# Patient Record
Sex: MALE | Race: WHITE | NOT HISPANIC OR LATINO | Employment: OTHER | URBAN - METROPOLITAN AREA
[De-identification: names, ages, dates, MRNs, and addresses within clinical notes are randomized per-mention and may not be internally consistent; named-entity substitution may affect disease eponyms.]

---

## 2017-01-05 ENCOUNTER — COMMUNICATION - HEALTHEAST (OUTPATIENT)
Dept: FAMILY MEDICINE | Facility: CLINIC | Age: 72
End: 2017-01-05

## 2017-01-05 DIAGNOSIS — E87.6 HYPOKALEMIA: ICD-10-CM

## 2017-01-09 ENCOUNTER — COMMUNICATION - HEALTHEAST (OUTPATIENT)
Dept: FAMILY MEDICINE | Facility: CLINIC | Age: 72
End: 2017-01-09

## 2017-01-12 ENCOUNTER — AMBULATORY - HEALTHEAST (OUTPATIENT)
Dept: LAB | Facility: CLINIC | Age: 72
End: 2017-01-12

## 2017-01-12 ENCOUNTER — COMMUNICATION - HEALTHEAST (OUTPATIENT)
Dept: NURSING | Facility: CLINIC | Age: 72
End: 2017-01-12

## 2017-01-12 DIAGNOSIS — E87.6 HYPOKALEMIA: ICD-10-CM

## 2017-01-12 DIAGNOSIS — I48.91 ATRIAL FIBRILLATION, UNSPECIFIED TYPE (H): ICD-10-CM

## 2017-01-26 ENCOUNTER — COMMUNICATION - HEALTHEAST (OUTPATIENT)
Dept: NURSING | Facility: CLINIC | Age: 72
End: 2017-01-26

## 2017-01-26 ENCOUNTER — AMBULATORY - HEALTHEAST (OUTPATIENT)
Dept: LAB | Facility: CLINIC | Age: 72
End: 2017-01-26

## 2017-01-26 DIAGNOSIS — I48.91 ATRIAL FIBRILLATION, UNSPECIFIED TYPE (H): ICD-10-CM

## 2017-02-17 ENCOUNTER — COMMUNICATION - HEALTHEAST (OUTPATIENT)
Dept: NURSING | Facility: CLINIC | Age: 72
End: 2017-02-17

## 2017-02-17 ENCOUNTER — AMBULATORY - HEALTHEAST (OUTPATIENT)
Dept: LAB | Facility: CLINIC | Age: 72
End: 2017-02-17

## 2017-02-17 DIAGNOSIS — I48.91 ATRIAL FIBRILLATION, UNSPECIFIED TYPE (H): ICD-10-CM

## 2017-02-19 ENCOUNTER — COMMUNICATION - HEALTHEAST (OUTPATIENT)
Dept: FAMILY MEDICINE | Facility: CLINIC | Age: 72
End: 2017-02-19

## 2017-02-19 DIAGNOSIS — E87.6 HYPOKALEMIA: ICD-10-CM

## 2017-03-02 ENCOUNTER — COMMUNICATION - HEALTHEAST (OUTPATIENT)
Dept: FAMILY MEDICINE | Facility: CLINIC | Age: 72
End: 2017-03-02

## 2017-03-02 DIAGNOSIS — E87.6 HYPOKALEMIA: ICD-10-CM

## 2017-03-07 ENCOUNTER — AMBULATORY - HEALTHEAST (OUTPATIENT)
Dept: LAB | Facility: CLINIC | Age: 72
End: 2017-03-07

## 2017-03-07 ENCOUNTER — COMMUNICATION - HEALTHEAST (OUTPATIENT)
Dept: NURSING | Facility: CLINIC | Age: 72
End: 2017-03-07

## 2017-03-07 DIAGNOSIS — I48.91 ATRIAL FIBRILLATION, UNSPECIFIED TYPE (H): ICD-10-CM

## 2017-03-21 ENCOUNTER — AMBULATORY - HEALTHEAST (OUTPATIENT)
Dept: LAB | Facility: CLINIC | Age: 72
End: 2017-03-21

## 2017-03-21 ENCOUNTER — COMMUNICATION - HEALTHEAST (OUTPATIENT)
Dept: FAMILY MEDICINE | Facility: CLINIC | Age: 72
End: 2017-03-21

## 2017-03-21 DIAGNOSIS — I48.91 ATRIAL FIBRILLATION, UNSPECIFIED TYPE (H): ICD-10-CM

## 2017-03-31 ENCOUNTER — COMMUNICATION - HEALTHEAST (OUTPATIENT)
Dept: FAMILY MEDICINE | Facility: CLINIC | Age: 72
End: 2017-03-31

## 2017-03-31 DIAGNOSIS — I48.91 ATRIAL FIBRILLATION (H): ICD-10-CM

## 2017-04-04 ENCOUNTER — COMMUNICATION - HEALTHEAST (OUTPATIENT)
Dept: FAMILY MEDICINE | Facility: CLINIC | Age: 72
End: 2017-04-04

## 2017-04-04 DIAGNOSIS — I48.91 ATRIAL FIBRILLATION (H): ICD-10-CM

## 2017-04-06 ENCOUNTER — COMMUNICATION - HEALTHEAST (OUTPATIENT)
Dept: SCHEDULING | Facility: CLINIC | Age: 72
End: 2017-04-06

## 2017-04-11 ENCOUNTER — HOME CARE/HOSPICE - HEALTHEAST (OUTPATIENT)
Dept: HOME HEALTH SERVICES | Facility: HOME HEALTH | Age: 72
End: 2017-04-11

## 2017-04-12 ENCOUNTER — COMMUNICATION - HEALTHEAST (OUTPATIENT)
Dept: FAMILY MEDICINE | Facility: CLINIC | Age: 72
End: 2017-04-12

## 2017-04-13 ENCOUNTER — COMMUNICATION - HEALTHEAST (OUTPATIENT)
Dept: LAB | Facility: CLINIC | Age: 72
End: 2017-04-13

## 2017-04-13 ENCOUNTER — OFFICE VISIT - HEALTHEAST (OUTPATIENT)
Dept: FAMILY MEDICINE | Facility: CLINIC | Age: 72
End: 2017-04-13

## 2017-04-13 DIAGNOSIS — I48.91 ATRIAL FIBRILLATION, UNSPECIFIED TYPE (H): ICD-10-CM

## 2017-04-13 DIAGNOSIS — Z87.440 HISTORY OF UTI: ICD-10-CM

## 2017-04-17 ENCOUNTER — OFFICE VISIT - HEALTHEAST (OUTPATIENT)
Dept: FAMILY MEDICINE | Facility: CLINIC | Age: 72
End: 2017-04-17

## 2017-04-17 ENCOUNTER — COMMUNICATION - HEALTHEAST (OUTPATIENT)
Dept: FAMILY MEDICINE | Facility: CLINIC | Age: 72
End: 2017-04-17

## 2017-04-17 ENCOUNTER — COMMUNICATION - HEALTHEAST (OUTPATIENT)
Dept: NURSING | Facility: CLINIC | Age: 72
End: 2017-04-17

## 2017-04-17 DIAGNOSIS — F03.90 DEMENTIA (H): ICD-10-CM

## 2017-04-17 DIAGNOSIS — I48.91 ATRIAL FIBRILLATION, UNSPECIFIED TYPE (H): ICD-10-CM

## 2017-04-17 DIAGNOSIS — R31.9 HEMATURIA: ICD-10-CM

## 2017-04-17 ASSESSMENT — MIFFLIN-ST. JEOR: SCORE: 1594.93

## 2017-04-26 ENCOUNTER — COMMUNICATION - HEALTHEAST (OUTPATIENT)
Dept: NURSING | Facility: CLINIC | Age: 72
End: 2017-04-26

## 2017-04-26 ENCOUNTER — AMBULATORY - HEALTHEAST (OUTPATIENT)
Dept: LAB | Facility: CLINIC | Age: 72
End: 2017-04-26

## 2017-04-26 DIAGNOSIS — I48.91 ATRIAL FIBRILLATION, UNSPECIFIED TYPE (H): ICD-10-CM

## 2017-05-05 ENCOUNTER — RECORDS - HEALTHEAST (OUTPATIENT)
Dept: ADMINISTRATIVE | Facility: OTHER | Age: 72
End: 2017-05-05

## 2017-05-05 ENCOUNTER — COMMUNICATION - HEALTHEAST (OUTPATIENT)
Dept: NURSING | Facility: CLINIC | Age: 72
End: 2017-05-05

## 2017-05-05 ENCOUNTER — AMBULATORY - HEALTHEAST (OUTPATIENT)
Dept: LAB | Facility: CLINIC | Age: 72
End: 2017-05-05

## 2017-05-05 DIAGNOSIS — I48.91 ATRIAL FIBRILLATION, UNSPECIFIED TYPE (H): ICD-10-CM

## 2017-05-09 ENCOUNTER — COMMUNICATION - HEALTHEAST (OUTPATIENT)
Dept: NURSING | Facility: CLINIC | Age: 72
End: 2017-05-09

## 2017-05-11 ENCOUNTER — HOSPITAL ENCOUNTER (OUTPATIENT)
Dept: CT IMAGING | Facility: CLINIC | Age: 72
Discharge: HOME | End: 2017-05-11
Attending: FAMILY MEDICINE

## 2017-05-11 DIAGNOSIS — Z87.440 HISTORY OF UTI: ICD-10-CM

## 2017-05-19 ENCOUNTER — COMMUNICATION - HEALTHEAST (OUTPATIENT)
Dept: NURSING | Facility: CLINIC | Age: 72
End: 2017-05-19

## 2017-05-19 ENCOUNTER — AMBULATORY - HEALTHEAST (OUTPATIENT)
Dept: LAB | Facility: CLINIC | Age: 72
End: 2017-05-19

## 2017-05-19 DIAGNOSIS — I48.91 ATRIAL FIBRILLATION, UNSPECIFIED TYPE (H): ICD-10-CM

## 2017-05-30 ENCOUNTER — COMMUNICATION - HEALTHEAST (OUTPATIENT)
Dept: FAMILY MEDICINE | Facility: CLINIC | Age: 72
End: 2017-05-30

## 2017-05-30 DIAGNOSIS — I10 ESSENTIAL HYPERTENSION: ICD-10-CM

## 2017-06-06 ENCOUNTER — OFFICE VISIT - HEALTHEAST (OUTPATIENT)
Dept: FAMILY MEDICINE | Facility: CLINIC | Age: 72
End: 2017-06-06

## 2017-06-06 ENCOUNTER — COMMUNICATION - HEALTHEAST (OUTPATIENT)
Dept: NURSING | Facility: CLINIC | Age: 72
End: 2017-06-06

## 2017-06-06 DIAGNOSIS — I48.91 ATRIAL FIBRILLATION, UNSPECIFIED TYPE (H): ICD-10-CM

## 2017-06-06 DIAGNOSIS — F03.90 DEMENTIA (H): ICD-10-CM

## 2017-06-06 DIAGNOSIS — H91.90 HEARING LOSS: ICD-10-CM

## 2017-06-06 DIAGNOSIS — I89.0 LYMPHEDEMA: ICD-10-CM

## 2017-06-06 DIAGNOSIS — N40.0 BPH (BENIGN PROSTATIC HYPERPLASIA): ICD-10-CM

## 2017-06-06 DIAGNOSIS — I48.91 ATRIAL FIBRILLATION (H): ICD-10-CM

## 2017-06-08 ENCOUNTER — AMBULATORY - HEALTHEAST (OUTPATIENT)
Dept: FAMILY MEDICINE | Facility: CLINIC | Age: 72
End: 2017-06-08

## 2017-06-08 ENCOUNTER — COMMUNICATION - HEALTHEAST (OUTPATIENT)
Dept: FAMILY MEDICINE | Facility: CLINIC | Age: 72
End: 2017-06-08

## 2017-06-08 DIAGNOSIS — E87.6 HYPOKALEMIA: ICD-10-CM

## 2017-06-15 ENCOUNTER — HOME CARE/HOSPICE - HEALTHEAST (OUTPATIENT)
Dept: HOME HEALTH SERVICES | Facility: HOME HEALTH | Age: 72
End: 2017-06-15

## 2017-06-16 ENCOUNTER — COMMUNICATION - HEALTHEAST (OUTPATIENT)
Dept: FAMILY MEDICINE | Facility: CLINIC | Age: 72
End: 2017-06-16

## 2017-06-16 DIAGNOSIS — R53.1 WEAKNESS: ICD-10-CM

## 2017-06-21 ENCOUNTER — COMMUNICATION - HEALTHEAST (OUTPATIENT)
Dept: FAMILY MEDICINE | Facility: CLINIC | Age: 72
End: 2017-06-21

## 2017-06-21 ENCOUNTER — COMMUNICATION - HEALTHEAST (OUTPATIENT)
Dept: NURSING | Facility: CLINIC | Age: 72
End: 2017-06-21

## 2017-06-21 ENCOUNTER — COMMUNICATION - HEALTHEAST (OUTPATIENT)
Dept: SCHEDULING | Facility: CLINIC | Age: 72
End: 2017-06-21

## 2017-06-26 ENCOUNTER — COMMUNICATION - HEALTHEAST (OUTPATIENT)
Dept: NURSING | Facility: CLINIC | Age: 72
End: 2017-06-26

## 2017-06-26 ENCOUNTER — OFFICE VISIT - HEALTHEAST (OUTPATIENT)
Dept: FAMILY MEDICINE | Facility: CLINIC | Age: 72
End: 2017-06-26

## 2017-06-26 DIAGNOSIS — I48.91 ATRIAL FIBRILLATION (H): ICD-10-CM

## 2017-06-26 DIAGNOSIS — E83.42 HYPOMAGNESEMIA: ICD-10-CM

## 2017-06-26 DIAGNOSIS — F03.90 DEMENTIA (H): ICD-10-CM

## 2017-06-26 DIAGNOSIS — J18.9 PNEUMONIA: ICD-10-CM

## 2017-06-26 DIAGNOSIS — I48.91 ATRIAL FIBRILLATION, UNSPECIFIED TYPE (H): ICD-10-CM

## 2017-06-26 DIAGNOSIS — E87.6 HYPOKALEMIA: ICD-10-CM

## 2017-06-27 ENCOUNTER — RECORDS - HEALTHEAST (OUTPATIENT)
Dept: ADMINISTRATIVE | Facility: OTHER | Age: 72
End: 2017-06-27

## 2017-07-10 ENCOUNTER — COMMUNICATION - HEALTHEAST (OUTPATIENT)
Dept: FAMILY MEDICINE | Facility: CLINIC | Age: 72
End: 2017-07-10

## 2017-07-10 DIAGNOSIS — H91.90 HEARING LOSS: ICD-10-CM

## 2017-08-06 ENCOUNTER — COMMUNICATION - HEALTHEAST (OUTPATIENT)
Dept: FAMILY MEDICINE | Facility: CLINIC | Age: 72
End: 2017-08-06

## 2017-08-06 DIAGNOSIS — F41.9 ANXIETY: ICD-10-CM

## 2017-09-12 ENCOUNTER — OFFICE VISIT - HEALTHEAST (OUTPATIENT)
Dept: CARDIOLOGY | Facility: CLINIC | Age: 72
End: 2017-09-12

## 2017-09-12 DIAGNOSIS — I10 ESSENTIAL HYPERTENSION WITH GOAL BLOOD PRESSURE LESS THAN 140/90: ICD-10-CM

## 2017-09-12 DIAGNOSIS — Z53.09 CONTRAINDICATION TO ANTICOAGULATION THERAPY: ICD-10-CM

## 2017-09-12 DIAGNOSIS — I48.20 CHRONIC ATRIAL FIBRILLATION (H): ICD-10-CM

## 2017-09-12 ASSESSMENT — MIFFLIN-ST. JEOR: SCORE: 1589.94

## 2017-09-13 ENCOUNTER — COMMUNICATION - HEALTHEAST (OUTPATIENT)
Dept: NURSING | Facility: CLINIC | Age: 72
End: 2017-09-13

## 2017-09-14 ENCOUNTER — COMMUNICATION - HEALTHEAST (OUTPATIENT)
Dept: FAMILY MEDICINE | Facility: CLINIC | Age: 72
End: 2017-09-14

## 2017-09-14 DIAGNOSIS — I10 HTN (HYPERTENSION): ICD-10-CM

## 2017-09-18 ENCOUNTER — COMMUNICATION - HEALTHEAST (OUTPATIENT)
Dept: CARDIOLOGY | Facility: CLINIC | Age: 72
End: 2017-09-18

## 2017-09-23 ENCOUNTER — COMMUNICATION - HEALTHEAST (OUTPATIENT)
Dept: FAMILY MEDICINE | Facility: CLINIC | Age: 72
End: 2017-09-23

## 2017-09-23 DIAGNOSIS — I48.91 ATRIAL FIBRILLATION (H): ICD-10-CM

## 2017-11-01 ENCOUNTER — COMMUNICATION - HEALTHEAST (OUTPATIENT)
Dept: CARDIOLOGY | Facility: CLINIC | Age: 72
End: 2017-11-01

## 2017-11-07 ENCOUNTER — COMMUNICATION - HEALTHEAST (OUTPATIENT)
Dept: FAMILY MEDICINE | Facility: CLINIC | Age: 72
End: 2017-11-07

## 2017-11-07 DIAGNOSIS — I10 ESSENTIAL HYPERTENSION: ICD-10-CM

## 2017-11-30 ENCOUNTER — OFFICE VISIT - HEALTHEAST (OUTPATIENT)
Dept: FAMILY MEDICINE | Facility: CLINIC | Age: 72
End: 2017-11-30

## 2017-11-30 DIAGNOSIS — N40.0 BPH (BENIGN PROSTATIC HYPERPLASIA): ICD-10-CM

## 2017-11-30 DIAGNOSIS — F03.90 DEMENTIA (H): ICD-10-CM

## 2017-11-30 DIAGNOSIS — I10 ESSENTIAL HYPERTENSION: ICD-10-CM

## 2017-11-30 DIAGNOSIS — I48.20 CHRONIC ATRIAL FIBRILLATION (H): ICD-10-CM

## 2017-11-30 LAB
CHOLEST SERPL-MCNC: 140 MG/DL
FASTING STATUS PATIENT QL REPORTED: NO
HDLC SERPL-MCNC: 41 MG/DL
LDLC SERPL CALC-MCNC: 86 MG/DL
TRIGL SERPL-MCNC: 67 MG/DL

## 2017-12-07 ENCOUNTER — COMMUNICATION - HEALTHEAST (OUTPATIENT)
Dept: FAMILY MEDICINE | Facility: CLINIC | Age: 72
End: 2017-12-07

## 2017-12-07 DIAGNOSIS — I48.91 ATRIAL FIBRILLATION (H): ICD-10-CM

## 2018-01-08 ENCOUNTER — COMMUNICATION - HEALTHEAST (OUTPATIENT)
Dept: FAMILY MEDICINE | Facility: CLINIC | Age: 73
End: 2018-01-08

## 2018-01-28 ENCOUNTER — COMMUNICATION - HEALTHEAST (OUTPATIENT)
Dept: FAMILY MEDICINE | Facility: CLINIC | Age: 73
End: 2018-01-28

## 2018-01-28 DIAGNOSIS — F41.9 ANXIETY: ICD-10-CM

## 2018-01-30 ENCOUNTER — OFFICE VISIT - HEALTHEAST (OUTPATIENT)
Dept: FAMILY MEDICINE | Facility: CLINIC | Age: 73
End: 2018-01-30

## 2018-01-30 DIAGNOSIS — R29.6 FREQUENT FALLS: ICD-10-CM

## 2018-01-30 DIAGNOSIS — H91.90 HEARING LOSS: ICD-10-CM

## 2018-01-30 DIAGNOSIS — F32.89 DEPRESSIVE DISORDER, ATYPICAL: ICD-10-CM

## 2018-01-30 DIAGNOSIS — G30.9 ALZHEIMER'S DEMENTIA WITHOUT BEHAVIORAL DISTURBANCE, UNSPECIFIED TIMING OF DEMENTIA ONSET: ICD-10-CM

## 2018-01-30 DIAGNOSIS — I48.20 CHRONIC ATRIAL FIBRILLATION (H): ICD-10-CM

## 2018-01-30 DIAGNOSIS — F41.1 ANXIETY STATE: ICD-10-CM

## 2018-01-30 DIAGNOSIS — R06.00 PAROXYSMAL DYSPNEA: ICD-10-CM

## 2018-01-30 DIAGNOSIS — F02.80 ALZHEIMER'S DEMENTIA WITHOUT BEHAVIORAL DISTURBANCE, UNSPECIFIED TIMING OF DEMENTIA ONSET: ICD-10-CM

## 2018-01-30 LAB
ATRIAL RATE - MUSE: 60 BPM
DIASTOLIC BLOOD PRESSURE - MUSE: NORMAL MMHG
INTERPRETATION ECG - MUSE: NORMAL
P AXIS - MUSE: NORMAL DEGREES
PR INTERVAL - MUSE: NORMAL MS
QRS DURATION - MUSE: 122 MS
QT - MUSE: 456 MS
QTC - MUSE: 451 MS
R AXIS - MUSE: -51 DEGREES
SYSTOLIC BLOOD PRESSURE - MUSE: NORMAL MMHG
T AXIS - MUSE: 68 DEGREES
VENTRICULAR RATE- MUSE: 59 BPM

## 2018-01-30 ASSESSMENT — MIFFLIN-ST. JEOR: SCORE: 1568.17

## 2018-02-06 ENCOUNTER — COMMUNICATION - HEALTHEAST (OUTPATIENT)
Dept: FAMILY MEDICINE | Facility: CLINIC | Age: 73
End: 2018-02-06

## 2018-02-06 DIAGNOSIS — F41.1 ANXIETY STATE: ICD-10-CM

## 2018-02-13 ENCOUNTER — HOSPITAL ENCOUNTER (OUTPATIENT)
Dept: CARDIOLOGY | Facility: CLINIC | Age: 73
Discharge: HOME | End: 2018-02-13
Attending: FAMILY MEDICINE

## 2018-02-13 DIAGNOSIS — I48.20 CHRONIC ATRIAL FIBRILLATION (H): ICD-10-CM

## 2018-02-13 DIAGNOSIS — R06.00 PAROXYSMAL DYSPNEA: ICD-10-CM

## 2018-04-10 ENCOUNTER — OFFICE VISIT - HEALTHEAST (OUTPATIENT)
Dept: PHYSICAL THERAPY | Facility: REHABILITATION | Age: 73
End: 2018-04-10

## 2018-04-10 DIAGNOSIS — M62.81 GENERALIZED MUSCLE WEAKNESS: ICD-10-CM

## 2018-04-10 DIAGNOSIS — R26.81 UNSTEADINESS ON FEET: ICD-10-CM

## 2018-04-24 ENCOUNTER — OFFICE VISIT - HEALTHEAST (OUTPATIENT)
Dept: PHYSICAL THERAPY | Facility: REHABILITATION | Age: 73
End: 2018-04-24

## 2018-04-24 DIAGNOSIS — R26.81 UNSTEADINESS ON FEET: ICD-10-CM

## 2018-04-24 DIAGNOSIS — M62.81 GENERALIZED MUSCLE WEAKNESS: ICD-10-CM

## 2018-04-25 ENCOUNTER — AMBULATORY - HEALTHEAST (OUTPATIENT)
Dept: LAB | Facility: CLINIC | Age: 73
End: 2018-04-25

## 2018-04-25 DIAGNOSIS — I48.20 CHRONIC ATRIAL FIBRILLATION (H): ICD-10-CM

## 2018-04-25 DIAGNOSIS — F02.80 ALZHEIMER'S DEMENTIA WITHOUT BEHAVIORAL DISTURBANCE, UNSPECIFIED TIMING OF DEMENTIA ONSET: ICD-10-CM

## 2018-04-25 DIAGNOSIS — G30.9 ALZHEIMER'S DEMENTIA WITHOUT BEHAVIORAL DISTURBANCE, UNSPECIFIED TIMING OF DEMENTIA ONSET: ICD-10-CM

## 2018-04-25 DIAGNOSIS — F41.1 ANXIETY STATE: ICD-10-CM

## 2018-04-25 DIAGNOSIS — F32.89 DEPRESSIVE DISORDER, ATYPICAL: ICD-10-CM

## 2018-04-25 DIAGNOSIS — R29.6 FREQUENT FALLS: ICD-10-CM

## 2018-04-25 DIAGNOSIS — R06.00 PAROXYSMAL DYSPNEA: ICD-10-CM

## 2018-04-25 LAB
ERYTHROCYTE [DISTWIDTH] IN BLOOD BY AUTOMATED COUNT: 12.6 % (ref 11–14.5)
HCT VFR BLD AUTO: 35.2 % (ref 40–54)
HGB BLD-MCNC: 12.1 G/DL (ref 14–18)
MCH RBC QN AUTO: 32.4 PG (ref 27–34)
MCHC RBC AUTO-ENTMCNC: 34.3 G/DL (ref 32–36)
MCV RBC AUTO: 94 FL (ref 80–100)
PLATELET # BLD AUTO: 264 THOU/UL (ref 140–440)
PMV BLD AUTO: 6.9 FL (ref 7–10)
RBC # BLD AUTO: 3.72 MILL/UL (ref 4.4–6.2)
WBC: 7 THOU/UL (ref 4–11)

## 2018-04-26 LAB
ANION GAP SERPL CALCULATED.3IONS-SCNC: 9 MMOL/L (ref 5–18)
BUN SERPL-MCNC: 17 MG/DL (ref 8–28)
CALCIUM SERPL-MCNC: 9.4 MG/DL (ref 8.5–10.5)
CHLORIDE BLD-SCNC: 102 MMOL/L (ref 98–107)
CO2 SERPL-SCNC: 28 MMOL/L (ref 22–31)
CREAT SERPL-MCNC: 0.9 MG/DL (ref 0.7–1.3)
GFR SERPL CREATININE-BSD FRML MDRD: >60 ML/MIN/1.73M2
GLUCOSE BLD-MCNC: 126 MG/DL (ref 70–125)
MAGNESIUM SERPL-MCNC: 1.4 MG/DL (ref 1.8–2.6)
POTASSIUM BLD-SCNC: 3.5 MMOL/L (ref 3.5–5)
SODIUM SERPL-SCNC: 139 MMOL/L (ref 136–145)
TSH SERPL DL<=0.005 MIU/L-ACNC: 1.78 UIU/ML (ref 0.3–5)

## 2018-04-27 ENCOUNTER — COMMUNICATION - HEALTHEAST (OUTPATIENT)
Dept: PHYSICAL THERAPY | Facility: REHABILITATION | Age: 73
End: 2018-04-27

## 2018-04-30 ENCOUNTER — COMMUNICATION - HEALTHEAST (OUTPATIENT)
Dept: FAMILY MEDICINE | Facility: CLINIC | Age: 73
End: 2018-04-30

## 2018-04-30 DIAGNOSIS — I10 ESSENTIAL HYPERTENSION: ICD-10-CM

## 2018-04-30 DIAGNOSIS — E87.6 HYPOKALEMIA: ICD-10-CM

## 2018-05-06 ENCOUNTER — AMBULATORY - HEALTHEAST (OUTPATIENT)
Dept: FAMILY MEDICINE | Facility: CLINIC | Age: 73
End: 2018-05-06

## 2018-05-06 DIAGNOSIS — E83.42 HYPOMAGNESEMIA: ICD-10-CM

## 2018-05-07 ENCOUNTER — COMMUNICATION - HEALTHEAST (OUTPATIENT)
Dept: FAMILY MEDICINE | Facility: CLINIC | Age: 73
End: 2018-05-07

## 2018-05-09 ENCOUNTER — COMMUNICATION - HEALTHEAST (OUTPATIENT)
Dept: SCHEDULING | Facility: CLINIC | Age: 73
End: 2018-05-09

## 2018-05-09 ENCOUNTER — COMMUNICATION - HEALTHEAST (OUTPATIENT)
Dept: FAMILY MEDICINE | Facility: CLINIC | Age: 73
End: 2018-05-09

## 2018-05-29 ENCOUNTER — AMBULATORY - HEALTHEAST (OUTPATIENT)
Dept: LAB | Facility: CLINIC | Age: 73
End: 2018-05-29

## 2018-05-29 DIAGNOSIS — E83.42 HYPOMAGNESEMIA: ICD-10-CM

## 2018-05-29 LAB — MAGNESIUM SERPL-MCNC: 1.2 MG/DL (ref 1.8–2.6)

## 2018-05-30 ENCOUNTER — COMMUNICATION - HEALTHEAST (OUTPATIENT)
Dept: FAMILY MEDICINE | Facility: CLINIC | Age: 73
End: 2018-05-30

## 2018-06-17 ENCOUNTER — COMMUNICATION - HEALTHEAST (OUTPATIENT)
Dept: FAMILY MEDICINE | Facility: CLINIC | Age: 73
End: 2018-06-17

## 2018-06-17 DIAGNOSIS — I48.91 ATRIAL FIBRILLATION (H): ICD-10-CM

## 2018-06-23 ENCOUNTER — COMMUNICATION - HEALTHEAST (OUTPATIENT)
Dept: FAMILY MEDICINE | Facility: CLINIC | Age: 73
End: 2018-06-23

## 2018-06-23 DIAGNOSIS — I48.91 A-FIB (H): ICD-10-CM

## 2018-07-03 ENCOUNTER — RECORDS - HEALTHEAST (OUTPATIENT)
Dept: ADMINISTRATIVE | Facility: OTHER | Age: 73
End: 2018-07-03

## 2018-07-31 ENCOUNTER — OFFICE VISIT - HEALTHEAST (OUTPATIENT)
Dept: FAMILY MEDICINE | Facility: CLINIC | Age: 73
End: 2018-07-31

## 2018-07-31 DIAGNOSIS — N40.0 BPH (BENIGN PROSTATIC HYPERPLASIA): ICD-10-CM

## 2018-07-31 DIAGNOSIS — I48.20 CHRONIC ATRIAL FIBRILLATION (H): ICD-10-CM

## 2018-07-31 DIAGNOSIS — R06.00 DYSPNEA: ICD-10-CM

## 2018-07-31 DIAGNOSIS — H91.90 HEARING LOSS: ICD-10-CM

## 2018-07-31 DIAGNOSIS — I10 ESSENTIAL HYPERTENSION: ICD-10-CM

## 2018-07-31 DIAGNOSIS — B37.2 YEAST DERMATITIS: ICD-10-CM

## 2018-07-31 DIAGNOSIS — F03.90 DEMENTIA (H): ICD-10-CM

## 2018-07-31 LAB
ALBUMIN SERPL-MCNC: 3.6 G/DL (ref 3.5–5)
ALP SERPL-CCNC: 91 U/L (ref 45–120)
ALT SERPL W P-5'-P-CCNC: 24 U/L (ref 0–45)
ANION GAP SERPL CALCULATED.3IONS-SCNC: 9 MMOL/L (ref 5–18)
AST SERPL W P-5'-P-CCNC: 67 U/L (ref 0–40)
BILIRUB SERPL-MCNC: 0.8 MG/DL (ref 0–1)
BNP SERPL-MCNC: 297 PG/ML (ref 0–69)
BUN SERPL-MCNC: 25 MG/DL (ref 8–28)
CALCIUM SERPL-MCNC: 10 MG/DL (ref 8.5–10.5)
CHLORIDE BLD-SCNC: 107 MMOL/L (ref 98–107)
CHOLEST SERPL-MCNC: 127 MG/DL
CO2 SERPL-SCNC: 27 MMOL/L (ref 22–31)
CREAT SERPL-MCNC: 1.08 MG/DL (ref 0.7–1.3)
ERYTHROCYTE [DISTWIDTH] IN BLOOD BY AUTOMATED COUNT: 12.8 % (ref 11–14.5)
FASTING STATUS PATIENT QL REPORTED: YES
GFR SERPL CREATININE-BSD FRML MDRD: >60 ML/MIN/1.73M2
GLUCOSE BLD-MCNC: 126 MG/DL (ref 70–125)
HCT VFR BLD AUTO: 35 % (ref 40–54)
HDLC SERPL-MCNC: 41 MG/DL
HGB BLD-MCNC: 12.3 G/DL (ref 14–18)
LDLC SERPL CALC-MCNC: 74 MG/DL
MAGNESIUM SERPL-MCNC: 1.9 MG/DL (ref 1.8–2.6)
MCH RBC QN AUTO: 32.4 PG (ref 27–34)
MCHC RBC AUTO-ENTMCNC: 35.2 G/DL (ref 32–36)
MCV RBC AUTO: 92 FL (ref 80–100)
PLATELET # BLD AUTO: 242 THOU/UL (ref 140–440)
PMV BLD AUTO: 7.4 FL (ref 7–10)
POTASSIUM BLD-SCNC: 3.6 MMOL/L (ref 3.5–5)
PROT SERPL-MCNC: 6.9 G/DL (ref 6–8)
RBC # BLD AUTO: 3.8 MILL/UL (ref 4.4–6.2)
SODIUM SERPL-SCNC: 143 MMOL/L (ref 136–145)
TRIGL SERPL-MCNC: 62 MG/DL
WBC: 9 THOU/UL (ref 4–11)

## 2018-07-31 ASSESSMENT — MIFFLIN-ST. JEOR: SCORE: 1592.21

## 2018-08-02 ENCOUNTER — AMBULATORY - HEALTHEAST (OUTPATIENT)
Dept: FAMILY MEDICINE | Facility: CLINIC | Age: 73
End: 2018-08-02

## 2018-08-02 DIAGNOSIS — R06.00 DYSPNEA: ICD-10-CM

## 2018-08-03 ENCOUNTER — COMMUNICATION - HEALTHEAST (OUTPATIENT)
Dept: FAMILY MEDICINE | Facility: CLINIC | Age: 73
End: 2018-08-03

## 2018-08-16 ENCOUNTER — HOSPITAL ENCOUNTER (OUTPATIENT)
Dept: CARDIOLOGY | Facility: CLINIC | Age: 73
Discharge: HOME | End: 2018-08-16
Attending: FAMILY MEDICINE

## 2018-08-16 DIAGNOSIS — R06.00 DYSPNEA: ICD-10-CM

## 2018-08-16 ASSESSMENT — MIFFLIN-ST. JEOR: SCORE: 1592.21

## 2018-08-17 LAB
AORTIC ROOT: 3.5 CM
AORTIC VALVE MEAN VELOCITY: 155 CM/S
AV DIMENSIONLESS INDEX VTI: 0.5
AV MEAN GRADIENT: 11 MMHG
AV PEAK GRADIENT: 16.3 MMHG
AV VALVE AREA: 1.6 CM2
AV VELOCITY RATIO: 0.5
BSA FOR ECHO PROCEDURE: 2.05 M2
CV BLOOD PRESSURE: NORMAL MMHG
CV ECHO HEIGHT: 69 IN
CV ECHO WEIGHT: 190 LBS
DOP CALC AO PEAK VEL: 202 CM/S
DOP CALC AO VTI: 43.3 CM
DOP CALC LVOT AREA: 3.46 CM2
DOP CALC LVOT DIAMETER: 2.1 CM
DOP CALC LVOT PEAK VEL: 92.9 CM/S
DOP CALC LVOT STROKE VOLUME: 69.9 CM3
DOP CALC MV VTI: 21.8 CM
DOP CALCLVOT PEAK VEL VTI: 20.2 CM
EJECTION FRACTION: 59 % (ref 55–75)
FRACTIONAL SHORTENING: 30.7 % (ref 28–44)
INTERVENTRICULAR SEPTUM IN END DIASTOLE: 1.4 CM (ref 0.6–1)
IVS/PW RATIO: 1.2
LA AREA 1: 23.4 CM2
LA AREA 2: 23.7 CM2
LEFT ATRIUM LENGTH: 5.62 CM
LEFT ATRIUM SIZE: 4 CM
LEFT ATRIUM VOLUME INDEX: 40.9 ML/M2
LEFT ATRIUM VOLUME: 83.9 ML
LEFT VENTRICLE CARDIAC INDEX: 0 L/MIN/M2
LEFT VENTRICLE CARDIAC OUTPUT: 0 L/MIN
LEFT VENTRICLE DIASTOLIC VOLUME INDEX: 63.9 CM3/M2 (ref 34–74)
LEFT VENTRICLE DIASTOLIC VOLUME: 131 CM3 (ref 62–150)
LEFT VENTRICLE HEART RATE: 0 BPM
LEFT VENTRICLE MASS INDEX: 128.1 G/M2
LEFT VENTRICLE SYSTOLIC VOLUME INDEX: 26.3 CM3/M2 (ref 11–31)
LEFT VENTRICLE SYSTOLIC VOLUME: 54 CM3 (ref 21–61)
LEFT VENTRICULAR INTERNAL DIMENSION IN DIASTOLE: 5.01 CM (ref 4.2–5.8)
LEFT VENTRICULAR INTERNAL DIMENSION IN SYSTOLE: 3.47 CM (ref 2.5–4)
LEFT VENTRICULAR MASS: 262.6 G
LEFT VENTRICULAR OUTFLOW TRACT MEAN GRADIENT: 2 MMHG
LEFT VENTRICULAR OUTFLOW TRACT MEAN VELOCITY: 69.4 CM/S
LEFT VENTRICULAR OUTFLOW TRACT PEAK GRADIENT: 3 MMHG
LEFT VENTRICULAR POSTERIOR WALL IN END DIASTOLE: 1.2 CM (ref 0.6–1)
LV STROKE VOLUME INDEX: 34.1 ML/M2
MITRAL VALVE DECELERATION SLOPE: 5230 MM/S2
MITRAL VALVE MEAN INFLOW VELOCITY: 73.1 CM/S
MITRAL VALVE PEAK VELOCITY: 104 CM/S
MITRAL VALVE PRESSURE HALF-TIME: 57 MS
MV AREA VTI: 3.21 CM2
MV AVERAGE E/E' RATIO: 10.2 CM/S
MV DECELERATION TIME: 201 MS
MV E'TISSUE VEL-LAT: 10.9 CM/S
MV E'TISSUE VEL-MED: 7.67 CM/S
MV LATERAL E/E' RATIO: 8.7
MV MEAN GRADIENT: 2 MMHG
MV MEDIAL E/E' RATIO: 12.4
MV PEAK E VELOCITY: 94.8 CM/S
MV PEAK GRADIENT: 4.3 MMHG
MV VALVE AREA BY CONTINUITY EQUATION: 3.2 CM2
MV VALVE AREA PRESSURE 1/2 METHOD: 3.9 CM2
NUC REST DIASTOLIC VOLUME INDEX: 3040 LBS
NUC REST SYSTOLIC VOLUME INDEX: 69 IN
TRICUSPID REGURGITATION PEAK PRESSURE GRADIENT: 18 MMHG
TRICUSPID VALVE ANULAR PLANE SYSTOLIC EXCURSION: 1.3 CM
TRICUSPID VALVE PEAK REGURGITANT VELOCITY: 212 CM/S

## 2018-09-13 ENCOUNTER — COMMUNICATION - HEALTHEAST (OUTPATIENT)
Dept: FAMILY MEDICINE | Facility: CLINIC | Age: 73
End: 2018-09-13

## 2018-09-13 DIAGNOSIS — I10 HTN (HYPERTENSION): ICD-10-CM

## 2018-09-23 ENCOUNTER — COMMUNICATION - HEALTHEAST (OUTPATIENT)
Dept: FAMILY MEDICINE | Facility: CLINIC | Age: 73
End: 2018-09-23

## 2018-09-23 DIAGNOSIS — I48.91 ATRIAL FIBRILLATION (H): ICD-10-CM

## 2018-10-02 ENCOUNTER — OFFICE VISIT - HEALTHEAST (OUTPATIENT)
Dept: FAMILY MEDICINE | Facility: CLINIC | Age: 73
End: 2018-10-02

## 2018-10-02 DIAGNOSIS — F32.89 DEPRESSIVE DISORDER, ATYPICAL: ICD-10-CM

## 2018-10-02 DIAGNOSIS — R06.00 DYSPNEA, UNSPECIFIED TYPE: ICD-10-CM

## 2018-10-03 ENCOUNTER — RECORDS - HEALTHEAST (OUTPATIENT)
Dept: LAB | Facility: HOSPITAL | Age: 73
End: 2018-10-03

## 2018-10-03 LAB
ALBUMIN SERPL-MCNC: 3.6 G/DL (ref 3.5–5)
ALP SERPL-CCNC: 76 U/L (ref 45–120)
ALT SERPL W P-5'-P-CCNC: 22 U/L (ref 0–45)
AST SERPL W P-5'-P-CCNC: 25 U/L (ref 0–40)
BILIRUB DIRECT SERPL-MCNC: 0.3 MG/DL
BILIRUB SERPL-MCNC: 1 MG/DL (ref 0–1)
PROT SERPL-MCNC: 7 G/DL (ref 6–8)

## 2018-10-18 ENCOUNTER — OFFICE VISIT - HEALTHEAST (OUTPATIENT)
Dept: FAMILY MEDICINE | Facility: CLINIC | Age: 73
End: 2018-10-18

## 2018-10-18 ENCOUNTER — HOSPITAL ENCOUNTER (OUTPATIENT)
Dept: RESPIRATORY THERAPY | Facility: CLINIC | Age: 73
Discharge: HOME | End: 2018-10-18
Attending: FAMILY MEDICINE

## 2018-10-18 DIAGNOSIS — I87.2 VENOUS (PERIPHERAL) INSUFFICIENCY: ICD-10-CM

## 2018-10-18 DIAGNOSIS — R06.00 DYSPNEA, UNSPECIFIED TYPE: ICD-10-CM

## 2018-10-18 LAB — HGB BLD-MCNC: 12.5 G/DL (ref 14–18)

## 2018-10-26 ENCOUNTER — COMMUNICATION - HEALTHEAST (OUTPATIENT)
Dept: FAMILY MEDICINE | Facility: CLINIC | Age: 73
End: 2018-10-26

## 2018-10-26 DIAGNOSIS — I87.2 VENOUS (PERIPHERAL) INSUFFICIENCY: ICD-10-CM

## 2018-11-05 ENCOUNTER — COMMUNICATION - HEALTHEAST (OUTPATIENT)
Dept: FAMILY MEDICINE | Facility: CLINIC | Age: 73
End: 2018-11-05

## 2018-11-05 DIAGNOSIS — I10 ESSENTIAL HYPERTENSION: ICD-10-CM

## 2018-12-10 ENCOUNTER — OFFICE VISIT - HEALTHEAST (OUTPATIENT)
Dept: FAMILY MEDICINE | Facility: CLINIC | Age: 73
End: 2018-12-10

## 2018-12-10 DIAGNOSIS — I48.20 CHRONIC ATRIAL FIBRILLATION (H): ICD-10-CM

## 2018-12-10 DIAGNOSIS — F03.90 DEMENTIA WITHOUT BEHAVIORAL DISTURBANCE, UNSPECIFIED DEMENTIA TYPE: ICD-10-CM

## 2018-12-10 DIAGNOSIS — I10 ESSENTIAL HYPERTENSION: ICD-10-CM

## 2018-12-23 ENCOUNTER — COMMUNICATION - HEALTHEAST (OUTPATIENT)
Dept: FAMILY MEDICINE | Facility: CLINIC | Age: 73
End: 2018-12-23

## 2018-12-23 DIAGNOSIS — F32.89 DEPRESSIVE DISORDER, ATYPICAL: ICD-10-CM

## 2018-12-28 ENCOUNTER — COMMUNICATION - HEALTHEAST (OUTPATIENT)
Dept: FAMILY MEDICINE | Facility: CLINIC | Age: 73
End: 2018-12-28

## 2018-12-28 DIAGNOSIS — I48.91 ATRIAL FIBRILLATION (H): ICD-10-CM

## 2019-01-13 ENCOUNTER — APPOINTMENT (OUTPATIENT)
Dept: CT IMAGING | Facility: CLINIC | Age: 74
End: 2019-01-13
Payer: COMMERCIAL

## 2019-01-13 ENCOUNTER — HOSPITAL ENCOUNTER (EMERGENCY)
Facility: CLINIC | Age: 74
Discharge: HOME | End: 2019-01-13
Attending: PHYSICIAN ASSISTANT | Admitting: PHYSICIAN ASSISTANT
Payer: COMMERCIAL

## 2019-01-13 VITALS
TEMPERATURE: 97.9 F | WEIGHT: 178 LBS | OXYGEN SATURATION: 99 % | HEART RATE: 83 BPM | RESPIRATION RATE: 20 BRPM | BODY MASS INDEX: 24.11 KG/M2 | SYSTOLIC BLOOD PRESSURE: 135 MMHG | HEIGHT: 72 IN | DIASTOLIC BLOOD PRESSURE: 76 MMHG

## 2019-01-13 DIAGNOSIS — S00.219A: ICD-10-CM
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
## 2019-01-13 PROCEDURE — 70450 CT HEAD/BRAIN W/O DYE: CPT

## 2019-01-13 PROCEDURE — G0463 HOSPITAL OUTPT CLINIC VISIT: HCPCS | Mod: 25

## 2019-01-13 PROCEDURE — 99214 OFFICE O/P EST MOD 30 MIN: CPT | Performed by: PHYSICIAN ASSISTANT

## 2019-01-13 ASSESSMENT — MIFFLIN-ST. JEOR: SCORE: 1600.4

## 2019-01-13 NOTE — ED AVS SNAPSHOT
[facility] Emergency Department  [address] [address]  Phone:  [phone]  Fax:  [phone]                                    Navdeep Spann   MRN: [id number]    Department:  [facility] Emergency Department   Date of Visit:  1/13/2019           After Visit Summary Signature Page    I have received my discharge instructions, and my questions have been answered. I have discussed any challenges I see with this plan with the nurse or doctor.    ..........................................................................................................................................  Patient/Patient Representative Signature      ..........................................................................................................................................  Patient Representative Print Name and Relationship to Patient    ..................................................               ................................................  Date                                   Time    ..........................................................................................................................................  Reviewed by Signature/Title    ...................................................              ..............................................  Date                                               Time          22EPIC Rev 08/18

## 2019-01-15 ASSESSMENT — ENCOUNTER SYMPTOMS
FEVER: 0
COLOR CHANGE: 0
PALPITATIONS: 0
COUGH: 0
LIGHT-HEADEDNESS: 0
NAUSEA: 0
NECK PAIN: 0
WOUND: 1
DIARRHEA: 0
NUMBNESS: 0
VOMITING: 0
DIZZINESS: 0
SHORTNESS OF BREATH: 0
BACK PAIN: 0
ABDOMINAL PAIN: 0
WHEEZING: 0
CHILLS: 0
WEAKNESS: 0
HEADACHES: 0

## 2019-01-15 NOTE — ED PROVIDER NOTES
History     Chief Complaint   Patient presents with     Laceration     HPI  Navdeep Spann is a 73 year old male who presents to the urgent care accompanied by friend with concern over laceration to his left eyebrow which occurred several hours prior to arrival.  Patient states that he was walking outside when he slipped on ice and fell forward.  He did not have any loss of consciousness.  He did however complain of feeling dazed/seeing stars  for several minutes.  Since then he denies any current headache, dizziness, lightheadedness, nausea, vomiting, photo or phonophobia, vision changes, eye pain, cough, dyspnea, wheezing.  He did not have any preceding chest pain, dizziness prior to fall.  He has not attempted any OTC treatments.  His tetanus vaccine is up-to-date per his report.  He is on Coumadin which he believes is for atrial fibrillation when asked directly.      Problem List:    There are no active problems to display for this patient.       Past Medical History:    No past medical history on file.    Past Surgical History:    No past surgical history on file.    Family History:    No family history on file.    Social History:  Marital Status:   [2]  Social History     Tobacco Use     Smoking status: Not on file   Substance Use Topics     Alcohol use: Not on file     Drug use: Not on file        Medications:      No current outpatient medications on file.      Review of Systems   Constitutional: Negative for chills and fever.   Respiratory: Negative for cough, shortness of breath and wheezing.    Cardiovascular: Negative for chest pain and palpitations.   Gastrointestinal: Negative for abdominal pain, diarrhea, nausea and vomiting.   Musculoskeletal: Negative for back pain and neck pain.   Skin: Positive for wound. Negative for color change and rash.   Neurological: Negative for dizziness, syncope, weakness, light-headedness, numbness and headaches.     Physical Exam   BP: 135/76  Pulse:  83  Temp: 97.9  F (36.6  C)  Resp: 20  Height: 182.9 cm (6')  Weight: 80.7 kg (178 lb)  SpO2: 99 %    Physical Exam   Constitutional: He is oriented to person, place, and time. He appears well-developed. No distress.   HENT:   Head: Normocephalic. Head is with abrasion. Head is without raccoon's eyes, without Lees's sign and without laceration. Hair is normal.       Right Ear: Tympanic membrane normal. No hemotympanum.   Left Ear: Tympanic membrane normal. No hemotympanum.   Nose: Nose normal.   Mouth/Throat: Uvula is midline, oropharynx is clear and moist and mucous membranes are normal.   Eyes: Conjunctivae and EOM are normal. Pupils are equal, round, and reactive to light. Right eye exhibits no discharge. Left eye exhibits no discharge.   Neck: Normal range of motion.   Neurological: He is alert and oriented to person, place, and time. He has normal strength and normal reflexes. He is not disoriented. He displays normal reflexes. No cranial nerve deficit or sensory deficit. GCS eye subscore is 4. GCS verbal subscore is 5. GCS motor subscore is 6.   Normal finger nose finger and rapid alternating movement testing    Skin: Skin is warm and dry. Abrasion and bruising noted. No laceration and no rash noted. No erythema.     ED Course        Procedures        Critical Care time:  none        Results for orders placed or performed during the hospital encounter of 01/13/19   Head CT w/o contrast    Narrative    CT SCAN OF THE HEAD WITHOUT CONTRAST   1/13/2019 7:36 PM     HISTORY: Patient fell, is on coumadin, denies current complaints.    TECHNIQUE:  Axial images of the head and coronal reformations without  IV contrast material. Radiation dose for this scan was reduced using  automated exposure control, adjustment of the mA and/or kV according  to patient size, or iterative reconstruction technique.    COMPARISON: None.    FINDINGS: No evidence of acute intracranial hemorrhage. No mass effect  or midline shift.  Extensive periventricular white matter hypodensities  are nonspecific, but most likely related to chronic microvascular  ischemic disease. Ventricular size is within normal limits without  evidence of hydrocephalus. No abnormal extra-axial fluid collection.  Chronic appearing lacunar infarct in the ventral right thalamus.    Small air-fluid layers within both maxillary sinuses.    Mild soft tissue swelling overlying the left preorbital region. No  underlying orbital fracture.      Impression    IMPRESSION:     1. No evidence of acute intracranial hemorrhage, mass, or herniation.  2. Marked periventricular white matter changes likely due to chronic  microvascular ischemic disease.  3. Chronic appearing lacunar infarct in the ventral right thalamus.  4. Nonspecific small air-fluid layers in the maxillary sinuses  bilaterally. This can be seen in acute sinusitis in the appropriate  clinical setting.  5. Mild soft tissue swelling overlying the left preorbital region. No  underlying orbital fracture appreciated.    CHAITANYA ZAMAN MD     Due to patient's anticoagulant use and persistent bleeding since timing of injury I did discuss her/benefits of closing superficial abrasion with surgical adhesive and patient elected to proceed.  Wound was cleaned with Hibiclens, normal saline.  He tolerated placement of glue without immediate complications.    Medications - No data to display    Assessments & Plan (with Medical Decision Making)     I have reviewed the nursing notes.    I have reviewed the findings, diagnosis, plan and need for follow up with the patient.          Medication List      There are no discharge medications for this visit.       Final diagnoses:   Abrasion of eyebrow   Fall, initial encounter     73-year-old male on Coumadin presents to the urgent care with concerns over laceration to his left eyebrow after he sustained a fall earlier today.  He had stable vital signs upon arrival.  Physical exam findings as  described above were significant for superficial abrasion to the left eyebrow with associated soft tissue swelling, minimal tenderness to palpation.  He had associated soft tissue swelling in the area.  He denies any significant headache or other concerning symptoms for concussion/clinically significant intracranial trauma however given his history of Coumadin use after reviewing the literature I did elect to obtain CT scan of his head which did not demonstrate any evidence of intracranial bleed, herniation.  He was discharged home stable with instructions for symptomatic treatment of cut.  Wound care instructions, signs of infection, worrisome reasons to return to ER/UC discussed.  Follow up as needed.      1/13/2019   [facility] EMERGENCY DEPARTMENT     Juliann Reed PA-C  01/15/19 [id number]

## 2019-01-26 ENCOUNTER — COMMUNICATION - HEALTHEAST (OUTPATIENT)
Dept: FAMILY MEDICINE | Facility: CLINIC | Age: 74
End: 2019-01-26

## 2019-01-26 DIAGNOSIS — I48.91 ATRIAL FIBRILLATION (H): ICD-10-CM

## 2019-02-14 ENCOUNTER — OFFICE VISIT - HEALTHEAST (OUTPATIENT)
Dept: FAMILY MEDICINE | Facility: CLINIC | Age: 74
End: 2019-02-14

## 2019-02-14 DIAGNOSIS — I10 ESSENTIAL HYPERTENSION: ICD-10-CM

## 2019-02-14 DIAGNOSIS — F32.89 DEPRESSIVE DISORDER, ATYPICAL: ICD-10-CM

## 2019-02-14 DIAGNOSIS — I48.20 CHRONIC ATRIAL FIBRILLATION (H): ICD-10-CM

## 2019-02-14 ASSESSMENT — MIFFLIN-ST. JEOR: SCORE: 1578.6

## 2019-02-18 ENCOUNTER — RECORDS - HEALTHEAST (OUTPATIENT)
Dept: LAB | Facility: HOSPITAL | Age: 74
End: 2019-02-18

## 2019-02-18 ENCOUNTER — RECORDS - HEALTHEAST (OUTPATIENT)
Dept: ADMINISTRATIVE | Facility: OTHER | Age: 74
End: 2019-02-18

## 2019-02-18 LAB
ALBUMIN SERPL-MCNC: 3.8 G/DL (ref 3.5–5)
ALP SERPL-CCNC: 106 U/L (ref 45–120)
ALT SERPL W P-5'-P-CCNC: 11 U/L (ref 0–45)
AST SERPL W P-5'-P-CCNC: 16 U/L (ref 0–40)
BILIRUB DIRECT SERPL-MCNC: 0.2 MG/DL
BILIRUB SERPL-MCNC: 0.7 MG/DL (ref 0–1)
PROT SERPL-MCNC: 7.4 G/DL (ref 6–8)

## 2019-02-27 ENCOUNTER — COMMUNICATION - HEALTHEAST (OUTPATIENT)
Dept: FAMILY MEDICINE | Facility: CLINIC | Age: 74
End: 2019-02-27

## 2019-02-27 DIAGNOSIS — I48.91 ATRIAL FIBRILLATION (H): ICD-10-CM

## 2019-03-13 ENCOUNTER — COMMUNICATION - HEALTHEAST (OUTPATIENT)
Dept: FAMILY MEDICINE | Facility: CLINIC | Age: 74
End: 2019-03-13

## 2019-03-13 DIAGNOSIS — I48.91 A-FIB (H): ICD-10-CM

## 2019-03-25 ENCOUNTER — COMMUNICATION - HEALTHEAST (OUTPATIENT)
Dept: FAMILY MEDICINE | Facility: CLINIC | Age: 74
End: 2019-03-25

## 2019-03-25 DIAGNOSIS — I48.91 ATRIAL FIBRILLATION (H): ICD-10-CM

## 2019-04-02 ENCOUNTER — OFFICE VISIT - HEALTHEAST (OUTPATIENT)
Dept: FAMILY MEDICINE | Facility: CLINIC | Age: 74
End: 2019-04-02

## 2019-04-02 DIAGNOSIS — R73.9 ELEVATED BLOOD SUGAR: ICD-10-CM

## 2019-04-02 DIAGNOSIS — F41.1 ANXIETY STATE: ICD-10-CM

## 2019-04-02 LAB — HBA1C MFR BLD: 5.8 % (ref 3.5–6)

## 2019-04-02 ASSESSMENT — MIFFLIN-ST. JEOR: SCORE: 1642.11

## 2019-04-18 ENCOUNTER — COMMUNICATION - HEALTHEAST (OUTPATIENT)
Dept: FAMILY MEDICINE | Facility: CLINIC | Age: 74
End: 2019-04-18

## 2019-04-18 DIAGNOSIS — E87.6 HYPOKALEMIA: ICD-10-CM

## 2019-04-23 ENCOUNTER — COMMUNICATION - HEALTHEAST (OUTPATIENT)
Dept: FAMILY MEDICINE | Facility: CLINIC | Age: 74
End: 2019-04-23

## 2019-04-23 DIAGNOSIS — I48.91 ATRIAL FIBRILLATION (H): ICD-10-CM

## 2019-05-04 ENCOUNTER — COMMUNICATION - HEALTHEAST (OUTPATIENT)
Dept: FAMILY MEDICINE | Facility: CLINIC | Age: 74
End: 2019-05-04

## 2019-05-04 DIAGNOSIS — F32.89 DEPRESSIVE DISORDER, ATYPICAL: ICD-10-CM

## 2019-05-05 ENCOUNTER — APPOINTMENT (OUTPATIENT)
Dept: CT IMAGING | Facility: CLINIC | Age: 74
End: 2019-05-05
Attending: EMERGENCY MEDICINE
Payer: COMMERCIAL

## 2019-05-05 ENCOUNTER — APPOINTMENT (OUTPATIENT)
Dept: GENERAL RADIOLOGY | Facility: CLINIC | Age: 74
DRG: 070 | End: 2019-05-05
Attending: PHYSICIAN ASSISTANT
Payer: COMMERCIAL

## 2019-05-05 ENCOUNTER — APPOINTMENT (OUTPATIENT)
Dept: MRI IMAGING | Facility: CLINIC | Age: 74
End: 2019-05-05
Attending: EMERGENCY MEDICINE
Payer: COMMERCIAL

## 2019-05-05 ENCOUNTER — HOSPITAL ENCOUNTER (EMERGENCY)
Facility: CLINIC | Age: 74
Discharge: ANOTHER HOSPITAL | End: 2019-05-05
Attending: EMERGENCY MEDICINE | Admitting: EMERGENCY MEDICINE
Payer: COMMERCIAL

## 2019-05-05 ENCOUNTER — HOSPITAL ENCOUNTER (INPATIENT)
Facility: CLINIC | Age: 74
LOS: 11 days | Discharge: NURSING, REHAB OR LONG-TERM CARE | DRG: 070 | End: 2019-05-16
Attending: INTERNAL MEDICINE | Admitting: INTERNAL MEDICINE
Payer: COMMERCIAL

## 2019-05-05 VITALS
OXYGEN SATURATION: 99 % | HEART RATE: 107 BPM | WEIGHT: 170 LBS | TEMPERATURE: 97.6 F | DIASTOLIC BLOOD PRESSURE: 52 MMHG | BODY MASS INDEX: 23.06 KG/M2 | RESPIRATION RATE: 20 BRPM | SYSTOLIC BLOOD PRESSURE: 148 MMHG

## 2019-05-05 DIAGNOSIS — G30.9 ALZHEIMER'S DEMENTIA WITHOUT BEHAVIORAL DISTURBANCE, UNSPECIFIED TIMING OF DEMENTIA ONSET: ICD-10-CM

## 2019-05-05 DIAGNOSIS — R41.82 ALTERED MENTAL STATUS, UNSPECIFIED ALTERED MENTAL STATUS TYPE: Primary | ICD-10-CM

## 2019-05-05 DIAGNOSIS — F02.80 ALZHEIMER'S DEMENTIA WITHOUT BEHAVIORAL DISTURBANCE, UNSPECIFIED TIMING OF DEMENTIA ONSET: ICD-10-CM

## 2019-05-05 DIAGNOSIS — R40.4 ALTERED LEVEL OF CONSCIOUSNESS: ICD-10-CM

## 2019-05-05 DIAGNOSIS — Z86.79 HISTORY OF ATRIAL FIBRILLATION: ICD-10-CM

## 2019-05-05 LAB
ABO + RH BLD: NORMAL
ABO + RH BLD: NORMAL
ALBUMIN SERPL-MCNC: 3.7 G/DL (ref 3.4–5)
ALBUMIN UR-MCNC: NEGATIVE MG/DL
ALP SERPL-CCNC: 109 U/L (ref 40–150)
ALT SERPL W P-5'-P-CCNC: 18 U/L (ref 0–70)
ANION GAP SERPL CALCULATED.3IONS-SCNC: 6 MMOL/L (ref 3–14)
APPEARANCE UR: CLEAR
AST SERPL W P-5'-P-CCNC: 18 U/L (ref 0–45)
BASE EXCESS BLDV CALC-SCNC: 3.2 MMOL/L
BASE EXCESS BLDV CALC-SCNC: 4.1 MMOL/L
BASE EXCESS BLDV CALC-SCNC: 4.6 MMOL/L
BASOPHILS # BLD AUTO: 0.1 10E9/L (ref 0–0.2)
BASOPHILS NFR BLD AUTO: 0.6 %
BILIRUB SERPL-MCNC: 0.6 MG/DL (ref 0.2–1.3)
BILIRUB UR QL STRIP: NEGATIVE
BLD GP AB SCN SERPL QL: NORMAL
BLOOD BANK CMNT PATIENT-IMP: NORMAL
BUN SERPL-MCNC: 14 MG/DL (ref 7–30)
CALCIUM SERPL-MCNC: 9.1 MG/DL (ref 8.5–10.1)
CHLORIDE SERPL-SCNC: 104 MMOL/L (ref 94–109)
CO2 SERPL-SCNC: 31 MMOL/L (ref 20–32)
COLOR UR AUTO: NORMAL
CREAT SERPL-MCNC: 1.2 MG/DL (ref 0.66–1.25)
DIFFERENTIAL METHOD BLD: ABNORMAL
DIGOXIN SERPL-MCNC: 0.7 UG/L (ref 0.5–2)
EOSINOPHIL # BLD AUTO: 0 10E9/L (ref 0–0.7)
EOSINOPHIL NFR BLD AUTO: 0.3 %
ERYTHROCYTE [DISTWIDTH] IN BLOOD BY AUTOMATED COUNT: 12.9 % (ref 10–15)
GFR SERPL CREATININE-BSD FRML MDRD: 60 ML/MIN/{1.73_M2}
GLUCOSE BLDC GLUCOMTR-MCNC: 132 MG/DL (ref 70–99)
GLUCOSE BLDC GLUCOMTR-MCNC: 146 MG/DL (ref 70–99)
GLUCOSE BLDC GLUCOMTR-MCNC: 184 MG/DL (ref 70–99)
GLUCOSE SERPL-MCNC: 204 MG/DL (ref 70–99)
GLUCOSE UR STRIP-MCNC: NEGATIVE MG/DL
HBA1C MFR BLD: 5.8 % (ref 0–5.6)
HCO3 BLDV-SCNC: 30 MMOL/L (ref 21–28)
HCT VFR BLD AUTO: 41.3 % (ref 40–53)
HGB BLD-MCNC: 13.7 G/DL (ref 13.3–17.7)
HGB UR QL STRIP: NEGATIVE
IMM GRANULOCYTES # BLD: 0 10E9/L (ref 0–0.4)
IMM GRANULOCYTES NFR BLD: 0.4 %
INR PPP: 1.05 (ref 0.86–1.14)
KETONES UR STRIP-MCNC: NEGATIVE MG/DL
LACTATE BLD-SCNC: 2 MMOL/L (ref 0.7–2)
LACTATE BLD-SCNC: 2.1 MMOL/L (ref 0.7–2)
LACTATE BLD-SCNC: 2.2 MMOL/L (ref 0.7–2)
LEUKOCYTE ESTERASE UR QL STRIP: NEGATIVE
LYMPHOCYTES # BLD AUTO: 1 10E9/L (ref 0.8–5.3)
LYMPHOCYTES NFR BLD AUTO: 9.6 %
MAGNESIUM SERPL-MCNC: 1.9 MG/DL (ref 1.6–2.3)
MCH RBC QN AUTO: 31.8 PG (ref 26.5–33)
MCHC RBC AUTO-ENTMCNC: 33.2 G/DL (ref 31.5–36.5)
MCV RBC AUTO: 96 FL (ref 78–100)
MONOCYTES # BLD AUTO: 0.7 10E9/L (ref 0–1.3)
MONOCYTES NFR BLD AUTO: 6 %
NEUTROPHILS # BLD AUTO: 9 10E9/L (ref 1.6–8.3)
NEUTROPHILS NFR BLD AUTO: 83.1 %
NITRATE UR QL: NEGATIVE
NRBC # BLD AUTO: 0 10*3/UL
NRBC BLD AUTO-RTO: 0 /100
O2/TOTAL GAS SETTING VFR VENT: 2 %
O2/TOTAL GAS SETTING VFR VENT: 2 %
O2/TOTAL GAS SETTING VFR VENT: ABNORMAL %
PCO2 BLDV: 49 MM HG (ref 40–50)
PCO2 BLDV: 52 MM HG (ref 40–50)
PCO2 BLDV: 52 MM HG (ref 40–50)
PH BLDV: 7.36 PH (ref 7.32–7.43)
PH BLDV: 7.38 PH (ref 7.32–7.43)
PH BLDV: 7.4 PH (ref 7.32–7.43)
PH UR STRIP: 6 PH (ref 5–7)
PLATELET # BLD AUTO: 192 10E9/L (ref 150–450)
PO2 BLDV: 28 MM HG (ref 25–47)
PO2 BLDV: 32 MM HG (ref 25–47)
PO2 BLDV: 37 MM HG (ref 25–47)
POTASSIUM SERPL-SCNC: 3.1 MMOL/L (ref 3.4–5.3)
POTASSIUM SERPL-SCNC: 3.5 MMOL/L (ref 3.4–5.3)
PROT SERPL-MCNC: 7.6 G/DL (ref 6.8–8.8)
RBC # BLD AUTO: 4.31 10E12/L (ref 4.4–5.9)
RBC #/AREA URNS AUTO: <1 /HPF (ref 0–2)
SODIUM SERPL-SCNC: 141 MMOL/L (ref 133–144)
SOURCE: NORMAL
SP GR UR STRIP: 1.02 (ref 1–1.03)
SPECIMEN EXP DATE BLD: NORMAL
UROBILINOGEN UR STRIP-MCNC: NORMAL MG/DL (ref 0–2)
WBC # BLD AUTO: 10.8 10E9/L (ref 4–11)
WBC #/AREA URNS AUTO: <1 /HPF (ref 0–5)

## 2019-05-05 PROCEDURE — 99292 CRITICAL CARE ADDL 30 MIN: CPT | Mod: Z6 | Performed by: EMERGENCY MEDICINE

## 2019-05-05 PROCEDURE — 12000000 ZZH R&B MED SURG/OB

## 2019-05-05 PROCEDURE — 82803 BLOOD GASES ANY COMBINATION: CPT | Performed by: EMERGENCY MEDICINE

## 2019-05-05 PROCEDURE — 99291 CRITICAL CARE FIRST HOUR: CPT | Mod: 25 | Performed by: EMERGENCY MEDICINE

## 2019-05-05 PROCEDURE — 80162 ASSAY OF DIGOXIN TOTAL: CPT | Performed by: EMERGENCY MEDICINE

## 2019-05-05 PROCEDURE — 96374 THER/PROPH/DIAG INJ IV PUSH: CPT | Mod: 59 | Performed by: EMERGENCY MEDICINE

## 2019-05-05 PROCEDURE — 93005 ELECTROCARDIOGRAM TRACING: CPT | Performed by: EMERGENCY MEDICINE

## 2019-05-05 PROCEDURE — 84132 ASSAY OF SERUM POTASSIUM: CPT | Performed by: PHYSICIAN ASSISTANT

## 2019-05-05 PROCEDURE — 81001 URINALYSIS AUTO W/SCOPE: CPT | Performed by: PHYSICIAN ASSISTANT

## 2019-05-05 PROCEDURE — 86850 RBC ANTIBODY SCREEN: CPT | Performed by: EMERGENCY MEDICINE

## 2019-05-05 PROCEDURE — 25000131 ZZH RX MED GY IP 250 OP 636 PS 637: Performed by: PHYSICIAN ASSISTANT

## 2019-05-05 PROCEDURE — 83036 HEMOGLOBIN GLYCOSYLATED A1C: CPT | Performed by: PHYSICIAN ASSISTANT

## 2019-05-05 PROCEDURE — 72125 CT NECK SPINE W/O DYE: CPT

## 2019-05-05 PROCEDURE — 25000128 H RX IP 250 OP 636: Performed by: EMERGENCY MEDICINE

## 2019-05-05 PROCEDURE — 00000146 ZZHCL STATISTIC GLUCOSE BY METER IP

## 2019-05-05 PROCEDURE — 36415 COLL VENOUS BLD VENIPUNCTURE: CPT | Performed by: PHYSICIAN ASSISTANT

## 2019-05-05 PROCEDURE — 25800030 ZZH RX IP 258 OP 636: Performed by: PHYSICIAN ASSISTANT

## 2019-05-05 PROCEDURE — 71045 X-RAY EXAM CHEST 1 VIEW: CPT

## 2019-05-05 PROCEDURE — 87040 BLOOD CULTURE FOR BACTERIA: CPT | Performed by: PHYSICIAN ASSISTANT

## 2019-05-05 PROCEDURE — 70450 CT HEAD/BRAIN W/O DYE: CPT | Mod: 59

## 2019-05-05 PROCEDURE — 70551 MRI BRAIN STEM W/O DYE: CPT

## 2019-05-05 PROCEDURE — 70498 CT ANGIOGRAPHY NECK: CPT

## 2019-05-05 PROCEDURE — 85610 PROTHROMBIN TIME: CPT | Performed by: EMERGENCY MEDICINE

## 2019-05-05 PROCEDURE — 86901 BLOOD TYPING SEROLOGIC RH(D): CPT | Performed by: EMERGENCY MEDICINE

## 2019-05-05 PROCEDURE — 93010 ELECTROCARDIOGRAM REPORT: CPT | Mod: Z6 | Performed by: EMERGENCY MEDICINE

## 2019-05-05 PROCEDURE — 85025 COMPLETE CBC W/AUTO DIFF WBC: CPT | Performed by: EMERGENCY MEDICINE

## 2019-05-05 PROCEDURE — 25000128 H RX IP 250 OP 636: Performed by: PHYSICIAN ASSISTANT

## 2019-05-05 PROCEDURE — 25000125 ZZHC RX 250: Performed by: PHYSICIAN ASSISTANT

## 2019-05-05 PROCEDURE — 83735 ASSAY OF MAGNESIUM: CPT | Performed by: PHYSICIAN ASSISTANT

## 2019-05-05 PROCEDURE — 25000125 ZZHC RX 250: Performed by: EMERGENCY MEDICINE

## 2019-05-05 PROCEDURE — 74176 CT ABD & PELVIS W/O CONTRAST: CPT

## 2019-05-05 PROCEDURE — 83605 ASSAY OF LACTIC ACID: CPT | Performed by: EMERGENCY MEDICINE

## 2019-05-05 PROCEDURE — 96361 HYDRATE IV INFUSION ADD-ON: CPT | Performed by: EMERGENCY MEDICINE

## 2019-05-05 PROCEDURE — 86900 BLOOD TYPING SEROLOGIC ABO: CPT | Performed by: EMERGENCY MEDICINE

## 2019-05-05 PROCEDURE — 99292 CRITICAL CARE ADDL 30 MIN: CPT | Performed by: EMERGENCY MEDICINE

## 2019-05-05 PROCEDURE — 99223 1ST HOSP IP/OBS HIGH 75: CPT | Mod: AI | Performed by: PHYSICIAN ASSISTANT

## 2019-05-05 PROCEDURE — 80053 COMPREHEN METABOLIC PANEL: CPT | Performed by: EMERGENCY MEDICINE

## 2019-05-05 PROCEDURE — 25800030 ZZH RX IP 258 OP 636: Performed by: EMERGENCY MEDICINE

## 2019-05-05 RX ORDER — LISINOPRIL 40 MG/1
40 TABLET ORAL DAILY
COMMUNITY

## 2019-05-05 RX ORDER — AMOXICILLIN 250 MG
2 CAPSULE ORAL 2 TIMES DAILY PRN
Status: DISCONTINUED | OUTPATIENT
Start: 2019-05-05 | End: 2019-05-16 | Stop reason: HOSPADM

## 2019-05-05 RX ORDER — POTASSIUM CHLORIDE 1.5 G/1.58G
20-40 POWDER, FOR SOLUTION ORAL
Status: DISCONTINUED | OUTPATIENT
Start: 2019-05-05 | End: 2019-05-16 | Stop reason: HOSPADM

## 2019-05-05 RX ORDER — NALOXONE HYDROCHLORIDE 0.4 MG/ML
.1-.4 INJECTION, SOLUTION INTRAMUSCULAR; INTRAVENOUS; SUBCUTANEOUS
Status: DISCONTINUED | OUTPATIENT
Start: 2019-05-05 | End: 2019-05-16 | Stop reason: HOSPADM

## 2019-05-05 RX ORDER — ACETAMINOPHEN 650 MG/1
650 SUPPOSITORY RECTAL EVERY 4 HOURS PRN
Status: DISCONTINUED | OUTPATIENT
Start: 2019-05-05 | End: 2019-05-16 | Stop reason: HOSPADM

## 2019-05-05 RX ORDER — POLYETHYLENE GLYCOL 3350 17 G/17G
17 POWDER, FOR SOLUTION ORAL DAILY PRN
Status: DISCONTINUED | OUTPATIENT
Start: 2019-05-05 | End: 2019-05-16 | Stop reason: HOSPADM

## 2019-05-05 RX ORDER — KETAMINE HCL IN 0.9 % NACL 50 MG/5 ML
100 SYRINGE (ML) INTRAVENOUS
Status: DISCONTINUED | OUTPATIENT
Start: 2019-05-05 | End: 2019-05-05

## 2019-05-05 RX ORDER — CITALOPRAM HYDROBROMIDE 20 MG/1
20 TABLET ORAL DAILY
COMMUNITY
Start: 2019-05-04 | End: 2021-07-29

## 2019-05-05 RX ORDER — NICOTINE POLACRILEX 4 MG
15-30 LOZENGE BUCCAL
Status: DISCONTINUED | OUTPATIENT
Start: 2019-05-05 | End: 2019-05-12

## 2019-05-05 RX ORDER — DIGOXIN 0.25 MG/ML
100 INJECTION INTRAMUSCULAR; INTRAVENOUS DAILY
Status: DISCONTINUED | OUTPATIENT
Start: 2019-05-06 | End: 2019-05-10

## 2019-05-05 RX ORDER — POTASSIUM CHLORIDE 1500 MG/1
20-40 TABLET, EXTENDED RELEASE ORAL
Status: DISCONTINUED | OUTPATIENT
Start: 2019-05-05 | End: 2019-05-16 | Stop reason: HOSPADM

## 2019-05-05 RX ORDER — POTASSIUM CHLORIDE 7.45 MG/ML
10 INJECTION INTRAVENOUS
Status: DISCONTINUED | OUTPATIENT
Start: 2019-05-05 | End: 2019-05-16 | Stop reason: HOSPADM

## 2019-05-05 RX ORDER — ONDANSETRON 4 MG/1
4 TABLET, ORALLY DISINTEGRATING ORAL EVERY 6 HOURS PRN
Status: DISCONTINUED | OUTPATIENT
Start: 2019-05-05 | End: 2019-05-16 | Stop reason: HOSPADM

## 2019-05-05 RX ORDER — AMOXICILLIN 250 MG
1 CAPSULE ORAL 2 TIMES DAILY PRN
Status: DISCONTINUED | OUTPATIENT
Start: 2019-05-05 | End: 2019-05-16 | Stop reason: HOSPADM

## 2019-05-05 RX ORDER — IOPAMIDOL 755 MG/ML
70 INJECTION, SOLUTION INTRAVASCULAR ONCE
Status: COMPLETED | OUTPATIENT
Start: 2019-05-05 | End: 2019-05-05

## 2019-05-05 RX ORDER — ALPRAZOLAM 0.25 MG
0.25 TABLET ORAL DAILY PRN
Status: ON HOLD | COMMUNITY
Start: 2019-04-02 | End: 2019-05-16

## 2019-05-05 RX ORDER — CHLORTHALIDONE 25 MG/1
25 TABLET ORAL DAILY
Status: ON HOLD | COMMUNITY
Start: 2018-09-13 | End: 2019-05-16

## 2019-05-05 RX ORDER — DEXTROSE MONOHYDRATE 25 G/50ML
25-50 INJECTION, SOLUTION INTRAVENOUS
Status: DISCONTINUED | OUTPATIENT
Start: 2019-05-05 | End: 2019-05-12

## 2019-05-05 RX ORDER — MAGNESIUM SULFATE HEPTAHYDRATE 40 MG/ML
4 INJECTION, SOLUTION INTRAVENOUS EVERY 4 HOURS PRN
Status: DISCONTINUED | OUTPATIENT
Start: 2019-05-05 | End: 2019-05-16 | Stop reason: HOSPADM

## 2019-05-05 RX ORDER — ACETAMINOPHEN 325 MG/1
650 TABLET ORAL EVERY 4 HOURS PRN
Status: DISCONTINUED | OUTPATIENT
Start: 2019-05-05 | End: 2019-05-16 | Stop reason: HOSPADM

## 2019-05-05 RX ORDER — PROCHLORPERAZINE 25 MG
12.5 SUPPOSITORY, RECTAL RECTAL EVERY 12 HOURS PRN
Status: DISCONTINUED | OUTPATIENT
Start: 2019-05-05 | End: 2019-05-16 | Stop reason: HOSPADM

## 2019-05-05 RX ORDER — SODIUM CHLORIDE 9 MG/ML
INJECTION, SOLUTION INTRAVENOUS CONTINUOUS
Status: DISCONTINUED | OUTPATIENT
Start: 2019-05-05 | End: 2019-05-07

## 2019-05-05 RX ORDER — DONEPEZIL HYDROCHLORIDE 10 MG/1
10 TABLET, FILM COATED ORAL AT BEDTIME
COMMUNITY
End: 2020-11-02

## 2019-05-05 RX ORDER — ASPIRIN 81 MG/1
81 TABLET ORAL DAILY
COMMUNITY
Start: 2019-03-17

## 2019-05-05 RX ORDER — POTASSIUM CHLORIDE 1500 MG/1
20 TABLET, EXTENDED RELEASE ORAL 3 TIMES DAILY
Status: ON HOLD | COMMUNITY
Start: 2019-04-19 | End: 2019-05-16

## 2019-05-05 RX ORDER — POTASSIUM CL/LIDO/0.9 % NACL 10MEQ/0.1L
10 INTRAVENOUS SOLUTION, PIGGYBACK (ML) INTRAVENOUS
Status: DISCONTINUED | OUTPATIENT
Start: 2019-05-05 | End: 2019-05-16 | Stop reason: HOSPADM

## 2019-05-05 RX ORDER — BISACODYL 10 MG
10 SUPPOSITORY, RECTAL RECTAL DAILY PRN
Status: DISCONTINUED | OUTPATIENT
Start: 2019-05-05 | End: 2019-05-16 | Stop reason: HOSPADM

## 2019-05-05 RX ORDER — FERROUS SULFATE 325(65) MG
1 TABLET ORAL
COMMUNITY
Start: 2015-05-26

## 2019-05-05 RX ORDER — METOPROLOL TARTRATE 1 MG/ML
2.5 INJECTION, SOLUTION INTRAVENOUS EVERY 4 HOURS PRN
Status: DISCONTINUED | OUTPATIENT
Start: 2019-05-05 | End: 2019-05-16 | Stop reason: HOSPADM

## 2019-05-05 RX ORDER — DIGOXIN 125 MCG
125 TABLET ORAL DAILY
COMMUNITY
Start: 2019-04-23

## 2019-05-05 RX ORDER — METOPROLOL TARTRATE 100 MG
150 TABLET ORAL 2 TIMES DAILY
Status: ON HOLD | COMMUNITY
Start: 2014-02-26 | End: 2019-05-16

## 2019-05-05 RX ORDER — PROCHLORPERAZINE MALEATE 5 MG
5 TABLET ORAL EVERY 6 HOURS PRN
Status: DISCONTINUED | OUTPATIENT
Start: 2019-05-05 | End: 2019-05-16 | Stop reason: HOSPADM

## 2019-05-05 RX ORDER — ONDANSETRON 2 MG/ML
4 INJECTION INTRAMUSCULAR; INTRAVENOUS EVERY 6 HOURS PRN
Status: DISCONTINUED | OUTPATIENT
Start: 2019-05-05 | End: 2019-05-16 | Stop reason: HOSPADM

## 2019-05-05 RX ORDER — MAGNESIUM OXIDE 400 MG/1
400 TABLET ORAL DAILY
COMMUNITY
Start: 2018-11-05

## 2019-05-05 RX ORDER — TRIMETHOPRIM 100 MG/1
100 TABLET ORAL DAILY
COMMUNITY

## 2019-05-05 RX ORDER — POTASSIUM CHLORIDE 29.8 MG/ML
20 INJECTION INTRAVENOUS
Status: DISCONTINUED | OUTPATIENT
Start: 2019-05-05 | End: 2019-05-16 | Stop reason: HOSPADM

## 2019-05-05 RX ORDER — LORAZEPAM 2 MG/ML
0.5 INJECTION INTRAMUSCULAR
Status: COMPLETED | OUTPATIENT
Start: 2019-05-05 | End: 2019-05-05

## 2019-05-05 RX ORDER — METOPROLOL TARTRATE 1 MG/ML
2.5 INJECTION, SOLUTION INTRAVENOUS EVERY 6 HOURS
Status: DISCONTINUED | OUTPATIENT
Start: 2019-05-05 | End: 2019-05-06 | Stop reason: ALTCHOICE

## 2019-05-05 RX ADMIN — Medication 10 MEQ: at 19:41

## 2019-05-05 RX ADMIN — Medication 10 MEQ: at 17:30

## 2019-05-05 RX ADMIN — SODIUM CHLORIDE, POTASSIUM CHLORIDE, SODIUM LACTATE AND CALCIUM CHLORIDE 500 ML: 600; 310; 30; 20 INJECTION, SOLUTION INTRAVENOUS at 09:10

## 2019-05-05 RX ADMIN — LORAZEPAM 0.5 MG: 2 INJECTION INTRAMUSCULAR; INTRAVENOUS at 11:07

## 2019-05-05 RX ADMIN — SODIUM CHLORIDE: 9 INJECTION, SOLUTION INTRAVENOUS at 17:26

## 2019-05-05 RX ADMIN — Medication 10 MEQ: at 20:40

## 2019-05-05 RX ADMIN — Medication 10 MEQ: at 18:35

## 2019-05-05 RX ADMIN — SODIUM CHLORIDE, POTASSIUM CHLORIDE, SODIUM LACTATE AND CALCIUM CHLORIDE 500 ML: 600; 310; 30; 20 INJECTION, SOLUTION INTRAVENOUS at 08:27

## 2019-05-05 RX ADMIN — INSULIN ASPART 1 UNITS: 100 INJECTION, SOLUTION INTRAVENOUS; SUBCUTANEOUS at 18:37

## 2019-05-05 RX ADMIN — SODIUM CHLORIDE 100 ML: 9 INJECTION, SOLUTION INTRAVENOUS at 07:25

## 2019-05-05 RX ADMIN — LORAZEPAM 0.5 MG: 2 INJECTION INTRAMUSCULAR; INTRAVENOUS at 08:08

## 2019-05-05 RX ADMIN — METOPROLOL TARTRATE 2.5 MG: 5 INJECTION, SOLUTION INTRAVENOUS at 20:46

## 2019-05-05 RX ADMIN — IOPAMIDOL 70 ML: 755 INJECTION, SOLUTION INTRAVENOUS at 07:25

## 2019-05-05 ASSESSMENT — ENCOUNTER SYMPTOMS
ALLERGIC/IMMUNOLOGIC NEGATIVE: 1
NEUROLOGICAL NEGATIVE: 1
PSYCHIATRIC NEGATIVE: 1
CARDIOVASCULAR NEGATIVE: 1
MUSCULOSKELETAL NEGATIVE: 1
RESPIRATORY NEGATIVE: 1
HEMATOLOGIC/LYMPHATIC NEGATIVE: 1
EYES NEGATIVE: 1
ENDOCRINE NEGATIVE: 1
GASTROINTESTINAL NEGATIVE: 1
CONSTITUTIONAL NEGATIVE: 1
WOUND: 1

## 2019-05-05 ASSESSMENT — ACTIVITIES OF DAILY LIVING (ADL): ADLS_ACUITY_SCORE: 15

## 2019-05-05 ASSESSMENT — MIFFLIN-ST. JEOR: SCORE: 1627.15

## 2019-05-05 NOTE — H&P
Admitted:     05/05/2019      HOSPITALIST ADMISSION      PRIMARY CARE PROVIDER:  Through the [facility].      CHIEF COMPLAINT:  Altered mental status.      History is provided by the patient's spouse over the phone due to the patient's altered mental status.      HISTORY OF PRESENT ILLNESS:  Navdeep Spann is a 74-year-old male with remote history of alcohol abuse, now in remission, cannabis use disorder, frequent UTIs on prophylactic therapy, BPH, depression/anxiety, dementia, DLD, HTN, and chronic atrial fibrillation who is a direct admission from [facility] Emergency Department where he was brought in by EMS after found on the ground around 6:30 a.m. on the day of admission.  His wife reports that they were up watching television until about 3:00 a.m. and this is when he was last seen normal.  She states she got up around 6:30 a.m. and found him lying on his back beside the futon that he was sleeping on.  She tried to wake him and he would open his eyes, but would not respond to questions, though he did babble incoherently.  Per the EMS report, he would follow commands like opening his mouth and sticking out his tongue.  EMS had appreciated left-sided hemiplegia as he would not move his left arm or leg and states that his pupils were pinpoint.      In the Emergency Department, a code stroke was called and subsequent imaging did not reveal any acute pathology, but did show chronic disease and prior infarction.  His overall lab work was unremarkable with the exception of mild hypokalemia, hyperglycemia and a mildly elevated lactate of 2.2.  He was treated with two 500 mL LR boluses as well as a total of 1 mg of Ativan.  He was discussed with Neurology, Dr. Genao, who determined that he was not a TPA candidate based off of last seen normal around 3:00 a.m.  Additionally, his EKG noted nonspecific T-wave changes with none available for comparison as well as rate controlled atrial fibrillation.  He had  "a normal digoxin level.  The patient was signed out to the accepting Hospitalist at [facility] with a GCS of 14.      The patient was evaluated at bedside and is minimally responsive to deep sternal rub.  He does withdraw to pain.  His breathing is unlabored and symmetric.  His pupils are approximately 3 mm and minimally responsive to light.  He moves all 4 extremities with withdrawing to pain.  There are no focal neurologic deficits appreciated, but unable to completely assess.  He does not follow commands to open his eyes, stick out his tongue or any other.  His heart rate is irregularly irregular but less than 100.      In conversation with his wife she states that he has a history of falling and notes that the patient is supposed to walk with a cane.  She states he has Alzheimer's dementia that was diagnosed approximately 3-4 years ago, but he is generally alert, interactive, and oriented to the situation.  He would usually be able to explain the events that led to such presentation today.  She states no recent medication changes other than discontinuing Prozac in February starting on citalopram for increased anxiety.  He was in chart review prescribed alprazolam as well and she states she has not used any of this p.r.n. medication.  Has history of alcohol abuse but has been abstinent since 1990 and previously smoked cannabis fairly regularly but has not used for approximately 1 year.  She states it is unlikely that he mixed up his medications or accidentally took something he was not supposed to, as she sets up his pills and directly gives them to him.  He has a history of accidentally taking his son's Clozaril 6-7 years ago and was reportedly a \"coma.\"  He has otherwise been in his usual state of health with no overt fevers or URI symptoms other than a cough that she is thought little of over the past month.  Notes he has a history of frequent UTIs and is on prophylactic trimethoprim for this.      PAST MEDICAL " HISTORY:   1.  Chronic atrial fibrillation.  Anticoagulation discontinued summer 2018, it appears due to frequent falls.   2.  Hypertension.   3.  Dyslipidemia.   4.  BPH.   5.  Depression/anxiety.   6.  Alzheimer's dementia.   7.  History cannabis use disorder.  Reportedly no use for approximately 1 year.   8.  History of alcohol abuse.  Abstinent since 1990.   9.  History frequent UTI, now on prophylactic trimethoprim.   10.  History of perforated diverticulitis.      PAST SURGICAL HISTORY:   1.  Tibial plateau arthroplasty 2008, left.  It looks like he has had a total knee arthroplasty in 1999 of the right knee and 2002 of the left arthroscopy.   2.  Appendectomy at age 16.   3.  TURP 2008.xt   4.  Laparoscopic colectomy with colostomy in 2014.   5.  PICC line in 2014.   6.  Laparoscopic-assisted colostomy takedown in 2015.      FAMILY HISTORY:  Mother:  Cancer.  Father:  Drowned.      SOCIAL HISTORY:  Reportedly, never smoker.  Quit drinking alcohol in 1990 after struggling with abuse.  No illicit drug use other than cannabis and last use was estimated around a year ago.  Reportedly walks with a cane at baseline.  The patient has longtime partner, significant other, Melva.  They are currently living with Melva's ex- in [place], [place].      PRIOR TO ADMISSION MEDICATIONS:  *      ALLERGIES:  No known drug allergies.      REVIEW OF SYSTEMS:  A complete review of systems was not obtained due to patient's altered mental status.      PHYSICAL EXAMINATION:   VITAL SIGNS:  Blood pressure 146/71, heart rate 78, temperature 97.6, respirations are 16, oxygen saturation 98%.   GENERAL:  Male who appears much older than stated age.  He looks comfortable lying on his back and breathing comfortably and snoring loudly.  He withdraws to sternal rub and pushing on a contusion on his right flank but otherwise does not open his eyes spontaneously or to command and does not follow any commands or respond to questions.    SKIN:  Warm, dry.  No rash or lesions on exposed skin.   HEENT:  Normocephalic, atraumatic.  EOMs not assessed as unable to follow commands.  Pupils minimally responsive but symmetric normal in size.  Dry mucous membranes and crowded oropharynx.   NECK:  Supple.  No cervical lymphadenopathy or JVD.   CHEST:  Breath sounds clear to auscultation with no increased work of breathing.   CARDIOVASCULAR:  Irregularly irregular.  No rub or murmur is appreciated.  Trace peripheral edema.   ABDOMEN:  Soft, nontender, nondistended.  Contusion with associated ecchymosis over right flank that is tender to palpation.   MUSCULOSKELETAL:  Moves all 4 extremities.   NEUROLOGIC:  Sleeping as above.  Unable to fully assess cranial nerves and strength.      LABORATORY DATA:  Sodium 141, potassium 3.1, creatinine 1.20, GFR 60, glucose 204.   WBC 10.8, hemoglobin 13.7, platelets 192.  INR 1.05.  Digoxin level is 0.7.       IMAGING:  MRI brain without contrast.   1.  No acute stroke. No mass or hemorrhage.  2.  The area of hypodensity in the left thalamus correlates with mild presumed chronic small vessel ischemic change. No associated well-defined lacunar infarct.  3.  Chronic lacunar infarct in the right thalamus.  4.  Advanced supratentorial and mild-to-moderate pontine presumed chronic small vessel ischemic change.    CTA head and neck:  HEAD CTA:  1.  Normal head CTA.  2.  No significant stenosis.  No aneurysm or vascular malformation.     NECK CTA:  1.  Normal neck CTA for age.  2.  No significant stenosis as per NASCET criteria.    CT cervical spine.  1.  No fracture or posttraumatic subluxation.  2.  No high-grade spinal canal stenosis.  3.  Degenerative change results in severe right C3-4, severe left C4-5 and severe right C5-6 neural foraminal stenoses.    CT head without contrast.  1.  Vague 13.1 x 8.4 mm left thalamic hypodensity more prominent than on 1/13/2019. This may be an acute infarct superimposed on a  chronic  infarct.  2.  No mass effect or hemorrhagic transformation.  3.  Small chronic right thalamic infarct.  4.  Advanced presumed chronic small vessel ischemic change.    CT chest, abdomen and pelvis.  1. No acute abnormality is identified. Specifically, no solid organ  injury is seen.  2. Mild mediastinal lymphadenopathy.  3. There is a 0.3 cm indeterminate nodule in the left lung base.    EKG. Atrial fibrillation with intraventricular conduction defect and nonspecific ST/T wave changes.     ASSESSMENT AND PLAN:  Navdeep Spann is a 74-year-old male with history of alcohol and cannabis use disorders now in remission, frequent UTIs on prophylactic medication, depression/anxiety, BPH, dementia, HTN, and chronic atrial fibrillation who is a direct admission from [facility] where he was brought in via EMS after found down with altered mental status around 6:30 a.m. on the day of admission.      1. Altered mental status.  2. Right flank contusion.  3. Unwitnessed fall.  Initially felt possibly CVA related as EMS reported left-sided hemiplegia and wife reported babbled speech.  CTH showed a possible stroke, but MRI showed no acute pathology; chronic infarction as well as chronic small vessel disease.  Prozac discontinued February in favor of starting citalopram; no other recent med changes.  No alcohol or drug use.  The patient's spouse sets up and gives the patient his medications.  No overt symptoms for infection other than a nonspecific cough for the last month that the spouse did not think much of.  Basic workup with a metabolic panel and CBC does not suggest overt pathology.  CT chest, abdomen and pelvis obtained without acute pathology.  CT cervical spine also obtained and did not show any acute pathology.  -- The patient reportedly left [facility] with GCS of 14 and currently his GCS is 4-5 as he only withdraws to pain.  He is breathing comfortably with no hypoxia or tachycardia and appears overall  "comfortable sleeping -- presumably from total 1mg IV ativan in ED.   -- Monitor on telemetry.   -- Neuro checks q.4 hours.  -- Obtain orthostatic vitals when able.  -- NPO until able to complete bedside swallow evaluation.  -- NS IVF 75mL/hour.  -- Obtain blood culture, CXR, urinalysis.  -- Overall low suspicion this was syncopal episode based on hx frequent falls, but will obtain echo with bubble study given initial concern for CVA and to eval structural/valve changes.  -- Appreciate Neurology input and will defer further stroke evaluation to their discretion.  -- Tylenol is available for pain control.    Hypokalemia.  Mild at 3.1.  Wife states appetite has been excellent lately.   -- Check magnesium.  -- Potassium and magnesium replacement protocols ordered.    Hyperglycemia. BG >200 with EMS and in ED.  -- QID AC HS glucose monitoring and low SSI available.  -- Check A1C.    Chronic atrial fibrillation.  Rate controlled with beta blocker.  Not on anticoagulation, appears due to history of frequent falls, Coumadin was discontinued in summer of 2018.   -- Schedule IV metoprolol 2.5mg Q6H and IV digoxin at reduced dose 100mcg daily until clear no swallowing risk.  -- PRN metoprolol for HR >120.    Hypertension, dyslipidemia.   -- PTA ASA, lisinopril 40mg, chlorthalidone 25mg, and Lopressor 150mg BID on hold while NPO.  -- Scheduled Lopressor IV as above and PRN hydralazine for SBP >180.  -- Monitor I&Os and daily weights.    Alzheimer's dementia.  Reportedly diagnosed 3-4 years ago following an episode where patient reportedly accidentally took his son's Clozaril prescription and went into a \"coma.\"  Then had neuropsychiatric testing at this was diagnosed.  Wife states this has been progressive.  Also states he is generally alert and interactive and oriented to situation.  Walks with a cane at baseline.  -- Resume PTA Aricept when tolerating PO.    Depression/anxiety.  Seen in chart review with increased anxiety of " late and Prozac was discontinued in February in favor of starting citalopram.  In April it appears he was given a limited prescription of alprazolam which the spouse states he has not yet used.   -- Resume PTA citalopram when tolerating PO.    History of alcohol abuse and cannabis use disorder.  Abstinent from alcohol since  and has not used cannabis for estimated 1 year.  Wife does not believe he has any access to alcohol or drugs that would be contributing to above presentation.    BPH and history of frequent UTI on prophylactic trimethoprim.   -- Hold PTA trimethoprim 100mg daily.  -- Check urinalysis as above.     Severe right C3-C4 C5-C6 and left C4-C5 foraminal stenosis. Incidental findings on CT c-spine.   -- Noted.    Deep venous thrombosis prophylaxis:  PCDs.      CODE STATUS:  Full code confirmed with patient's spouse at time of admission, she states she has POA paperwork, but has not yet filled these out.      DISPOSITION:  Anticipate discharge in greater than 2 days pending clinical course.      ATTESTATION:  This patient was discussed with Dr. Nohemy Broderick of the Hospitalist Service who is in agreement with my assessment and plan of care as outlined above.         NOHEMY BRODERICK DO       As dictated by JOANNA ULMEN BARTHELL, PA-C            D: 2019   T: 2019   MT: JORI      Name:     HELIO SEGUNDO   MRN:      [id number]        Account:      [id number]   :      1945        Admitted:     2019                   Document: [id number]

## 2019-05-05 NOTE — ED NOTES
[facility] here, report given. Emtala not signed as patient not able, RN signature X2. Monitoring transferred to [facility].

## 2019-05-05 NOTE — ED PROVIDER NOTES
History     Chief Complaint   Patient presents with     Cerebrovascular Accident     last known well 0300, found on floor, decreased responsiveness, unknown if fell     HPI  Navdeep Spann is a 74 year old male who arrived from home by EMS for evaluation for altered level of consciousness after an unwitnessed fall.  History is limited from the patient due to his presentation.  History obtained from EMS who report patient was found on the ground at his home.  He was last seen/ normal around 3 AM when his girlfriend found him on a carpeted floor.  EMS reports blood sugar was in the 240s.  He was hypertensive on transport.  He arrived for stroke evaluation without peripheral IV access.  Patient was seen upon arrival with 10 minutes of pre-notification.  Upon arrival patient responded to verbal commands opening his mouth, and sticking out his tongue.  He had a left-hemiplegia and would not move left arm or leg.  His pupils were pinpoint.  symmetric chest rise with equal breath sounds.  He had an abrasion about his right flank.  Additional history was obtained from his long-term Partner- ( Melva) who later arrived in the emergency department during course of care..  She reported they are currently staying with her ex- in [facility].  They have been living there since December 2018.  Originally from [place].  Patient is reported to be off warfarin as of June 2017.  Patient is normally ambulatory with a cane. Prior history of bilateral knee replacement and lymphedema.History of  Alzheimer's but no known history of strokes.  Patient is on benazepril, chlorthalidone, digoxin, ferrous sulfate, metoprolol and trimethoprim for history of urinary tract infections.  He is a retiree who used to work for [employer]  and retired at the age of 56 after working there for 40 years. Girl friend reports she found him on the floor this morning around 3AM. He was sleeping in a futon next to her. She did not  hear him fall. He was supine on a carpeted floor.    Allergies:  No Known Allergies    Problem List:    There are no active problems to display for this patient.       Past Medical History:    History reviewed. No pertinent past medical history.    Past Surgical History:    No past surgical history on file.    Family History:    No family history on file.    Social History:  Marital Status:   [2]  Social History     Tobacco Use     Smoking status: None   Substance Use Topics     Alcohol use: None     Drug use: None        Medications:      No current outpatient medications on file.      Review of Systems   Constitutional: Negative.         Unwitnessed fall at home.  Last known normal at 3 AM.  Found in the floor by his girlfriend   HENT: Negative.    Eyes: Negative.    Respiratory: Negative.    Cardiovascular: Negative.    Gastrointestinal: Negative.    Endocrine: Negative.    Genitourinary: Negative.    Musculoskeletal: Negative.    Skin: Positive for wound (abrasion overlying right flank).   Allergic/Immunologic: Negative.    Neurological: Negative.    Hematological: Negative.    Psychiatric/Behavioral: Negative.        Physical Exam   BP: (!) 160/117  Pulse: 79  Heart Rate: 76  Temp: 97.6  F (36.4  C)  Resp: 14  Weight: 77.1 kg (170 lb)  SpO2: 94 %      Physical Exam   Constitutional: He appears listless.   HENT:   Head: Normocephalic and atraumatic.   Neck: Normal range of motion. Neck supple. No thyromegaly present.   Pulmonary/Chest: Effort normal.   Abdominal: Soft. He exhibits no distension and no mass. There is no tenderness. There is no rebound and no guarding. No hernia.   Musculoskeletal: He exhibits no tenderness or deformity.   Lymphadenopathy:     He has no cervical adenopathy.   Neurological: He appears listless. GCS eye subscore is 3. GCS verbal subscore is 4. GCS motor subscore is 5.   Skin: There is pallor.       ED Course        Procedures               EKG Interpretation:      Interpreted  by Nadeem Almazan  Time reviewed: 08:35  Symptoms at time of EKG: altered level of consciousness   Rhythm: atrial fibrillation - controlled and atrial fibrillation - rapid  Rate: Normal  Axis: Normal  Ectopy: premature ventricular contractions (unifocal)  Conduction: nonspecific interventricular conduction block  ST Segments/ T Waves: Non-specific ST-T wave changes  Q Waves: nonspecific  Comparison to prior: No old EKG available    Clinical Impression: Rate controlled atrial fibrillation with ectopic beats unifocal PVCs with nonspecific T wave changes without old EKG for comparison.        Critical Care time:  was 90 minutes for this patient excluding procedures.               ED medications:  Medications   lactated ringers BOLUS 500 mL (0 mLs Intravenous Stopped 5/5/19 0910)   iopamidol (ISOVUE-370) solution 70 mL (70 mLs Intravenous Given 5/5/19 0725)   sodium chloride 0.9 % bag 500mL for CT scan flush use (100 mLs Intravenous Given 5/5/19 0725)   LORazepam (ATIVAN) injection 0.5 mg (0.5 mg Intravenous Given 5/5/19 1107)   lactated ringers BOLUS 500 mL (0 mLs Intravenous Stopped 5/5/19 1018)       ED labs and imaging:  Results for orders placed or performed during the hospital encounter of 05/05/19   CT Head w/o Contrast    Narrative    HEAD CT AND CT ANGIOGRAM OF THE HEAD AND NECK WITHOUT AND WITH  CONTRAST  5/5/2019 7:41 AM     COMPARISON: Head CT 1/13/2019    HISTORY: Last known normal at 3 AM.  Found on the ground. Right  hemiparesis.    TECHNIQUE:    HEAD CT: Axial CT images of the head from the skull base to the vertex  were acquired without IV contrast.  HEAD AND NECK CTA: Precontrast localizing scans were followed by CT  angiography with an injection of 70 mL Isovue-370 nonionic intravenous  contrast material with scans through the head and neck.  Images were  transferred to a separate 3-D workstation where multiplanar  reformations and 3-D images were created.  Estimates of carotid  stenoses are  made relative to the distal internal carotid artery  diameters except as noted.      FINDINGS:   HEAD CT:  INTRACRANIAL CONTENTS: No mass or hemorrhage. Vague 13.1 x 8.4 mm left  thalamic hypodensity, slightly more prominent than on the prior exam.  This may be an acute infarct superimposed on a chronic infarct.  Chronic lacunar infarct in the right thalamus. Advanced presumed  chronic small vessel ischemic change throughout the supratentorial  parenchyma.    VISUALIZED ORBITS/SINUSES/MASTOIDS: No significant orbital  abnormality. No significant paranasal sinus mucosal disease. No  significant middle ear or mastoid effusion.    OSSEOUS STRUCTURES/SOFT TISSUES: No significant abnormality.    HEAD CTA:  ANTERIOR CIRCULATION: No significant stenosis or occlusion. Standard  [place] anatomy.    POSTERIOR CIRCULATION: No significant stenosis or occlusion. Balanced  vertebral arteries supply a normal basilar artery.    ANEURYSM/VASCULAR MALFORMATION: None.    NECK CTA:  RIGHT CAROTID: Less than 40% stenosis in the right ICA based on NASCET  criteria.    LEFT CAROTID: Less than 40% stenosis in the left ICA based on NASCET  criteria.     VERTEBRAL ARTERIES: Balanced vertebral arteries are patent in the neck  and into the head.     AORTIC ARCH: Classic aortic arch anatomy with no significant stenosis  at the origin of the great vessels.    OTHER: Normal neck soft tissues. Normal visualized lung apices.      Impression    IMPRESSION:  HEAD CT:  1.  Vague 13.1 x 8.4 mm left thalamic hypodensity more prominent than  on 1/13/2019. This may be an acute infarct superimposed on a chronic  infarct.  2.  No mass effect or hemorrhagic transformation.  3.  Small chronic right thalamic infarct.  4.  Advanced presumed chronic small vessel ischemic change.    HEAD CTA:  1.  Normal head CTA.  2.  No significant stenosis.  No aneurysm or vascular malformation.    NECK CTA:  1.  Normal neck CTA  for age.  2.  No significant stenosis  as per NASCET criteria.    Nonenhanced head CT findings were discussed with Dr. Almazan at 7:50  AM hours and head and neck CTA findings were discussed with Dr. Almazan at 8:30 AM hours on 5/5/2019.    Radiation dose for this scan was reduced using automated exposure  control, adjustment of the mA and/or kV according to patient size, or  iterative reconstruction technique.    ANDRES SABA MD   CT Head Neck Angio w/o & w Contrast    Narrative    HEAD CT AND CT ANGIOGRAM OF THE HEAD AND NECK WITHOUT AND WITH  CONTRAST  5/5/2019 7:41 AM     COMPARISON: Head CT 1/13/2019    HISTORY: Last known normal at 3 AM.  Found on the ground. Right  hemiparesis.    TECHNIQUE:    HEAD CT: Axial CT images of the head from the skull base to the vertex  were acquired without IV contrast.  HEAD AND NECK CTA: Precontrast localizing scans were followed by CT  angiography with an injection of 70 mL Isovue-370 nonionic intravenous  contrast material with scans through the head and neck.  Images were  transferred to a separate 3-D workstation where multiplanar  reformations and 3-D images were created.  Estimates of carotid  stenoses are made relative to the distal internal carotid artery  diameters except as noted.      FINDINGS:   HEAD CT:  INTRACRANIAL CONTENTS: No mass or hemorrhage. Vague 13.1 x 8.4 mm left  thalamic hypodensity, slightly more prominent than on the prior exam.  This may be an acute infarct superimposed on a chronic infarct.  Chronic lacunar infarct in the right thalamus. Advanced presumed  chronic small vessel ischemic change throughout the supratentorial  parenchyma.    VISUALIZED ORBITS/SINUSES/MASTOIDS: No significant orbital  abnormality. No significant paranasal sinus mucosal disease. No  significant middle ear or mastoid effusion.    OSSEOUS STRUCTURES/SOFT TISSUES: No significant abnormality.    HEAD CTA:  ANTERIOR CIRCULATION: No significant stenosis or occlusion. Standard  [place]  anatomy.    POSTERIOR CIRCULATION: No significant stenosis or occlusion. Balanced  vertebral arteries supply a normal basilar artery.    ANEURYSM/VASCULAR MALFORMATION: None.    NECK CTA:  RIGHT CAROTID: Less than 40% stenosis in the right ICA based on NASCET  criteria.    LEFT CAROTID: Less than 40% stenosis in the left ICA based on NASCET  criteria.     VERTEBRAL ARTERIES: Balanced vertebral arteries are patent in the neck  and into the head.     AORTIC ARCH: Classic aortic arch anatomy with no significant stenosis  at the origin of the great vessels.    OTHER: Normal neck soft tissues. Normal visualized lung apices.      Impression    IMPRESSION:  HEAD CT:  1.  Vague 13.1 x 8.4 mm left thalamic hypodensity more prominent than  on 1/13/2019. This may be an acute infarct superimposed on a chronic  infarct.  2.  No mass effect or hemorrhagic transformation.  3.  Small chronic right thalamic infarct.  4.  Advanced presumed chronic small vessel ischemic change.    HEAD CTA:  1.  Normal head CTA.  2.  No significant stenosis.  No aneurysm or vascular malformation.    NECK CTA:  1.  Normal neck CTA  for age.  2.  No significant stenosis as per NASCET criteria.    Nonenhanced head CT findings were discussed with Dr. Almazan at 7:50  AM hours and head and neck CTA findings were discussed with Dr. Almazan at 8:30 AM hours on 5/5/2019.    Radiation dose for this scan was reduced using automated exposure  control, adjustment of the mA and/or kV according to patient size, or  iterative reconstruction technique.    ANDRES SABA MD   CT Chest Abdomen Pelvis w/o Contrast    Narrative    CT CHEST, ABDOMEN, AND PELVIS WITHOUT CONTRAST  5/5/2019 8:33 AM    HISTORY: Altered level of consciousness.  Found on the ground last  known normal at 3 AM.  Evaluate for acute process related to blunt  trauma with fall. Specifically solid organ injury    COMPARISON: None.    TECHNIQUE: Routine transverse CT imaging of the chest, abdomen,  and  pelvis was performed without intravenous contrast. Radiation dose for  this scan was reduced using automated exposure control, adjustment of  the mA and/or kV according to patient size, or iterative  reconstruction technique.    FINDINGS:     Chest: The heart size is normal. There are a few mildly enlarged  mediastinal lymph nodes, largest measuring 1.4 cm in short axis  dimension in the precarinal location. No other abnormal mediastinal  mass is seen. There is calcification within the thoracic aorta and  coronary arteries. The vascular structures are otherwise unremarkable,  allowing for the absence of contrast. There is mild atelectasis in  both posterior lung bases. There is a 0.3 cm nodule in the left lung  base demonstrated on series 5 image 28. There is also a 0.3 cm  calcified granuloma of the right posterior lower lobe on series 2  image 25. The lungs are otherwise clear. No pneumothorax is  demonstrated. No pleural effusion is identified. There are  degenerative changes in the spine. No other osseous abnormality is  seen. No chest wall pathology is seen.     Abdomen and pelvis: The liver, spleen, pancreas, gallbladder, and  adrenal glands are normal. There is a 0.2 cm nonobstructing calculus  in the anterior aspect of the right kidney. There appear to be  bilateral bladder diverticula. No other urinary tract abnormality is  seen. No enlarged lymph node or other abnormal mass is demonstrated.  No free fluid is seen. No free intraperitoneal gas is identified. The  gastrointestinal tract is unremarkable. The appendix is not  identified. There is no additional evidence of appendicitis. There is  calcification in the vascular structures. There are prominent  degenerative changes in the spine. No other osseous abnormality is  seen. No abdominal or pelvic wall pathology is demonstrated.       Impression    IMPRESSION:   1. No acute abnormality is identified. Specifically, no solid organ  injury is seen.  2.  Mild mediastinal lymphadenopathy.  3. There is a 0.3 cm indeterminate nodule in the left lung base.  Recommendations for one or multiple incidental lung nodules < 6mm :    Low risk patients: No routine follow-up.    High risk patients: Optional follow-up CT at 12 months; if  unchanged, no further follow-up.    *Low Risk: Minimal or absent history of smoking or other known risk  factors.  *Nonsolid (ground glass) or partly solid nodules may require longer  follow-up to exclude indolent adenocarcinoma.  *Recommendations based on Guidelines for the Management of Incidental  Pulmonary Nodules Detected at CT: From the Fleischner Society 2017,  Radiology 2017.    HERIBERTO MIRANDA MD   Cervical spine CT w/o contrast    Narrative    CT OF THE CERVICAL SPINE WITHOUT CONTRAST   5/5/2019 7:59 AM     COMPARISON: None    HISTORY: Traumatic neck injury. Status post fall. Right hemiparesis.    TECHNIQUE:  Axial images of the cervical spine were acquired without  intravenous contrast. Multiplanar reformations were created.      FINDINGS:   VERTEBRA: Normal vertebral body heights and alignment. No fracture or  posttraumatic subluxation.    CANAL/FORAMINA: No high-grade spinal canal stenosis. Degenerative  change results in severe right C3-4, severe left C4-5 and severe right  C5-6 neural foraminal stenoses.    PARASPINAL: No extraspinal abnormality. Visualized lung fields are  clear.      Impression    IMPRESSION:  CERVICAL SPINE CT:  1.  No fracture or posttraumatic subluxation.  2.  No high-grade spinal canal stenosis.  3.  Degenerative change results in severe right C3-4, severe left C4-5  and severe right C5-6 neural foraminal stenoses.    Radiation dose for this scan was reduced using automated exposure  control, adjustment of the mA and/or kV according to patient size, or  iterative reconstruction technique    ANDRES SABA MD   MR Brain w/o Contrast    Narrative    MRI OF THE BRAIN WITHOUT CONTRAST   5/5/2019 11:32 AM      HISTORY: Abnormal head CT. Possible acute on chronic left thalamic  infarct.    COMPARISON: Head CT 5/5/2019.    TECHNIQUE: Sagittal T1-weighted, axial T2-weighted, axial FLAIR, and  axial diffusion-weighted MR images of the brain were acquired without  intravenous contrast.    FINDINGS:   INTRACRANIAL CONTENTS: No acute or subacute infarct. No mass, acute  hemorrhage, or extra-axial fluid collections. Extensive supratentorial  white matter T2 prolongation associated with moderate generalized  volume loss, presumably related to advanced chronic small vessel  ischemic change. Mild-to-moderate presumed chronic small vessel  ischemic change in the zul[place]. Chronic lacunar infarct in the right  thalamus. Mild presumed chronic small vessel ischemic change in the  left thalamus correlating with the abnormality on CT. No acute or  chronic lacunar infarct in this area. Normal position of the  cerebellar tonsils.    SELLA: No significant abnormality accounting for technique.    OSSEOUS STRUCTURES/SOFT TISSUES: No aggressive osseous lesion  involving the calvarium, skull base, or visualized upper cervical  spine. The major intracranial vascular flow voids are maintained.    ORBITS: No significant abnormality accounting for technique.    SINUSES/MASTOIDS: No significant paranasal sinus mucosal disease. No  significant middle ear or mastoid effusion.       Impression    IMPRESSION:  HEAD MRI:  1.  No acute stroke. No mass or hemorrhage.  2.  The area of hypodensity in the left thalamus correlates with mild  presumed chronic small vessel ischemic change. No associated  well-defined lacunar infarct.  3.  Chronic lacunar infarct in the right thalamus.  4.  Advanced supratentorial and mild-to-moderate pontine presumed  chronic small vessel ischemic change.    ANDRES SABA MD   CBC with platelets differential   Result Value Ref Range    WBC 10.8 4.0 - 11.0 10e9/L    RBC Count 4.31 (L) 4.4 - 5.9 10e12/L    Hemoglobin 13.7 13.3 -  17.7 g/dL    Hematocrit 41.3 40.0 - 53.0 %    MCV 96 78 - 100 fl    MCH 31.8 26.5 - 33.0 pg    MCHC 33.2 31.5 - 36.5 g/dL    RDW 12.9 10.0 - 15.0 %    Platelet Count 192 150 - 450 10e9/L    Diff Method Automated Method     % Neutrophils 83.1 %    % Lymphocytes 9.6 %    % Monocytes 6.0 %    % Eosinophils 0.3 %    % Basophils 0.6 %    % Immature Granulocytes 0.4 %    Nucleated RBCs 0 0 /100    Absolute Neutrophil 9.0 (H) 1.6 - 8.3 10e9/L    Absolute Lymphocytes 1.0 0.8 - 5.3 10e9/L    Absolute Monocytes 0.7 0.0 - 1.3 10e9/L    Absolute Eosinophils 0.0 0.0 - 0.7 10e9/L    Absolute Basophils 0.1 0.0 - 0.2 10e9/L    Abs Immature Granulocytes 0.0 0 - 0.4 10e9/L    Absolute Nucleated RBC 0.0    INR   Result Value Ref Range    INR 1.05 0.86 - 1.14   Comprehensive metabolic panel   Result Value Ref Range    Sodium 141 133 - 144 mmol/L    Potassium 3.1 (L) 3.4 - 5.3 mmol/L    Chloride 104 94 - 109 mmol/L    Carbon Dioxide 31 20 - 32 mmol/L    Anion Gap 6 3 - 14 mmol/L    Glucose 204 (H) 70 - 99 mg/dL    Urea Nitrogen 14 7 - 30 mg/dL    Creatinine 1.20 0.66 - 1.25 mg/dL    GFR Estimate 60 (L) >60 mL/min/[1.73_m2]    GFR Estimate If Black 69 >60 mL/min/[1.73_m2]    Calcium 9.1 8.5 - 10.1 mg/dL    Bilirubin Total 0.6 0.2 - 1.3 mg/dL    Albumin 3.7 3.4 - 5.0 g/dL    Protein Total 7.6 6.8 - 8.8 g/dL    Alkaline Phosphatase 109 40 - 150 U/L    ALT 18 0 - 70 U/L    AST 18 0 - 45 U/L   Lactic acid whole blood   Result Value Ref Range    Lactic Acid 2.1 (H) 0.7 - 2.0 mmol/L   Blood gas venous   Result Value Ref Range    Ph Venous 7.36 7.32 - 7.43 pH    PCO2 Venous 52 (H) 40 - 50 mm Hg    PO2 Venous 32 25 - 47 mm Hg    Bicarbonate Venous 30 (H) 21 - 28 mmol/L    Base Excess Venous 3.2 mmol/L    FIO2 2    Glucose by meter   Result Value Ref Range    Glucose 184 (H) 70 - 99 mg/dL   Digoxin level   Result Value Ref Range    Digoxin Level 0.7 0.5 - 2.0 ug/L   Lactic acid whole blood   Result Value Ref Range    Lactic Acid 2.2 (H) 0.7 - 2.0  mmol/L   Blood gas venous   Result Value Ref Range    Ph Venous 7.38 7.32 - 7.43 pH    PCO2 Venous 52 (H) 40 - 50 mm Hg    PO2 Venous 28 25 - 47 mm Hg    Bicarbonate Venous 30 (H) 21 - 28 mmol/L    Base Excess Venous 4.1 mmol/L    FIO2 2    Blood gas venous   Result Value Ref Range    Ph Venous 7.40 7.32 - 7.43 pH    PCO2 Venous 49 40 - 50 mm Hg    PO2 Venous 37 25 - 47 mm Hg    Bicarbonate Venous 30 (H) 21 - 28 mmol/L    Base Excess Venous 4.6 mmol/L    FIO2 2l/02/nc    Lactic acid whole blood   Result Value Ref Range    Lactic Acid 2.0 0.7 - 2.0 mmol/L   ABO/Rh type and screen   Result Value Ref Range    ABO O     RH(D) Pos     Antibody Screen Neg     Test Valid Only At [facility]        Specimen Expires 05/08/2019            ED Vitals:  Vitals:    05/05/19 0950 05/05/19 1000 05/05/19 1010 05/05/19 1200   BP: 157/86 160/88 148/52    Pulse: 73 76 107    Resp: 11 23 21 22   Temp:       TempSrc:       SpO2: 99% 99% 99% 99%   Weight:         National Institutes of Health Stroke Scale  Exam Interval: Baseline   Score    Level of consciousness: (2)   Not alert; repeat stim to attend strong stim/pain to move    LOC questions: (2)   Answers neither question correctly    LOC commands: (2)   Performs neither task correctly    Best gaze: (1)   Partial gaze palsy    Visual: 3mm pupils, not reactive to light    Facial palsy: (3)   Complete paralysis of one or both sides    Motor arm (left): (4)   No movement    Motor arm (right): (2)   Some effort against gravity and withdraws to painful stimuli    Motor leg (left): (4)   No movement    Motor leg (right): (2)   Some effort against gravity    Limb ataxia: (2)   Present in two limbs    Sensory: (2)   Severe to total sensory loss    Best language: (3)   Mute- global aphasia    Dysarthria: (2)   No spontaneous verbal response    Extinction and inattention: (2)   Profound nicholas-inattention / extinction > one modality        Total Score:  33     Assessments & Plan (with  Medical Decision Making)   Clinical impression: 74-year-old male with history of atrial fibrillation who arrived  by EMS for evaluation for concern for altered level of consciousness after an unwitnessed fall.  Exact cause of his episode when he was found down is not clear.   History was limited on arrival as there was no family at the bedside additional history obtained during course of care and from EMS on arrival..    History obtained from EMS was  patient was found on the ground.  He was last known normal at 3 AM when he was found by his girlfriend-( Melva) on the ground.  EMS subsequently transported the patient who arrived as a stroke evaluation.  Patient was seen upon arrival.  Code stroke was initiated as patient had a GCS of 12 responded to painful stimuli, followed verbal commands by opening his mouth.  He had pinpoint pupils, minimally reactive.  He had no scalp hematoma or contusion. NIH on ED arrival was 33. GCS 12.   Symmetric chest rise on lung exam.  No rubs or gallops appreciated.  His abdomen was soft, non-distended, with bruising about his right lower flank and abdomen.  His pelvis was stable.  EMS reports patient withdrew to pain during  attempts to place a peripheral IV. Hypertensive in transport (SBP~ 170s).  A code stroke was initiated upon arrival with concern for symptoms presumed onset at 3 AM as patient was last known normal and seen at 3 AM.  Additional history obtained from long-term partner who later arrived in the emergency department stating patient was sleeping next to her in a futon and was found on carpeted floor at 3 AM.  Melva assumed patient got up to go to the bathroom.  She did not hear him fall.  Patient is normally ambulatory independently with a cane.  He does have a history of lymphedema ,r bilateral knee replacement. History of atrial fibrillation.  Patient is reported to have been off of Coumadin because of fall risk since June 2017        ED course and plan:  I  "reviewed patient's medical records.  He was last seen in urgent care on January 13, 2019.  With altered level of consciousness post unwitnessed fall- code stroke was initiated upon arrival.  CT imaging of the head, CTA (head and neck) and CTchest/ abdomen/ pelvis was obtained without contrast to limit contrast load to evaluate for solid organ injury, to rule out intracranial hemorrhage related to blunt trauma with fall.    Patient was monitored on end-tidal CO2.  Patient was accompanied by nursing to CT for code stroke imaging.  I reviewed records provided by EMS. His long-term partner Melva who later arrived in the emergency department with her son provided additional history.    Received a phone call from the on-duty interpreting radiologist- Dr Zambrano at 7:47 AM stating CT head showed a chronic right thalamic infarct and a subacute left thalamic infarct.  Additional phone calls at 8:35 AM stating no acute process on CTA. Dr Zambrano recommended an MRI of the brain without contrast because of concern for a left thalamic lesion. Patient required IV ketamine for sedation for MRI as he was \" fidgeting and squirming\".  To ensure good quality of MRI imaging patient was given IV ketamine and monitored by nursing while in diagnostic imaging.  CT cervical spine was negative for acute bony process. Cervical collar removed (after placement on ED arrival).  See report above. MRI was negative acute stroke. See additional findings in report above.    I reviewed arrival presentation with Dr Dontrell Genao- on-duty neurologist at the [facility] at 7.50AM who reviewed images. Patient is not IV TPA candidate with patient found on the ground at 3AM.  Dr. Genao also reviewed CT and CTA imaging.  Plan was to transfer to [facility] once a bed was available for further care and evaluation.   Dr Genao agreed to assume care on transfer at 8.35AM    INR on ED arrival-1.05.  EKG during course of care showed rate controlled atrial fibrillation " with ectopic beats unifocal PVCs.  There was no old EKG for comparison, subtle T wave changes noted- nonspecific with no old EKG for comparison. Lactate on arrival was 2.1, and trended. Normal digoxin level.  Plan of care including rationale for transfer was reviewed with his long-term partner- (Melva).    I spoke with Dr. Genao at 9.35AM again about his level of consciousness with degree of alertness, to ensure comfort with level of care for admission. No clear indication for emergent intubation. At 11.20AM-I was notified that patient may not be transferred for another 4 to 6 hours to the [facility].  I elected to transfer the patient to [facility].     I spoke with  Dr ASHOK Broderick- admitting hospitalist at  [facility]-who agreed to assume care upon transfer for further care with plan for Dr. Dontrell Genao- stroke neurologist to continue to manage patient in consultation.  No acute solid organ injury related to unwitnessed fall.        Critical care-90 minutes.          Disclaimer: This note consists of symbols derived from keyboarding, dictation and/or voice recognition software. As a result, there may be errors in the script that have gone undetected. Please consider this when interpreting information found in this chart.  I have reviewed the nursing notes.    I have reviewed the findings, diagnosis, plan and need for follow up with the patient.          Medication List      There are no discharge medications for this visit.         Final diagnoses:   Altered level of consciousness - uncertain cause. Last Known/ at baseline around 3AM   History of atrial fibrillation - Was anticoagulated until June 2017 5/5/2019   [facility] EMERGENCY DEPARTMENT     Nadeem Almazan MD  05/05/19 1667

## 2019-05-05 NOTE — PHARMACY-ADMISSION MEDICATION HISTORY
Admission medication history interview status for the 5/5/2019  admission is complete. See EPIC admission navigator for prior to admission medications     Medication history source reliability:Poor    Actions taken by pharmacist (provider contacted, etc):None     Additional medication history information not noted on PTA med list :source [facility] Medlist, last doses unknown    Medication reconciliation/reorder completed by provider prior to medication history? No    Time spent in this activity: 30min    Prior to Admission medications    Medication Sig Last Dose Taking? Auth Provider   ALPRAZolam (XANAX) 0.25 MG tablet Take 0.25 mg by mouth daily as needed for anxiety  Yes Unknown, Entered By History   aspirin 81 MG EC tablet Take 81 mg by mouth daily  Yes Unknown, Entered By History   chlorthalidone (HYGROTON) 25 MG tablet Take 25 mg by mouth daily  Yes Unknown, Entered By History   citalopram (CELEXA) 20 MG tablet Take 20 mg by mouth daily  Yes Unknown, Entered By History   digoxin (DIGOX) 125 MCG tablet Take 125 mcg by mouth daily  Yes Unknown, Entered By History   ferrous sulfate (FEROSUL) 325 (65 Fe) MG tablet Take 1 tablet by mouth 3 times daily (with meals)  Yes Unknown, Entered By History   magnesium oxide (MAG-OX) 400 MG tablet Take 400 mg by mouth daily  Yes Unknown, Entered By History   metoprolol tartrate (LOPRESSOR) 100 MG tablet Take 150 mg by mouth 2 times daily  Yes Unknown, Entered By History   potassium chloride ER (KLOR-CON) 20 MEQ CR tablet Take 20 mEq by mouth 3 times daily  Yes Unknown, Entered By History   cyanocobalamin (VITAMIN B-12) 2500 MCG SUBL sublingual tablet Place 2,500 mcg under the tongue daily   Unknown, Entered By History   donepezil (ARICEPT) 10 MG tablet Take 10 mg by mouth daily   Unknown, Entered By History   lisinopril (PRINIVIL/ZESTRIL) 40 MG tablet Take 40 mg by mouth daily   Unknown, Entered By History   trimethoprim (TRIMPEX) 100 MG tablet Take 100 mg by mouth daily    Unknown, Entered By History

## 2019-05-05 NOTE — PROGRESS NOTES
Hospitalist admission note dictated. Confirmation #: [id number].    JoAnna Barthell, PA-C  5/5/2019   4:45 PM

## 2019-05-06 ENCOUNTER — APPOINTMENT (OUTPATIENT)
Dept: GENERAL RADIOLOGY | Facility: CLINIC | Age: 74
DRG: 070 | End: 2019-05-06
Attending: INTERNAL MEDICINE
Payer: COMMERCIAL

## 2019-05-06 ENCOUNTER — APPOINTMENT (OUTPATIENT)
Dept: CARDIOLOGY | Facility: CLINIC | Age: 74
DRG: 070 | End: 2019-05-06
Attending: PHYSICIAN ASSISTANT
Payer: COMMERCIAL

## 2019-05-06 LAB
ANION GAP SERPL CALCULATED.3IONS-SCNC: 7 MMOL/L (ref 3–14)
APPEARANCE CSF: CLEAR
APPEARANCE CSF: CLEAR
BUN SERPL-MCNC: 10 MG/DL (ref 7–30)
CALCIUM SERPL-MCNC: 8.7 MG/DL (ref 8.5–10.1)
CHLORIDE SERPL-SCNC: 111 MMOL/L (ref 94–109)
CO2 SERPL-SCNC: 27 MMOL/L (ref 20–32)
COLOR CSF: ABNORMAL
COLOR CSF: COLORLESS
CREAT SERPL-MCNC: 1.04 MG/DL (ref 0.66–1.25)
GFR SERPL CREATININE-BSD FRML MDRD: 71 ML/MIN/{1.73_M2}
GLUCOSE BLDC GLUCOMTR-MCNC: 106 MG/DL (ref 70–99)
GLUCOSE BLDC GLUCOMTR-MCNC: 123 MG/DL (ref 70–99)
GLUCOSE BLDC GLUCOMTR-MCNC: 123 MG/DL (ref 70–99)
GLUCOSE BLDC GLUCOMTR-MCNC: 131 MG/DL (ref 70–99)
GLUCOSE BLDC GLUCOMTR-MCNC: 135 MG/DL (ref 70–99)
GLUCOSE BLDC GLUCOMTR-MCNC: 144 MG/DL (ref 70–99)
GLUCOSE CSF-MCNC: 67 MG/DL (ref 40–70)
GLUCOSE SERPL-MCNC: 141 MG/DL (ref 70–99)
GRAM STN SPEC: NORMAL
POTASSIUM SERPL-SCNC: 3.3 MMOL/L (ref 3.4–5.3)
PROCALCITONIN SERPL-MCNC: <0.05 NG/ML
PROT CSF-MCNC: 63 MG/DL (ref 15–60)
RBC # CSF MANUAL: 19 /UL (ref 0–2)
RBC # CSF MANUAL: 452 /UL (ref 0–2)
SODIUM SERPL-SCNC: 145 MMOL/L (ref 133–144)
SPECIMEN SOURCE: NORMAL
TUBE # CSF: 1 #
TUBE # CSF: 4 #
WBC # CSF MANUAL: 1 /UL (ref 0–5)
WBC # CSF MANUAL: 2 /UL (ref 0–5)

## 2019-05-06 PROCEDURE — 87015 SPECIMEN INFECT AGNT CONCNTJ: CPT | Performed by: NURSE PRACTITIONER

## 2019-05-06 PROCEDURE — 25000128 H RX IP 250 OP 636: Performed by: INTERNAL MEDICINE

## 2019-05-06 PROCEDURE — 80048 BASIC METABOLIC PNL TOTAL CA: CPT | Performed by: PHYSICIAN ASSISTANT

## 2019-05-06 PROCEDURE — 25000128 H RX IP 250 OP 636: Performed by: PHYSICIAN ASSISTANT

## 2019-05-06 PROCEDURE — 36415 COLL VENOUS BLD VENIPUNCTURE: CPT | Performed by: INTERNAL MEDICINE

## 2019-05-06 PROCEDURE — 89050 BODY FLUID CELL COUNT: CPT | Performed by: NURSE PRACTITIONER

## 2019-05-06 PROCEDURE — 86592 SYPHILIS TEST NON-TREP QUAL: CPT | Performed by: NURSE PRACTITIONER

## 2019-05-06 PROCEDURE — 87040 BLOOD CULTURE FOR BACTERIA: CPT | Performed by: INTERNAL MEDICINE

## 2019-05-06 PROCEDURE — 25000128 H RX IP 250 OP 636: Performed by: PSYCHIATRY & NEUROLOGY

## 2019-05-06 PROCEDURE — 25000125 ZZHC RX 250: Performed by: PHYSICIAN ASSISTANT

## 2019-05-06 PROCEDURE — 82945 GLUCOSE OTHER FLUID: CPT | Performed by: NURSE PRACTITIONER

## 2019-05-06 PROCEDURE — 87498 ENTEROVIRUS PROBE&REVRS TRNS: CPT | Performed by: NURSE PRACTITIONER

## 2019-05-06 PROCEDURE — 25000132 ZZH RX MED GY IP 250 OP 250 PS 637: Performed by: PHYSICIAN ASSISTANT

## 2019-05-06 PROCEDURE — 84157 ASSAY OF PROTEIN OTHER: CPT | Performed by: NURSE PRACTITIONER

## 2019-05-06 PROCEDURE — 25800030 ZZH RX IP 258 OP 636: Performed by: PHYSICIAN ASSISTANT

## 2019-05-06 PROCEDURE — 87040 BLOOD CULTURE FOR BACTERIA: CPT | Performed by: NURSE PRACTITIONER

## 2019-05-06 PROCEDURE — 87529 HSV DNA AMP PROBE: CPT | Performed by: NURSE PRACTITIONER

## 2019-05-06 PROCEDURE — 25000125 ZZHC RX 250: Performed by: INTERNAL MEDICINE

## 2019-05-06 PROCEDURE — 36415 COLL VENOUS BLD VENIPUNCTURE: CPT | Performed by: PHYSICIAN ASSISTANT

## 2019-05-06 PROCEDURE — 12000000 ZZH R&B MED SURG/OB

## 2019-05-06 PROCEDURE — 40000264 ECHOCARDIOGRAM COMPLETE

## 2019-05-06 PROCEDURE — 99233 SBSQ HOSP IP/OBS HIGH 50: CPT | Performed by: INTERNAL MEDICINE

## 2019-05-06 PROCEDURE — 71045 X-RAY EXAM CHEST 1 VIEW: CPT

## 2019-05-06 PROCEDURE — 93306 TTE W/DOPPLER COMPLETE: CPT | Mod: 26 | Performed by: INTERNAL MEDICINE

## 2019-05-06 PROCEDURE — 87075 CULTR BACTERIA EXCEPT BLOOD: CPT | Performed by: NURSE PRACTITIONER

## 2019-05-06 PROCEDURE — 87798 DETECT AGENT NOS DNA AMP: CPT | Performed by: NURSE PRACTITIONER

## 2019-05-06 PROCEDURE — 87070 CULTURE OTHR SPECIMN AEROBIC: CPT | Performed by: NURSE PRACTITIONER

## 2019-05-06 PROCEDURE — 84145 PROCALCITONIN (PCT): CPT | Performed by: INTERNAL MEDICINE

## 2019-05-06 PROCEDURE — 009U3ZX DRAINAGE OF SPINAL CANAL, PERCUTANEOUS APPROACH, DIAGNOSTIC: ICD-10-PCS | Performed by: PSYCHIATRY & NEUROLOGY

## 2019-05-06 PROCEDURE — 40000061 ZZH STATISTIC EEG TIME EA 10 MIN

## 2019-05-06 PROCEDURE — 84132 ASSAY OF SERUM POTASSIUM: CPT | Performed by: INTERNAL MEDICINE

## 2019-05-06 PROCEDURE — 00000146 ZZHCL STATISTIC GLUCOSE BY METER IP

## 2019-05-06 PROCEDURE — 36415 COLL VENOUS BLD VENIPUNCTURE: CPT | Performed by: NURSE PRACTITIONER

## 2019-05-06 PROCEDURE — 99222 1ST HOSP IP/OBS MODERATE 55: CPT | Mod: 25 | Performed by: NURSE PRACTITIONER

## 2019-05-06 PROCEDURE — 99207 ZZC CDG-MDM COMPONENT: MEETS MODERATE - UP CODED: CPT | Performed by: INTERNAL MEDICINE

## 2019-05-06 PROCEDURE — 95816 EEG AWAKE AND DROWSY: CPT

## 2019-05-06 PROCEDURE — 87205 SMEAR GRAM STAIN: CPT | Performed by: NURSE PRACTITIONER

## 2019-05-06 PROCEDURE — 62270 DX LMBR SPI PNXR: CPT | Mod: GC | Performed by: PSYCHIATRY & NEUROLOGY

## 2019-05-06 RX ORDER — METOPROLOL TARTRATE 50 MG
150 TABLET ORAL 2 TIMES DAILY
Status: DISCONTINUED | OUTPATIENT
Start: 2019-05-06 | End: 2019-05-06

## 2019-05-06 RX ORDER — LISINOPRIL 40 MG/1
40 TABLET ORAL DAILY
Status: DISCONTINUED | OUTPATIENT
Start: 2019-05-06 | End: 2019-05-10

## 2019-05-06 RX ORDER — FUROSEMIDE 10 MG/ML
40 INJECTION INTRAMUSCULAR; INTRAVENOUS ONCE
Status: COMPLETED | OUTPATIENT
Start: 2019-05-06 | End: 2019-05-06

## 2019-05-06 RX ORDER — METOPROLOL TARTRATE 1 MG/ML
5 INJECTION, SOLUTION INTRAVENOUS EVERY 6 HOURS
Status: DISCONTINUED | OUTPATIENT
Start: 2019-05-06 | End: 2019-05-10

## 2019-05-06 RX ORDER — LORAZEPAM 2 MG/ML
INJECTION INTRAMUSCULAR
Status: DISPENSED
Start: 2019-05-06 | End: 2019-05-07

## 2019-05-06 RX ORDER — LORAZEPAM 2 MG/ML
1 INJECTION INTRAMUSCULAR ONCE
Status: COMPLETED | OUTPATIENT
Start: 2019-05-06 | End: 2019-05-06

## 2019-05-06 RX ORDER — DONEPEZIL HYDROCHLORIDE 10 MG/1
10 TABLET, FILM COATED ORAL AT BEDTIME
Status: DISCONTINUED | OUTPATIENT
Start: 2019-05-06 | End: 2019-05-16 | Stop reason: HOSPADM

## 2019-05-06 RX ORDER — PIPERACILLIN SODIUM, TAZOBACTAM SODIUM 4; .5 G/20ML; G/20ML
4.5 INJECTION, POWDER, LYOPHILIZED, FOR SOLUTION INTRAVENOUS EVERY 6 HOURS
Status: DISCONTINUED | OUTPATIENT
Start: 2019-05-06 | End: 2019-05-11

## 2019-05-06 RX ADMIN — LORAZEPAM 1 MG: 2 INJECTION INTRAMUSCULAR; INTRAVENOUS at 17:30

## 2019-05-06 RX ADMIN — Medication 10 MEQ: at 20:34

## 2019-05-06 RX ADMIN — METOPROLOL TARTRATE 2.5 MG: 5 INJECTION, SOLUTION INTRAVENOUS at 06:28

## 2019-05-06 RX ADMIN — INSULIN ASPART 1 UNITS: 100 INJECTION, SOLUTION INTRAVENOUS; SUBCUTANEOUS at 10:06

## 2019-05-06 RX ADMIN — Medication 10 MEQ: at 13:44

## 2019-05-06 RX ADMIN — Medication 10 MEQ: at 15:11

## 2019-05-06 RX ADMIN — FUROSEMIDE 40 MG: 10 INJECTION, SOLUTION INTRAVENOUS at 20:24

## 2019-05-06 RX ADMIN — METOPROLOL TARTRATE 5 MG: 5 INJECTION, SOLUTION INTRAVENOUS at 20:20

## 2019-05-06 RX ADMIN — PIPERACILLIN SODIUM,TAZOBACTAM SODIUM 4.5 G: 4; .5 INJECTION, POWDER, FOR SOLUTION INTRAVENOUS at 20:25

## 2019-05-06 RX ADMIN — METOPROLOL TARTRATE 5 MG: 5 INJECTION, SOLUTION INTRAVENOUS at 13:13

## 2019-05-06 RX ADMIN — METOPROLOL TARTRATE 2.5 MG: 5 INJECTION, SOLUTION INTRAVENOUS at 01:53

## 2019-05-06 RX ADMIN — ACETAMINOPHEN 650 MG: 650 SUPPOSITORY RECTAL at 14:24

## 2019-05-06 RX ADMIN — Medication 10 MEQ: at 18:12

## 2019-05-06 RX ADMIN — PIPERACILLIN SODIUM,TAZOBACTAM SODIUM 4.5 G: 4; .5 INJECTION, POWDER, FOR SOLUTION INTRAVENOUS at 15:04

## 2019-05-06 RX ADMIN — SODIUM CHLORIDE: 9 INJECTION, SOLUTION INTRAVENOUS at 10:18

## 2019-05-06 RX ADMIN — DIGOXIN 100 MCG: 0.25 INJECTION INTRAMUSCULAR; INTRAVENOUS at 10:06

## 2019-05-06 ASSESSMENT — ACTIVITIES OF DAILY LIVING (ADL)
ADLS_ACUITY_SCORE: 20

## 2019-05-06 NOTE — PROVIDER NOTIFICATION
Spoke with Mary Kay Spring NP Neurology, Will hold on antibiotic until after LP.      Pt received 1 dose Zosyn at 1430. Will hold other doses until directed further.    LP done.Okay per Dia Lopez to continue Zoysn.

## 2019-05-06 NOTE — PLAN OF CARE
Unable to assess orientation and most of neuros. Pt is mostly non-verbal, at times did mumble a few short words which were garbled and non-understandable. Pt does not follow commands, LS clear, BS+, flatus+, voiding adequately. Pt is incontinent of bladder, is turn and repo q2h, on BG checks q4h. Pt is assist x2, is NPO ex meds. Abrasion to R lower side is JS, is CDI.

## 2019-05-06 NOTE — PROCEDURES
Procedure Date: 2019      PORTABLE ELECTROENCEPHALOGRAM       DATE OF RECORDIN2019      EEG # FSH-      CLINICAL SUMMARY:  The patient is a 74-year-old male, who was found down by his wife, who was confused, restless and obtunded.  EEG was performed to evaluate for seizures.     TECHNICAL SUMMARY:  This EEG monitoring was performed with 23 scalp electrodes in the 10-20 system of placement and additional scalp, precordial and other surface electrodes were used for electrical referencing and artifact detection.      INTERICTAL ACTIVITY:  During waking, there was nonsustained 9-10 Hz alpha activity over the posterior head regions, which was symmetric and reactive.  Diffuse low-amplitude theta-delta slowing was present throughout the recording.  No epileptiform discharges were present.      Activation maneuvers were not performed.      CLINICAL/ICTAL EVENTS:  The patient was reported to be agitated, restless and moaning and confused during the EEG.  EEG did not show any ictal activity; therefore, the patient's confusion is not due to seizures or status epilepticus.      IMPRESSION:  This is an abnormal portable EEG due to the presence of mild to moderate diffuse nonspecific encephalopathy.   No epileptiform discharges were present.  No electrographic or clinical seizures were recorded.  Clinical correlation advised.         SCAR NICHOLSON MD             D: 2019   T: 2019   MT: BRIANA      Name:     HELIO SEGUNDO   MRN:      [id number]        Account:        [id number]   :      1945           Procedure Date: 2019      Document: [id number]

## 2019-05-06 NOTE — PLAN OF CARE
Direct admit d/t a fall. Very lethargy, not able to follow command. VEENA most of his neuro exam. Vitals stable. Tele a-fib with CVR. Incontinent of urine. Strong assist of two with cares. BGM was 146/132. Potassium was 3.1, replacement was done per protocol and recheck was 3.5. He has right lateral/lower abdomen site bruise/abrasion from the fall and it is open to air. Needs assist and reposition every 2 hrs. Continue to monitor.

## 2019-05-06 NOTE — PLAN OF CARE
SLP - Attempted to see patient for a swallow evaluation; however, pt did not follow commands or maintain alertness despite max cues this am.  Will follow.

## 2019-05-06 NOTE — CONSULTS
[facility]    Neurology Consultation Note     Navdeep Spann MRN# [id number]   YOB: 1945 Age: 74 year old    Code Status:Full Code   Date of Admission: 5/5/2019  Date of Consult: 05/06/2019    _________________________________   Primary Care Physician   Physician No Ref-Primary      ______________________________________________         Assessment & Plan     Reason for consult: I was asked by Joanna Barthell, PA-C to evaluate this patient for altered mental status      ADDENDUM: 5/6/2019  2:38 PM  Patient spiking fever, no significant clinical change.  Ordered blood cultures  Will order LP with cultures for further evaluation  ----------------------------------------------------  Mary Kay Spring, MSN, FNP-BC, RN CNRN    ______________________________________________  #. (R41.82) Altered mental status, unspecified altered mental status type  (primary encounter diagnosis)  --found down at home  --CT/MRI with old right thalamic infarct, no new abnormalities  --not following commands, not verbalizing  -----will get EEG for further evaluation  #. (G30.9,  F02.80) Alzheimer's dementia without behavioral disturbance, unspecified timing of dementia onset  --at baseline is conversant and appropriate, per family  --on donepezil PTA   #. DVT Prophylaxis  --per primary service  #. PT/OT/Speech  --evaluations as able  #. Nutrition / GI Prophylaxis  --Per recommendations of speech therapy      #. Code Status: Full Code      Chief Complaint   ______________________________________________  Altered mental status   History is obtained from the electronic health record    History of Present Illness   ______________________________________________  Navdeep Spann is a 74 year old male who presented with altered mental status. Patient was found on the ground the day of admission and EMS was called. They were up until 3am the morning of admission watching tv, and wife found him on the floor next  to the futon he was sleeping on at 0630, would open his eyes but not respond to questions, and was babbling incoherently. Per EMS, he followed minimal commands and noted left sided weakness. Code stroke was called in the ED. Imaging negative for acute abnormality. Patient has Alzheimer's at baseline, but per wife is alert, interactive, and oriented. History of falls, walks with a cane. Wife sets up his medications, so it is unlikely he mixed up his medications, per wife. Previously on anticoagulation for Afib, which was discontinued due to frequent falls.    On exam, patient opens eyes occasionally spontaneously but does not track. Spontaneous movements in all extremities with good strength, but does not follow commands. No verbalizations. Will get EEG for further evaluation.     Past Medical History    ______________________________________________  No past medical history on file.   Unable to review with patient due to mental status  Past Surgical History   ______________________________________________  No past surgical history on file.   Unable to review with patient due to mental status  Prior to Admission Medications   ______________________________________________  Prior to Admission Medications   Prescriptions Last Dose Informant Patient Reported? Taking?   ALPRAZolam (XANAX) 0.25 MG tablet   Yes Yes   Sig: Take 0.25 mg by mouth daily as needed for anxiety   aspirin 81 MG EC tablet   Yes Yes   Sig: Take 81 mg by mouth daily   chlorthalidone (HYGROTON) 25 MG tablet   Yes Yes   Sig: Take 25 mg by mouth daily   citalopram (CELEXA) 20 MG tablet   Yes Yes   Sig: Take 20 mg by mouth daily   cyanocobalamin (VITAMIN B-12) 2500 MCG SUBL sublingual tablet   Yes Yes   Sig: Place 2,500 mcg under the tongue daily   digoxin (DIGOX) 125 MCG tablet   Yes Yes   Sig: Take 125 mcg by mouth daily   donepezil (ARICEPT) 10 MG tablet   Yes Yes   Sig: Take 10 mg by mouth At Bedtime    ferrous sulfate (FEROSUL) 325 (65 Fe) MG tablet    Yes Yes   Sig: Take 1 tablet by mouth 3 times daily (with meals)   lisinopril (PRINIVIL/ZESTRIL) 40 MG tablet   Yes Yes   Sig: Take 40 mg by mouth daily   magnesium oxide (MAG-OX) 400 MG tablet   Yes Yes   Sig: Take 400 mg by mouth daily   metoprolol tartrate (LOPRESSOR) 100 MG tablet   Yes Yes   Sig: Take 150 mg by mouth 2 times daily   potassium chloride ER (KLOR-CON) 20 MEQ CR tablet   Yes Yes   Sig: Take 20 mEq by mouth 3 times daily   trimethoprim (TRIMPEX) 100 MG tablet   Yes Yes   Sig: Take 100 mg by mouth daily       Facility-Administered Medications: None     Allergies   No Known Allergies    Social History   ______________________________________________  Social History     Socioeconomic History     Marital status:      Spouse name: Not on file     Number of children: Not on file     Years of education: Not on file     Highest education level: Not on file   Occupational History     Not on file   Social Needs     Financial resource strain: Not on file     Food insecurity:     Worry: Not on file     Inability: Not on file     Transportation needs:     Medical: Not on file     Non-medical: Not on file   Tobacco Use     Smoking status: Not on file   Substance and Sexual Activity     Alcohol use: Not on file     Drug use: Not on file     Sexual activity: Not on file   Lifestyle     Physical activity:     Days per week: Not on file     Minutes per session: Not on file     Stress: Not on file   Relationships     Social connections:     Talks on phone: Not on file     Gets together: Not on file     Attends Judaism service: Not on file     Active member of club or organization: Not on file     Attends meetings of clubs or organizations: Not on file     Relationship status: Not on file     Intimate partner violence:     Fear of current or ex partner: Not on file     Emotionally abused: Not on file     Physically abused: Not on file     Forced sexual activity: Not on file   Other Topics Concern     Not on  "file   Social History Narrative     Not on file     Unable to review with patient due to mental status    Family History   ______________________________________________  Reviewed in chart - unable to review with patient due to mental status.    Review of Systems   ______________________________________________  Review of systems is not obtainable due to patient factors - mental status      Physical Exam   ______________________________________________  Weight:185 lbs 0 oz; Height:6' 0\"  Temp: 101.3  F (38.5  C) Temp src: Oral BP: 144/77 Pulse: 88 Heart Rate: 109 Resp: 20 SpO2: 94 % O2 Device: None (Room air)    General Appearance:  No acute distress  Neuro:       Mental Status Exam:   Lethargic, opens eyes spontaneously, not to voice, unable to assess orientation. No verbalizations. Mental status is decreased/agitated       Cranial Nerves:  Pupils 3 mm, reactive. Opens eyes spontaneously, not tracking. Face is symmetric. Other CN are largely untestable due to agitation       Motor:  Moves all extremities, does not follow commands. Tone and bulk are normal           Reflexes:  Unable to assess DTR due to agitation. Toes equivocal.        Sensory:  Unable to assess           Coordination:   Unable to assess       Gait:  Unable to ambulate  Neck: no clear nuchal rigidity, normal thyroid. No carotid bruits.    Cardiovascular: Regular rate and rhythm, no m/r/g  Lungs: Clear to auscultation  Abdomen: Soft, not tender, not distended  Extremities: No clubbing, no cyanosis, no edema    Data   ______________________________________________  All Data personally reviewed:       Labs:   CBC RESULTS:     Recent Labs   Lab 05/05/19  0725   WBC 10.8   RBC 4.31*   HGB 13.7   HCT 41.3        Basic Metabolic Panel:   Recent Labs   Lab Test 05/06/19  0738 05/05/19  2253 05/05/19  0725   *  --  141   POTASSIUM 3.3* 3.5 3.1*   CHLORIDE 111*  --  104   CO2 27  --  31   BUN 10  --  14   CR 1.04  --  1.20   *  --  204* "   PANKAJ 8.7  --  9.1     Liver panel:  Recent Labs   Lab Test 05/05/19  0725   PROTTOTAL 7.6   ALBUMIN 3.7   BILITOTAL 0.6   ALKPHOS 109   AST 18   ALT 18     INR:  Recent Labs   Lab Test 05/05/19  0725   INR 1.05      A1C:   Recent Labs   Lab Test 05/05/19  1753   A1C 5.8*     UA Results:  Recent Labs   Lab Test 05/05/19  1900   COLOR Light Yellow   APPEARANCE Clear   URINEGLC Negative   URINEBILI Negative   URINEKETONE Negative   SG 1.016   UBLD Negative   URINEPH 6.0   PROTEIN Negative   NITRITE Negative   LEUKEST Negative   RBCU <1   WBCU <1     Most Recent 6 Bacteria Isolates From Any Culture (See EPIC Reports for Culture Details):  Recent Labs   Lab Test 05/05/19  1752   CULT No growth after 9 hours        Cardiac US:   --       Neurophysiology:   --       Imaging:   All imaging studies were reviewed personally  CT head 5/5/19:   1.  Vague 13.1 x 8.4 mm left thalamic hypodensity more prominent than on 1/13/2019. This may be an acute infarct superimposed on a chronic infarct.  2.  No mass effect or hemorrhagic transformation.  3.  Small chronic right thalamic infarct.  4.  Advanced presumed chronic small vessel ischemic change.    CTA neck/head 5/5/19:  HEAD CTA:  1.  Normal head CTA.  2.  No significant stenosis.  No aneurysm or vascular malformation.     NECK CTA:  1.  Normal neck CTA  for age.  2.  No significant stenosis as per NASCET criteria.    MRI brain 5/5/19:   1.  No acute stroke. No mass or hemorrhage.  2.  The area of hypodensity in the left thalamus correlates with mild presumed chronic small vessel ischemic change. No associated well-defined lacunar infarct.  3.  Chronic lacunar infarct in the right thalamus.  4.  Advanced supratentorial and mild-to-moderate pontine presumed chronic small vessel ischemic change.      Text Page    Mary Kay Spring, MSN, FNP-BC, RN CNRN SCRN

## 2019-05-06 NOTE — PROGRESS NOTES
[facility]    Medicine Progress Note - Hospitalist Service       Date of Admission:  5/5/2019  Assessment & Plan   Navdeep Spann is a 74-year-old male with history of alcohol and cannabis use disorders now in remission, frequent UTIs on prophylactic medication, depression/anxiety, BPH, dementia, HTN, and chronic atrial fibrillation who is a direct admission from [facility] where he was brought in via EMS after found down with altered mental status around 6:30 a.m. on the day of admission.      Encephalopathy, unclear etiology  Unwitnessed fall w/right flank contusion   Initially felt possibly CVA related as EMS reported left-sided hemiplegia and wife reported babbled speech.  CT head showed a possible stroke, but MRI showed no acute pathology; chronic infarction as well as chronic small vessel disease.  Prozac discontinued February in favor of starting citalopram; no other recent med changes.  No alcohol or drug use.  The patient's significant other sets up and gives the patient his medications.  No overt symptoms for infection other than a nonspecific cough for the last month that the spouse did not think much of.  Basic workup with a metabolic panel and CBC does not suggest overt pathology.  CT chest, abdomen and pelvis obtained without acute pathology.  CT cervical spine also obtained and did not show any acute pathology.   - Monitor on telemetry  - Neuro checks q4 hours  - Neurology consulted and appreciate their recommendations.  Plan for EEG     Addendum:   Possible sepsis   Later in the day patient becoming tachypnea and intermittently having fevers with Tmax of 101.3 degrees  - Spoke with neurology and they agree with LP  - Repeat CXR  - Blood cultures x2  - Lactate for sepsis  - Procalcitonin ordered  - Zosyn IV for possible sepsis        Hypokalemia  - Potassium and magnesium replacement protocols     Hyperglycemia  BG >200 with EMS and in ED.  HgbA1c here was 5.8   - QID  HS  "glucose monitoring and low SSI available     Chronic atrial fibrillation  Rate controlled with beta blocker.  Not on anticoagulation, appears due to history of frequent falls, Coumadin was discontinued in summer of 2018.   - Switched Digoxin to IV at reduced dose 100mcg daily until clear no swallowing risk  - Scheduled IV Lopressor 5 mg q6h with PRN for HR >120     Hypertension  HLP   - PTA ASA, lisinopril 40mg, chlorthalidone 25mg, and Lopressor 150mg BID on hold while NPO.    - IV Lopressor as above      Alzheimer's dementia  Reportedly diagnosed 3-4 years ago following an episode where patient reportedly accidentally took his son's Clozaril prescription and went into a \"coma.\"  Then had neuropsychiatric testing at this was diagnosed.  Wife states this has been progressive.  Also states he is generally alert and interactive and oriented to situation.  Walks with a cane at baseline.  - Resume PTA Aricept when tolerating PO     Depression/anxiety  Seen in chart review with increased anxiety of late and Prozac was discontinued in February in favor of starting citalopram.  In April it appears he was given a limited prescription of alprazolam which the spouse states he has not yet used.   - Resume PTA citalopram when tolerating PO     History of alcohol abuse and cannabis use disorder  Abstinent from alcohol since 1990 and has not used cannabis for estimated 1 year.  Wife does not believe he has any access to alcohol or drugs that would be contributing to above presentation.     BPH  History of frequent UTI on prophylactic trimethoprim  UA was not suggestive of UTI   - Hold PTA trimethoprim 100mg daily     Severe right C3-C4 C5-C6 and left C4-C5 foraminal stenosis  Incidental findings on CT c-spine.       Diet: NPO for Medical/Clinical Reasons Except for: Meds, Ice Chips    DVT Prophylaxis: Pneumatic Compression Devices  Lang Catheter: not present  Code Status: Full Code      Disposition Plan   Expected discharge: " TBD.  Still undergoing work up and evaluation.  Likely TCU at discharge   Entered: Robert Broderick DO 05/06/2019, 1:40 PM       The patient's care was discussed with the Bedside Nurse and Patient.    Robert Broderick DO  Hospitalist Service  [facility]    ______________________________________________________________________    Interval History   Patient seen and examined.  Still not interactive with staff.  Will move limbs and did try to get out of bed earlier.     Data reviewed today: I reviewed all medications, new labs and imaging results over the last 24 hours. I personally reviewed no images or EKG's today.    Physical Exam   Vital Signs: Temp: 99.9  F (37.7  C) Temp src: Axillary BP: 145/55 Pulse: 88 Heart Rate: 103 Resp: 24 SpO2: 94 % O2 Device: None (Room air)    Weight: 185 lbs 0 oz  General Appearance: Somnolent and not arousable to voice.  Intermittently trying to get out of bed.  NAD  Respiratory: Clear to auscultation.  No respiratory distress  Cardiovascular:  Tachycardiac.  No obvious murmurs  GI: Bowel sounds present.  Non-distended  Skin: No rashes.  No cyanosis   Other: No edema      Data   Recent Labs   Lab 05/06/19  0738 05/05/19  2253 05/05/19  0725   WBC  --   --  10.8   HGB  --   --  13.7   MCV  --   --  96   PLT  --   --  192   INR  --   --  1.05   *  --  141   POTASSIUM 3.3* 3.5 3.1*   CHLORIDE 111*  --  104   CO2 27  --  31   BUN 10  --  14   CR 1.04  --  1.20   ANIONGAP 7  --  6   PANKAJ 8.7  --  9.1   *  --  204*   ALBUMIN  --   --  3.7   PROTTOTAL  --   --  7.6   BILITOTAL  --   --  0.6   ALKPHOS  --   --  109   ALT  --   --  18   AST  --   --  18     Recent Results (from the past 24 hour(s))   XR Chest Port 1 View    Narrative    CHEST PORTABLE ONE VIEW  5/5/2019 4:41 PM     COMPARISON: None.    HISTORY: AMS.      Impression    IMPRESSION: There is moderate cardiomegaly. There is mild prominence  of the pulmonary vasculature but no definite evidence for  pulmonary  edema. There is no pleural effusion or pneumothorax. There is no  evidence for pneumonia.    KESHAV LOUIS MD

## 2019-05-06 NOTE — PROGRESS NOTES
EEG DONE ON CONFUSED , UNCOOP, MOVING, RESTLESS, OBTUNED, NOT FOLLOWING  PORTABLE EEG  [id number]  JONI COUNTERS ORDERING

## 2019-05-07 ENCOUNTER — APPOINTMENT (OUTPATIENT)
Dept: SPEECH THERAPY | Facility: CLINIC | Age: 74
DRG: 070 | End: 2019-05-07
Attending: PHYSICIAN ASSISTANT
Payer: COMMERCIAL

## 2019-05-07 LAB
EV RNA SPEC QL NAA+PROBE: NEGATIVE
GLUCOSE BLDC GLUCOMTR-MCNC: 106 MG/DL (ref 70–99)
GLUCOSE BLDC GLUCOMTR-MCNC: 118 MG/DL (ref 70–99)
GLUCOSE BLDC GLUCOMTR-MCNC: 121 MG/DL (ref 70–99)
GLUCOSE BLDC GLUCOMTR-MCNC: 134 MG/DL (ref 70–99)
GLUCOSE BLDC GLUCOMTR-MCNC: 148 MG/DL (ref 70–99)
GLUCOSE BLDC GLUCOMTR-MCNC: 151 MG/DL (ref 70–99)
HGB BLD-MCNC: 12.6 G/DL (ref 13.3–17.7)
HSV1 DNA CSF QL NAA+PROBE: NOT DETECTED
HSV2 DNA CSF QL NAA+PROBE: NOT DETECTED
LACTATE BLD-SCNC: 1.1 MMOL/L (ref 0.7–2)
MICROBIOLOGIST REVIEW: NORMAL
POTASSIUM SERPL-SCNC: 3.4 MMOL/L (ref 3.4–5.3)
SPECIMEN SOURCE: NORMAL
SPECIMEN TYPE: NORMAL
VARICELLA ZOSTER DNA PCR COMMENT: NORMAL
VZV DNA SPEC QL NAA+PROBE: NORMAL

## 2019-05-07 PROCEDURE — 25000128 H RX IP 250 OP 636: Performed by: PHYSICIAN ASSISTANT

## 2019-05-07 PROCEDURE — 25800030 ZZH RX IP 258 OP 636: Performed by: PHYSICIAN ASSISTANT

## 2019-05-07 PROCEDURE — 00000146 ZZHCL STATISTIC GLUCOSE BY METER IP

## 2019-05-07 PROCEDURE — 25000128 H RX IP 250 OP 636: Performed by: INTERNAL MEDICINE

## 2019-05-07 PROCEDURE — 36415 COLL VENOUS BLD VENIPUNCTURE: CPT | Performed by: PHYSICIAN ASSISTANT

## 2019-05-07 PROCEDURE — 12000000 ZZH R&B MED SURG/OB

## 2019-05-07 PROCEDURE — 25800029 ZZH RX IP 258 OP 250: Performed by: INTERNAL MEDICINE

## 2019-05-07 PROCEDURE — 83605 ASSAY OF LACTIC ACID: CPT | Performed by: INTERNAL MEDICINE

## 2019-05-07 PROCEDURE — 36415 COLL VENOUS BLD VENIPUNCTURE: CPT | Performed by: INTERNAL MEDICINE

## 2019-05-07 PROCEDURE — 85018 HEMOGLOBIN: CPT | Performed by: PHYSICIAN ASSISTANT

## 2019-05-07 PROCEDURE — 99233 SBSQ HOSP IP/OBS HIGH 50: CPT | Performed by: INTERNAL MEDICINE

## 2019-05-07 PROCEDURE — 25000132 ZZH RX MED GY IP 250 OP 250 PS 637: Performed by: PHYSICIAN ASSISTANT

## 2019-05-07 PROCEDURE — 99233 SBSQ HOSP IP/OBS HIGH 50: CPT | Performed by: NURSE PRACTITIONER

## 2019-05-07 PROCEDURE — 92610 EVALUATE SWALLOWING FUNCTION: CPT | Mod: GN

## 2019-05-07 PROCEDURE — 25000125 ZZHC RX 250: Performed by: INTERNAL MEDICINE

## 2019-05-07 RX ORDER — FUROSEMIDE 10 MG/ML
40 INJECTION INTRAMUSCULAR; INTRAVENOUS ONCE
Status: COMPLETED | OUTPATIENT
Start: 2019-05-07 | End: 2019-05-07

## 2019-05-07 RX ORDER — SODIUM CHLORIDE 450 MG/100ML
INJECTION, SOLUTION INTRAVENOUS CONTINUOUS
Status: DISCONTINUED | OUTPATIENT
Start: 2019-05-07 | End: 2019-05-08

## 2019-05-07 RX ADMIN — DIGOXIN 100 MCG: 0.25 INJECTION INTRAMUSCULAR; INTRAVENOUS at 10:03

## 2019-05-07 RX ADMIN — PIPERACILLIN SODIUM,TAZOBACTAM SODIUM 4.5 G: 4; .5 INJECTION, POWDER, FOR SOLUTION INTRAVENOUS at 02:43

## 2019-05-07 RX ADMIN — METOPROLOL TARTRATE 5 MG: 5 INJECTION, SOLUTION INTRAVENOUS at 06:44

## 2019-05-07 RX ADMIN — SODIUM CHLORIDE: 4.5 INJECTION, SOLUTION INTRAVENOUS at 14:09

## 2019-05-07 RX ADMIN — METOPROLOL TARTRATE 5 MG: 5 INJECTION, SOLUTION INTRAVENOUS at 01:05

## 2019-05-07 RX ADMIN — METOPROLOL TARTRATE 5 MG: 5 INJECTION, SOLUTION INTRAVENOUS at 18:14

## 2019-05-07 RX ADMIN — PIPERACILLIN SODIUM,TAZOBACTAM SODIUM 4.5 G: 4; .5 INJECTION, POWDER, FOR SOLUTION INTRAVENOUS at 08:10

## 2019-05-07 RX ADMIN — PIPERACILLIN SODIUM,TAZOBACTAM SODIUM 4.5 G: 4; .5 INJECTION, POWDER, FOR SOLUTION INTRAVENOUS at 20:01

## 2019-05-07 RX ADMIN — METOPROLOL TARTRATE 5 MG: 5 INJECTION, SOLUTION INTRAVENOUS at 12:27

## 2019-05-07 RX ADMIN — PIPERACILLIN SODIUM,TAZOBACTAM SODIUM 4.5 G: 4; .5 INJECTION, POWDER, FOR SOLUTION INTRAVENOUS at 14:11

## 2019-05-07 RX ADMIN — ACETAMINOPHEN 650 MG: 650 SUPPOSITORY RECTAL at 20:01

## 2019-05-07 RX ADMIN — SODIUM CHLORIDE: 9 INJECTION, SOLUTION INTRAVENOUS at 07:55

## 2019-05-07 RX ADMIN — FUROSEMIDE 40 MG: 10 INJECTION, SOLUTION INTRAVENOUS at 14:15

## 2019-05-07 ASSESSMENT — ACTIVITIES OF DAILY LIVING (ADL)
ADLS_ACUITY_SCORE: 23
ADLS_ACUITY_SCORE: 24
ADLS_ACUITY_SCORE: 23
ADLS_ACUITY_SCORE: 24
ADLS_ACUITY_SCORE: 23
ADLS_ACUITY_SCORE: 24

## 2019-05-07 NOTE — PROGRESS NOTES
[facility]    Medicine Progress Note - Hospitalist Service       Date of Admission:  5/5/2019  Assessment & Plan   Navdeep Spann is a 74-year-old male with history of alcohol and cannabis use disorders now in remission, frequent UTIs on prophylactic medication, depression/anxiety, BPH, dementia, HTN, and chronic atrial fibrillation who is a direct admission from [facility] where he was brought in via EMS after found down with altered mental status around 6:30 a.m. on the day of admission.      Encephalopathy, unclear etiology  Unwitnessed fall w/right flank contusion   Initially felt possibly CVA related as EMS reported left-sided hemiplegia and wife reported babbled speech.  CT head showed a possible stroke, but MRI showed no acute pathology; chronic infarction as well as chronic small vessel disease.  Prozac discontinued February in favor of starting citalopram; no other recent med changes.  No alcohol or drug use.  The patient's significant other sets up and gives the patient his medications.  No overt symptoms for infection other than a nonspecific cough for the last month that the spouse did not think much of.  Basic workup with a metabolic panel and CBC does not suggest overt pathology.  CT chest, abdomen and pelvis obtained without acute pathology.  CT cervical spine also obtained and did not show any acute pathology.   - Monitor on telemetry  - Neuro checks q4 hours  - Neurology consulted and appreciate their recommendations. eeg showing encephalopathy   - cause is still unknown .mild improvement noted in mental status today , more alert during my visit.  Lung nodule  --need follow up set up on discharge     Possible sepsis   5/6 patient become tachypnic  and intermittently having fevers with Tmax of 101.3 degrees, had LP done, procal negative, lactate upper limit of normal , started on zosyn ,  tmax today 100.6, csf studies so far negative for bacterial infection .  --will continue  "zosyn for now, will discontinue  That in 1-2 days if all tests are negative.  --blood cultures pending     Hypokalemia  - Potassium and magnesium replacement protocols     Hyperglycemia  BG >200 with EMS and in ED.  HgbA1c here was 5.8   - QID AC HS glucose monitoring and low SSI available     Chronic atrial fibrillation  Rate controlled with beta blocker.  Not on anticoagulation, appears due to history of frequent falls, Coumadin was discontinued in summer of 2018.   - Switched Digoxin to IV at reduced dose 100mcg daily until clear no swallowing risk  - Scheduled IV Lopressor 5 mg q6h with PRN for HR >120     Hypertension  HLP   - PTA ASA, lisinopril 40mg, chlorthalidone 25mg, and Lopressor 150mg BID on hold while NPO.    - IV Lopressor as above      Alzheimer's dementia  Reportedly diagnosed 3-4 years ago following an episode where patient reportedly accidentally took his son's Clozaril prescription and went into a \"coma.\"  Then had neuropsychiatric testing at this was diagnosed.  Wife states this has been progressive.  Also states he is generally alert and interactive and oriented to situation.  Walks with a cane at baseline.  - Resume PTA Aricept when tolerating PO     Depression/anxiety  Seen in chart review with increased anxiety of late and Prozac was discontinued in February in favor of starting citalopram.  In April it appears he was given a limited prescription of alprazolam which the spouse states he has not yet used.   - Resume PTA citalopram when tolerating PO     History of alcohol abuse and cannabis use disorder  Abstinent from alcohol since 1990 and has not used cannabis for estimated 1 year.  Wife does not believe he has any access to alcohol or drugs that would be contributing to above presentation.     BPH  History of frequent UTI on prophylactic trimethoprim  UA was not suggestive of UTI   - Hold PTA trimethoprim 100mg daily     Severe right C3-C4 C5-C6 and left C4-C5 foraminal " stenosis  Incidental findings on CT c-spine.       Diet: NPO for Medical/Clinical Reasons Except for: Meds, Ice Chips    DVT Prophylaxis: Pneumatic Compression Devices  Lang Catheter: not present  Code Status: Full Code      Disposition Plan   Expected discharge: TBD.  Still undergoing work up and evaluation.  Likely TCU at discharge   Entered: Gianna Brar MD 05/07/2019, 1:29 PM       The patient's care was discussed with the consultant     Gianna Brar MD  Hospitalist Service  [facility]    ______________________________________________________________________    Interval History    he was sleepy, woke up and asked to get up, does not know where he is .continue to have encephalopathy, had lp done 5/6     Data reviewed today: I reviewed all medications, new labs and imaging results over the last 24 hours. I personally reviewed no images or EKG's today.    Physical Exam   Vital Signs: Temp: 99.9  F (37.7  C) Temp src: Axillary BP: 167/84 Pulse: 89 Heart Rate: 90 Resp: 24 SpO2: 91 % O2 Device: None (Room air) Oxygen Delivery: 2 LPM  Weight: 185 lbs 0 oz  Constitutional: Sleepy arousable   Respiratory: bilateral rhonchi noted, noisy breathing+  Cardiovascular: Regular rate and rhythm, normal S1 and S2, and no murmur noted  GI: Normal bowel sounds, soft, non-distended, non-tender  Skin/Integumen: No rashes, no cyanosis, no edema  Neuro : moving all 4 extremities,confused        Data   Recent Labs   Lab 05/07/19  0837 05/06/19  2351 05/06/19  0738 05/05/19  2253 05/05/19  0725   WBC  --   --   --   --  10.8   HGB 12.6*  --   --   --  13.7   MCV  --   --   --   --  96   PLT  --   --   --   --  192   INR  --   --   --   --  1.05   NA  --   --  145*  --  141   POTASSIUM  --  3.4 3.3* 3.5 3.1*   CHLORIDE  --   --  111*  --  104   CO2  --   --  27  --  31   BUN  --   --  10  --  14   CR  --   --  1.04  --  1.20   ANIONGAP  --   --  7  --  6   PANKAJ  --   --  8.7  --  9.1   GLC  --   --  141*  --  204*    ALBUMIN  --   --   --   --  3.7   PROTTOTAL  --   --   --   --  7.6   BILITOTAL  --   --   --   --  0.6   ALKPHOS  --   --   --   --  109   ALT  --   --   --   --  18   AST  --   --   --   --  18     Recent Results (from the past 24 hour(s))   XR Chest Port 1 View    Narrative    CHEST ONE VIEW PORTABLE   5/6/2019 3:00 PM     HISTORY: Hypoxia.    COMPARISON: 5/5/2019.      Impression    IMPRESSION: The heart is enlarged. There is vascular congestion. No  pulmonary edema or pleural effusion. No pneumothorax. No lobar  consolidation.    SUDHA BALDERAS MD

## 2019-05-07 NOTE — PLAN OF CARE
Patient here with Altered mental status.VSS ecxept laboured breathing, IV lasix given in the evening with little improvement o2L oxygen.Unable to assess orientation and most of neuros. Pt is mostly non-verbal,  did mumble a few short words which were garbled and unable to understand. Tele afib RVR.Pt does not follow commands, potassium recheck back as 3.4, BS+, flatus+, incontinent of urine.  turn and repo q2h, on BG checks q4h. Pt is assist x2, is NPO ex meds. Abrasion to R lower side is JS, Restless intermittently sitter at bed side

## 2019-05-07 NOTE — PROCEDURES
[facility]    Neurology Procedure Note    Name: Navdeep Spann  YOB: 1945  MRN: [id number]  Today's date: 05/06/19    Procedure:  Lumbar puncture    Indications:   Fever and confusion    Pre procedure:  Consent given by: patient's partner who states understanding of the procedure being performed after discussing the risks, benefits and alternatives.  Time: 05:30 PM  Performed by: Dia Lopez MD  Authorized by: Tylor Toney MD    Procedure details:  Prior to the start of the procedure and with procedural staff participation, confirmed the patient s identity and that the procedure was appropriate and matched the consent or emergent situation, and that the correct equipment/implants were available. (The Joint Commission universal protocol was followed.). Under sterile conditions the patient was positioned Sitting, bent forward. Betadine solution and sterile drapes were utilized. Local anesthetic at the site: 5 ml of lidocaine 1% without epinephrine from the LP tray. A 21 G  spinal needle was inserted at the L 3-4 interspace. Opening Pressure was not checked. A total of 8mL of clear and colorless spinal fluid was obtained and sent to the laboratory. After the needle was removed, a bandaid and pressure were applied and the patient was instructed to stay horizontal until the results were back.    Complications:    None  Patient tolerance: Patient tolerated the procedure well with no immediate complications.      Dia Lopez MD  Vascular Neurology fellow  Pager # [phone]

## 2019-05-07 NOTE — PLAN OF CARE
Pt here with Encephalopathy w/ unknown etiology and R flank contusion. A&O to self. Speech garbled, fast paced, rambling, and slightly slurred. Inconsistent with following commands. VSS. Tele Afib CVR. Labored breathing, not requiring O2 during my shift. NPO diet, speech paged about increased alertness but had already left and will need to reassess tomorrow. Up with 2/lift. LP site, WDL Band-Aid in place. R Lower abdominal ecchymosis, no drainage JS. Incontinent at times. IV dressings changed x2. IVF infusing at 50 ml/hr in r hand. Denies pain. Sitter at bedside. Plan pending LP results and SLP assessment in morning.

## 2019-05-07 NOTE — PROGRESS NOTES
05/07/19 1506   General Information   Onset Date 05/05/19   Start of Care Date 05/07/19   Referring Physician Barthell, Joanna Kersten Ulmen, PA-C   Patient Profile Review/OT: Additional Occupational Profile Info See Profile for full history and prior level of function   Patient/Family Goals Statement None stated    Swallowing Evaluation Bedside swallow evaluation   Behaviorial Observations Confused;Distractible;Impulsive;Lethargic   Mode of current nutrition NPO   Respiratory Status O2 Supply   Type of O2 supply Nasal cannula   Comments Navdeep Spann is a 74-year-old male with history of alcohol and cannabis use disorders now in remission, frequent UTIs on prophylactic medication, depression/anxiety, BPH, dementia, HTN, and chronic atrial fibrillation who is a direct admission from [facility] where he was brought in via EMS after found down with altered mental status around 6:30 a.m. on the day of admission. Pt alert, answers some questions, but is confused and can not provide a hx. He maintains alert for most of evaluation. No family present.    Clinical Swallow Evaluation   Oral Musculature generally intact;unable to assess due to poor participation/comprehension   Structural Abnormalities none present   Dentition   (unsure)   Secretion Management   (difficulty with secretions when laying down her 1:1)   Mucosal Quality dry   Oral Labial Strength and Mobility   (unable to assess d/t pt's status )   Lingual Strength and Mobility   (unable to assess d/t pt's status )   Buccal Strength and Mobility   (unable to assess d/t pt's status )   Laryngeal Function Cough;Voicing initiated;Swallow   Additional Documentation Yes   Swallow Eval   Feeding Assistance dependent   Clinical Swallow Eval: Thin Liquid Texture Trial   Mode of Presentation, Thin Liquids spoon;fed by clinician   Volume of Liquid or Food Presented ice chips x 3   Oral Phase of Swallow Premature pharyngeal entry   Pharyngeal Phase of Swallow  impaired;coughing/choking;repeated swallows;throat clearing   Diagnostic Statement Congested cough with 75% of ice chip trials - suspect related to cognitive status    Clinical Swallow Eval: Puree Solid Texture Trial   Mode of Presentation, Puree spoon;fed by clinician   Volume of Puree Presented 2 oz    Oral Phase, Puree Premature pharyngeal entry;Poor AP movement;Residue in oral cavity   Pharyngeal Phase, Puree impaired;coughing/choking;reduction in laryngeal movement;repeated swallows   Diagnostic Statement Intermittent coughing with puree textures, suspect also impacted by secretions and cognitive status    Esophageal Phase of Swallow   Patient reports or presents with symptoms of esophageal dysphagia Yes   Esophageal comments Occasional burping    General Therapy Interventions   Planned Therapy Interventions Dysphagia Treatment   Dysphagia treatment Oropharyngeal exercise training;Modified diet education;Instruction of safe swallow strategies   Swallow Eval: Clinical Impressions   Skilled Criteria for Therapy Intervention Skilled criteria met.  Treatment indicated.   Functional Assessment Scale (FAS) 3   Treatment Diagnosis Moderate oropharyngeal dysphagia    Diet texture recommendations NPO   Therapy Frequency daily   Predicted Duration of Therapy Intervention (days/wks) 1 week    Anticipated Discharge Disposition extended care facility   Risks and Benefits of Treatment have been explained. Yes   Patient, family and/or staff in agreement with Plan of Care Yes   Clinical Impression Comments Pt seen for swallow evaluation. He is somewhat more alert than he has been, however remains confused and can not provide any functional information. No family present, no apparent dysphagia hx. Pt was assesssed with ice chips by spoon and puree. Premature spillage of all PO trials, reduced bolus awareness.  Intermittent coughing with secretions and some oral suctioning needed per 1:1. Pt demonstrated a cough response with 75  % of ice chip trials. Intermittent coughing noted across 2 oz of puree textures - suspect this is related to reduced oral control, lack of awareness to PO, and overall mentation. Pt remains a high aspiration risk with all PO intake secondary to cognitive status. Continue NPO status with frequent oral cares.   Total Evaluation Time   Total Evaluation Time (Minutes) 18

## 2019-05-07 NOTE — PROGRESS NOTES
[facility]  Neurology Daily Note      Admission Date:5/5/2019   Date of service: 05/07/2019   Hospital Day: 3      Assessment & Plan   _______________________________  #. (R41.82) Altered mental status, unspecified altered mental status type  (primary encounter diagnosis)  --found down at home  --CT/MRI with old right thalamic infarct, no new abnormalities  --not following commands, not verbalizing  -----EEG encephalopathic, without seizures  --developed fever 5/6/19  -----zosyn started  -----LP completed on 5/6/19  ---------initial cells unremarkable - patient moved a lot leading to bloody tap  ---------cultures and viral studies pending   #. (G30.9,  F02.80) Alzheimer's dementia without behavioral disturbance, unspecified timing of dementia onset  --at baseline is conversant and appropriate, per family  --on donepezil PTA, unable to take currently due to NPO  #. DVT Prophylaxis  --per primary service  #. PT/OT/Speech  --evaluations as able  #. Nutrition / GI Prophylaxis  --Per recommendations of speech therapy        Code Status: Full Code    Disposition: pending     Interval History   _______________________________  Patient presented with altered mental status. Found on the ground at home unable to respond, babbling incoherently. History of Alzheimer's, but reportedly per family is alert, interactive, and oriented at baseline. History of falls, walks with a cane. Off anticoagulation for Afib due to falls. MRI brain negative for acute abnormality. Developed fever on 5/6/19. Started antibiotics, LP performed.   Today still restless, sitter at bedside. Able to say fairly clearly that he wanted to get up, otherwise still illogical speech. Not following commands.     Review of Systems   _______________________________  Review of systems is not obtainable due to patient factors - mental status  Physical Exam   _______________________________  Vitals: Temp: 98.8  F (37.1  C) Temp src: Oral BP: 138/74  Pulse: 89 Heart Rate: 94 Resp: 18 SpO2: 98 % O2 Device: Oxymask(with humidification) Oxygen Delivery: 2 LPM  Vital Signs with Ranges: Temp:  [98.8  F (37.1  C)-100.8  F (38.2  C)] 98.8  F (37.1  C)  Pulse:  [] 89  Heart Rate:  [] 94  Resp:  [18-30] 18  BP: (127-159)/(55-93) 138/74  SpO2:  [93 %-99 %] 98 %    General Appearance:  No acute distress  Neuro:       Mental Status Exam:   Lethargic, opens eyes spontaneously, not to voice, unable to assess orientation. Minimal verbalizations, some logical, mostly illogical. Mental status is decreased/agitated       Cranial Nerves:  Pupils 3 mm, reactive. Opens eyes spontaneously, not tracking. Face is symmetric. Other CN are largely untestable due to agitation       Motor:  Moves all extremities, does not follow commands. Tone and bulk are normal           Reflexes:  Unable to assess DTR due to agitation. Toes equivocal.        Sensory:  Unable to assess           Coordination:   Unable to assess       Gait:  Unable to ambulate  Abdomen: Soft, not tender, not distended  Extremities: No clubbing, no cyanosis, no edema    Medications   _______________________________    sodium chloride 50 mL/hr at 05/07/19 0755       digoxin  100 mcg Intravenous Daily     donepezil  10 mg Oral At Bedtime     insulin aspart  1-4 Units Subcutaneous Q4H     lisinopril  40 mg Oral Daily     metoprolol  5 mg Intravenous Q6H     piperacillin-tazobactam  4.5 g Intravenous Q6H       Data   _______________________________      Lab Data:   All data was reviewed by me personally  CBC RESULTS:  Recent Labs   Lab Test 05/05/19  0725   WBC 10.8   RBC 4.31*   HGB 13.7   HCT 41.3        Basic Metabolic Panel:  Recent Labs   Lab Test 05/06/19  2351 05/06/19  0738 05/05/19  2253 05/05/19  0725   NA  --  145*  --  141   POTASSIUM 3.4 3.3* 3.5 3.1*   CHLORIDE  --  111*  --  104   CO2  --  27  --  31   BUN  --  10  --  14   CR  --  1.04  --  1.20   GLC  --  141*  --  204*   PANKAJ  --  8.7  --   9.1     Liver panel:  Recent Labs   Lab Test 05/05/19  0725   PROTTOTAL 7.6   ALBUMIN 3.7   BILITOTAL 0.6   ALKPHOS 109   AST 18   ALT 18     Coagulation  Recent Labs   Lab Test 05/05/19  0725   INR 1.05      A1C:   Recent Labs   Lab Test 05/05/19  1753   A1C 5.8*     UA Results:  Recent Labs   Lab Test 05/05/19  1900   COLOR Light Yellow   APPEARANCE Clear   URINEGLC Negative   URINEBILI Negative   URINEKETONE Negative   SG 1.016   UBLD Negative   URINEPH 6.0   PROTEIN Negative   NITRITE Negative   LEUKEST Negative   RBCU <1   WBCU <1        Cardiac US:   --     Neurophysiology:   EEG 5/6/19:  This is an abnormal portable EEG due to the presence of mild to moderate diffuse nonspecific encephalopathy.   No epileptiform discharges were present.  No electrographic or clinical seizures were recorded.  Clinical correlation advised.        Imaging:   All imaging studies were reviewed personally  CT head 5/5/19:   1.  Vague 13.1 x 8.4 mm left thalamic hypodensity more prominent than on 1/13/2019. This may be an acute infarct superimposed on a chronic infarct.  2.  No mass effect or hemorrhagic transformation.  3.  Small chronic right thalamic infarct.  4.  Advanced presumed chronic small vessel ischemic change.     CTA neck/head 5/5/19:  HEAD CTA:  1.  Normal head CTA.  2.  No significant stenosis.  No aneurysm or vascular malformation.     NECK CTA:  1.  Normal neck CTA  for age.  2.  No significant stenosis as per NASCET criteria.     MRI brain 5/5/19:   1.  No acute stroke. No mass or hemorrhage.  2.  The area of hypodensity in the left thalamus correlates with mild presumed chronic small vessel ischemic change. No associated well-defined lacunar infarct.  3.  Chronic lacunar infarct in the right thalamus.  4.  Advanced supratentorial and mild-to-moderate pontine presumed chronic small vessel ischemic change.          Text Page    Mary Kay Spring, MSN, FNP-BC, RN CNRN SCRN

## 2019-05-07 NOTE — PROGRESS NOTES
Cross cover    Paged regarding patient with increased effort of breathing, requiring 2 L of oxygen by nasal cannula.  Sounds wet on exam.  N.p.o.  Fevers noted.  Procalcitonin negative.  Cultures pending.  --Chest x-ray shows vascular congestion without signs of edema or pneumonia.  --Lasix 40 mg IV x1  --Decrease maintenance IV fluids to 50 mL/h while n.p.o.  --Monitor volume and respiratory status    Naman Santos PA-C

## 2019-05-07 NOTE — PLAN OF CARE
Discharge Planner SLP   Patient plan for discharge: Did not discuss  Current status: Pt seen for swallow evaluation. He is somewhat more alert than he has been, however remains confused and can not provide any functional information. No family present, no apparent dysphagia hx. Pt was assesssed with ice chips by nic and diane. Premature spillage of all PO trials, reduced bolus awareness.  Intermittent coughing with secretions and some oral suctioning needed per 1:1. Pt demonstrated a cough response with 75 % of ice chip trials. Intermittent coughing noted across 2 oz of puree textures - suspect this is related to reduced oral control, lack of awareness to PO, and overall mentation.     Pt remains a high aspiration risk with all PO intake secondary to cognitive status. Continue NPO status with frequent oral cares.    Barriers to return to prior living situation: Below baseline, confusion, nutrition, aspiration risk   Recommendations for discharge: TCU  Rationale for recommendations: SLP at next level of care for management of dysphagia          Entered by: Shanelle Rodriguez 05/07/2019 3:16 PM

## 2019-05-07 NOTE — PLAN OF CARE
Pt here with altered mental status. A&O alfredo. Neuros alfredo. VSS with some htn and fever. Respirations increased up to 30 today - HOB 30+ and 2LO2 helped. NPO. IV medications only until able to take them orally. Up with A2 + lift. No nonverbal indicators of  pain. Lumbar puncture done at 1745; results pending. Continue to monitor and follow POC.   Spoke with significant other Melva to assess for alcohol and/or drug use and she stated that he's been proudly sober for many years.

## 2019-05-07 NOTE — PLAN OF CARE
Pt here with altered mental status. Garbled speech, not following commands, restless at times. VSS, except temp increased, lactic acid drawn, WNL. Increased respiratory rate, infrequent cough, RT assessed patient, no need for suctioning at this time. Lasix given as ordered. Tele a-fib with CVR. NPO. On bedrest. Continue to monitor.

## 2019-05-08 ENCOUNTER — APPOINTMENT (OUTPATIENT)
Dept: OCCUPATIONAL THERAPY | Facility: CLINIC | Age: 74
DRG: 070 | End: 2019-05-08
Attending: NURSE PRACTITIONER
Payer: COMMERCIAL

## 2019-05-08 ENCOUNTER — APPOINTMENT (OUTPATIENT)
Dept: SPEECH THERAPY | Facility: CLINIC | Age: 74
DRG: 070 | End: 2019-05-08
Attending: INTERNAL MEDICINE
Payer: COMMERCIAL

## 2019-05-08 LAB
ANION GAP SERPL CALCULATED.3IONS-SCNC: 15 MMOL/L (ref 3–14)
BUN SERPL-MCNC: 25 MG/DL (ref 7–30)
CALCIUM SERPL-MCNC: 9.1 MG/DL (ref 8.5–10.1)
CHLORIDE SERPL-SCNC: 109 MMOL/L (ref 94–109)
CO2 SERPL-SCNC: 25 MMOL/L (ref 20–32)
CREAT SERPL-MCNC: 1.22 MG/DL (ref 0.66–1.25)
ERYTHROCYTE [DISTWIDTH] IN BLOOD BY AUTOMATED COUNT: 13.5 % (ref 10–15)
GFR SERPL CREATININE-BSD FRML MDRD: 59 ML/MIN/{1.73_M2}
GLUCOSE BLDC GLUCOMTR-MCNC: 105 MG/DL (ref 70–99)
GLUCOSE BLDC GLUCOMTR-MCNC: 105 MG/DL (ref 70–99)
GLUCOSE BLDC GLUCOMTR-MCNC: 106 MG/DL (ref 70–99)
GLUCOSE BLDC GLUCOMTR-MCNC: 107 MG/DL (ref 70–99)
GLUCOSE BLDC GLUCOMTR-MCNC: 110 MG/DL (ref 70–99)
GLUCOSE BLDC GLUCOMTR-MCNC: 122 MG/DL (ref 70–99)
GLUCOSE BLDC GLUCOMTR-MCNC: 199 MG/DL (ref 70–99)
GLUCOSE SERPL-MCNC: 121 MG/DL (ref 70–99)
HCT VFR BLD AUTO: 40 % (ref 40–53)
HGB BLD-MCNC: 13.6 G/DL (ref 13.3–17.7)
MCH RBC QN AUTO: 32.2 PG (ref 26.5–33)
MCHC RBC AUTO-ENTMCNC: 34 G/DL (ref 31.5–36.5)
MCV RBC AUTO: 95 FL (ref 78–100)
PLATELET # BLD AUTO: 171 10E9/L (ref 150–450)
POTASSIUM SERPL-SCNC: 2.6 MMOL/L (ref 3.4–5.3)
RBC # BLD AUTO: 4.22 10E12/L (ref 4.4–5.9)
SODIUM SERPL-SCNC: 149 MMOL/L (ref 133–144)
VDRL CSF QL: NON REACTIVE
WBC # BLD AUTO: 11.9 10E9/L (ref 4–11)

## 2019-05-08 PROCEDURE — 99233 SBSQ HOSP IP/OBS HIGH 50: CPT | Performed by: INTERNAL MEDICINE

## 2019-05-08 PROCEDURE — 25800029 ZZH RX IP 258 OP 250: Performed by: INTERNAL MEDICINE

## 2019-05-08 PROCEDURE — 36415 COLL VENOUS BLD VENIPUNCTURE: CPT | Performed by: INTERNAL MEDICINE

## 2019-05-08 PROCEDURE — 25000125 ZZHC RX 250: Performed by: INTERNAL MEDICINE

## 2019-05-08 PROCEDURE — 97166 OT EVAL MOD COMPLEX 45 MIN: CPT | Mod: GO | Performed by: OCCUPATIONAL THERAPIST

## 2019-05-08 PROCEDURE — 12000000 ZZH R&B MED SURG/OB

## 2019-05-08 PROCEDURE — 25800030 ZZH RX IP 258 OP 636: Performed by: INTERNAL MEDICINE

## 2019-05-08 PROCEDURE — 85027 COMPLETE CBC AUTOMATED: CPT | Performed by: INTERNAL MEDICINE

## 2019-05-08 PROCEDURE — 99232 SBSQ HOSP IP/OBS MODERATE 35: CPT | Performed by: NURSE PRACTITIONER

## 2019-05-08 PROCEDURE — 80048 BASIC METABOLIC PNL TOTAL CA: CPT | Performed by: INTERNAL MEDICINE

## 2019-05-08 PROCEDURE — 92526 ORAL FUNCTION THERAPY: CPT | Mod: GN | Performed by: SPEECH-LANGUAGE PATHOLOGIST

## 2019-05-08 PROCEDURE — 25000128 H RX IP 250 OP 636: Performed by: INTERNAL MEDICINE

## 2019-05-08 PROCEDURE — 25000128 H RX IP 250 OP 636: Performed by: PHYSICIAN ASSISTANT

## 2019-05-08 PROCEDURE — 25000132 ZZH RX MED GY IP 250 OP 250 PS 637: Performed by: INTERNAL MEDICINE

## 2019-05-08 PROCEDURE — 00000146 ZZHCL STATISTIC GLUCOSE BY METER IP

## 2019-05-08 RX ORDER — DEXTROSE MONOHYDRATE 50 MG/ML
INJECTION, SOLUTION INTRAVENOUS CONTINUOUS
Status: DISCONTINUED | OUTPATIENT
Start: 2019-05-08 | End: 2019-05-09

## 2019-05-08 RX ADMIN — INSULIN ASPART 1 UNITS: 100 INJECTION, SOLUTION INTRAVENOUS; SUBCUTANEOUS at 21:23

## 2019-05-08 RX ADMIN — PIPERACILLIN SODIUM,TAZOBACTAM SODIUM 4.5 G: 4; .5 INJECTION, POWDER, FOR SOLUTION INTRAVENOUS at 02:29

## 2019-05-08 RX ADMIN — Medication 10 MEQ: at 13:55

## 2019-05-08 RX ADMIN — Medication 10 MEQ: at 15:07

## 2019-05-08 RX ADMIN — DONEPEZIL HYDROCHLORIDE 10 MG: 10 TABLET ORAL at 21:24

## 2019-05-08 RX ADMIN — Medication 10 MEQ: at 16:37

## 2019-05-08 RX ADMIN — PIPERACILLIN SODIUM,TAZOBACTAM SODIUM 4.5 G: 4; .5 INJECTION, POWDER, FOR SOLUTION INTRAVENOUS at 15:23

## 2019-05-08 RX ADMIN — Medication 10 MEQ: at 12:43

## 2019-05-08 RX ADMIN — PIPERACILLIN SODIUM,TAZOBACTAM SODIUM 4.5 G: 4; .5 INJECTION, POWDER, FOR SOLUTION INTRAVENOUS at 21:23

## 2019-05-08 RX ADMIN — Medication 10 MEQ: at 17:46

## 2019-05-08 RX ADMIN — PIPERACILLIN SODIUM,TAZOBACTAM SODIUM 4.5 G: 4; .5 INJECTION, POWDER, FOR SOLUTION INTRAVENOUS at 09:16

## 2019-05-08 RX ADMIN — METOPROLOL TARTRATE 5 MG: 5 INJECTION, SOLUTION INTRAVENOUS at 19:02

## 2019-05-08 RX ADMIN — Medication 10 MEQ: at 19:49

## 2019-05-08 RX ADMIN — DIGOXIN 100 MCG: 0.25 INJECTION INTRAMUSCULAR; INTRAVENOUS at 09:13

## 2019-05-08 RX ADMIN — METOPROLOL TARTRATE 5 MG: 5 INJECTION, SOLUTION INTRAVENOUS at 07:01

## 2019-05-08 RX ADMIN — LISINOPRIL 40 MG: 40 TABLET ORAL at 10:02

## 2019-05-08 RX ADMIN — METOPROLOL TARTRATE 5 MG: 5 INJECTION, SOLUTION INTRAVENOUS at 12:42

## 2019-05-08 RX ADMIN — DEXTROSE MONOHYDRATE: 50 INJECTION, SOLUTION INTRAVENOUS at 15:05

## 2019-05-08 RX ADMIN — SODIUM CHLORIDE: 4.5 INJECTION, SOLUTION INTRAVENOUS at 09:15

## 2019-05-08 RX ADMIN — METOPROLOL TARTRATE 5 MG: 5 INJECTION, SOLUTION INTRAVENOUS at 00:10

## 2019-05-08 ASSESSMENT — ACTIVITIES OF DAILY LIVING (ADL)
ADLS_ACUITY_SCORE: 23
ADLS_ACUITY_SCORE: 23
ADLS_ACUITY_SCORE: 24
ADLS_ACUITY_SCORE: 23

## 2019-05-08 NOTE — PROGRESS NOTES
05/08/19 1017   Quick Adds   Type of Visit Initial Occupational Therapy Evaluation   Living Environment   Lives With significant other   Living Arrangements house   Living Environment Comment Patient unable to report set up of house, appears to live with SO (Melva) and roommates per ED note.   Self-Care   Current Activity Tolerance poor   Activity/Exercise/Self-Care Comment Pt. unable to report prior level of function, but per chart, he was completing mobility and basic ADL (I)-supervision level   Functional Level   Ambulation 0-->independent   Transferring 0-->independent   Toileting 0-->independent   Bathing 0-->independent   Dressing 0-->independent   Eating 0-->independent   Fall history within last six months yes   Number of times patient has fallen within last six months 1   Prior Functional Level Comment Has help with medications per chart   General Information   Onset of Illness/Injury or Date of Surgery - Date 05/05/19   Referring Physician Mary Kay Spring APRN CNP   Patient/Family Goals Statement unable to state   Additional Occupational Profile Info/Pertinent History of Current Problem Navdeep Spann is a 74-year-old male with history of alcohol and cannabis use disorders now in remission, frequent UTIs on prophylactic medication, depression/anxiety, BPH, dementia, HTN, and chronic atrial fibrillation who is a direct admission from [facility] where he was brought in via EMS after found down with altered mental status around 6:30 a.m. on the day of admission. MRI negative for acute stroke. Has encephalopathy of unknown origin.   Precautions/Limitations seizure precautions;fall precautions   General Observations 1:1 attendant   Cognitive Status Examination   Orientation person   Level of Consciousness alert;confused;agitated   Follows Commands (Cognition) follows one step commands;25-49% accuracy   Memory impaired   Attention Distractible during evaluation   Organization/Problem Solving  Problem solving impaired;Sequencing impaired   Executive Function Impulsive;Planning ability impaired;Self awareness/monitoring impaired   Cognitive Comment dementia at baseline, but appears to be far below cognitive baseline per chart   Sensory Examination   Sensory Comments able to feel light touch throughout   Pain Assessment   Patient Currently in Pain No  (denies pain)   Range of Motion (ROM)   ROM Comment BUE AROM WFL    Strength   Strength Comments BUE strength appears WNL but patient had difficulty following MMT directions   Hand Strength   Hand Strength Comments fair, equal grasp   Muscle Tone Assessment   Muscle Tone Quick Adds No deficits were identified   Coordination   Coordination Comments impaired motor planning and body awareness   Mobility   Bed Mobility Comments Mod A supine to EOB   Transfer Skills   Transfer Comments unsafe to transfer out of bed due to severely impaired cognition   Transfer Skill: Bed to Chair/Chair to Bed   Level of Forest: Bed to Chair unable to perform   Transfer Skill: Sit to Stand   Level of [place]: Sit/Stand unable to perform   Transfer Skill: Toilet Transfer   Level of [place]: Toilet unable to perform   Balance   Balance Comments CGA for EOB sitting   Upper Body Dressing   Level of [place]: Dress Upper Body maximum assist (25% patients effort)   Lower Body Dressing   Level of [place]: Dress Lower Body dependent (less than 25% patients effort)   Toileting   Level of [place]: Toilet dependent (less than 25% patients effort)   Grooming   Level of [place]: Grooming maximum assist (25% patients effort)   Instrumental Activities of Daily Living (IADL)   IADL Comments Had assist from significant other per chart   Activities of Daily Living Analysis   Impairments Contributing to Impaired Activities of Daily Living balance impaired;cognition impaired;coordination impaired;postural control impaired;strength decreased   ADL Comments greatly  impaired activity tolerance   General Therapy Interventions   Planned Therapy Interventions ADL retraining;IADL retraining;balance training;bed mobility training;cognition;strengthening;progressive activity/exercise;transfer training   Clinical Impression   Criteria for Skilled Therapeutic Interventions Met yes, treatment indicated   OT Diagnosis impaired ADL and cognition   Influenced by the following impairments medical status, signs/symtoms of delirium   Assessment of Occupational Performance 3-5 Performance Deficits   Identified Performance Deficits ADL, IADL, mobility, cognition   Clinical Decision Making (Complexity) Moderate complexity   Therapy Frequency 3 times/wk   Predicted Duration of Therapy Intervention (days/wks) 1 week   Anticipated Equipment Needs at Discharge shower chair;raised toilet seat   Anticipated Discharge Disposition Transitional Care Facility   Risks and Benefits of Treatment have been explained. Yes   Patient, Family & other staff in agreement with plan of care Yes   Total Evaluation Time   Total Evaluation Time (Minutes) 15

## 2019-05-08 NOTE — PLAN OF CARE
Discharge Planner SLP   Patient plan for discharge: Not addressed.   Current status:  Patient seen for swallow treatment elda jenkins at bedside. He was alert, confused and impulsive/distracted. He took approximately 2 oz of nectar thick liquids by spoon with premature spillage but no apparent aspiration. Decreased bolus control and AP movement of pudding with minimal to mild oral residue that cleared with a liquid rinse. Continues to be a high risk for aspiration due to confusion and history of dysphagia documented on a video swallow study at an outside provider in 4014 with penetration of all liquids. Recommend: 1. Cautiously initiate a full liquid nectar thick by spoon only. 2. Feed only when fully alert with 1:1 supervision/assistance, alternate liquids/solids at a slow rate. If coughing, wet voice or changes in his respiratory status. Crush medications and place in pudding or apple sauce.     Barriers to return to prior living situation: Confusion/safety.   Recommendations for discharge: TCU  Rationale for recommendations: Continue skilled ST needs for swallowing at next level of care due to below his baseline diet. May need a cognitive linguistic evaluation at the next level of care.        Entered by: Kassandra Kent 05/08/2019 10:02 AM

## 2019-05-08 NOTE — PROVIDER NOTIFICATION
MD Notification    Notified Person: MD    Notified Person Name:Maykel    Notification Date/Time:5/8/19 1134    Notification Interaction:text    Purpose of Notification:FYI Critical lab K is 2.6, message sent to pharmacy to send up 6 doses to replace per protocol.

## 2019-05-08 NOTE — PLAN OF CARE
Discharge Planner OT   Patient plan for discharge: Unable to state  Current status: OT john completed. Unable to initiate treatment, as patient not able to participate meaningfully. Has signs/symptoms consistent with delirium. Disoriented to time, place, situation. He attempts to follow commands, but is very easily confused, distracted, talking non-sensically at times, picking at sheets and lines. BUE AROM appears WFL and strength appears WFL but had difficulty with testing. Unable to give an accurate history about where he lives or who he lives with. Per chart, patient lives at home with significant other Melva and roommates in a house. He sat up on EOB with Mod A for bed mobility and CGA to stay sitting upright, but then lays back down impulsively. Not appropriate to initiate out of bed activity today; PT should wait to evaluate until better able to participate.  Barriers to return to prior living situation: disoriented, medical status, severely impaired cognition, level of assist with all ADL  Recommendations for discharge: TCU-will continue to assess as patient's condition changes  Rationale for recommendations: Patient currently has a 1:1 attendant and is not safe to mobilize out of bed due to severely impaired cognition/disorientation and limited command following. Will see patient 3x/week for OT until better able to participate.       Entered by: Marcie Browning 05/08/2019 10:47 AM

## 2019-05-08 NOTE — PROGRESS NOTES
[facility]  Neurology Daily Note      Admission Date:5/5/2019   Date of service: 05/08/2019   Hospital Day: 4      Assessment & Plan   _______________________________  #. (R41.82) Altered mental status, unspecified altered mental status type  (primary encounter diagnosis)  --found down at home  --CT/MRI with old right thalamic infarct, no new abnormalities  --not following commands, not verbalizing  -----EEG encephalopathic, without seizures  --developed fever 5/6/19  -----zosyn started 5/6/19  -----LP completed on 5/6/19  ---------initial cells unremarkable - patient moved a lot leading to bloody tap  ---------preliminary cultures negative and viral studies negative  ---mild improvement, speaking some, still agitated with sitter at bedside  #. (G30.9,  F02.80) Alzheimer's dementia without behavioral disturbance, unspecified timing of dementia onset  --at baseline is conversant and appropriate, per family  --on donepezil PTA  #. DVT Prophylaxis  --per primary service  #. PT/OT/Speech  --evaluations as able  #. Nutrition / GI Prophylaxis  --Per recommendations of speech therapy        Code Status: Full Code    Disposition: pending     Interval History   _______________________________  Patient presented with altered mental status. Found on the ground at home unable to respond, babbling incoherently. History of Alzheimer's, but reportedly per family is alert, interactive, and oriented at baseline. History of falls, walks with a cane. Off anticoagulation for Afib due to falls. MRI brain negative for acute abnormality. Developed fever on 5/6/19. Started antibiotics, LP performed.   Today somewhat more alert. Answers some questions appropriate, follows some commands. Still agitated with sitter at the bedside. LP negative. Blood cultures prelim negative.     Review of Systems   _______________________________  Review of systems is not obtainable due to patient factors - mental status  Physical Exam    _______________________________  Vitals: Temp: 98.7  F (37.1  C) Temp src: Oral BP: (!) 165/93   Heart Rate: 87 Resp: 20 SpO2: 96 % O2 Device: None (Room air)    Vital Signs with Ranges: Temp:  [98  F (36.7  C)-100.6  F (38.1  C)] 98.7  F (37.1  C)  Heart Rate:  [] 87  Resp:  [20-24] 20  BP: (142-168)/(65-93) 165/93  SpO2:  [91 %-96 %] 96 %    General Appearance:  No acute distress  Neuro:       Mental Status Exam:   awake, opens eyes spontaneously and to voice, unable to assess orientation. Increasing verbalizations, some logical, mostly illogical. Mental status is decreased/agitated       Cranial Nerves:  Pupils 3 mm, reactive. Opens eyes spontaneously, not tracking. Face is symmetric. Other CN are largely untestable due to agitation       Motor:  Moves all extremities, follows some commands. Tone and bulk are normal           Reflexes:  Unable to assess DTR due to agitation. Toes equivocal.        Sensory:  Unable to assess           Coordination:   Unable to assess       Gait:  Unable to ambulate  Abdomen: Soft, not tender, not distended  Extremities: No clubbing, no cyanosis, no edema    Medications   _______________________________    NaCl 50 mL/hr at 05/08/19 0915       digoxin  100 mcg Intravenous Daily     donepezil  10 mg Oral At Bedtime     insulin aspart  1-4 Units Subcutaneous Q4H     lisinopril  40 mg Oral Daily     metoprolol  5 mg Intravenous Q6H     piperacillin-tazobactam  4.5 g Intravenous Q6H       Data   _______________________________      Lab Data:   All data was reviewed by me personally  CBC RESULTS:  Recent Labs   Lab Test 05/07/19  0837 05/05/19  0725   WBC  --  10.8   RBC  --  4.31*   HGB 12.6* 13.7   HCT  --  41.3   PLT  --  192     Basic Metabolic Panel:  Recent Labs   Lab Test 05/06/19  2351 05/06/19  0738 05/05/19  2253 05/05/19  0725   NA  --  145*  --  141   POTASSIUM 3.4 3.3* 3.5 3.1*   CHLORIDE  --  111*  --  104   CO2  --  27  --  31   BUN  --  10  --  14   CR  --  1.04   --  1.20   GLC  --  141*  --  204*   PANKAJ  --  8.7  --  9.1     Liver panel:  Recent Labs   Lab Test 05/05/19  0725   PROTTOTAL 7.6   ALBUMIN 3.7   BILITOTAL 0.6   ALKPHOS 109   AST 18   ALT 18     Coagulation  Recent Labs   Lab Test 05/05/19  0725   INR 1.05      A1C:   Recent Labs   Lab Test 05/05/19  1753   A1C 5.8*     UA Results:  Recent Labs   Lab Test 05/05/19  1900   COLOR Light Yellow   APPEARANCE Clear   URINEGLC Negative   URINEBILI Negative   URINEKETONE Negative   SG 1.016   UBLD Negative   URINEPH 6.0   PROTEIN Negative   NITRITE Negative   LEUKEST Negative   RBCU <1   WBCU <1        Cardiac US:   --     Neurophysiology:   EEG 5/6/19:  This is an abnormal portable EEG due to the presence of mild to moderate diffuse nonspecific encephalopathy.   No epileptiform discharges were present.  No electrographic or clinical seizures were recorded.  Clinical correlation advised.        Imaging:   All imaging studies were reviewed personally  CT head 5/5/19:   1.  Vague 13.1 x 8.4 mm left thalamic hypodensity more prominent than on 1/13/2019. This may be an acute infarct superimposed on a chronic infarct.  2.  No mass effect or hemorrhagic transformation.  3.  Small chronic right thalamic infarct.  4.  Advanced presumed chronic small vessel ischemic change.     CTA neck/head 5/5/19:  HEAD CTA:  1.  Normal head CTA.  2.  No significant stenosis.  No aneurysm or vascular malformation.     NECK CTA:  1.  Normal neck CTA  for age.  2.  No significant stenosis as per NASCET criteria.     MRI brain 5/5/19:   1.  No acute stroke. No mass or hemorrhage.  2.  The area of hypodensity in the left thalamus correlates with mild presumed chronic small vessel ischemic change. No associated well-defined lacunar infarct.  3.  Chronic lacunar infarct in the right thalamus.  4.  Advanced supratentorial and mild-to-moderate pontine presumed chronic small vessel ischemic change.          Text Page    Mary Kay Spring, MSN, FNP-BC, RN  CNRN SCRN

## 2019-05-08 NOTE — PROGRESS NOTES
[facility]    Medicine Progress Note - Hospitalist Service       Date of Admission:  5/5/2019  Assessment & Plan   Nadveep Spann is a 74-year-old male with history of alcohol and cannabis use disorders now in remission, frequent UTIs on prophylactic medication, depression/anxiety, BPH, dementia, HTN, and chronic atrial fibrillation who is a direct admission from [facility] where he was brought in via EMS after found down with altered mental status around 6:30 a.m. on the day of admission.      Encephalopathy, unclear etiology  Unwitnessed fall w/right flank contusion   Initially felt possibly CVA related as EMS reported left-sided hemiplegia and wife reported babbled speech.  CT head showed a possible stroke, but MRI showed no acute pathology; chronic infarction as well as chronic small vessel disease.  Prozac discontinued February in favor of starting citalopram; no other recent med changes.  No alcohol or drug use.  The patient's significant other sets up and gives the patient his medications.  No overt symptoms for infection other than a nonspecific cough for the last month that the spouse did not think much of.  Basic workup with a metabolic panel and CBC does not suggest overt pathology.  CT chest, abdomen and pelvis obtained without acute pathology.  CT cervical spine also obtained and did not show any acute pathology.   - Monitor on telemetry  - Neuro checks q4 hours  - Neurology consulted and appreciate their recommendations. eeg showing encephalopathy  -patient  Did have dementia prior to this admission.   - cause is still unknown . improvement noted in mental status today , more alert during my visit.  Lung nodule  --need follow up set up on discharge     Possible sepsis   5/6 patient become tachypnic  and intermittently having fevers with Tmax of 101.3 degrees, had LP done, procal negative, lactate upper limit of normal , started on zosyn ,  tmax today 100.6, csf studies so far  "negative for bacterial infection .  --will continue zosyn for now, will discontinue  That in 1-2 days if all tests are negative.  --blood cultures pending     Hypokalemia  - Potassium and magnesium replacement protocols     hypernatremia  -started on d5w drip  -basic metabolic panel in am   -blood sugars are high already, will have to adjust sliding scale.    Hyperglycemia  BG >200 with EMS and in ED.  HgbA1c here was 5.8   - QID AC HS glucose monitoring and low SSI available     Chronic atrial fibrillation  Rate controlled with beta blocker.  Not on anticoagulation, appears due to history of frequent falls, Coumadin was discontinued in summer of 2018.   - Switched Digoxin to IV at reduced dose 100mcg daily until clear no swallowing risk  - Scheduled IV Lopressor 5 mg q6h with PRN for HR >120  -he is not oriented for now, will continue iv until he obeys commands well.     Hypertension  HLP   - PTA ASA, lisinopril 40mg, chlorthalidone 25mg, and Lopressor 150mg BID on hold while NPO.    - IV Lopressor as above      Alzheimer's dementia  Reportedly diagnosed 3-4 years ago following an episode where patient reportedly accidentally took his son's Clozaril prescription and went into a \"coma.\"  Then had neuropsychiatric testing at this was diagnosed.  Wife states this has been progressive.  Also states he is generally alert and interactive and oriented to situation.  Walks with a cane at baseline.  - Resume PTA Aricept when tolerating PO     Depression/anxiety  Seen in chart review with increased anxiety of late and Prozac was discontinued in February in favor of starting citalopram.  In April it appears he was given a limited prescription of alprazolam which the spouse states he has not yet used.   - Resume PTA citalopram when tolerating PO     History of alcohol abuse and cannabis use disorder  Abstinent from alcohol since 1990 and has not used cannabis for estimated 1 year.  Wife does not believe he has any access to " alcohol or drugs that would be contributing to above presentation.     BPH  History of frequent UTI on prophylactic trimethoprim  UA was not suggestive of UTI   - Hold PTA trimethoprim 100mg daily     Severe right C3-C4 C5-C6 and left C4-C5 foraminal stenosis  Incidental findings on CT c-spine.       Diet: Combination Diet Full Liquid Diet; Nectar Thickened Liquids (pre-thickened or use instant food thickener) (By spoon)  Room Service    DVT Prophylaxis: Pneumatic Compression Devices  Lang Catheter: not present  Code Status: Full Code      Disposition Plan   Expected discharge: TBD.  Still undergoing work up and evaluation.  Likely TCU at discharge   Entered: Gianna Brar MD 05/08/2019, 2:13 PM       The patient's care was discussed with the consultant     Gianna Brar MD  Hospitalist Service  [facility]    ______________________________________________________________________    Interval History   More alert today but not oriented in place person or time, does obey commands occasionally, speech saw patient  Today.  Data reviewed today: I reviewed all medications, new labs and imaging results over the last 24 hours. I personally reviewed no images or EKG's today.    Physical Exam   Vital Signs: Temp: 98.2  F (36.8  C) Temp src: Oral BP: 146/80   Heart Rate: 89 Resp: 20 SpO2: 95 % O2 Device: None (Room air)    Weight: 185 lbs 0 oz  Constitutional: Awake, not oriented in place person or time.  Respiratory: bilateral rhonchi noted, noisy breathing+  Cardiovascular: Regular rate and rhythm, normal S1 and S2, and no murmur noted  GI: Normal bowel sounds, soft, non-distended, non-tender  Skin/Integumen: No rashes, no cyanosis, no edema  Neuro : moving all 4 extremities,confused        Data   Recent Labs   Lab 05/08/19  1040 05/07/19  0837 05/06/19  2351 05/06/19  0738  05/05/19  0725   WBC 11.9*  --   --   --   --  10.8   HGB 13.6 12.6*  --   --   --  13.7   MCV 95  --   --   --   --  96      --   --   --   --  192   INR  --   --   --   --   --  1.05   *  --   --  145*  --  141   POTASSIUM 2.6*  --  3.4 3.3*   < > 3.1*   CHLORIDE 109  --   --  111*  --  104   CO2 25  --   --  27  --  31   BUN 25  --   --  10  --  14   CR 1.22  --   --  1.04  --  1.20   ANIONGAP 15*  --   --  7  --  6   PANKAJ 9.1  --   --  8.7  --  9.1   *  --   --  141*  --  204*   ALBUMIN  --   --   --   --   --  3.7   PROTTOTAL  --   --   --   --   --  7.6   BILITOTAL  --   --   --   --   --  0.6   ALKPHOS  --   --   --   --   --  109   ALT  --   --   --   --   --  18   AST  --   --   --   --   --  18    < > = values in this interval not displayed.     No results found for this or any previous visit (from the past 24 hour(s)).

## 2019-05-08 NOTE — PROVIDER NOTIFICATION
"Text page to MD at [phone] \"FYI Pt. is still NPO, cannot give lisinopril as it is ordered PO. Thanks  "

## 2019-05-08 NOTE — PLAN OF CARE
[id number]: Pt here with Encephalopathy w/ unknown etiology and R flank contusion. A&O to self. Rambles, illogical speech w/ some clear sentences. Denies N/T. Inconsistent with following commands at times. Restless, sitter at bedside. VSS. Tele Afib RVR w/ PVC. Full liquid diet, nectar thick liquids by spoon. BS checks, no insulin needed. Takes pills crushed with pudding. A2/turn and repo q2h. SZR precautions, no activity noted. Labored irregular breathing pattern, congested cough. Incontinent. K 2.6, currently replacing via IV. R abdominal bruising. LP site CDI. Denies pain. Plan pending LP results and likely a TCU discharge.  1530-1930: No changes from previous assessment. K still being replaced. Pt. Restless and still requiring a sitter.

## 2019-05-09 ENCOUNTER — APPOINTMENT (OUTPATIENT)
Dept: SPEECH THERAPY | Facility: CLINIC | Age: 74
DRG: 070 | End: 2019-05-09
Attending: INTERNAL MEDICINE
Payer: COMMERCIAL

## 2019-05-09 ENCOUNTER — APPOINTMENT (OUTPATIENT)
Dept: OCCUPATIONAL THERAPY | Facility: CLINIC | Age: 74
DRG: 070 | End: 2019-05-09
Attending: NURSE PRACTITIONER
Payer: COMMERCIAL

## 2019-05-09 LAB
ANION GAP SERPL CALCULATED.3IONS-SCNC: 3 MMOL/L (ref 3–14)
BUN SERPL-MCNC: 26 MG/DL (ref 7–30)
CALCIUM SERPL-MCNC: 8.8 MG/DL (ref 8.5–10.1)
CHLORIDE SERPL-SCNC: 118 MMOL/L (ref 94–109)
CO2 SERPL-SCNC: 30 MMOL/L (ref 20–32)
CREAT SERPL-MCNC: 1.11 MG/DL (ref 0.66–1.25)
GFR SERPL CREATININE-BSD FRML MDRD: 66 ML/MIN/{1.73_M2}
GLUCOSE BLDC GLUCOMTR-MCNC: 133 MG/DL (ref 70–99)
GLUCOSE BLDC GLUCOMTR-MCNC: 137 MG/DL (ref 70–99)
GLUCOSE BLDC GLUCOMTR-MCNC: 146 MG/DL (ref 70–99)
GLUCOSE BLDC GLUCOMTR-MCNC: 147 MG/DL (ref 70–99)
GLUCOSE BLDC GLUCOMTR-MCNC: 150 MG/DL (ref 70–99)
GLUCOSE BLDC GLUCOMTR-MCNC: 151 MG/DL (ref 70–99)
GLUCOSE SERPL-MCNC: 152 MG/DL (ref 70–99)
MAGNESIUM SERPL-MCNC: 2 MG/DL (ref 1.6–2.3)
OSMOLALITY SERPL: 317 MMOL/KG (ref 280–301)
OSMOLALITY UR: 734 MMOL/KG (ref 100–1200)
POTASSIUM SERPL-SCNC: 2.7 MMOL/L (ref 3.4–5.3)
POTASSIUM SERPL-SCNC: 3.1 MMOL/L (ref 3.4–5.3)
POTASSIUM SERPL-SCNC: 3.2 MMOL/L (ref 3.4–5.3)
SODIUM SERPL-SCNC: 151 MMOL/L (ref 133–144)
SODIUM UR-SCNC: 152 MMOL/L

## 2019-05-09 PROCEDURE — 92526 ORAL FUNCTION THERAPY: CPT | Mod: GN | Performed by: SPEECH-LANGUAGE PATHOLOGIST

## 2019-05-09 PROCEDURE — 25000128 H RX IP 250 OP 636: Performed by: INTERNAL MEDICINE

## 2019-05-09 PROCEDURE — 97535 SELF CARE MNGMENT TRAINING: CPT | Mod: GO

## 2019-05-09 PROCEDURE — 25000125 ZZHC RX 250: Performed by: PHYSICIAN ASSISTANT

## 2019-05-09 PROCEDURE — 99233 SBSQ HOSP IP/OBS HIGH 50: CPT | Performed by: INTERNAL MEDICINE

## 2019-05-09 PROCEDURE — 25000128 H RX IP 250 OP 636: Performed by: PHYSICIAN ASSISTANT

## 2019-05-09 PROCEDURE — 25000125 ZZHC RX 250: Performed by: INTERNAL MEDICINE

## 2019-05-09 PROCEDURE — 12000000 ZZH R&B MED SURG/OB

## 2019-05-09 PROCEDURE — 83935 ASSAY OF URINE OSMOLALITY: CPT | Performed by: INTERNAL MEDICINE

## 2019-05-09 PROCEDURE — 25000132 ZZH RX MED GY IP 250 OP 250 PS 637: Performed by: INTERNAL MEDICINE

## 2019-05-09 PROCEDURE — 25000132 ZZH RX MED GY IP 250 OP 250 PS 637: Performed by: PHYSICIAN ASSISTANT

## 2019-05-09 PROCEDURE — 83735 ASSAY OF MAGNESIUM: CPT | Performed by: INTERNAL MEDICINE

## 2019-05-09 PROCEDURE — 99232 SBSQ HOSP IP/OBS MODERATE 35: CPT | Performed by: NURSE PRACTITIONER

## 2019-05-09 PROCEDURE — 00000146 ZZHCL STATISTIC GLUCOSE BY METER IP

## 2019-05-09 PROCEDURE — 25800030 ZZH RX IP 258 OP 636: Performed by: INTERNAL MEDICINE

## 2019-05-09 PROCEDURE — 84300 ASSAY OF URINE SODIUM: CPT | Performed by: INTERNAL MEDICINE

## 2019-05-09 PROCEDURE — 84132 ASSAY OF SERUM POTASSIUM: CPT | Performed by: INTERNAL MEDICINE

## 2019-05-09 PROCEDURE — 83930 ASSAY OF BLOOD OSMOLALITY: CPT | Performed by: INTERNAL MEDICINE

## 2019-05-09 PROCEDURE — 25800030 ZZH RX IP 258 OP 636: Performed by: PHYSICIAN ASSISTANT

## 2019-05-09 PROCEDURE — 80048 BASIC METABOLIC PNL TOTAL CA: CPT | Performed by: INTERNAL MEDICINE

## 2019-05-09 PROCEDURE — 40000225 ZZH STATISTIC SLP WARD VISIT: Performed by: SPEECH-LANGUAGE PATHOLOGIST

## 2019-05-09 PROCEDURE — 36415 COLL VENOUS BLD VENIPUNCTURE: CPT | Performed by: INTERNAL MEDICINE

## 2019-05-09 PROCEDURE — 25000125 ZZHC RX 250

## 2019-05-09 RX ORDER — LIDOCAINE HYDROCHLORIDE 20 MG/ML
JELLY TOPICAL
Status: COMPLETED
Start: 2019-05-09 | End: 2019-05-09

## 2019-05-09 RX ORDER — POTASSIUM CHLORIDE 1500 MG/1
20 TABLET, EXTENDED RELEASE ORAL 2 TIMES DAILY
Status: DISCONTINUED | OUTPATIENT
Start: 2019-05-09 | End: 2019-05-10

## 2019-05-09 RX ADMIN — DIGOXIN 100 MCG: 0.25 INJECTION INTRAMUSCULAR; INTRAVENOUS at 08:58

## 2019-05-09 RX ADMIN — METOPROLOL TARTRATE 5 MG: 5 INJECTION, SOLUTION INTRAVENOUS at 19:37

## 2019-05-09 RX ADMIN — LIDOCAINE HYDROCHLORIDE: 20 JELLY TOPICAL at 17:03

## 2019-05-09 RX ADMIN — INSULIN ASPART 1 UNITS: 100 INJECTION, SOLUTION INTRAVENOUS; SUBCUTANEOUS at 21:03

## 2019-05-09 RX ADMIN — Medication 10 MEQ: at 23:45

## 2019-05-09 RX ADMIN — DEXTROSE MONOHYDRATE: 50 INJECTION, SOLUTION INTRAVENOUS at 12:01

## 2019-05-09 RX ADMIN — Medication 10 MEQ: at 02:28

## 2019-05-09 RX ADMIN — METOPROLOL TARTRATE 5 MG: 5 INJECTION, SOLUTION INTRAVENOUS at 14:00

## 2019-05-09 RX ADMIN — INSULIN ASPART 1 UNITS: 100 INJECTION, SOLUTION INTRAVENOUS; SUBCUTANEOUS at 07:52

## 2019-05-09 RX ADMIN — METOPROLOL TARTRATE 5 MG: 5 INJECTION, SOLUTION INTRAVENOUS at 00:48

## 2019-05-09 RX ADMIN — DONEPEZIL HYDROCHLORIDE 10 MG: 10 TABLET ORAL at 21:04

## 2019-05-09 RX ADMIN — Medication 10 MEQ: at 04:50

## 2019-05-09 RX ADMIN — PIPERACILLIN SODIUM,TAZOBACTAM SODIUM 4.5 G: 4; .5 INJECTION, POWDER, FOR SOLUTION INTRAVENOUS at 03:25

## 2019-05-09 RX ADMIN — PIPERACILLIN SODIUM,TAZOBACTAM SODIUM 4.5 G: 4; .5 INJECTION, POWDER, FOR SOLUTION INTRAVENOUS at 09:09

## 2019-05-09 RX ADMIN — LISINOPRIL 40 MG: 40 TABLET ORAL at 08:58

## 2019-05-09 RX ADMIN — Medication 10 MEQ: at 19:33

## 2019-05-09 RX ADMIN — INSULIN ASPART 1 UNITS: 100 INJECTION, SOLUTION INTRAVENOUS; SUBCUTANEOUS at 12:04

## 2019-05-09 RX ADMIN — DEXTROSE MONOHYDRATE: 5 INJECTION, SOLUTION INTRAVENOUS at 19:33

## 2019-05-09 RX ADMIN — PIPERACILLIN SODIUM,TAZOBACTAM SODIUM 4.5 G: 4; .5 INJECTION, POWDER, FOR SOLUTION INTRAVENOUS at 21:03

## 2019-05-09 RX ADMIN — Medication 10 MEQ: at 06:21

## 2019-05-09 RX ADMIN — Medication 10 MEQ: at 07:30

## 2019-05-09 RX ADMIN — Medication 10 MEQ: at 03:44

## 2019-05-09 RX ADMIN — PIPERACILLIN SODIUM,TAZOBACTAM SODIUM 4.5 G: 4; .5 INJECTION, POWDER, FOR SOLUTION INTRAVENOUS at 14:17

## 2019-05-09 RX ADMIN — Medication 10 MEQ: at 17:03

## 2019-05-09 RX ADMIN — ACETAMINOPHEN 650 MG: 650 SUPPOSITORY RECTAL at 19:38

## 2019-05-09 RX ADMIN — Medication 10 MEQ: at 22:34

## 2019-05-09 RX ADMIN — INSULIN ASPART 1 UNITS: 100 INJECTION, SOLUTION INTRAVENOUS; SUBCUTANEOUS at 00:48

## 2019-05-09 RX ADMIN — METOPROLOL TARTRATE 5 MG: 5 INJECTION, SOLUTION INTRAVENOUS at 07:48

## 2019-05-09 RX ADMIN — Medication 10 MEQ: at 01:36

## 2019-05-09 ASSESSMENT — ACTIVITIES OF DAILY LIVING (ADL)
ADLS_ACUITY_SCORE: 14
ADLS_ACUITY_SCORE: 23
ADLS_ACUITY_SCORE: 24
ADLS_ACUITY_SCORE: 14

## 2019-05-09 ASSESSMENT — MIFFLIN-ST. JEOR: SCORE: 1627

## 2019-05-09 NOTE — PLAN OF CARE
Discharge Planner SLP   Patient plan for discharge: Unable to state  Current status: Swallow tx provided at bedside. Sitter present and reports pt ate some breakfast with total feeding assist and max cues. Positioned upright in bed as able. Pt very confused, disoriented but will cooperate with simple verbal cues. PO trials - nectar-thick liquids by spoon, tsp pudding. Did not recognize spoon when presented in oral cavity and did not attempt to take bolus from spoon on x3/4 trials. Max cues for oral phase due to confusion and poor awareness/attention. Prompt pharyngeal swallow after cues with no overt s/sx of aspiration. Recommend: 1. Cautiously cont full liquids - NECTAR-THICK - by spoon only with max feeding assist at slow rate. Small bites. Alternate liquids/solids. Simple verbal cues as needed for attention to PO. 2. SLP to cont daily per POC  Barriers to return to prior living situation: Medical status, confusion  Recommendations for discharge: TCU with SLP  Rationale for recommendations: Dysphagia, below baseline.        Entered by: Lopez Burch 05/09/2019 9:49 AM

## 2019-05-09 NOTE — PLAN OF CARE
Pt here with encephalopathy. Alert, oriented to self. Moving all 4 extremities, inconsistent in following commands. Illogical incoherent speech. VSS. Tele a-fib with CVR and PVC. Full liquid, NTL diet, poor appetite. Takes pills crushed with pudding. Has not been out of bed. Moves independently in bed. Incontinent of bowel and bladder, had BM this morning. Denies pain. Sitter at bedside.

## 2019-05-09 NOTE — PROVIDER NOTIFICATION
"Hospitalist paged: \"Would you consider changing IVF, since Na is increasing and K+ is low? Thanks\"  "

## 2019-05-09 NOTE — PLAN OF CARE
Discharge Planner OT   Patient plan for discharge: Not stated  Current status: Max A x2 for rolling in bed, Max A  for toileting/changing brief in bed. Completed oral cares with mod Crow A and VC's for initiating, then comleted with VC's for ceasing. CNA in room for feeding at end of session.  Barriers to return to prior living situation: disoriented, medical status, severely impaired cognition, level of assist with all ADL  Recommendations for discharge: TCU  Rationale for recommendations: Patient currently has a 1:1 attendant and is not safe to mobilize out of bed due to severely impaired cognition/disorientation and limited command following. Will see patient 3x/week for OT until better able to participate.         Entered by: Neelima Sherwood 05/09/2019 12:35 PM

## 2019-05-09 NOTE — PROGRESS NOTES
[facility]    Medicine Progress Note - Hospitalist Service       Date of Admission:  5/5/2019  Assessment & Plan   Navdeep Spann is a 74-year-old male with history of alcohol and cannabis use disorders now in remission, frequent UTIs on prophylactic medication, depression/anxiety, BPH, dementia, HTN, and chronic atrial fibrillation who is a direct admission from [facility] where he was brought in via EMS after found down with altered mental status around 6:30 a.m. on the day of admission.      Encephalopathy, unclear etiology  Unwitnessed fall w/right flank contusion   Initially felt possibly CVA related as EMS reported left-sided hemiplegia and wife reported babbled speech.  CT head showed a possible stroke, but MRI showed no acute pathology; chronic infarction as well as chronic small vessel disease.  Prozac discontinued February in favor of starting citalopram; no other recent med changes.  No alcohol or drug use.  The patient's significant other sets up and gives the patient his medications.  No overt symptoms for infection other than a nonspecific cough for the last month that the spouse did not think much of.  Basic workup with a metabolic panel and CBC does not suggest overt pathology.  CT chest, abdomen and pelvis obtained without acute pathology.  CT cervical spine also obtained and did not show any acute pathology.   - Monitor on telemetry  - Neuro checks q4 hours  - Neurology consulted and appreciate their recommendations. eeg showing encephalopathy  -patient  Did have dementia prior to this admission.   - cause is still unknown . improvement noted in mental status today , more alert during my visit.  - continue physical therapy /occupational therapy /speech , his mental status is slow to improve, csf studies negative  So far .  Lung nodule  --need follow up set up on discharge     Possible sepsis   5/6 patient become tachypnic  and intermittently having fevers with Tmax of 101.3  "degrees, had LP done, procal negative, lactate upper limit of normal , started on zosyn ,  tmax today 100.6, csf studies so far negative for bacterial infection .  --will continue zosyn for now, will discontinue  That in 1-2 days if all tests are negative.  --blood cultures pending     Hypokalemia  - Potassium and magnesium replacement protocols     hypernatremia  -started on d5w drip 5/8  -basic metabolic panel in am   -blood sugars are high already, will have to adjust sliding scale.  -his sodium levels only went up , will obtain cortisol levels in am and get hypernatremia labs ordered today .    Hyperglycemia  BG >200 with EMS and in ED.  HgbA1c here was 5.8   - QID AC HS glucose monitoring and low SSI available     Chronic atrial fibrillation  Rate controlled with beta blocker.  Not on anticoagulation, appears due to history of frequent falls, Coumadin was discontinued in summer of 2018.   - will start back all his oral medications and stop iv medications for above including betablocker's.     Hypertension  HLP   - PTA ASA, lisinopril 40mg, chlorthalidone 25mg, and Lopressor 150mg BID on hold while NPO.    - IV Lopressor as above      Alzheimer's dementia  Reportedly diagnosed 3-4 years ago following an episode where patient reportedly accidentally took his son's Clozaril prescription and went into a \"coma.\"  Then had neuropsychiatric testing at this was diagnosed.  Wife states this has been progressive.  Also states he is generally alert and interactive and oriented to situation.  Walks with a cane at baseline.  - Resumed PTA Aricept      Depression/anxiety  Seen in chart review with increased anxiety of late and Prozac was discontinued in February in favor of starting citalopram.  In April it appears he was given a limited prescription of alprazolam which the spouse states he has not yet used.   - Resumed PTA citalopram      History of alcohol abuse and cannabis use disorder  Abstinent from alcohol since 1990 " and has not used cannabis for estimated 1 year.  Wife does not believe he has any access to alcohol or drugs that would be contributing to above presentation.     BPH  History of frequent UTI on prophylactic trimethoprim  UA was not suggestive of UTI   - Hold PTA trimethoprim 100mg daily     Severe right C3-C4 C5-C6 and left C4-C5 foraminal stenosis  Incidental findings on CT c-spine.       Diet: Combination Diet Full Liquid Diet; Nectar Thickened Liquids (pre-thickened or use instant food thickener) (By spoon)  Room Service    DVT Prophylaxis: Pneumatic Compression Devices  Lang Catheter: not present  Code Status: Full Code      Disposition Plan   Expected discharge: TBD.  Still undergoing work up and evaluation.  Likely TCU at discharge   Entered: Gianna Brar MD 05/09/2019, 1:23 PM       The patient's care was discussed with the consultant     Gianna Brar MD  Hospitalist Service  [facility]    ______________________________________________________________________    Interval History   More alert and oriented x 2 today, its a big improvement from yesterday, afebrile.  Data reviewed today: I reviewed all medications, new labs and imaging results over the last 24 hours. I personally reviewed no images or EKG's today.    Physical Exam   Vital Signs: Temp: 99.1  F (37.3  C) Temp src: Axillary BP: 145/84   Heart Rate: 70 Resp: 30 SpO2: 95 % O2 Device: None (Room air)    Weight: 184 lbs 15.46 oz  Constitutional: Awake, oriented in person and place.  Respiratory: scattered rhonchi+  Cardiovascular: Regular rate and rhythm, normal S1 and S2, and no murmur noted  GI: Normal bowel sounds, soft, non-distended, non-tender  Skin/Integumen: No rashes, no cyanosis, no edema  Neuro : moving all 4 extremities,confused        Data   Recent Labs   Lab 05/09/19  1004 05/09/19  0017 05/08/19  1040 05/07/19  0837  05/06/19  0738  05/05/19  0725   WBC  --   --  11.9*  --   --   --   --  10.8   HGB  --   --  13.6  12.6*  --   --   --  13.7   MCV  --   --  95  --   --   --   --  96   PLT  --   --  171  --   --   --   --  192   INR  --   --   --   --   --   --   --  1.05   *  --  149*  --   --  145*  --  141   POTASSIUM 3.1*  3.2* 2.7* 2.6*  --    < > 3.3*   < > 3.1*   CHLORIDE 118*  --  109  --   --  111*  --  104   CO2 30  --  25  --   --  27  --  31   BUN 26  --  25  --   --  10  --  14   CR 1.11  --  1.22  --   --  1.04  --  1.20   ANIONGAP 3  --  15*  --   --  7  --  6   PANKAJ 8.8  --  9.1  --   --  8.7  --  9.1   *  --  121*  --   --  141*  --  204*   ALBUMIN  --   --   --   --   --   --   --  3.7   PROTTOTAL  --   --   --   --   --   --   --  7.6   BILITOTAL  --   --   --   --   --   --   --  0.6   ALKPHOS  --   --   --   --   --   --   --  109   ALT  --   --   --   --   --   --   --  18   AST  --   --   --   --   --   --   --  18    < > = values in this interval not displayed.     No results found for this or any previous visit (from the past 24 hour(s)).

## 2019-05-09 NOTE — PLAN OF CARE
Disoriented to place, time and situation. Does not consistently follow commands, rambling, illogical speech, grabbing at air. Strong strength in all extremities. VSS on RA. Tele A-fib CVR. Full nectar thick liquid diet. Takes pills crushed in pudding. Finished K replacement protocol this shift, rechecked K at MN, 2.7, 6 pumps of K replacement given overnight, redraw later this AM. Up with lift. Incontinent of bowel and bladder, large loose stool on evening shift. Denies pain. Plan continue to monitor.

## 2019-05-09 NOTE — PROGRESS NOTES
[facility]  Neurology Daily Note      Admission Date:5/5/2019   Date of service: 05/09/2019   Hospital Day: 5      Assessment & Plan   _______________________________  #. (R41.82) Altered mental status, unspecified altered mental status type  (primary encounter diagnosis)  --found down at home  --CT/MRI with old right thalamic infarct, no new abnormalities  -----EEG encephalopathic, without seizures  --developed fever 5/6/19  -----zosyn started 5/6/19  -----LP completed on 5/6/19  ---------initial cells unremarkable - patient moved a lot leading to bloody tap  ---------preliminary cultures negative and viral studies negative  ---mild improvements daily, more logical speech, still agitated with sitter at the bedside  --no clear cause at this time, may be related to severe dementia spell exacerbated by infectious process, clearing somewhat  #. (G30.9,  F02.80) Alzheimer's dementia without behavioral disturbance, unspecified timing of dementia onset  --at baseline is conversant and appropriate, per family  --on donepezil PTA  #. DVT Prophylaxis  --per primary service  #. PT/OT/Speech  --evaluations as able  #. Nutrition / GI Prophylaxis  --Per recommendations of speech therapy        Code Status: Full Code    Disposition: pending     Interval History   _______________________________  Patient presented with altered mental status. Found on the ground at home unable to respond, babbling incoherently. History of Alzheimer's, but reportedly per family is alert, interactive, and oriented at baseline. History of falls, walks with a cane. Off anticoagulation for Afib due to falls. MRI brain negative for acute abnormality. Developed fever on 5/6/19. Started antibiotics, LP performed.   LP negative. Blood cultures prelim negative. Much more awake and alert today, speech clearer with more logical speech. Still some illogical speech, still disoriented, still agitated with a sitter at the bedside.     Review of  Systems   _______________________________  Review of systems is not obtainable due to patient factors - mental status  Physical Exam   _______________________________  Vitals: Temp: 99.1  F (37.3  C) Temp src: Axillary BP: (!) 165/92   Heart Rate: 91 Resp: 30 SpO2: 95 % O2 Device: None (Room air)    Vital Signs with Ranges: Temp:  [97.7  F (36.5  C)-99.1  F (37.3  C)] 99.1  F (37.3  C)  Heart Rate:  [] 91  Resp:  [20-30] 30  BP: (134-169)/(65-92) 165/92  SpO2:  [91 %-95 %] 95 %    General Appearance:  No acute distress  Neuro:       Mental Status Exam:   awake, more alert, oriented to self. Increasing verbalizations, more logical speech, some illogical. Mental status is confused/agitated       Cranial Nerves:  Pupils 3 mm, reactive. Opens eyes spontaneously, not tracking. Face is symmetric. Other CN are largely untestable due to agitation       Motor:  Moves all extremities, follows some commands. Tone and bulk are normal           Reflexes:  Unable to assess DTR due to agitation. Toes equivocal.        Sensory:  Unable to assess           Coordination:   grossly intact finger to nose, doesn't follow commands well       Gait:  Unable to ambulate  Abdomen: Soft, not tender, not distended  Extremities: No clubbing, no cyanosis, no edema    Medications   _______________________________    D5W 75 mL/hr at 05/09/19 0030       digoxin  100 mcg Intravenous Daily     donepezil  10 mg Oral At Bedtime     insulin aspart  1-4 Units Subcutaneous Q4H     lisinopril  40 mg Oral Daily     metoprolol  5 mg Intravenous Q6H     piperacillin-tazobactam  4.5 g Intravenous Q6H       Data   _______________________________      Lab Data:   All data was reviewed by me personally  CBC RESULTS:  Recent Labs   Lab Test 05/08/19  1040 05/07/19  0837 05/05/19  0725   WBC 11.9*  --  10.8   RBC 4.22*  --  4.31*   HGB 13.6 12.6* 13.7   HCT 40.0  --  41.3     --  192     Basic Metabolic Panel:  Recent Labs   Lab Test 05/09/19  0017  05/08/19  1040 05/06/19  2351 05/06/19  0738  05/05/19  0725   NA  --  149*  --  145*  --  141   POTASSIUM 2.7* 2.6* 3.4 3.3*   < > 3.1*   CHLORIDE  --  109  --  111*  --  104   CO2  --  25  --  27  --  31   BUN  --  25  --  10  --  14   CR  --  1.22  --  1.04  --  1.20   GLC  --  121*  --  141*  --  204*   PANKAJ  --  9.1  --  8.7  --  9.1    < > = values in this interval not displayed.     Liver panel:  Recent Labs   Lab Test 05/05/19  0725   PROTTOTAL 7.6   ALBUMIN 3.7   BILITOTAL 0.6   ALKPHOS 109   AST 18   ALT 18     Coagulation  Recent Labs   Lab Test 05/05/19  0725   INR 1.05      A1C:   Recent Labs   Lab Test 05/05/19  1753   A1C 5.8*     UA Results:  Recent Labs   Lab Test 05/05/19  1900   COLOR Light Yellow   APPEARANCE Clear   URINEGLC Negative   URINEBILI Negative   URINEKETONE Negative   SG 1.016   UBLD Negative   URINEPH 6.0   PROTEIN Negative   NITRITE Negative   LEUKEST Negative   RBCU <1   WBCU <1        Cardiac US:   --     Neurophysiology:   EEG 5/6/19:  This is an abnormal portable EEG due to the presence of mild to moderate diffuse nonspecific encephalopathy.   No epileptiform discharges were present.  No electrographic or clinical seizures were recorded.  Clinical correlation advised.        Imaging:   All imaging studies were reviewed personally  CT head 5/5/19:   1.  Vague 13.1 x 8.4 mm left thalamic hypodensity more prominent than on 1/13/2019. This may be an acute infarct superimposed on a chronic infarct.  2.  No mass effect or hemorrhagic transformation.  3.  Small chronic right thalamic infarct.  4.  Advanced presumed chronic small vessel ischemic change.     CTA neck/head 5/5/19:  HEAD CTA:  1.  Normal head CTA.  2.  No significant stenosis.  No aneurysm or vascular malformation.     NECK CTA:  1.  Normal neck CTA  for age.  2.  No significant stenosis as per NASCET criteria.     MRI brain 5/5/19:   1.  No acute stroke. No mass or hemorrhage.  2.  The area of hypodensity in the left  thalamus correlates with mild presumed chronic small vessel ischemic change. No associated well-defined lacunar infarct.  3.  Chronic lacunar infarct in the right thalamus.  4.  Advanced supratentorial and mild-to-moderate pontine presumed chronic small vessel ischemic change.          Text Page    Mary Kay Spring, MSN, FNP-BC, RN CNRN SCRN

## 2019-05-09 NOTE — PLAN OF CARE
PT: Order received; Per chart review and conversation with nurse, patient currently not appropriate for evaluation as he is not able to follow commands. Will plan to check status of patient next on 5/10/19. Nurse in agreement with current plan per conversation.

## 2019-05-09 NOTE — PROVIDER NOTIFICATION
D/w Dr Brar, can we cath pt for Na urine and osmo? Also talked with pharmacy, they can make D5 and K+ w/o saline in main if ordered under IV builder. Per MD ok to cath for samples and MD will change IVF orders.

## 2019-05-10 ENCOUNTER — APPOINTMENT (OUTPATIENT)
Dept: SPEECH THERAPY | Facility: CLINIC | Age: 74
DRG: 070 | End: 2019-05-10
Attending: INTERNAL MEDICINE
Payer: COMMERCIAL

## 2019-05-10 LAB
ALBUMIN UR-MCNC: 10 MG/DL
ANION GAP SERPL CALCULATED.3IONS-SCNC: 8 MMOL/L (ref 3–14)
APPEARANCE UR: CLEAR
BILIRUB UR QL STRIP: NEGATIVE
BUN SERPL-MCNC: 21 MG/DL (ref 7–30)
CALCIUM SERPL-MCNC: 8.9 MG/DL (ref 8.5–10.1)
CHLORIDE SERPL-SCNC: 116 MMOL/L (ref 94–109)
CHLORIDE UR-SCNC: 153 MMOL/L
CO2 SERPL-SCNC: 27 MMOL/L (ref 20–32)
COLOR UR AUTO: YELLOW
CORTIS SERPL-MCNC: 16.6 UG/DL (ref 4–22)
CREAT SERPL-MCNC: 1.02 MG/DL (ref 0.66–1.25)
CREAT UR-MCNC: 147 MG/DL
GFR SERPL CREATININE-BSD FRML MDRD: 73 ML/MIN/{1.73_M2}
GLUCOSE BLDC GLUCOMTR-MCNC: 125 MG/DL (ref 70–99)
GLUCOSE BLDC GLUCOMTR-MCNC: 137 MG/DL (ref 70–99)
GLUCOSE BLDC GLUCOMTR-MCNC: 144 MG/DL (ref 70–99)
GLUCOSE BLDC GLUCOMTR-MCNC: 154 MG/DL (ref 70–99)
GLUCOSE BLDC GLUCOMTR-MCNC: 155 MG/DL (ref 70–99)
GLUCOSE BLDC GLUCOMTR-MCNC: 177 MG/DL (ref 70–99)
GLUCOSE SERPL-MCNC: 148 MG/DL (ref 70–99)
GLUCOSE UR STRIP-MCNC: NEGATIVE MG/DL
HGB UR QL STRIP: NEGATIVE
HYALINE CASTS #/AREA URNS LPF: 3 /LPF (ref 0–2)
KETONES UR STRIP-MCNC: 5 MG/DL
LEUKOCYTE ESTERASE UR QL STRIP: NEGATIVE
MUCOUS THREADS #/AREA URNS LPF: PRESENT /LPF
NITRATE UR QL: NEGATIVE
OSMOLALITY UR: 760 MMOL/KG (ref 100–1200)
PH UR STRIP: 6 PH (ref 5–7)
POTASSIUM SERPL-SCNC: 2.8 MMOL/L (ref 3.4–5.3)
POTASSIUM SERPL-SCNC: 3.5 MMOL/L (ref 3.4–5.3)
POTASSIUM UR-SCNC: 95 MMOL/L
PROT UR-MCNC: 0.53 G/L
PROT/CREAT 24H UR: 0.36 G/G CR (ref 0–0.2)
RBC #/AREA URNS AUTO: 4 /HPF (ref 0–2)
SODIUM SERPL-SCNC: 151 MMOL/L (ref 133–144)
SODIUM UR-SCNC: 111 MMOL/L
SOURCE: ABNORMAL
SP GR UR STRIP: 1.02 (ref 1–1.03)
SQUAMOUS #/AREA URNS AUTO: <1 /HPF (ref 0–1)
UROBILINOGEN UR STRIP-MCNC: NORMAL MG/DL (ref 0–2)
WBC #/AREA URNS AUTO: 3 /HPF (ref 0–5)

## 2019-05-10 PROCEDURE — 25000132 ZZH RX MED GY IP 250 OP 250 PS 637: Performed by: INTERNAL MEDICINE

## 2019-05-10 PROCEDURE — 12000000 ZZH R&B MED SURG/OB

## 2019-05-10 PROCEDURE — 99233 SBSQ HOSP IP/OBS HIGH 50: CPT | Performed by: INTERNAL MEDICINE

## 2019-05-10 PROCEDURE — 25000128 H RX IP 250 OP 636: Performed by: PHYSICIAN ASSISTANT

## 2019-05-10 PROCEDURE — 25000128 H RX IP 250 OP 636: Performed by: INTERNAL MEDICINE

## 2019-05-10 PROCEDURE — 81001 URINALYSIS AUTO W/SCOPE: CPT | Performed by: INTERNAL MEDICINE

## 2019-05-10 PROCEDURE — 25000125 ZZHC RX 250: Performed by: INTERNAL MEDICINE

## 2019-05-10 PROCEDURE — 82436 ASSAY OF URINE CHLORIDE: CPT | Performed by: INTERNAL MEDICINE

## 2019-05-10 PROCEDURE — 84133 ASSAY OF URINE POTASSIUM: CPT | Performed by: INTERNAL MEDICINE

## 2019-05-10 PROCEDURE — 84156 ASSAY OF PROTEIN URINE: CPT | Performed by: INTERNAL MEDICINE

## 2019-05-10 PROCEDURE — 00000146 ZZHCL STATISTIC GLUCOSE BY METER IP

## 2019-05-10 PROCEDURE — 83935 ASSAY OF URINE OSMOLALITY: CPT | Performed by: INTERNAL MEDICINE

## 2019-05-10 PROCEDURE — 84300 ASSAY OF URINE SODIUM: CPT | Performed by: INTERNAL MEDICINE

## 2019-05-10 PROCEDURE — 36415 COLL VENOUS BLD VENIPUNCTURE: CPT | Performed by: INTERNAL MEDICINE

## 2019-05-10 PROCEDURE — 25000132 ZZH RX MED GY IP 250 OP 250 PS 637: Performed by: PHYSICIAN ASSISTANT

## 2019-05-10 PROCEDURE — 80048 BASIC METABOLIC PNL TOTAL CA: CPT | Performed by: INTERNAL MEDICINE

## 2019-05-10 PROCEDURE — 82533 TOTAL CORTISOL: CPT | Performed by: INTERNAL MEDICINE

## 2019-05-10 PROCEDURE — 99232 SBSQ HOSP IP/OBS MODERATE 35: CPT | Performed by: NURSE PRACTITIONER

## 2019-05-10 PROCEDURE — 25800030 ZZH RX IP 258 OP 636: Performed by: INTERNAL MEDICINE

## 2019-05-10 PROCEDURE — 92526 ORAL FUNCTION THERAPY: CPT | Mod: GN | Performed by: SPEECH-LANGUAGE PATHOLOGIST

## 2019-05-10 PROCEDURE — 84132 ASSAY OF SERUM POTASSIUM: CPT | Performed by: INTERNAL MEDICINE

## 2019-05-10 RX ORDER — AMLODIPINE BESYLATE 5 MG/1
5 TABLET ORAL DAILY
Status: DISCONTINUED | OUTPATIENT
Start: 2019-05-10 | End: 2019-05-16 | Stop reason: HOSPADM

## 2019-05-10 RX ORDER — POTASSIUM CHLORIDE 1500 MG/1
40 TABLET, EXTENDED RELEASE ORAL 2 TIMES DAILY
Status: DISCONTINUED | OUTPATIENT
Start: 2019-05-10 | End: 2019-05-16 | Stop reason: HOSPADM

## 2019-05-10 RX ORDER — DIGOXIN 125 MCG
125 TABLET ORAL DAILY
Status: DISCONTINUED | OUTPATIENT
Start: 2019-05-10 | End: 2019-05-16 | Stop reason: HOSPADM

## 2019-05-10 RX ADMIN — DEXTROSE MONOHYDRATE: 5 INJECTION, SOLUTION INTRAVENOUS at 23:10

## 2019-05-10 RX ADMIN — METOPROLOL TARTRATE 5 MG: 5 INJECTION, SOLUTION INTRAVENOUS at 00:06

## 2019-05-10 RX ADMIN — PIPERACILLIN SODIUM,TAZOBACTAM SODIUM 4.5 G: 4; .5 INJECTION, POWDER, FOR SOLUTION INTRAVENOUS at 14:03

## 2019-05-10 RX ADMIN — DEXTROSE MONOHYDRATE: 5 INJECTION, SOLUTION INTRAVENOUS at 13:48

## 2019-05-10 RX ADMIN — POTASSIUM CHLORIDE 40 MEQ: 1500 TABLET, EXTENDED RELEASE ORAL at 12:10

## 2019-05-10 RX ADMIN — METOPROLOL TARTRATE 75 MG: 25 TABLET, FILM COATED ORAL at 21:25

## 2019-05-10 RX ADMIN — DONEPEZIL HYDROCHLORIDE 10 MG: 10 TABLET ORAL at 21:23

## 2019-05-10 RX ADMIN — INSULIN ASPART 1 UNITS: 100 INJECTION, SOLUTION INTRAVENOUS; SUBCUTANEOUS at 14:25

## 2019-05-10 RX ADMIN — LISINOPRIL 40 MG: 40 TABLET ORAL at 10:59

## 2019-05-10 RX ADMIN — DIGOXIN 100 MCG: 0.25 INJECTION INTRAMUSCULAR; INTRAVENOUS at 10:00

## 2019-05-10 RX ADMIN — INSULIN ASPART 1 UNITS: 100 INJECTION, SOLUTION INTRAVENOUS; SUBCUTANEOUS at 20:20

## 2019-05-10 RX ADMIN — POTASSIUM CHLORIDE 40 MEQ: 1500 TABLET, EXTENDED RELEASE ORAL at 21:19

## 2019-05-10 RX ADMIN — INSULIN ASPART 1 UNITS: 100 INJECTION, SOLUTION INTRAVENOUS; SUBCUTANEOUS at 00:05

## 2019-05-10 RX ADMIN — POTASSIUM CHLORIDE 40 MEQ: 1500 TABLET, EXTENDED RELEASE ORAL at 13:53

## 2019-05-10 RX ADMIN — METOPROLOL TARTRATE 5 MG: 5 INJECTION, SOLUTION INTRAVENOUS at 08:20

## 2019-05-10 RX ADMIN — PIPERACILLIN SODIUM,TAZOBACTAM SODIUM 4.5 G: 4; .5 INJECTION, POWDER, FOR SOLUTION INTRAVENOUS at 08:30

## 2019-05-10 RX ADMIN — METOPROLOL TARTRATE 75 MG: 25 TABLET, FILM COATED ORAL at 13:54

## 2019-05-10 RX ADMIN — PIPERACILLIN SODIUM,TAZOBACTAM SODIUM 4.5 G: 4; .5 INJECTION, POWDER, FOR SOLUTION INTRAVENOUS at 20:14

## 2019-05-10 RX ADMIN — POTASSIUM CHLORIDE 20 MEQ: 1500 TABLET, EXTENDED RELEASE ORAL at 10:59

## 2019-05-10 RX ADMIN — INSULIN ASPART 1 UNITS: 100 INJECTION, SOLUTION INTRAVENOUS; SUBCUTANEOUS at 17:06

## 2019-05-10 RX ADMIN — PIPERACILLIN SODIUM,TAZOBACTAM SODIUM 4.5 G: 4; .5 INJECTION, POWDER, FOR SOLUTION INTRAVENOUS at 02:26

## 2019-05-10 RX ADMIN — AMLODIPINE BESYLATE 5 MG: 5 TABLET ORAL at 13:53

## 2019-05-10 ASSESSMENT — ACTIVITIES OF DAILY LIVING (ADL)
ADLS_ACUITY_SCORE: 14
ADLS_ACUITY_SCORE: 23
ADLS_ACUITY_SCORE: 14.5
ADLS_ACUITY_SCORE: 13.5

## 2019-05-10 ASSESSMENT — MIFFLIN-ST. JEOR: SCORE: 1612

## 2019-05-10 NOTE — PLAN OF CARE
SLP: Pt asleep in bed with sitter present. Sitter reported that pt has not been alert to eat breakfast yet. Will continue to follow.     Discharge Planner SLP   Patient plan for discharge: did not state  Current status: SLP: Pt alert and agreeable to ST session. Pt is edentulous and reported no dentures, but taking his time chewing. Pt tolerated 8oz of thin liquids with no overt s/sx of aspiration. Prolonged oral phase and moderate oral residue with regular solids. No awarness of residue and cues required to clear. Recommend: dysphagia diet level 2 and thin liquids with supervision/assist as needed and check mouth after all meals. Continue excellent oral cares.     Barriers to return to prior living situation: Cognition  Recommendations for discharge: Per PT/OT  Rationale for recommendations: Pt will likely meet SLP swallowing goals during hospitalization        Entered by: Gabriella Hernandez 05/10/2019 11:26 AM

## 2019-05-10 NOTE — PLAN OF CARE
PT: Order received; Evaluation attempted; patient just finished with Speech and was alert, awake and answering questions. Started to ask subjective questions and patient unable to maintain level of alertness then to participate in eval; would wake up briefly to sternal rub but then quickly return to sleep; noticed dagoberto cracker ball on tongue and removed for safety; Speech and Nursing notified. Will attempt evaluation again on 5/11.

## 2019-05-10 NOTE — PLAN OF CARE
Pt alert and confused, illogical speech with limited command following. VEENA most neuros but DEL TORO and PERRLA. On tele in Afib with CVR. VS, with HTN and Tmax, 99.3A, down with PRN tylenol and scheduled meds. Bedrest with fx turns. Inc of bowel and bladder. Tolerating full nectar thick liquids by spoon. Denies pain. Bruising on hip/flank in state of healing but intact skin. K+ being replaced. Discharge plan pending.

## 2019-05-10 NOTE — PLAN OF CARE
Confusion and restlessness continues. No overt neuro changes. Pt alert, speech increasingly clear. Appears to be hallucinating at times. VSS on room air. Tele: a-fib with CVR. Denies discomfort. Asking to get out of bed and walk. Incontinent of urine. Bedside attendant and bed alarm for safety.

## 2019-05-10 NOTE — PROGRESS NOTES
[facility]  Neurology Daily Note      Admission Date:5/5/2019   Date of service: 05/10/2019   Hospital Day: 6      Assessment & Plan   _______________________________  #. (R41.82) Altered mental status, unspecified altered mental status type  (primary encounter diagnosis)  --found down at home  --CT/MRI with old right thalamic infarct, no new abnormalities  -----EEG encephalopathic, without seizures  --developed fever 5/6/19  -----zosyn started 5/6/19  -----LP completed on 5/6/19  ---------initial cells unremarkable - patient moved a lot leading to bloody tap  ---------preliminary cultures negative and viral studies negative  ---mild improvements daily, more logical speech, still agitated with sitter at the bedside  --no clear cause at this time, may be related to severe dementia spell exacerbated by infectious process, clearing somewhat  #. (G30.9,  F02.80) Alzheimer's dementia without behavioral disturbance, unspecified timing of dementia onset  --at baseline is conversant and appropriate, per family  --on donepezil PTA  #. DVT Prophylaxis  --per primary service  #. PT/OT/Speech  --evaluations as able  #. Nutrition / GI Prophylaxis  --Per recommendations of speech therapy        Code Status: Full Code    Disposition: pending     Interval History   _______________________________  Patient presented with altered mental status. Found on the ground at home unable to respond, babbling incoherently. History of Alzheimer's, but reportedly per family is alert, interactive, and oriented at baseline. History of falls, walks with a cane. Off anticoagulation for Afib due to falls. MRI brain negative for acute abnormality. Developed fever on 5/6/19. Started antibiotics, LP performed.   LP negative. Blood cultures prelim negative. Still some illogical speech, still disoriented, still agitated with a sitter at the bedside. Speech is clearer when he speaks, and more speech makes sense, however. Not back to  baseline.    Review of Systems   _______________________________  Review of systems is not obtainable due to patient factors - mental status  Physical Exam   _______________________________  Vitals: Temp: 98  F (36.7  C) Temp src: Axillary BP: (!) 165/97   Heart Rate: 74 Resp: 18 SpO2: 94 % O2 Device: None (Room air)    Vital Signs with Ranges: Temp:  [97.5  F (36.4  C)-99.2  F (37.3  C)] 98  F (36.7  C)  Heart Rate:  [54-98] 74  Resp:  [18-24] 18  BP: (146-169)/(71-97) 165/97  SpO2:  [92 %-97 %] 94 %    General Appearance:  No acute distress  Neuro:       Mental Status Exam:   awake, more alert, oriented to self. Increasing verbalizations, more logical speech, some illogical. Mental status is confused/agitated       Cranial Nerves:  Pupils 3 mm, reactive. Opens eyes spontaneously, not tracking. Face is symmetric. Other CN are largely untestable due to agitation       Motor:  Moves all extremities, follows some commands. Tone and bulk are normal           Reflexes:  Unable to assess DTR due to agitation. Toes equivocal.        Sensory:  Unable to assess           Coordination:   grossly intact finger to nose, doesn't follow commands well       Gait:  Unable to ambulate  Abdomen: Soft, not tender, not distended  Extremities: No clubbing, no cyanosis, no edema    Medications   _______________________________    IV infusion builder WITH additives 75 mL/hr at 05/09/19 1933       digoxin  100 mcg Intravenous Daily     donepezil  10 mg Oral At Bedtime     insulin aspart  1-4 Units Subcutaneous Q4H     lisinopril  40 mg Oral Daily     metoprolol  5 mg Intravenous Q6H     piperacillin-tazobactam  4.5 g Intravenous Q6H     potassium chloride  20 mEq Oral BID       Data   _______________________________      Lab Data:   All data was reviewed by me personally  CBC RESULTS:  Recent Labs   Lab Test 05/08/19  1040 05/07/19  0837 05/05/19  0725   WBC 11.9*  --  10.8   RBC 4.22*  --  4.31*   HGB 13.6 12.6* 13.7   HCT 40.0  --  41.3      --  192     Basic Metabolic Panel:  Recent Labs   Lab Test 05/10/19  0805 05/09/19  1004 05/09/19  0017 05/08/19  1040   * 151*  --  149*   POTASSIUM 2.8* 3.1*  3.2* 2.7* 2.6*   CHLORIDE 116* 118*  --  109   CO2 27 30  --  25   BUN 21 26  --  25   CR 1.02 1.11  --  1.22   * 152*  --  121*   PANKAJ 8.9 8.8  --  9.1     Liver panel:  Recent Labs   Lab Test 05/05/19  0725   PROTTOTAL 7.6   ALBUMIN 3.7   BILITOTAL 0.6   ALKPHOS 109   AST 18   ALT 18     Coagulation  Recent Labs   Lab Test 05/05/19  0725   INR 1.05      A1C:   Recent Labs   Lab Test 05/05/19  1753   A1C 5.8*     UA Results:  Recent Labs   Lab Test 05/05/19  1900   COLOR Light Yellow   APPEARANCE Clear   URINEGLC Negative   URINEBILI Negative   URINEKETONE Negative   SG 1.016   UBLD Negative   URINEPH 6.0   PROTEIN Negative   NITRITE Negative   LEUKEST Negative   RBCU <1   WBCU <1        Cardiac US:   --     Neurophysiology:   EEG 5/6/19:  This is an abnormal portable EEG due to the presence of mild to moderate diffuse nonspecific encephalopathy.   No epileptiform discharges were present.  No electrographic or clinical seizures were recorded.  Clinical correlation advised.        Imaging:   All imaging studies were reviewed personally  CT head 5/5/19:   1.  Vague 13.1 x 8.4 mm left thalamic hypodensity more prominent than on 1/13/2019. This may be an acute infarct superimposed on a chronic infarct.  2.  No mass effect or hemorrhagic transformation.  3.  Small chronic right thalamic infarct.  4.  Advanced presumed chronic small vessel ischemic change.     CTA neck/head 5/5/19:  HEAD CTA:  1.  Normal head CTA.  2.  No significant stenosis.  No aneurysm or vascular malformation.     NECK CTA:  1.  Normal neck CTA  for age.  2.  No significant stenosis as per NASCET criteria.     MRI brain 5/5/19:   1.  No acute stroke. No mass or hemorrhage.  2.  The area of hypodensity in the left thalamus correlates with mild presumed chronic small  vessel ischemic change. No associated well-defined lacunar infarct.  3.  Chronic lacunar infarct in the right thalamus.  4.  Advanced supratentorial and mild-to-moderate pontine presumed chronic small vessel ischemic change.          Text Page    Mary Kay Spring, MSN, FNP-BC, RN CNRN SCRN

## 2019-05-10 NOTE — PLAN OF CARE
Pt here with altered mental status. Lethargic, slept this morning and in between cares. Speech more logical when awake, speaking in full sentences, no slurred speech. As patient continued talking speech became more incoherent. Patient tends to fall asleep quickly, encouraged to stay awake for eating. Neuro's appear intact, inconsistent with following commands. VSS. Tele afib with CVR. Diet advanced to DD2 with thin liquids. Takes pills crushed with liquids or pudding. Has not been out of bed today. Denies pain. Incontinent of urine. Potassium replaced, recheck later this afternoon and tomorrow. IVF rate increased as ordered. Nephrology consulted.

## 2019-05-10 NOTE — PROGRESS NOTES
"[facility]    HOSPITALIST PROGRESS NOTE :   --------------------------------------------------    Date of Admission:  5/5/2019      Cumulative Summary: Navdeep Spann is a 74-year-old male with history of alcohol and cannabis use disorders now in remission, frequent UTIs on prophylactic medication, depression/anxiety, BPH, dementia, HTN, and chronic atrial fibrillation who is a direct admission from [facility] where he was brought in via EMS after found down with altered mental status on 05/05/19    Assessment & Plan     Active Problems:  Encephalopathy, unclear etiology  Unwitnessed fall w/right flank contusion   Initially felt possibly CVA related as EMS reported left-sided hemiplegia and wife reported slurred speech. CT head showed a possible stroke, but MRI showed no acute pathology; chronic infarction as well as chronic small vessel disease.   Prozac was discontinued February in favor of starting citalopram; no other recent med changes. No alcohol or drug use.The patient's significant other sets up and gives the patient his medications. No overt symptoms for infection other than a nonspecific cough for the last month that the spouse did not think much of.   Basic workup with a metabolic panel and CBC does not suggest overt pathology.  CT chest, abdomen and pelvis obtained without acute pathology. CT cervical spine also obtained and did not show any acute pathology.   Patient has gone extensive work-up including lumbar puncture, CSF is negative for any pathology.  Patient has also been evaluated by neurology, EEG is concerning for encephalopathy  Alzheimer's dementia:Reportedly diagnosed 3-4 years ago following an episode where patient reportedly accidentally took his son's Clozaril prescription and went into a \"coma.\"  Then had neuropsychiatric testing at this was diagnosed.  Wife states this has been progressive.  Also states he is generally alert and interactive and oriented to " situation.  Walks with a cane at baseline.    --Continue to monitor patient closely.  --This morning patient remains somnolent with periods of alertness  --Neurochecks every 4 hours.  --Continue to monitor on telemetry.  --Appreciate neurology help, EEG showing encephalopathy, there is concern for possibly underlying severe dementia spell exacerbated by infectious process although no clear source of infection is present at this point.  --Continue physical and occupational therapy along with his speech, currently patient was not able to participate with physical therapy but was able to work with the speech this morning.  --Patient will probably need to be discharged to facility to continue to work with therapy when he is clinically improved.  -- continue Aricept     Possible sepsis: Patient was noticed to have tachypnea and intermittently fever with T-max of 101.3 before lumbar puncture, his procalcitonin was negative, lactate was in upper limit of normal and patient was a started on Zosyn on Monday, so far patient now remains afebrile.  And his work-up has been negative for infection so far.    --Will continue IV Zosyn for 5 days preemptively.  --Blood cultures remain negative for the past 4 days.  --CSF studies also remain negative so far for viral and bacterial infection.  --Patient has not received any IV acyclovir due to the concern for any viral infection, herpes PCR has came back negative.     Persistent hypokalemia: Continues to have significant hypokalemia despite magnesium being in normal range, continues to require IV and oral potassium supplement along with potassium and D5 infusion.  Cortisol levels are in normal range.  Patient does not seem to have significant hypertension at this point but is also on ACE inhibitor and at this point his primary aldosteronism work-up can be an accurate.  Hypernatremia: Most likely combination of poor oral intake due to encephalopathy.  Urine osmolality is 734, with  random Urine sodium of 152.  Plasma osmolality is mildly elevated at 317.  Patient was a started on D5 infusion on May 8 but continues to have persistent hyponatremia    --Continue to monitor patient closely.  --Continue potassium replacement.  --Continue patient on D5, will increase the infusion rate at 125 cc/h from 75 cc/h  --Patient will probably benefit from taken off from lisinopril, currently on 40 mg po daily   -- of note patient was also on chlorthalidone at home   --Start patient on Norvasc for blood pressure control.  --We will also ask nephrology to evaluate patient considering his ongoing encephalopathy and persistent electrolyte abnormalities.  As patient does not have significantly elevated hypertension, will discuss with nephrology patient will benefit from further primary hyperaldosteronism work-up in the absence of any incidental adrenal nodules  --Will increase his oral supplementation to 40 mEq twice daily      Hyperglycemia  BG >200 with EMS and in ED.  HgbA1c here was 5.8   - QID AC HS glucose monitoring and low SSI available     Chronic atrial fibrillation  Rate controlled with beta blocker.  Not on anticoagulation, appears due to history of frequent falls, Coumadin was discontinued in summer of 2018.   Hypertension  HLP     --Start patient on digoxin 125 mcg once a day, discontinue IV digoxin.  --Start patient on metoprolol tartrate 75 mg p.o. twice daily, can be titrated up to 150 mg p.o. twice daily if tolerating the dose well.  -- will also place holding parameters on digoxin if potassium is below 3 to avoid risk of arrhythmia.  --As above will discontinue lisinopril and will keep him off chlorthalidone.  --Start patient on Norvasc 5 mg p.o. daily.     Depression/anxiety  Seen in chart review with increased anxiety of late and Prozac was discontinued in February in favor of starting citalopram.  In April it appears he was given a limited prescription of alprazolam which the spouse states  he has not yet used.   - Resumed PTA citalopram      History of alcohol abuse and cannabis use disorder  Abstinent from alcohol since 1990 and has not used cannabis for estimated 1 year.  Wife does not believe he has any access to alcohol or drugs that would be contributing to above presentation.     BPH  History of frequent UTI on prophylactic trimethoprim  UA was not suggestive of UTI   - Hold PTA trimethoprim 100mg daily     Severe right C3-C4 C5-C6 and left C4-C5 foraminal stenosis  Incidental findings on CT c-spine.     Diet: Room Service  Combination Diet Dysphagia Diet Level 2: Mechan Altered; Thin Liquids (water, ice chips, juice, milk gelatin, ice cream, etc)    Lang Catheter: not present  DVT Prophylaxis: Pneumatic Compression Devices  Code Status: Full Code    The patient's care was discussed with the Bedside Nurse and Patient.    Disposition Plan   Expected discharge: 2 - 3 days, at this time , patient remains confused ad somnolent , needs further improvement before discharge can be planned   Entered: Teresita Moreno MD 05/10/2019, 12:09 PM       Teresita Moreno MD, FACP  Text Page (7am - 6pm)    ----------------------------------------------------------------------------------------------------------------------      Interval History   Patient care was assumed this morning, patient was seen and examined.  Currently patient remains significantly somnolent, wakes up but then falls back to sleep, not following any commands and is not able to provide any history.  According to the sitter patient was alert and awake this morning and was able to work with the speech therapy but at the time physical therapy arrived patient was extremely sleepy.    -Data reviewed today: I reviewed all new labs and imaging results over the last 24 hours.    I personally reviewed no images or EKG's today.    Physical Exam   Temp: 98  F (36.7  C) Temp src: Axillary BP: 135/81   Heart Rate: 70 Resp: 16 SpO2: 94 % O2 Device: None (Room  air)    Vitals:    05/05/19 1523 05/09/19 0659 05/10/19 0620   Weight: 83.9 kg (185 lb) 83.9 kg (184 lb 15.5 oz) 82.4 kg (181 lb 10.5 oz)     Vital Signs with Ranges  Temp:  [97.5  F (36.4  C)-99.2  F (37.3  C)] 98  F (36.7  C)  Heart Rate:  [54-98] 70  Resp:  [16-24] 16  BP: (135-169)/(71-97) 135/81  SpO2:  [92 %-97 %] 94 %  I/O last 3 completed shifts:  In: 1268.75 [P.O.:240; I.V.:1028.75]  Out: 250 [Urine:250]    GENERAL: Somnolent but wakes up, not oriented to time place and person at this point.  HEENT: Normocephalic. EOMI. No icterus or injection. Nares normal.   LUNGS: Minimal rhonchi in bases, good air entry.   HEART: Regular rate. Extremities perfused.   ABDOMEN: Soft, nontender, and nondistended. Positive bowel sounds.   EXTREMITIES: No LE edema noted.   NEUROLOGIC: Moves extremities x4 on command. No acute focal neurologic abnormalities noted.     Medications     IV infusion builder WITH additives 75 mL/hr at 05/09/19 1933       digoxin  100 mcg Intravenous Daily     donepezil  10 mg Oral At Bedtime     insulin aspart  1-4 Units Subcutaneous Q4H     lisinopril  40 mg Oral Daily     metoprolol  5 mg Intravenous Q6H     piperacillin-tazobactam  4.5 g Intravenous Q6H     potassium chloride  20 mEq Oral BID       Data   Recent Labs   Lab 05/10/19  0805 05/09/19  1004 05/09/19  0017 05/08/19  1040 05/07/19  0837  05/05/19  0725   WBC  --   --   --  11.9*  --   --  10.8   HGB  --   --   --  13.6 12.6*  --  13.7   MCV  --   --   --  95  --   --  96   PLT  --   --   --  171  --   --  192   INR  --   --   --   --   --   --  1.05   * 151*  --  149*  --    < > 141   POTASSIUM 2.8* 3.1*  3.2* 2.7* 2.6*  --    < > 3.1*   CHLORIDE 116* 118*  --  109  --    < > 104   CO2 27 30  --  25  --    < > 31   BUN 21 26  --  25  --    < > 14   CR 1.02 1.11  --  1.22  --    < > 1.20   ANIONGAP 8 3  --  15*  --    < > 6   PANKAJ 8.9 8.8  --  9.1  --    < > 9.1   * 152*  --  121*  --    < > 204*   ALBUMIN  --   --   --    --   --   --  3.7   PROTTOTAL  --   --   --   --   --   --  7.6   BILITOTAL  --   --   --   --   --   --  0.6   ALKPHOS  --   --   --   --   --   --  109   ALT  --   --   --   --   --   --  18   AST  --   --   --   --   --   --  18    < > = values in this interval not displayed.       Imaging:   No results found for this or any previous visit (from the past 24 hour(s)).

## 2019-05-10 NOTE — PROVIDER NOTIFICATION
D/w neuro provider face to face. NP is ok with MN VS and neuro checks then uninterrupted sleep over NOC if able

## 2019-05-10 NOTE — PROGRESS NOTES
SPIRITUAL HE[facility] SERVICES Progress Note  [facility]    Visit per LOS.  Pt sleeping soundly,  unable to awaken pt by calling name.  SH is available as needs arise, and upon request by pt or staff.      Barb Houser  Chaplain Resident

## 2019-05-10 NOTE — CONSULTS
See dictation.    New onset hypokalemia and hypernatremia.  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  It is curious that Na+ only became abnl during this admission, while low K+ has worsened since admn, despite aggressive KCL supplementation.  Pt appears euvolemic, and VS have been stable.  UO seems good, not fully quantitated in records.  Note 5/9 Jihan+ and Uosm both appropriately high, and UA on 5/5 was normal.  I have ordered more extensive urine evaluation, including full urine electrolyte panel.    Agree with current Rx, including D5W infusion and continued KCL dosing.  Note that lisinopril just placed on hold.  I will not order measurement of renin and aldosterone yet, since the values would be of questionable value with ACEi effect still present.    Thanks.    Rashad Shi MD  Nephrology; [facility] [employer]  [phone]

## 2019-05-11 ENCOUNTER — APPOINTMENT (OUTPATIENT)
Dept: PHYSICAL THERAPY | Facility: CLINIC | Age: 74
DRG: 070 | End: 2019-05-11
Attending: NURSE PRACTITIONER
Payer: COMMERCIAL

## 2019-05-11 ENCOUNTER — APPOINTMENT (OUTPATIENT)
Dept: SPEECH THERAPY | Facility: CLINIC | Age: 74
DRG: 070 | End: 2019-05-11
Attending: INTERNAL MEDICINE
Payer: COMMERCIAL

## 2019-05-11 LAB
ALBUMIN SERPL-MCNC: 2.5 G/DL (ref 3.4–5)
ANION GAP SERPL CALCULATED.3IONS-SCNC: 9 MMOL/L (ref 3–14)
BACTERIA SPEC CULT: NO GROWTH
BACTERIA SPEC CULT: NO GROWTH
BUN SERPL-MCNC: 21 MG/DL (ref 7–30)
CALCIUM SERPL-MCNC: 8.8 MG/DL (ref 8.5–10.1)
CHLORIDE SERPL-SCNC: 113 MMOL/L (ref 94–109)
CO2 SERPL-SCNC: 25 MMOL/L (ref 20–32)
CREAT SERPL-MCNC: 0.99 MG/DL (ref 0.66–1.25)
GFR SERPL CREATININE-BSD FRML MDRD: 76 ML/MIN/{1.73_M2}
GLUCOSE BLDC GLUCOMTR-MCNC: 114 MG/DL (ref 70–99)
GLUCOSE BLDC GLUCOMTR-MCNC: 116 MG/DL (ref 70–99)
GLUCOSE BLDC GLUCOMTR-MCNC: 128 MG/DL (ref 70–99)
GLUCOSE BLDC GLUCOMTR-MCNC: 133 MG/DL (ref 70–99)
GLUCOSE BLDC GLUCOMTR-MCNC: 134 MG/DL (ref 70–99)
GLUCOSE BLDC GLUCOMTR-MCNC: 150 MG/DL (ref 70–99)
GLUCOSE SERPL-MCNC: 107 MG/DL (ref 70–99)
Lab: NORMAL
PHOSPHATE SERPL-MCNC: 2.4 MG/DL (ref 2.5–4.5)
POTASSIUM SERPL-SCNC: 3.3 MMOL/L (ref 3.4–5.3)
SODIUM SERPL-SCNC: 147 MMOL/L (ref 133–144)
SPECIMEN SOURCE: NORMAL
SPECIMEN SOURCE: NORMAL

## 2019-05-11 PROCEDURE — 12000000 ZZH R&B MED SURG/OB

## 2019-05-11 PROCEDURE — 25000132 ZZH RX MED GY IP 250 OP 250 PS 637: Performed by: PSYCHIATRY & NEUROLOGY

## 2019-05-11 PROCEDURE — 97530 THERAPEUTIC ACTIVITIES: CPT | Mod: GP

## 2019-05-11 PROCEDURE — 99207 ZZC CDG-MDM COMPONENT: MEETS LOW - DOWN CODED: CPT | Performed by: HOSPITALIST

## 2019-05-11 PROCEDURE — 25000128 H RX IP 250 OP 636: Performed by: HOSPITALIST

## 2019-05-11 PROCEDURE — 25000132 ZZH RX MED GY IP 250 OP 250 PS 637: Performed by: INTERNAL MEDICINE

## 2019-05-11 PROCEDURE — 25800030 ZZH RX IP 258 OP 636: Performed by: HOSPITALIST

## 2019-05-11 PROCEDURE — 80069 RENAL FUNCTION PANEL: CPT | Performed by: INTERNAL MEDICINE

## 2019-05-11 PROCEDURE — 36415 COLL VENOUS BLD VENIPUNCTURE: CPT | Performed by: INTERNAL MEDICINE

## 2019-05-11 PROCEDURE — 99232 SBSQ HOSP IP/OBS MODERATE 35: CPT | Performed by: HOSPITALIST

## 2019-05-11 PROCEDURE — 25000128 H RX IP 250 OP 636: Performed by: INTERNAL MEDICINE

## 2019-05-11 PROCEDURE — 97162 PT EVAL MOD COMPLEX 30 MIN: CPT | Mod: GP

## 2019-05-11 PROCEDURE — 25000132 ZZH RX MED GY IP 250 OP 250 PS 637: Performed by: PHYSICIAN ASSISTANT

## 2019-05-11 PROCEDURE — 92526 ORAL FUNCTION THERAPY: CPT | Mod: GN | Performed by: SPEECH-LANGUAGE PATHOLOGIST

## 2019-05-11 PROCEDURE — 00000146 ZZHCL STATISTIC GLUCOSE BY METER IP

## 2019-05-11 RX ADMIN — POTASSIUM CHLORIDE 40 MEQ: 1500 TABLET, EXTENDED RELEASE ORAL at 20:42

## 2019-05-11 RX ADMIN — DONEPEZIL HYDROCHLORIDE 10 MG: 10 TABLET ORAL at 21:00

## 2019-05-11 RX ADMIN — METOPROLOL TARTRATE 75 MG: 25 TABLET, FILM COATED ORAL at 08:50

## 2019-05-11 RX ADMIN — PIPERACILLIN SODIUM,TAZOBACTAM SODIUM 4.5 G: 4; .5 INJECTION, POWDER, FOR SOLUTION INTRAVENOUS at 08:48

## 2019-05-11 RX ADMIN — METOPROLOL TARTRATE 75 MG: 25 TABLET, FILM COATED ORAL at 20:42

## 2019-05-11 RX ADMIN — INSULIN ASPART 1 UNITS: 100 INJECTION, SOLUTION INTRAVENOUS; SUBCUTANEOUS at 00:44

## 2019-05-11 RX ADMIN — DEXTROSE MONOHYDRATE: 5 INJECTION, SOLUTION INTRAVENOUS at 22:06

## 2019-05-11 RX ADMIN — Medication 1 MG: at 20:50

## 2019-05-11 RX ADMIN — POTASSIUM CHLORIDE 40 MEQ: 1500 TABLET, EXTENDED RELEASE ORAL at 08:48

## 2019-05-11 RX ADMIN — AMLODIPINE BESYLATE 5 MG: 5 TABLET ORAL at 08:50

## 2019-05-11 RX ADMIN — PIPERACILLIN SODIUM,TAZOBACTAM SODIUM 4.5 G: 4; .5 INJECTION, POWDER, FOR SOLUTION INTRAVENOUS at 02:50

## 2019-05-11 RX ADMIN — DIGOXIN 125 MCG: 0.12 TABLET ORAL at 08:50

## 2019-05-11 ASSESSMENT — ACTIVITIES OF DAILY LIVING (ADL)
ADLS_ACUITY_SCORE: 21
ADLS_ACUITY_SCORE: 21
ADLS_ACUITY_SCORE: 13.5
ADLS_ACUITY_SCORE: 21

## 2019-05-11 NOTE — PROGRESS NOTES
Neurology Progress Note  Navdeep Spann  [id number]  May 11, 2019      Assessment/Recommendations:    74-year-old male with suspected Alzheimer's dementia who is presenting after being found down at home with prominent encephalopathy.  He has had extensive work-up thus far with MRI, EEG LP without findings to explain levels of encephalopathy.  Fortunately he has had a significant improvement overnight in comparison of my exam to previous notes.  I suspect this may have been triggered by an underlying infection given the fever documented near admission.  However there was no clear source of infection identified.  It is also possible that this was an unwitnessed seizure although there is obviously no evidence to corroborate this.  In either case he has had MRI and EEG for a first-time event would not initiate antiepileptics.  With dramatic improvement overnight I do not think any other work-up is indicated at this time.  Would recommend delirium precautions and schedule melatonin in the evening to prevent hospital delirium in setting of neurodegenerative disease.  No further work-up recommended by neurology at this time.  Inpatient neurology will sign off.    Subjective: Per patient and review of chart patient is much improved today    Physical Exam:  /70 (BP Location: Left arm)   Pulse 67   Temp 98.3  F (36.8  C) (Oral)   Resp 16   Ht 1.829 m (6')   Wt 82.4 kg (181 lb 10.5 oz)   SpO2 96%   BMI 24.64 kg/m    GEN: awake and alert, NAD   HEENT: NCAT  NECK: Supple  RESP: Non-labored breathing  GI: Nondistended   SKIN/MSK: No edema  NEURO:   Awake, alert, oriented to person, and time.  At first says he is in [facility], but then corrects and says he is in [facility].  Able state quarters in $1.75.  Spells forward but not backward.  Able to do three-step cross commands although somewhat slowly.  Able to name current president but not past presidents.  Able to name and repeat without difficulty.   Follows simple commands without difficulty.  His face is grossly symmetric.  Pupils are reactive.  He tracks me in all quadrants.  Blinks to threat.  Tongue midline.  Moving all extremities equal and antigravity.  Mild peritoneal block is normal.  Deep tendon reflexes are symmetric.  Toes are mute.  Sensation intact in all 4 extremities with no extinction.  Finger-nose-finger intact.  Did not assess gait.    Pertinent Imaging and Labs:  MRI, CSF, EEG, serum laboratory work-up personally reviewed.  MRI shows chronic lacunar infarcts with extensive white matter small vessel ischemic changes.      Migue Lovelace DO   of Neurology          This note was transcribed with dragon dictation software.

## 2019-05-11 NOTE — PLAN OF CARE
Discharge Planner SLP   Patient plan for discharge: Not addressed.  Current status:  Patient seen for swallow treatment while up in the chair. He was able to feed himself lunch with verbal cues to take small bites and slow rate. He demonstrated premature enty of thin liquids without overt Sx of aspiration via the cup. He stated that he is a vegetarian and has not eaten meat in years. He demonstrated mildly prolonged mastication due to being edentulous, but able to clear oral cavity. He continues to demonstrate improvement with alertness and function. Recommend: 1. Mechanical/dental soft with thin liquids. 2. Up in chair for meals, small bites/sips, slow rate of eating and no straws.   Barriers to return to prior living situation: Cognition/safety  Recommendations for discharge: TCU  Rationale for recommendations: Anticipate swallow goals to be met prior to discharge. Cognitive linguistic evaluation can be deferred to the next level of care.        Entered by: Kassandra Kent 05/11/2019 12:21 PM

## 2019-05-11 NOTE — PROGRESS NOTES
05/11/19 1009   Quick Adds   Type of Visit Initial PT Evaluation   Living Environment   Lives With child(yumiko), adult;significant other   Living Arrangements house   Transportation Anticipated family or friend will provide   Living Environment Comment 3 [address], railing; 12 stairs to bedroom one rail, laundry in basement   Self-Care   Usual Activity Tolerance moderate   Current Activity Tolerance fair   Equipment Currently Used at Home cane, straight;walker, rolling   Activity/Exercise/Self-Care Comment Pt reports IND, questionable historian   Functional Level Prior   Ambulation 0-->independent   Transferring 0-->independent   Toileting 0-->independent   Fall history within last six months yes   Number of times patient has fallen within last six months 1   Prior Functional Level Comment Pt reports IND - mod I with SEC household distances, SEC community distances. Reports one fall in last six months unable to describe to writer. Pt continues to be somewhat confused at times however cognition and lethargy much improved this day    General Information   Onset of Illness/Injury or Date of Surgery - Date 05/05/19   Referring Physician Mary Kay Spring APRN CNP   Pertinent History of Current Problem (include personal factors and/or comorbidities that impact the POC) Navdeep Spann is a 74-year-old male with history of alcohol and cannabis use disorders now in remission, frequent UTIs on prophylactic medication, depression/anxiety, BPH, dementia, HTN, and chronic atrial fibrillation who is a direct admission from [facility] where he was brought in via EMS after found down with altered mental status on 05/05/19   Precautions/Limitations fall precautions   Cognitive Status Examination   Orientation person;place   Cognitive Comment Pt's cognition has improved, answers questions correctly, alert throughout session, at times some minor STM loss/confusionnoted. OT following    Pain Assessment   Patient Currently in Pain  "No   Posture    Posture Forward head position   Range of Motion (ROM)   ROM Comment BLE WFL   Strength   Strength Comments BLE > 3/5 strength based on functional mobility    Bed Mobility   Bed Mobility Comments Supine > sit SBA with HOB elevated    Transfer Skills   Transfer Comments STS with FWW, close CGA x 2, braces LE against bed unsteady   Gait   Gait Comments Pt ambulates with FWW, min A x 1, unsteady on feet, difficulty with ft placement    Balance   Balance Comments Impaired dynamic balance   Sensory Examination   Sensory Perception Comments intact ot light touch    Coordination   Coordination Comments Pt demonstrates impaired BLE  coordination with gait   General Therapy Interventions   Planned Therapy Interventions balance training;bed mobility training;gait training;neuromuscular re-education;transfer training;strengthening   Clinical Impression   Criteria for Skilled Therapeutic Intervention yes, treatment indicated   PT Diagnosis Impaired functional mobiltiy from baseline   Influenced by the following impairments medical condition, balance, coordination, weakness   Functional limitations due to impairments impaired functional mobility    Clinical Presentation Evolving/Changing   Clinical Presentation Rationale A x 1-2, fall risk, co morbidities    Clinical Decision Making (Complexity) Moderate complexity   Therapy Frequency` daily   Predicted Duration of Therapy Intervention (days/wks) 1 week   Anticipated Discharge Disposition Transitional Care Facility   Risk & Benefits of therapy have been explained Yes   Patient, Family & other staff in agreement with plan of care Yes   [facility] AM-PAC  \"6 Clicks\" V.2 Basic Mobility Inpatient Short Form   1. Turning from your back to your side while in a flat bed without using bedrails? 3 - A Little   2. Moving from lying on your back to sitting on the side of a flat bed without using bedrails? 3 - A Little   3. Moving to and from a bed to a chair " (including a wheelchair)? 3 - A Little   4. Standing up from a chair using your arms (e.g., wheelchair, or bedside chair)? 3 - A Little   5. To walk in hospital room? 2 - A Lot   6. Climbing 3-5 steps with a railing? 2 - A Lot   Basic Mobility Raw Score (Score out of 24.Lower scores equate to lower levels of function) 16   Total Evaluation Time   Total Evaluation Time (Minutes) 15

## 2019-05-11 NOTE — PROGRESS NOTES
"[facility]  HOSPITALIST PROGRESS NOTE :     Date of Admission:  5/5/2019    Cumulative Summary: This is a 74-year-old gentleman with PMH significant for  Alcohol and marijuana use disorders, frequent UTIs on prophylactic medication, depression/anxiety, BPH, dementia, HTN, and chronic atrial fibrillation not on anticoagulation who is a direct admission from [facility] where he was brought in via EMS after found down with altered mental status on 05/05/19    Assessment & Plan     Active Problems:  Encephalopathy, unclear etiology  Unwitnessed fall w/right flank contusion.   Markedly improved today,   Initially felt possibly CVA . CT head showed a possible stroke, but MRI showed no acute pathology. No alcohol or drug use. Basic workup does not suggest overt pathology.  Patient has gone extensive work-up including lumbar puncture, CSF is negative for any pathology.  Patient has also been evaluated by neurology, EEG is concerning for encephalopathy.  -- Continuing on supportive cares, asked Nephrology to weigh in the electrolyte abnormalities (hypernatreamia and hypokalemia).    Alzheimer's dementia:Reportedly diagnosed 3-4 years ago following an episode where patient reportedly accidentally took his son's Clozaril prescription and went into a \"coma.\"  Then had neuropsychiatric testing at this was diagnosed.  Wife states this has been progressive.  Walks with a cane at baseline.  --Continue to monitor patient closely.  --This morning patient was more alert per nursing staff  --Neurochecks every 4 hours.  --Continue to monitor on telemetry.  --Appreciate neurology help, EEG showing encephalopathy, there is concern for possibly underlying severe dementia spell exacerbated by infectious process although no clear source of infection is present at this point.  -- patient is not dilshad to participate in therapies.   -- continue Aricept     Possible sepsis: Patient was noticed to have tachypnea and " intermittently fever with T-max of 101.3 before lumbar puncture, his procalcitonin was negative, lactate was in upper limit of normal and patient was a started on Zosyn on Monday, so far patient now remains afebrile.  And his work-up has been negative for infection so far.    --Will discontinue IV Zosyn as there is no source of infection to treat.  --Blood cultures remain negative for the past 4 days.  --CSF studies also remain negative so far for viral and bacterial infection.  --Patient has not received any IV acyclovir due to the concern for any viral infection, herpes PCR has came back negative.     Persistent hypokalemia   Plan per Nephrology   Continues to have significant hypokalemia despite magnesium being in normal range.  Cortisol levels are in normal range.  Patient does not seem to have significant hypertension at this point but is also on ACE inhibitor and at this point his primary aldosteronism work-up can be an accurate.  Hypernatremia: Most likely combination of poor oral intake due to encephalopathy.  Plan per Nephrology,  --Continue to monitor patient closely.  -  Will not restart chlorthalidone and trimethoprim per nephrology reccs,  --Continue potassium replacement.  -- decrease the reate D5W from 125 to 75 given improved hypernatronemia      Hyperglycemia    HgbA1c here was 5.8   Recent Labs   Lab 05/11/19  0857 05/11/19  0746 05/11/19  0423 05/11/19  0027 05/10/19  2015 05/10/19  1610 05/10/19  1413  05/10/19  0805  05/09/19  1004  05/08/19  1040  05/06/19  0738  05/05/19  0725   *  --   --   --   --   --   --   --  148*  --  152*  --  121*  --  141*  --  204*   BGM  --  114* 133* 150* 144* 177* 154*   < >  --    < >  --    < >  --    < >  --    < >  --     < > = values in this interval not displayed.     - QID AC HS glucose monitoring and low SSI available     Chronic atrial fibrillation  Rate controlled with beta blocker.  Not on anticoagulation, appears due to history of frequent  falls, Coumadin was discontinued in summer of 2018.   Hypertension  HLP   --cont on digoxin 125 mcg once a day,   --Cont on metoprolol tartrate 75 mg p.o. twice daily,   -- cont on Norvasc 5 mg p.o. daily.     Depression/anxiety  - resumed PTA citalopram      BPH  History of frequent UTI on prophylactic trimethoprim  UA was not suggestive of UTI   - Hold PTA trimethoprim 100mg daily     Severe right C3-C4 C5-C6 and left C4-C5 foraminal stenosis  Incidental findings on CT c-spine.     Diet: Room Service  Combination Diet Dysphagia Diet Level 2: Mechan Altered; Thin Liquids (water, ice chips, juice, milk gelatin, ice cream, etc)    Lang Catheter: not present  DVT Prophylaxis: Pneumatic Compression Devices  Code Status: Full Code    The patient's care was discussed with the Bedside Nurse and Patient.    Disposition Plan   Expected discharge: 1-2 maru pendindg therapy input,   Entered: Everett Villagran MD 05/11/2019, 8:31 AM       Everett Villagran,      ----------------------------------------------------------------------------------------------------------------------      Interval History     -Data reviewed today: I reviewed all new labs and imaging results over the last 24 hours.   Feels much better, interactive sitting up n chair at bedside pleasantly minimally confused. otherwise very polite and cooperative . denies chest pain palpaitations, no fever or chills     I personally reviewed no images or EKG's today.    Physical Exam   Temp: 98.5  F (36.9  C) Temp src: Oral BP: 138/67 Pulse: 67 Heart Rate: 54 Resp: 16 SpO2: 93 % O2 Device: None (Room air)    Vitals:    05/05/19 1523 05/09/19 0659 05/10/19 0620   Weight: 83.9 kg (185 lb) 83.9 kg (184 lb 15.5 oz) 82.4 kg (181 lb 10.5 oz)     Vital Signs with Ranges  Temp:  [97.9  F (36.6  C)-98.7  F (37.1  C)] 98.5  F (36.9  C)  Pulse:  [67] 67  Heart Rate:  [53-70] 54  Resp:  [14-22] 16  BP: (112-145)/(57-93) 138/67  SpO2:  [93 %-97 %] 93 %  I/O last 3  completed shifts:  In: 2280 [P.O.:480; I.V.:1800]  Out: 890 [Urine:890]    GENERAL: Awake alert in NAD comfortably siting in a chair at bedsie   HEENT:  MMM. No oral trush  LUNGS: CT no wheezing or crackles.   HEART: RRR no murmurs or gallosp   ABDOMEN: Soft NT/ND _+ BS   EXTREMITIES: No LE edema noted.       Medications     IV infusion builder WITH additives 125 mL/hr at 05/10/19 2310       amLODIPine  5 mg Oral Daily     digoxin  125 mcg Oral Daily     donepezil  10 mg Oral At Bedtime     insulin aspart  1-4 Units Subcutaneous Q4H     metoprolol tartrate  75 mg Oral BID     piperacillin-tazobactam  4.5 g Intravenous Q6H     potassium chloride  40 mEq Oral BID       Data   Recent Labs   Lab 05/10/19  1843 05/10/19  0805 05/09/19  1004  05/08/19  1040 05/07/19  0837  05/05/19  0725   WBC  --   --   --   --  11.9*  --   --  10.8   HGB  --   --   --   --  13.6 12.6*  --  13.7   MCV  --   --   --   --  95  --   --  96   PLT  --   --   --   --  171  --   --  192   INR  --   --   --   --   --   --   --  1.05   NA  --  151* 151*  --  149*  --    < > 141   POTASSIUM 3.5 2.8* 3.1*  3.2*   < > 2.6*  --    < > 3.1*   CHLORIDE  --  116* 118*  --  109  --    < > 104   CO2  --  27 30  --  25  --    < > 31   BUN  --  21 26  --  25  --    < > 14   CR  --  1.02 1.11  --  1.22  --    < > 1.20   ANIONGAP  --  8 3  --  15*  --    < > 6   PANKAJ  --  8.9 8.8  --  9.1  --    < > 9.1   GLC  --  148* 152*  --  121*  --    < > 204*   ALBUMIN  --   --   --   --   --   --   --  3.7   PROTTOTAL  --   --   --   --   --   --   --  7.6   BILITOTAL  --   --   --   --   --   --   --  0.6   ALKPHOS  --   --   --   --   --   --   --  109   ALT  --   --   --   --   --   --   --  18   AST  --   --   --   --   --   --   --  18    < > = values in this interval not displayed.       Imaging:   No results found for this or any previous visit (from the past 24 hour(s)).

## 2019-05-11 NOTE — PLAN OF CARE
Pt here with AMS and falls. Stroke work up has been negative. A&Ox3. Neuros intact. VSS. Tele Afib w/ CVR. DD2 diet, thin liquids. Takes pills crushed in pudding. Up with A1 w/ GB and walker. Denies pain. Plan to continue work w/ therapies and encourage sleep cycles overnight. Discharge plan pending, nursing will continue to monitor.

## 2019-05-11 NOTE — PLAN OF CARE
Discharge Planner PT   Patient plan for discharge: not stated   Current status:     Eval complete, treatment indicated. Pt educated on PT role and POC. VSS during session. Pt demonstraets improved cognition, alert throughout session, answers questions appropriately. At times minor STM noted. Pt [facility].     Supine > sit with HOB elevated, SBA. STS from EOB with FWW, CGA x 2 for safety, pt bracing BLE against bed, usnteady.     Pt engaged in repeated STS form recliner for improved functional LE strength and for impvoed technique. Cues for proper BLE ft position and UE placement. Pt performed x 6 reps, [facility] improved stability with blocked practice.     Pt ambulated short distance in room, CGA, PT managed IV pole. Improved stability and BLE ft placement, min A x 1 provided, pt ambulated ~20'.     Pt left seated in recliner with pillows placed appropriately, chair alarm on, needs in reach     Barriers to return to prior living situation: A x 1, impaired balance, high fall risk, BLE weakness     Recommendations for discharge: TCU  Rationale for recommendations: Pt will benefit from continued skilled PT at TCU to improve strength, balance, gait, endurance to improve functional mobility prior to return home          Entered by: Naina Linares 05/11/2019 10:57 AM

## 2019-05-11 NOTE — PLAN OF CARE
Pt here with encephalopathy and unwitnessed fall. Disoriented to situation. Patient somnolent and arousal to voice. Generalized weakness. Moves all extremities. Not following some commands. HR jeffrey. Tele Afib with CVR. DD2 diet with thin liquids. Takes pills crush in pudding. Bedrest. Turned and repositioned q 2 hrs.  Incontinent of bowel and bladder. Denies pain. Sitter at bedside. Patient has been calm and no agitation noted. Seizure precaution. No witnessed or reported seizure activity. Bedtime blood sugar 144, gave 1 unit of insulin per sliding scale. Plan potassium and renal panel draw tomorrow morning. Discharge to TCU pending.

## 2019-05-11 NOTE — PLAN OF CARE
Pt here with altered mental status and frequent falls. Lethargic but does awaken and participate in assessments, disoriented to situation and place, [place]. Neuros intact. VSS on RA. Tele Afib CVR . DD2 diet, thin liquids. Takes pills crushed in pudding. Up with 2, not OOB this shift, q2 T/R. Incontinent B/B, will use urinal when offered. Denies pain. Plan to monitor potassium, continue therapies, discharge pending.

## 2019-05-11 NOTE — PROGRESS NOTES
Nephrology Progress Note          Assessment and Plan:   Electrolyte abnormalities better today on current KCL and free water replacement.  5/10 urine data show no surprises other than higher than expected urine K+.  Mental status much improved today.  No change in plan recommended today.  Recheck BMP in AM and consider reducing IV rate if Na+ continues to improve.  I would recommend not restarting chlorthalidone and trimethoprim, considering current electrolyte abnormalities and his mild CKD.  Await decision re: advancing diet.  He needs to improve his nutritional state.  Consider stopping Zosyn.      Altered mental status    * No resolved hospital problems. *               Interval History:   no complaints, up and ambulating, alert, oriented to person, place and time and doing well; no cp, sob, n/v/d, or abd pain.  Up in chair, wide awake, interacts appropriately.  VS ok.  Oral intake and UO seem fine.  Meds and labs reviewed.  Na+ and K+ improved to 147 and 3.3.  Other chemistries ok, though Alb lower with hydration, and Phos is borderline, likely due to poor nutrition.  Urine shows low grade proteinuria and ketonuria.                    Medications:       amLODIPine  5 mg Oral Daily     digoxin  125 mcg Oral Daily     donepezil  10 mg Oral At Bedtime     insulin aspart  1-4 Units Subcutaneous Q4H     metoprolol tartrate  75 mg Oral BID     piperacillin-tazobactam  4.5 g Intravenous Q6H     potassium chloride  40 mEq Oral BID       IV infusion builder WITH additives 125 mL/hr at 05/10/19 2310                    Physical Exam:       Vital Sign Ranges  Temp:  [97.9  F (36.6  C)-98.7  F (37.1  C)] 98.5  F (36.9  C)  Pulse:  [67] 67  Heart Rate:  [53-70] 65  Resp:  [14-22] 16  BP: (112-148)/(57-93) 148/82  SpO2:  [93 %-97 %] 93 %    Weight, current:  82.4 kg (actual weight)  Weight change:     I/O last 3 completed shifts:  In: 2280 [P.O.:480; I.V.:1800]  Out: 890 [Urine:890]    Physical Exam:   General:  Patient  comfortable, in no apparent distress.  Awake, alert, oriented x3.  Neck:  Supple, no JVD.  Lungs:  Clear to auscultation bilaterally.  Cardiac:  Irregular rate and rhythm, no murmurs, rub, or gallops.  Abdomen:  Soft, nontender, physiologic sounds.  Extremities:  Without edema.  2+ pulses.  Skin:  Warm, dry.  Neurologic:  Mild left side weakness.             Data:        Lab Results   Component Value Date     (H) 05/11/2019    Lab Results   Component Value Date    CHLORIDE 113 (H) 05/11/2019    Lab Results   Component Value Date    BUN 21 05/11/2019      Lab Results   Component Value Date    POTASSIUM 3.3 (L) 05/11/2019    Lab Results   Component Value Date    CO2 25 05/11/2019    Lab Results   Component Value Date    CR 0.99 05/11/2019        Lab Results   Component Value Date     (H) 05/11/2019     (H) 05/10/2019     (H) 05/09/2019     Lab Results   Component Value Date    POTASSIUM 3.3 (L) 05/11/2019    POTASSIUM 3.5 05/10/2019    POTASSIUM 2.8 (L) 05/10/2019     Lab Results   Component Value Date    CHLORIDE 113 (H) 05/11/2019    CHLORIDE 116 (H) 05/10/2019    CHLORIDE 118 (H) 05/09/2019     Lab Results   Component Value Date    CO2 25 05/11/2019    CO2 27 05/10/2019    CO2 30 05/09/2019     Lab Results   Component Value Date    CR 0.99 05/11/2019    CR 1.02 05/10/2019    CR 1.11 05/09/2019     Lab Results   Component Value Date    BUN 21 05/11/2019    BUN 21 05/10/2019    BUN 26 05/09/2019     Lab Results   Component Value Date    HGB 13.6 05/08/2019    HGB 12.6 (L) 05/07/2019    HGB 13.7 05/05/2019     No results found for: PH, PHARTERIAL, PO2, OC8MARQROMS, SAT, PCO2, HCO3, BASEEXCESS, CARTER, BEB          Rashad Shi MD  Nephrology; [employer]  [phone]

## 2019-05-11 NOTE — CONSULTS
Consult Date:  05/10/2019      NEPHROLOGY CONSULTATION      REQUESTING PHYSICIAN:  Dr. Teresita Moreno.      Navdeep Spann is a 74-year-old man whom we were asked to see to assist in evaluation and management of persistent hypokalemia and hypernatremia.  The patient was admitted in transfer from the emergency room at [facility] for evaluation of altered mental status.  The patient has a history of hypertension, dementia, chemical dependency, chronic atrial fibrillation not on anticoagulation therapy, benign prostatic hypertrophy, frequent UTIs and depression.  The patient has had previous right hemispheric cerebrovascular infarction and has a chronic left-sided weakness.  Brain imaging also shows more generalized small vessel changes.  His hospitalization is now in its sixth day, and the patient's mental status has continued to be depressed.  We are asked to see him because of worsening hypernatremia that appears to have developed only since his hospitalization began.  The patient's initial serum sodium was 141.  His initial potassium was depressed at 3.1, but in spite of generous amounts of potassium chloride supplementation, potassium has not risen much above 3.2-3.4 since his hospitalization began.  His sodium has gradually risen and in the last 2 days has been 151.  The patient appears to have a history of mild chronic kidney disease, probably stage 2 to stage 3, with a baseline creatinine of around 1.1-1.2.  He has no history of electrolyte abnormalities in the past that I can see.  There is [facility] Everywhere laboratory data available for review that shows generally normal electrolytes for about the last 10 years, the most recent of which were approximately 9 months ago.  The patient's renal function at that time included a BUN and creatinine of 25 and 1.08.  The patient has had a mild anemia with hemoglobin generally about 12 for the last several years.  His CBC is otherwise normal.  His urinalysis  has been normal in the past.  There have been brief episodes of transient renal dysfunction, the last of which was about 3 years ago.  Function rapidly improved after a creatinine elevation to about 2.4 was noted; within a month, creatinine had fallen to baseline.  The reason for this abnormality is not apparent from the chart.  Transient ARF in 2014 was caused by a colon perforation due to diverticulitis; renal function rapidly improved after appropriate surgical management.      Mr. Spann has left ventricular hypertrophy on echocardiography but no other sign of significant cardiac problems, other than his atrial fibrillation.  In particular, he has well-preserved left ventricular systolic function.  The presence of diastolic dysfunction could not be determined precisely.  His right ventricular function appears normal, and the atria are both of normal size.      Mr. Spann had an initial creatinine on admission of 1.2.  It has remained between 1.0 and 1.2 since then.  The most recent value today is 1.02.  Today's electrolytes show a potassium of 2.8, with a later value of 3.5 after generous supplementation was given.  Sodium is 151.  Anion gap is normal.  CO2 is 27.  Magnesium is normal at 2.0.  Liver function tests are normal on admission, with albumin and total protein of 3.7 and 7.6 respectively.  A urinalysis was normal on the day of admission, with a specific gravity of 1.016.  Yesterday, urine was evaluated for sodium and osmolality.  Serum osmolality at that time was 317.  Urine osmolality was 734 and sodium 152.  The patient's urine output has been in the nonoliguric range.  It has not been completely quantitated because of some incontinence, but there is no evidence of polyuria.  The patient's CNS scanning does not suggest any acute abnormalities.      His antihypertensive regimen included chlorthalidone and lisinopril prior to admission.  The chlorthalidone has been on hold since the beginning of his  hospital stay.  Lisinopril was just discontinued today.  He has also been on digoxin and metoprolol, along with alprazolam, aspirin, citalopram, vitamin B12, donepezil, ferrous sulfate, magnesium oxide, potassium chloride, and trimethoprim.  His current medications include continued digoxin and metoprolol.  Amlodipine has been added.  Donepezil has been continued.  His IV fluid has been maintained at a generous volume, 125 mL per hour.  He has been receiving D5W with added potassium chloride.      PHYSICAL EXAMINATION:   GENERAL:  On exam, Mr. Spann is difficult to arouse.  He falls asleep rapidly after being awakened.  He answers questions in a few words that, for the most part, seem to be appropriate responses.  VITAL SIGNS:  Throughout the hospital stay, he has been afebrile except for a brief period of fever the day after hospitalization to about 101.  He has been on antibiotic therapy empirically with Zosyn since the second hospital day.  Cultures of CSF and blood on that day returned negative, and his CSF otherwise was unremarkable.  Recent blood pressure has been about 120/60.  Pulse is irregular at about 60-70.  Respirations are unlabored at 22, with good room air saturation.   SKIN:  Shows satisfactory color and turgor.  There are no significant skin lesions.  There is no lymphadenopathy.   HEENT AND NECK:  Exams are unremarkable.  Carotid pulses are strong.  There is no JVD.  Mucous membranes are moist.   LUNGS:  Clear.   HEART:  Unremarkable aside from the irregular rhythm.   ABDOMEN:  Seems negative, with no masses, tenderness or organomegaly.   EXTREMITIES:  Warm.  There is no significant edema.  There are no joint findings of concern.  Peripheral pulses are strong throughout, without bruits.   NEUROLOGIC:  Difficult because of the poor level of consciousness, but mild left-sided weakness seems to persist.      Mr. Spann's electrolyte abnormalities are of unclear origin, and it is intriguing that  they seem to have become severe only since his hospitalization.  I suspect that he could have been chronically hypokalemic related to poor intake and the continued diuretic use.  The reason for his hyperosmolality, however, seems quite unclear, since it seems to have developed only since his hospital stay began.  His high urine osmolality of yesterday rules out diabetes insipidus.  I see that a normal cortisol level was obtained.  I have ordered additional urine chemistries today, including a full electrolyte panel.  These data are pending.  I will be reviewing them and will consider measurement of renin and aldosterone.  I will not do that as yet, however, because of the recent lisinopril and chlorthalidone therapy.      I would continue the same medication regimen and IV fluid.      Thank you for the opportunity to assist in Mr. Segundo's care.  Our group will follow as appropriate during the remainder of his hospital stay.            SWEETIE BAÑUELOS MD             D: 2019   T: 2019   MT: GINNA      Name:     HELIO SEGUNDO   MRN:      [id number]        Account:       [id number]   :      1945           Consult Date:  05/10/2019      Document: [id number]

## 2019-05-12 ENCOUNTER — APPOINTMENT (OUTPATIENT)
Dept: PHYSICAL THERAPY | Facility: CLINIC | Age: 74
DRG: 070 | End: 2019-05-12
Attending: INTERNAL MEDICINE
Payer: COMMERCIAL

## 2019-05-12 ENCOUNTER — APPOINTMENT (OUTPATIENT)
Dept: SPEECH THERAPY | Facility: CLINIC | Age: 74
DRG: 070 | End: 2019-05-12
Attending: INTERNAL MEDICINE
Payer: COMMERCIAL

## 2019-05-12 LAB
ANION GAP SERPL CALCULATED.3IONS-SCNC: 8 MMOL/L (ref 3–14)
BACTERIA SPEC CULT: NO GROWTH
BACTERIA SPEC CULT: NO GROWTH
BUN SERPL-MCNC: 18 MG/DL (ref 7–30)
CALCIUM SERPL-MCNC: 8.5 MG/DL (ref 8.5–10.1)
CHLORIDE SERPL-SCNC: 107 MMOL/L (ref 94–109)
CO2 SERPL-SCNC: 23 MMOL/L (ref 20–32)
CREAT SERPL-MCNC: 0.9 MG/DL (ref 0.66–1.25)
GFR SERPL CREATININE-BSD FRML MDRD: 85 ML/MIN/{1.73_M2}
GLUCOSE BLDC GLUCOMTR-MCNC: 124 MG/DL (ref 70–99)
GLUCOSE BLDC GLUCOMTR-MCNC: 129 MG/DL (ref 70–99)
GLUCOSE SERPL-MCNC: 132 MG/DL (ref 70–99)
Lab: NORMAL
Lab: NORMAL
MAGNESIUM SERPL-MCNC: 1.6 MG/DL (ref 1.6–2.3)
PHOSPHATE SERPL-MCNC: 2.9 MG/DL (ref 2.5–4.5)
POTASSIUM SERPL-SCNC: 3.6 MMOL/L (ref 3.4–5.3)
SODIUM SERPL-SCNC: 138 MMOL/L (ref 133–144)
SPECIMEN SOURCE: NORMAL
SPECIMEN SOURCE: NORMAL

## 2019-05-12 PROCEDURE — 97110 THERAPEUTIC EXERCISES: CPT | Mod: GP

## 2019-05-12 PROCEDURE — 25000132 ZZH RX MED GY IP 250 OP 250 PS 637: Performed by: INTERNAL MEDICINE

## 2019-05-12 PROCEDURE — 99207 ZZC CDG-MDM COMPONENT: MEETS LOW - DOWN CODED: CPT | Performed by: INTERNAL MEDICINE

## 2019-05-12 PROCEDURE — 83735 ASSAY OF MAGNESIUM: CPT | Performed by: INTERNAL MEDICINE

## 2019-05-12 PROCEDURE — 99232 SBSQ HOSP IP/OBS MODERATE 35: CPT | Performed by: INTERNAL MEDICINE

## 2019-05-12 PROCEDURE — 84100 ASSAY OF PHOSPHORUS: CPT | Performed by: INTERNAL MEDICINE

## 2019-05-12 PROCEDURE — 12000000 ZZH R&B MED SURG/OB

## 2019-05-12 PROCEDURE — 92526 ORAL FUNCTION THERAPY: CPT | Mod: GN | Performed by: SPEECH-LANGUAGE PATHOLOGIST

## 2019-05-12 PROCEDURE — 00000146 ZZHCL STATISTIC GLUCOSE BY METER IP

## 2019-05-12 PROCEDURE — 25000132 ZZH RX MED GY IP 250 OP 250 PS 637: Performed by: PSYCHIATRY & NEUROLOGY

## 2019-05-12 PROCEDURE — 36415 COLL VENOUS BLD VENIPUNCTURE: CPT | Performed by: INTERNAL MEDICINE

## 2019-05-12 PROCEDURE — 25000132 ZZH RX MED GY IP 250 OP 250 PS 637: Performed by: PHYSICIAN ASSISTANT

## 2019-05-12 PROCEDURE — 97116 GAIT TRAINING THERAPY: CPT | Mod: GP

## 2019-05-12 PROCEDURE — 80048 BASIC METABOLIC PNL TOTAL CA: CPT | Performed by: INTERNAL MEDICINE

## 2019-05-12 RX ORDER — NICOTINE POLACRILEX 4 MG
15-30 LOZENGE BUCCAL
Status: DISCONTINUED | OUTPATIENT
Start: 2019-05-12 | End: 2019-05-12

## 2019-05-12 RX ORDER — DEXTROSE MONOHYDRATE 25 G/50ML
25-50 INJECTION, SOLUTION INTRAVENOUS
Status: DISCONTINUED | OUTPATIENT
Start: 2019-05-12 | End: 2019-05-12

## 2019-05-12 RX ADMIN — POTASSIUM CHLORIDE 40 MEQ: 1500 TABLET, EXTENDED RELEASE ORAL at 08:36

## 2019-05-12 RX ADMIN — METOPROLOL TARTRATE 75 MG: 25 TABLET, FILM COATED ORAL at 08:36

## 2019-05-12 RX ADMIN — Medication 1 MG: at 21:26

## 2019-05-12 RX ADMIN — METOPROLOL TARTRATE 75 MG: 25 TABLET, FILM COATED ORAL at 21:26

## 2019-05-12 RX ADMIN — DIGOXIN 125 MCG: 0.12 TABLET ORAL at 08:36

## 2019-05-12 RX ADMIN — POTASSIUM CHLORIDE 40 MEQ: 1500 TABLET, EXTENDED RELEASE ORAL at 21:26

## 2019-05-12 RX ADMIN — ACETAMINOPHEN 650 MG: 325 TABLET, FILM COATED ORAL at 21:25

## 2019-05-12 RX ADMIN — AMLODIPINE BESYLATE 5 MG: 5 TABLET ORAL at 08:36

## 2019-05-12 RX ADMIN — DONEPEZIL HYDROCHLORIDE 10 MG: 10 TABLET ORAL at 21:26

## 2019-05-12 ASSESSMENT — ACTIVITIES OF DAILY LIVING (ADL)
ADLS_ACUITY_SCORE: 21
ADLS_ACUITY_SCORE: 20
ADLS_ACUITY_SCORE: 21

## 2019-05-12 ASSESSMENT — MIFFLIN-ST. JEOR: SCORE: 1644

## 2019-05-12 NOTE — PLAN OF CARE
Pt here with AMS and encephalopathy. A&Ox3-4, just misses some details, forgetful. Neuros otherwise intact, generalized weakness. VSS. Tele Afib w/ CVR. Dental soft vegetarian diet, thin liquids. Takes pills crushed in pudding. Voiding in urinal, but incontinent at times. Up with A1 w/ GB and walker, pivot to chair. Denies pain. Plan to continue work w/ therapies and monitoring cultures. Discharge to TCU pending, nursing will continue to monitor.

## 2019-05-12 NOTE — PROVIDER NOTIFICATION
540 T.ZIYAD. Pt has orders for QID blood glucose checks but has q4 insulin ordered. Could we switch insulin to meals and HS instead of q4? Thanks!

## 2019-05-12 NOTE — PLAN OF CARE
Pt here with encephalopathy. A&O. Forgetful. Generalized weakness. VSS. Tele Afib with CVR. Vegetarian, mechanical soft dental diet, thin liquids. Takes pills crush in pudding. Up with 1 with belt and walker to chair or to use the bathroom. Incontinent of urine at times. Patient was up in chair till around 2100 when he went to bed. Denies pain. Bedtime blood sugar 134. Plan PT recommending TCU on discharge. Discharge pending.

## 2019-05-12 NOTE — PLAN OF CARE
Pt here with altered mental status and frequent falls, initial stroke w/u unrevealing. Pt is still somewhat fuzzy on why he's here but overall is oriented and intact, [place]. Sleep hygiene promoted. VSS on RA. Tele Afib CVR w/ a BBB. DD2 diet, thin liquids. Takes pills crushed in pudding. Up with 1-2 & GB. Incontinent B/B, will use urinal when offered. Denies pain. Plan to monitor potassium, continue therapies, discharge pending.

## 2019-05-12 NOTE — PROGRESS NOTES
Nephrology Progress Note          Assessment and Plan:   Electrolyte abnormalities now resolved, though K+ still only low normal.  Na+ is rapidly back in normal range.  Cause of curious rise in Na+ earlier this admn still unclear.  Suspect low K+ is due to diuretic Rx and poor nutritional status prior to admn.  IV fluid stopped.  Continue oral KCL supp a while longer.  Observe chemistries off IV; note diet advanced.  I recommend not restarting diuretic Rx.        Altered mental status    * No resolved hospital problems. *               Interval History:   no new complaints, alert, oriented to person, place and time and doing well; no cp, sob, n/v/d, or abd pain.  Fully lucid today.  Feels well.  VS fine.  Good oral intake, and UO appears fine.  Meds and labs reviewed.  Lytes nl; K+ up to 3.6 and Na+ 138.  Other chemistries fine.                  Medications:       amLODIPine  5 mg Oral Daily     digoxin  125 mcg Oral Daily     donepezil  10 mg Oral At Bedtime     insulin aspart  1-3 Units Subcutaneous At Bedtime     insulin aspart  1-3 Units Subcutaneous TID AC     melatonin  1 mg Oral Q24H     metoprolol tartrate  75 mg Oral BID     potassium chloride  40 mEq Oral BID                      Physical Exam:       Vital Sign Ranges  Temp:  [97.3  F (36.3  C)-98.8  F (37.1  C)] 98.8  F (37.1  C)  Pulse:  [62] 62  Heart Rate:  [56-81] 81  Resp:  [14-16] 16  BP: (122-151)/(59-80) 136/80  SpO2:  [93 %-98 %] 93 %    Weight, current:  85.6 kg (actual weight)  Weight change:     I/O last 3 completed shifts:  In: 1278 [P.O.:300; I.V.:978]  Out: 750 [Urine:750]    Physical Exam:   General:  Patient comfortable, in no apparent distress.  Awake, alert, oriented x3.  Neck:  Supple, no JVD.  Lungs:  Clear to auscultation bilaterally.  Cardiac:  Irregular rate and rhythm, no murmurs, rub, or gallops.  Abdomen:  Soft, nontender, physiologic sounds.  Extremities:  Without edema.  2+ pulses.  Skin:  Warm, dry.  Neurologic:  No focal  deficits.             Data:        Lab Results   Component Value Date     05/12/2019    Lab Results   Component Value Date    CHLORIDE 107 05/12/2019    Lab Results   Component Value Date    BUN 18 05/12/2019      Lab Results   Component Value Date    POTASSIUM 3.6 05/12/2019    Lab Results   Component Value Date    CO2 23 05/12/2019    Lab Results   Component Value Date    CR 0.90 05/12/2019        Lab Results   Component Value Date     05/12/2019     (H) 05/11/2019     (H) 05/10/2019     Lab Results   Component Value Date    POTASSIUM 3.6 05/12/2019    POTASSIUM 3.3 (L) 05/11/2019    POTASSIUM 3.5 05/10/2019     Lab Results   Component Value Date    CHLORIDE 107 05/12/2019    CHLORIDE 113 (H) 05/11/2019    CHLORIDE 116 (H) 05/10/2019     Lab Results   Component Value Date    CO2 23 05/12/2019    CO2 25 05/11/2019    CO2 27 05/10/2019     Lab Results   Component Value Date    CR 0.90 05/12/2019    CR 0.99 05/11/2019    CR 1.02 05/10/2019     Lab Results   Component Value Date    BUN 18 05/12/2019    BUN 21 05/11/2019    BUN 21 05/10/2019     Lab Results   Component Value Date    HGB 13.6 05/08/2019    HGB 12.6 (L) 05/07/2019    HGB 13.7 05/05/2019     No results found for: PH, PHARTERIAL, PO2, [id number], SAT, PCO2, HCO3, BASEEXCESS, CARTER, BEB          Rashad Shi MD  Nephrology; [employer] [facility]  [phone]

## 2019-05-12 NOTE — PROGRESS NOTES
"BRIEF NUTRITION ASSESSMENT      REASON FOR ASSESSMENT:  Navdeep Spann is a 74 year old male seen by Registered Dietitian for [facility]    NUTRITION HISTORY:  Per H&P - Wife states appetite has been excellent lately    CURRENT DIET AND INTAKE:  Diet:  Mechanical soft, thin liquids, vegetarian           Not Appropriate for Room Service              Visited with pt this morning  He tells me that he ate breakfast - \"tasted pretty good\"  Breakfast order - eggs, Cheerios, banana bread, fruit cup, OJ, milk  States he is getting hungry for lunch (tray on its way - mac-n-cheese, soup, carrots, fruit ice, applesauce, ice cream, juice)  Flowsheets reflect intake has been % meals past few days    ANTHROPOMETRICS:  Height: 6' 0\"  Weight:(5/12) 85.6 kg /  188 lbs 11.42 oz  Body mass index is 25.59 kg/m .   Weight Status: Overweight BMI 25-29.9  IBW:  80.9 kg  %IBW: 106%  Weight History:   Wt Readings from Last 10 Encounters:   05/12/19 85.6 kg (188 lb 11.4 oz)   05/05/19 77.1 kg (170 lb)   01/13/19 80.7 kg (178 lb)         LABS:  5/12: K 3.6           Phos 2.9    MALNUTRITION:  Patient does not meet two of the following criteria necessary for diagnosing malnutrition: significant weight loss, reduced intake, subcutaneous fat loss, muscle loss or fluid retention    NUTRITION INTERVENTION:  Nutrition Diagnosis:  No nutrition diagnosis at this time.    Implementation:  Nutrition Education ---> Encouraged pt to eat well at meals for energy and recovery    FOLLOW UP/MONITORING:   Will re-evaluate in 7 - 10 days, or sooner, if re-consulted.          "

## 2019-05-12 NOTE — PLAN OF CARE
Discharge Planner SLP   Patient plan for discharge: He did not state.   Current status: Patient was seen for swallow treatment with his significant other present and brought in his dentures. He was able to tolerate banana bread with good mastication and mild oral residue on the hard/soft palate were the denture meets, able to clear with a liquid rinse. Tolerated 8 oz of water via the cup without overt Sx of aspiration. Patient has dentures so will see if he is able to tolerate regular textures.  Recommend: 1. Continue on the mechanical/dental soft diet and thin liquids. 2. Up in a chair for all meals, small bites/sips, no straws, and check for oral residue, liquid rinse as needed. 2. Will follow up for diet tolerance and advancement now that he has his upper and lower dentures.   Barriers to return to prior living situation: Cognition and deconditioning  Recommendations for discharge: TCU  Rationale for recommendations: Anticipate he may meet his swallow goals as an IP but if not will need short term ST needs at TCU.        Entered by: Kassandra Kent 05/12/2019 3:29 PM

## 2019-05-12 NOTE — PLAN OF CARE
Discharge Planner PT   Patient plan for discharge: NOt stated  Current status: Pt's family present and supportive during session. Pt demonstrates improved stability with functional mobility. STS x 5 from varying surface heights. From low surface min A x 1, recliner CGA, EOB SBA with FWW. Pt ambulated 200' one seated rest, FWW, CGA, cues for improved mechanics. Engaged in seated and standing LE exercises.     Barriers to return to prior living situation: Fall risk, impaired enduranc,e strength, balance   Recommendations for discharge: TCU  Rationale for recommendations: Pt will benefit from continued skilled PT at TCU to improve strength, balance, gait, endurance to improve functional mobility prior to return home          Entered by: Naina Linares 05/12/2019 4:37 PM

## 2019-05-12 NOTE — PROGRESS NOTES
"[facility]    Hospitalist Progress Note    Assessment & Plan   Navdeep Spann is a 74 year old male with PMHx of hypertension, chronic afib not on anticoagulation, BPH, dementia, depression, anxiety, hx of UTIs, alcohol use and marijuana use who was admitted from [facility] ED on 5/5/2019 for evaluation of altered mental status with suspected stroke after he was found down.     Encephalopathy, unclear etiology  Unwitnessed fall w/right flank contusion   Brought to [facility] ED after he was found down. Initial head CT showed a subacute left thalamic infarct and a chronic right thalamic infarct which was initially thought to be the culprit of his altered mental status. Subsequent MRI did not show acute pathology. No reported hx of EtOH or drug use. Basic labs unremarkable, including BMP, CBC, LFTs. Neurology following this stay. Additional workup pursued including an LP. CSF studies unrevealing. EEG obtained and was consistent with encephalopathy. ?possible underlying infection precipitated changes in mentation but no infection was found during stay.   -- appreciate ongoing help per neurology -- no additional workup needed at this time beyond delirium precautions (recommended sched melatonin 1mg in the evening)  -- has had clinical improvement during stay with supportive cares  -- neurochecks per protocol  -- on modified diet this stay per SLP recs    Alzheimer's Dementia  Reportedly diagnosed 3-4 years ago following an episode where he reportedly accidentally took his son's Clozaril prescription and went into a \"coma.\" Diagnosis was confirmed on subsequent neuropsych testing. Per wife, has had progressive cognitive decline. Uses cane with ambulation at baseline. Underwent workup for encephalopathy this stay as above.  -- cont Aricept  -- PT/OT as able    Possible sepsis: Resolved  Noted to have a fever on 5/6 with Tmax 101.3 with some associated tachypnea. Procal neg, lactate nl. Was " placed on Zosyn on 5/6. Underwent workup with LP on 5/6 as above. CSF cultures (viral and bacterial) remained neg. Blood cultures this stay remained neg  -- given all cultures remained negative and he had no recurrent fevers, Zosyn was dc'd on 5/11     Persistent Hypokalemia: Improved  K steadily low this stay. Mag nl. Have been replacing K nearly daily. Cortisol level nl this stay. Nephrology following, suspect low K due to prior diuretic use and poor nutrition  -- cont sched KCl (40mEq BID) and replacement protocol as needed  -- repeat BMP in AM    Hypernatremia: Resolved  Attributed to poor oral intake and encephalopathy. Na peaked at 151 on 5/9 - 5/10. Per nephrology recs, PTA meds including chlorthalidone and trimethoprim were not restarted.   -- has been on D5W, rate decreased to 75ml/h on 5/11 as Na trending down  -- Na 138 today -- IVFs discontinued  -- repeat BMP in AM    Hyperglycemia  A1C 5.8 this stay. Had monitoring accuchecks AC/HS this stay with  low dose sliding scale insulin.  Can discontinue accuchecks and sliding scale insulin at this point.     Hypertension  Hyperlipidemia  Chronic Afib:  Stable on PTA meds, which include metoprolol 75mg BID, amlodipine 5mg daily and digoxin 125mcg daily  Chlorthalidone (25mg daily) stopped this stay dt electrolyte disturbances  Lisinopril 40mg daily remains on hold  Not on anticoagulation prior to admission dt prior hx of falls (anticoagulation was stopped in summer 2018)    Depression/anxiety  Chronic and stable on citalopram      BPH  History of frequent UTIs, on prophylactic trimethoprim  UA this stay was not suggestive of UTI   Prophylactic trimethoprim (100mg daily) on hold given electrolyte disturbances as above     Severe right C3-C4 C5-C6 and left C4-C5 foraminal stenosis  Incidental findings on CT C-spine. Can follow up with PCP after discharge.     FEN: IVFs dc'd, lytes stable, advanced to mechanical soft diet w/thin liquids per SLP assessment on  5/11  DVT Prophylaxis: PCDs  Code Status: Full Code    Disposition: Anticipate discharge in the next 1-2d, pending continued clinical improvement and stable electrolytes. TCU stay recommended.     Jennie Carmelina Javier    Interval History   Seen this afternoon. Resting comfortably. No specific complaints. Oriented to self and knows he's in [place]. Mentation has been continuing to improve.     -Data reviewed today: I reviewed all new labs and imaging results over the last 24 hours. I personally reviewed no images or EKG's today.    Physical Exam   Temp: 98.8  F (37.1  C) Temp src: Oral BP: 136/80 Pulse: 62 Heart Rate: 81 Resp: 16 SpO2: 93 % O2 Device: None (Room air)    Vitals:    05/09/19 0659 05/10/19 0620 05/12/19 0424   Weight: 83.9 kg (184 lb 15.5 oz) 82.4 kg (181 lb 10.5 oz) 85.6 kg (188 lb 11.4 oz)     Vital Signs with Ranges  Temp:  [97.3  F (36.3  C)-98.8  F (37.1  C)] 98.8  F (37.1  C)  Pulse:  [62] 62  Heart Rate:  [61-81] 81  Resp:  [14-16] 16  BP: (122-151)/(59-80) 136/80  SpO2:  [93 %-98 %] 93 %  I/O last 3 completed shifts:  In: 1278 [P.O.:300; I.V.:978]  Out: 750 [Urine:750]    Constitutional: Resting comfortably, alert and oriented to self, knows he's in [place] and that its 2019, thinks it's the month of February  Respiratory: CTAB, no wheeze/rales/rhonchi, no increased work of breathing  Cardiovascular: HR irregular, no MGR, no LE edema  GI: S, NT, ND, +BS  Skin/Integumen: warm/dry  Other:      Medications       amLODIPine  5 mg Oral Daily     digoxin  125 mcg Oral Daily     donepezil  10 mg Oral At Bedtime     insulin aspart  1-3 Units Subcutaneous At Bedtime     insulin aspart  1-3 Units Subcutaneous TID AC     melatonin  1 mg Oral Q24H     metoprolol tartrate  75 mg Oral BID     potassium chloride  40 mEq Oral BID       Data   Recent Labs   Lab 05/12/19  0912 05/11/19  0857 05/10/19  1843 05/10/19  0805  05/08/19  1040 05/07/19  0837   WBC  --   --   --   --   --  11.9*  --    HGB  --   --   --    --   --  13.6 12.6*   MCV  --   --   --   --   --  95  --    PLT  --   --   --   --   --  171  --     147*  --  151*   < > 149*  --    POTASSIUM 3.6 3.3* 3.5 2.8*   < > 2.6*  --    CHLORIDE 107 113*  --  116*   < > 109  --    CO2 23 25  --  27   < > 25  --    BUN 18 21  --  21   < > 25  --    CR 0.90 0.99  --  1.02   < > 1.22  --    ANIONGAP 8 9  --  8   < > 15*  --    PANKAJ 8.5 8.8  --  8.9   < > 9.1  --    * 107*  --  148*   < > 121*  --    ALBUMIN  --  2.5*  --   --   --   --   --     < > = values in this interval not displayed.       No results found for this or any previous visit (from the past 24 hour(s)).

## 2019-05-13 ENCOUNTER — APPOINTMENT (OUTPATIENT)
Dept: SPEECH THERAPY | Facility: CLINIC | Age: 74
DRG: 070 | End: 2019-05-13
Attending: INTERNAL MEDICINE
Payer: COMMERCIAL

## 2019-05-13 ENCOUNTER — APPOINTMENT (OUTPATIENT)
Dept: OCCUPATIONAL THERAPY | Facility: CLINIC | Age: 74
DRG: 070 | End: 2019-05-13
Attending: INTERNAL MEDICINE
Payer: COMMERCIAL

## 2019-05-13 LAB
ANION GAP SERPL CALCULATED.3IONS-SCNC: 6 MMOL/L (ref 3–14)
BUN SERPL-MCNC: 17 MG/DL (ref 7–30)
CALCIUM SERPL-MCNC: 8.5 MG/DL (ref 8.5–10.1)
CHLORIDE SERPL-SCNC: 107 MMOL/L (ref 94–109)
CO2 SERPL-SCNC: 25 MMOL/L (ref 20–32)
CREAT SERPL-MCNC: 1.01 MG/DL (ref 0.66–1.25)
GFR SERPL CREATININE-BSD FRML MDRD: 74 ML/MIN/{1.73_M2}
GLUCOSE SERPL-MCNC: 130 MG/DL (ref 70–99)
POTASSIUM SERPL-SCNC: 3.5 MMOL/L (ref 3.4–5.3)
SODIUM SERPL-SCNC: 138 MMOL/L (ref 133–144)

## 2019-05-13 PROCEDURE — 25000132 ZZH RX MED GY IP 250 OP 250 PS 637: Performed by: PSYCHIATRY & NEUROLOGY

## 2019-05-13 PROCEDURE — 99232 SBSQ HOSP IP/OBS MODERATE 35: CPT | Performed by: INTERNAL MEDICINE

## 2019-05-13 PROCEDURE — 97535 SELF CARE MNGMENT TRAINING: CPT | Mod: GO | Performed by: OCCUPATIONAL THERAPY ASSISTANT

## 2019-05-13 PROCEDURE — 97530 THERAPEUTIC ACTIVITIES: CPT | Mod: GO | Performed by: OCCUPATIONAL THERAPY ASSISTANT

## 2019-05-13 PROCEDURE — 80048 BASIC METABOLIC PNL TOTAL CA: CPT | Performed by: INTERNAL MEDICINE

## 2019-05-13 PROCEDURE — 25000132 ZZH RX MED GY IP 250 OP 250 PS 637: Performed by: INTERNAL MEDICINE

## 2019-05-13 PROCEDURE — 99207 ZZC CDG-MDM COMPONENT: MEETS LOW - DOWN CODED: CPT | Performed by: INTERNAL MEDICINE

## 2019-05-13 PROCEDURE — 12000000 ZZH R&B MED SURG/OB

## 2019-05-13 PROCEDURE — 36415 COLL VENOUS BLD VENIPUNCTURE: CPT | Performed by: INTERNAL MEDICINE

## 2019-05-13 PROCEDURE — 92526 ORAL FUNCTION THERAPY: CPT | Mod: GN | Performed by: SPEECH-LANGUAGE PATHOLOGIST

## 2019-05-13 RX ADMIN — DIGOXIN 125 MCG: 0.12 TABLET ORAL at 08:29

## 2019-05-13 RX ADMIN — Medication 1 MG: at 21:07

## 2019-05-13 RX ADMIN — DONEPEZIL HYDROCHLORIDE 10 MG: 10 TABLET ORAL at 21:07

## 2019-05-13 RX ADMIN — POTASSIUM CHLORIDE 40 MEQ: 1500 TABLET, EXTENDED RELEASE ORAL at 21:07

## 2019-05-13 RX ADMIN — METOPROLOL TARTRATE 75 MG: 25 TABLET, FILM COATED ORAL at 21:07

## 2019-05-13 RX ADMIN — AMLODIPINE BESYLATE 5 MG: 5 TABLET ORAL at 08:29

## 2019-05-13 RX ADMIN — POTASSIUM CHLORIDE 40 MEQ: 1500 TABLET, EXTENDED RELEASE ORAL at 08:29

## 2019-05-13 RX ADMIN — METOPROLOL TARTRATE 75 MG: 25 TABLET, FILM COATED ORAL at 08:29

## 2019-05-13 ASSESSMENT — ACTIVITIES OF DAILY LIVING (ADL)
ADLS_ACUITY_SCORE: 20
ADLS_ACUITY_SCORE: 21
ADLS_ACUITY_SCORE: 21
ADLS_ACUITY_SCORE: 20
ADLS_ACUITY_SCORE: 21
ADLS_ACUITY_SCORE: 20

## 2019-05-13 ASSESSMENT — MIFFLIN-ST. JEOR: SCORE: 1652

## 2019-05-13 NOTE — PLAN OF CARE
Pt here with altered mental status and frequent falls, initial stroke w/u unrevealing. Neuros intact, [place], forgetful at baseline. Sleep hygiene promoted. TMAX 1001., no interventions. Tele Afib CVR w/ a BBB. DD2 diet, thin liquids. Takes pills crushed in pudding. Up with 1-2 & GB. Incontinent B/B, will use urinal when offered, impulsive when needing to void. Denies pain. Plan to monitor, discharge to TCU pending.

## 2019-05-13 NOTE — PLAN OF CARE
Discharge Planner OT   Patient plan for discharge: Not stated  Current status: pt completed sit to/from sit EOB SBA, CGA sit to stand, amb with FWW BARB to/from bathroom, toilet transfer with grab bars CGA, stood at sink ADLS CGA, pt able to doff/don socks with effort but SBA.   Barriers to return to prior living situation: disoriented, medical status, severely impaired cognition, level of assist with all ADL  Recommendations for discharge: TCU per plan established by the Occupational THerapist  Rationale for recommendations: pt making progress after collaboration with OTR will increase to 5x/wk to progress safe and independence with ADLS.          Entered by: Nimco Barber 05/13/2019 11:21 AM

## 2019-05-13 NOTE — PROGRESS NOTES
I reviewed his chart. Na, K and creatinine are okay. Please call us with any questions or concerns. I am signing off. Thanks.

## 2019-05-13 NOTE — PLAN OF CARE
Discharge Planner SLP   Patient plan for discharge: Did not state  Current status: Swallow Tx was provided this pm.  Patient tolerated regular solids and thin liquids without overt signs of aspiration given min cues to use strategies.  Progress made toward goal.  Recommend a diet upgrade to regular diet textures and thin liquids, dentures in, sit at 90 degrees, small bites/sips, alternate textures.  Plan to provide 1-2 swallow Tx visits to assess diet tolerance and train strategies.  Barriers to return to prior living situation: No SLP barriers  Recommendations for discharge: TCU per OT/PT needs; SLP Tx goal may be met prior to discharge  Rationale for recommendations: SLP swallow Tx to maximize swallow safety for a least restrictive diet; Goal may be met prior to discharge/resolving dysphagia       Entered by: Luana Moore 05/13/2019 3:15 PM

## 2019-05-13 NOTE — PLAN OF CARE
Pt here with encephalopathy. Disoriented to time and situation. Intermittent confusion. Generalized weakness. Temp 101.7 decreased to 99.8 with tylenol . Tele Afib with CVR. Mechanical/dental, vegetarian diet with thin liquids. Takes pills crush in pudding. Up with 1 with belt and walker. Incontinent of bowel and bladder sometimes. Denies pain. Plan continue to monitor. Discharge to pending.

## 2019-05-13 NOTE — PROGRESS NOTES
"[facility]    Hospitalist Progress Note    Assessment & Plan   Navdeep Spann is a 74 year old male with PMHx of hypertension, chronic afib not on anticoagulation, BPH, dementia, depression, anxiety, hx of UTIs, alcohol use and marijuana use who was admitted from [facility] ED on 5/5/2019 for evaluation of altered mental status with suspected stroke after he was found down.     Encephalopathy, unclear etiology  Unwitnessed fall w/right flank contusion   Brought to [facility] ED after he was found down. Initial head CT showed a subacute left thalamic infarct and a chronic right thalamic infarct which was initially thought to be the culprit of his altered mental status. Subsequent MRI did not show acute pathology. No reported hx of EtOH or drug use. Basic labs unremarkable, including BMP, CBC, LFTs. Neurology following this stay. Additional workup pursued including an LP. CSF studies unrevealing. EEG obtained and was consistent with encephalopathy. ?possible underlying infection precipitated changes in mentation but no infection was found during stay.   -- appreciate ongoing help per neurology -- no additional workup needed at this time beyond delirium precautions (recommended sched melatonin 1mg in the evening)  -- has had clinical improvement during stay with supportive cares  -- neurochecks per protocol  -- on modified diet this stay per SLP recs    Alzheimer's Dementia  Reportedly diagnosed 3-4 years ago following an episode where he reportedly accidentally took his son's Clozaril prescription and went into a \"coma.\" Diagnosis was confirmed on subsequent neuropsych testing. Per wife, has had progressive cognitive decline. Uses cane with ambulation at baseline. Underwent workup for encephalopathy this stay as above.  -- cont Aricept  -- PT/OT as able    Fever, with concern for possible sepsis:   Noted to have a fever on 5/6 with Tmax 101.3 with some associated tachypnea. Procal neg, " lactate nl. Was placed on Zosyn on 5/6. Underwent workup with LP on 5/6 as above. CSF cultures (viral and bacterial) remained neg. Blood cultures this stay remained neg. UA on 5/10 bland.   -- given all cultures remained negative and he had no recurrent fevers, Zosyn was dc'd on 5/11  -- had isolated fever on 5/12 (Tmax 101.7) -- monitor for now, if fevers persist will need to re-culture and consider resumption of antibiotics  -- encourage OOB and IS     Persistent Hypokalemia: Improved  K steadily low this stay. Mag nl. Have been replacing K nearly daily. Cortisol level nl this stay. Nephrology following, suspect low K due to prior diuretic use and poor nutrition  -- K 3.5 this morning  -- cont sched KCl (40mEq BID) and replacement protocol as needed  -- repeat BMP in AM    Hypernatremia: Resolved  Attributed to poor oral intake and encephalopathy. Na peaked at 151 on 5/9 - 5/10. Per nephrology recs, PTA meds including chlorthalidone and trimethoprim were not restarted. Was placed on D5W and Na normalized. IVFs dc'd on 5/11  -- Na remains stable off IVFs   -- cont daily BMP while hospitalized    Hyperglycemia  A1C 5.8 this stay. Had monitoring accuchecks AC/HS this stay with  low dose sliding scale insulin.  Can discontinue accuchecks and sliding scale insulin at this point.     Hypertension  Hyperlipidemia  Chronic Afib:  Stable on PTA meds, which include metoprolol 75mg BID, amlodipine 5mg daily and digoxin 125mcg daily  Chlorthalidone (25mg daily) stopped this stay dt electrolyte disturbances  Lisinopril 40mg daily remains on hold  Not on anticoagulation prior to admission dt prior hx of falls (anticoagulation was stopped in summer 2018)    Depression/anxiety  Chronic and stable on citalopram      BPH  History of frequent UTIs, on prophylactic trimethoprim  UA this stay was not suggestive of UTI   Prophylactic trimethoprim (100mg daily) on hold given electrolyte disturbances as above     Severe right C3-C4 C5-C6  and left C4-C5 foraminal stenosis  Incidental findings on CT C-spine. Can follow up with PCP after discharge.     FEN: off IVFs, lytes stable, advanced to mechanical soft diet w/thin liquids per SLP assessment on 5/11  DVT Prophylaxis: PCDs  Code Status: Full Code    Disposition: Anticipate discharge in the next 1-2d, pending continued clinical improvement, no recurrence of fevers and stable electrolytes. TCU stay recommended. Updated CC/SW to begin looking for placement    Jennie Herrera    Interval History   Febrile last night with Tmax 101.7 Fever curve is since trending down. Seen this morning. Resting comfortably. No specific complaints. Denies cp/sob/cough, abd pain/n/v. Oriented to self, location and date (year/month).    -Data reviewed today: I reviewed all new labs and imaging results over the last 24 hours. I personally reviewed no images or EKG's today.    Physical Exam   Temp: 99.1  F (37.3  C) Temp src: Oral BP: 135/74 Pulse: 70 Heart Rate: 65 Resp: 16 SpO2: 95 % O2 Device: None (Room air)    Vitals:    05/10/19 0620 05/12/19 0424 05/13/19 0620   Weight: 82.4 kg (181 lb 10.5 oz) 85.6 kg (188 lb 11.4 oz) 86.4 kg (190 lb 7.6 oz)     Vital Signs with Ranges  Temp:  [98.2  F (36.8  C)-101.7  F (38.7  C)] 99.1  F (37.3  C)  Pulse:  [70] 70  Heart Rate:  [65-82] 65  Resp:  [16-18] 16  BP: (132-149)/(65-76) 135/74  SpO2:  [94 %-97 %] 95 %  I/O last 3 completed shifts:  In: 1439 [P.O.:440; I.V.:999]  Out: 1175 [Urine:1175]    Constitutional: Resting comfortably, alert and oriented to self, location and date (knows is 2019 and the month is May), answering questions appropriately  Respiratory: CTAB, no wheeze/rales/rhonchi, no increased work of breathing  Cardiovascular: HR irregular, no MGR, no LE edema  GI: S, NT, ND, +BS  Skin/Integumen: warm/dry  Other:      Medications       amLODIPine  5 mg Oral Daily     digoxin  125 mcg Oral Daily     donepezil  10 mg Oral At Bedtime     melatonin  1 mg Oral Q24H      metoprolol tartrate  75 mg Oral BID     potassium chloride  40 mEq Oral BID       Data   Recent Labs   Lab 05/13/19  0834 05/12/19  0912 05/11/19  0857  05/08/19  1040 05/07/19  0837   WBC  --   --   --   --  11.9*  --    HGB  --   --   --   --  13.6 12.6*   MCV  --   --   --   --  95  --    PLT  --   --   --   --  171  --     138 147*   < > 149*  --    POTASSIUM 3.5 3.6 3.3*   < > 2.6*  --    CHLORIDE 107 107 113*   < > 109  --    CO2 25 23 25   < > 25  --    BUN 17 18 21   < > 25  --    CR 1.01 0.90 0.99   < > 1.22  --    ANIONGAP 6 8 9   < > 15*  --    PANKAJ 8.5 8.5 8.8   < > 9.1  --    * 132* 107*   < > 121*  --    ALBUMIN  --   --  2.5*  --   --   --     < > = values in this interval not displayed.       No results found for this or any previous visit (from the past 24 hour(s)).

## 2019-05-14 ENCOUNTER — APPOINTMENT (OUTPATIENT)
Dept: PHYSICAL THERAPY | Facility: CLINIC | Age: 74
DRG: 070 | End: 2019-05-14
Attending: INTERNAL MEDICINE
Payer: COMMERCIAL

## 2019-05-14 ENCOUNTER — APPOINTMENT (OUTPATIENT)
Dept: SPEECH THERAPY | Facility: CLINIC | Age: 74
DRG: 070 | End: 2019-05-14
Attending: INTERNAL MEDICINE
Payer: COMMERCIAL

## 2019-05-14 ENCOUNTER — APPOINTMENT (OUTPATIENT)
Dept: OCCUPATIONAL THERAPY | Facility: CLINIC | Age: 74
DRG: 070 | End: 2019-05-14
Attending: INTERNAL MEDICINE
Payer: COMMERCIAL

## 2019-05-14 LAB
ANION GAP SERPL CALCULATED.3IONS-SCNC: 6 MMOL/L (ref 3–14)
BUN SERPL-MCNC: 20 MG/DL (ref 7–30)
CALCIUM SERPL-MCNC: 8.8 MG/DL (ref 8.5–10.1)
CHLORIDE SERPL-SCNC: 107 MMOL/L (ref 94–109)
CO2 SERPL-SCNC: 24 MMOL/L (ref 20–32)
CREAT SERPL-MCNC: 1.01 MG/DL (ref 0.66–1.25)
ERYTHROCYTE [DISTWIDTH] IN BLOOD BY AUTOMATED COUNT: 13.1 % (ref 10–15)
GFR SERPL CREATININE-BSD FRML MDRD: 74 ML/MIN/{1.73_M2}
GLUCOSE SERPL-MCNC: 132 MG/DL (ref 70–99)
HCT VFR BLD AUTO: 36.1 % (ref 40–53)
HGB BLD-MCNC: 12.3 G/DL (ref 13.3–17.7)
MAGNESIUM SERPL-MCNC: 2.1 MG/DL (ref 1.6–2.3)
MCH RBC QN AUTO: 31.8 PG (ref 26.5–33)
MCHC RBC AUTO-ENTMCNC: 34.1 G/DL (ref 31.5–36.5)
MCV RBC AUTO: 93 FL (ref 78–100)
PLATELET # BLD AUTO: 207 10E9/L (ref 150–450)
POTASSIUM SERPL-SCNC: 3.6 MMOL/L (ref 3.4–5.3)
RBC # BLD AUTO: 3.87 10E12/L (ref 4.4–5.9)
SODIUM SERPL-SCNC: 137 MMOL/L (ref 133–144)
WBC # BLD AUTO: 11 10E9/L (ref 4–11)

## 2019-05-14 PROCEDURE — 97110 THERAPEUTIC EXERCISES: CPT | Mod: GP

## 2019-05-14 PROCEDURE — 36415 COLL VENOUS BLD VENIPUNCTURE: CPT | Performed by: INTERNAL MEDICINE

## 2019-05-14 PROCEDURE — 92526 ORAL FUNCTION THERAPY: CPT | Mod: GN | Performed by: SPEECH-LANGUAGE PATHOLOGIST

## 2019-05-14 PROCEDURE — 83735 ASSAY OF MAGNESIUM: CPT | Performed by: INTERNAL MEDICINE

## 2019-05-14 PROCEDURE — 97116 GAIT TRAINING THERAPY: CPT | Mod: GP

## 2019-05-14 PROCEDURE — 12000000 ZZH R&B MED SURG/OB

## 2019-05-14 PROCEDURE — 80048 BASIC METABOLIC PNL TOTAL CA: CPT | Performed by: INTERNAL MEDICINE

## 2019-05-14 PROCEDURE — 25000132 ZZH RX MED GY IP 250 OP 250 PS 637: Performed by: INTERNAL MEDICINE

## 2019-05-14 PROCEDURE — 97530 THERAPEUTIC ACTIVITIES: CPT | Mod: GO | Performed by: OCCUPATIONAL THERAPY ASSISTANT

## 2019-05-14 PROCEDURE — 99232 SBSQ HOSP IP/OBS MODERATE 35: CPT | Performed by: INTERNAL MEDICINE

## 2019-05-14 PROCEDURE — 85027 COMPLETE CBC AUTOMATED: CPT | Performed by: INTERNAL MEDICINE

## 2019-05-14 PROCEDURE — 25000132 ZZH RX MED GY IP 250 OP 250 PS 637: Performed by: PSYCHIATRY & NEUROLOGY

## 2019-05-14 PROCEDURE — 97535 SELF CARE MNGMENT TRAINING: CPT | Mod: GO | Performed by: OCCUPATIONAL THERAPY ASSISTANT

## 2019-05-14 RX ADMIN — DIGOXIN 125 MCG: 0.12 TABLET ORAL at 08:08

## 2019-05-14 RX ADMIN — POTASSIUM CHLORIDE 40 MEQ: 1500 TABLET, EXTENDED RELEASE ORAL at 08:08

## 2019-05-14 RX ADMIN — Medication 1 MG: at 20:29

## 2019-05-14 RX ADMIN — DONEPEZIL HYDROCHLORIDE 10 MG: 10 TABLET ORAL at 21:49

## 2019-05-14 RX ADMIN — METOPROLOL TARTRATE 75 MG: 25 TABLET, FILM COATED ORAL at 20:28

## 2019-05-14 RX ADMIN — METOPROLOL TARTRATE 75 MG: 25 TABLET, FILM COATED ORAL at 08:08

## 2019-05-14 RX ADMIN — AMLODIPINE BESYLATE 5 MG: 5 TABLET ORAL at 08:08

## 2019-05-14 RX ADMIN — POTASSIUM CHLORIDE 40 MEQ: 1500 TABLET, EXTENDED RELEASE ORAL at 20:28

## 2019-05-14 ASSESSMENT — ACTIVITIES OF DAILY LIVING (ADL)
ADLS_ACUITY_SCORE: 20
ADLS_ACUITY_SCORE: 20
ADLS_ACUITY_SCORE: 22
ADLS_ACUITY_SCORE: 21
ADLS_ACUITY_SCORE: 21
ADLS_ACUITY_SCORE: 20

## 2019-05-14 ASSESSMENT — MIFFLIN-ST. JEOR: SCORE: 1649

## 2019-05-14 NOTE — PLAN OF CARE
Discharge Planner OT   Patient plan for discharge: Not stated  Current status: pt Marcela sit to stand with cues for hand placement, amb with FWW CGA to/from bathroom, toilet transfer with grab bars Marcela, CGA standing at sink for ADLS, cues needed to sequence through to complete task.   Barriers to return to prior living situation: disoriented, medical status, severely impaired cognition, level of assist with all ADL  Recommendations for discharge: TCU per plan established by the Occupational THerapist  Rationale for recommendations: pt making progress  ADLS will continue to see to progress strength/endurance for safety with ADLS         Entered by: Nimco Barber 05/14/2019 10:20 AM

## 2019-05-14 NOTE — PLAN OF CARE
Pt here with AMS, encephalopathy. A&Ox3-4. Neuros otherwise intact. Generalized weakness. VSS. Tele Afib w/ CVR. Regular vegetarian diet, thin liquids. Takes pills crushed, but could trial whole now that diet advances. Voiding, but incontinent at times. Up with A1 w/ GB and walker. Denies pain. Plan to continue work with therapies and promote sleep hygeine. Discharge plan pending, likely TCU, nursing will continue to monitor.

## 2019-05-14 NOTE — CONSULTS
Care Transition Initial Assessment - SW     Met with: FAMILY ( on phone)    Active Problems:    Altered mental status       DATA  Lives With: child(yumiko), adult, significant other   Living Arrangements: house     Description of Support System: Supportive, Involved  Who is your support system?: Significant Other, Children  Support Assessment: Adequate social supports.   Identified issues/concerns regarding health management: Possible need for increased services at time of discharge.   Transportation Anticipated: family or friend will provide    ASSESSMENT  Cognitive Status:  Unable to assess; pt has been asleep each time SW has attempted to see him  Concerns to be addressed: Discharge planning.     SW reviewed chart and attempted to meet with patient to discuss discharge planning. Pt was admitted 5/5/19 with altered mental status. Anticipated discharge date: 5/15/19. As patient was asleep, SW had message to call significant other, Melva.  SW introduced self and role to Melva over the phone. Per Melva, patient has been independent with all ADL's and IADL's and had also been assisting her, as she reports having advanced osteoarthritis.  Per Melva, they are currently residing her ex-husbands home in [place], [place],while they await a move into an apartment complex. Melva's son, Griffin, is also living with them in [place], [place].  We discussed therapy recommendations for: TCU. Melva states they are leaning toward taking patient home, stating Griffin is available 24/7 and would be the person to care for patient, if needed.  As EDIS updated her of patient's current needs at [facility], she stated she may encourage patient to discharge to TCU. Melva asked EDIS to meet with patient to discuss the options as well, stating they may want TCU at: [facility] or [facility]. Pt has not worked with PT in the past two days, stating he has been too tired. Physical Therapy is scheduled to work  with patient at 1630 today. Yoselyn stated she will call patient and encourage him to participate w/PT, knowing the importance of their recommendation(s). EDIS attempted to see patient, however, he was asleep once again. EDIS did put referrals thru DOD to facilities that were discussed. Melva asks for a call tomorrow a.m from EDIS once PT notes are in. Will continue to follow.      PLAN  Financial costs for the patient includes: Possible transportation costs .  Patient given options and choices for discharge: Yes  Patient/family is agreeable to the plan?  TCU vs Home w/Home Care-Yes  Patient Goals and Preferences: Discharge to home vs TCU .  Patient anticipates discharging to:  Home vs TCU .    Continue to assist as needed to ensure a safe discharge.    REYES Alcala

## 2019-05-14 NOTE — PLAN OF CARE
Pt here with AMS. A&Ox4. Neuros intact except forgetful and generalized weakness. VSS. Tele A-fib CVR w/PVC. Regular diet, thin liquids. Takes pills crushed in pudding. Up with A1/GB/W. Denies pain. Slept well.  Discharge TCU vs home. Family would like patient to come home as family would be able to help. Girlfriend, Melva,  would like call from .

## 2019-05-14 NOTE — PROGRESS NOTES
"[facility]    Hospitalist Progress Note    Assessment & Plan   Navdeep Spann is a 74 year old male with PMHx of hypertension, chronic afib not on anticoagulation, BPH, dementia, depression, anxiety, hx of UTIs, alcohol use and marijuana use who was admitted from [facility] ED on 5/5/2019 for evaluation of altered mental status with suspected stroke after he was found down.     Encephalopathy, unclear etiology: Imporved  Unwitnessed fall w/right flank contusion   Brought to [facility] ED after he was found down. Initial head CT showed a subacute left thalamic infarct and a chronic right thalamic infarct which was initially thought to be the culprit of his altered mental status. Subsequent MRI did not show acute pathology. No reported hx of EtOH or drug use. Basic labs unremarkable, including BMP, CBC, LFTs. Neurology following this stay. Additional workup pursued including an LP. CSF studies unrevealing. EEG obtained and was consistent with encephalopathy. ?possible underlying infection precipitated changes in mentation but no infection was found during stay.   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 Per wife, has had progressive cognitive decline. Uses cane with ambulation at baseline. Underwent workup for encephalopathy this stay as above.  -- cont Aricept  -- PT/OT as able    Fever, with concern for possible sepsis:   Noted to have a fever on 5/6 with Tmax 101.3 with some associated tachypnea. Procal neg, lactate nl. Was placed on Zosyn on " 5/6. Underwent workup with LP on 5/6 as above. CSF cultures (viral and bacterial) remained neg. Blood cultures this stay remained neg. UA on 5/10 bland.   -- given all cultures remained negative and he had no recurrent fevers, Chandanan was dc'd on 5/11  -- had isolated fever on 5/12 (Tmax 101.7) and 5/14 (Tmax 100.9)  -- WBC nl and no localized s/sx of infection   -- monitor for now, if fevers persist will need to re-culture and consider resumption of antibiotics  -- encourage OOB and IS     Persistent Hypokalemia: Improved  K steadily low this stay. Mag nl. Have been replacing K nearly daily. Cortisol level nl this stay. Nephrology following, suspect low K due to prior diuretic use and poor nutrition  -- K 3.5 this morning  -- cont sched KCl (40mEq BID) and replacement protocol as needed  -- repeat BMP in AM    Hypernatremia: Resolved  Attributed to poor oral intake and encephalopathy. Na peaked at 151 on 5/9 - 5/10. Per nephrology recs, PTA meds including chlorthalidone and trimethoprim were not restarted. Was placed on D5W and Na normalized. IVFs dc'd on 5/11  -- Na remains stable off IVFs     Hyperglycemia  A1C 5.8 this stay. Had monitoring accuchecks AC/HS this stay with low dose sliding scale insulin but needs were minimal this stay.    Hypertension  Hyperlipidemia  Chronic Afib:  Stable on PTA meds, which include metoprolol 75mg BID, amlodipine 5mg daily and digoxin 125mcg daily  Chlorthalidone (25mg daily) stopped this stay dt electrolyte disturbances  Lisinopril 40mg daily remains on hold  Not on anticoagulation prior to admission dt prior hx of falls (anticoagulation was stopped in summer 2018)    Depression/anxiety  Chronic and stable on citalopram      BPH  History of frequent UTIs, on prophylactic trimethoprim  UA this stay was not suggestive of UTI   Prophylactic trimethoprim (100mg daily) on hold given electrolyte disturbances as above     Severe right C3-C4, C5-C6 and left C4-C5 foraminal  stenosis  Incidental findings on CT C-spine. Can follow up with PCP after discharge.     FEN: off IVFs, lytes stable, advanced to regular diet w/thin liquids per SLP assessment on 5/13  DVT Prophylaxis: PCDs  Code Status: Full Code    Disposition: Anticipate discharge tomorrow if no recurrence of fevers. TCU stay recommended and patient/spouse in agreement. SW following.     Jennie Herrera    Interval History   Seen this afternoon. Resting comfortably. A&Ox3. No specific complaints. Low grade temp overnight but hasn't recurred. Denies cp/sob/cough, abd pain/n/v. No urinary discomfort.    -Data reviewed today: I reviewed all new labs and imaging results over the last 24 hours. I personally reviewed no images or EKG's today.    Physical Exam   Temp: 98  F (36.7  C) Temp src: Oral BP: 133/77   Heart Rate: 75 Resp: 16 SpO2: 96 % O2 Device: None (Room air)    Vitals:    05/12/19 0424 05/13/19 0620 05/14/19 0500   Weight: 85.6 kg (188 lb 11.4 oz) 86.4 kg (190 lb 7.6 oz) 86.1 kg (189 lb 13.1 oz)     Vital Signs with Ranges  Temp:  [98  F (36.7  C)-100.9  F (38.3  C)] 98  F (36.7  C)  Heart Rate:  [72-81] 75  Resp:  [14-16] 16  BP: (133-151)/(68-77) 133/77  SpO2:  [93 %-96 %] 96 %  I/O last 3 completed shifts:  In: 240 [P.O.:240]  Out: 300 [Urine:300]    Constitutional: Resting comfortably, alert and orientedx3, answering questions appropriately  Respiratory: CTAB, no wheeze/rales/rhonchi, no increased work of breathing  Cardiovascular: HR irregular, no MGR, no LE edema  GI: S, NT, ND, +BS  Skin/Integumen: warm/dry  Other:      Medications       amLODIPine  5 mg Oral Daily     digoxin  125 mcg Oral Daily     donepezil  10 mg Oral At Bedtime     melatonin  1 mg Oral Q24H     metoprolol tartrate  75 mg Oral BID     potassium chloride  40 mEq Oral BID       Data   Recent Labs   Lab 05/14/19  0721 05/13/19  0834 05/12/19  0912 05/11/19  0857  05/08/19  1040   WBC 11.0  --   --   --   --  11.9*   HGB 12.3*  --   --   --    --  13.6   MCV 93  --   --   --   --  95     --   --   --   --  171    138 138 147*   < > 149*   POTASSIUM 3.6 3.5 3.6 3.3*   < > 2.6*   CHLORIDE 107 107 107 113*   < > 109   CO2 24 25 23 25   < > 25   BUN 20 17 18 21   < > 25   CR 1.01 1.01 0.90 0.99   < > 1.22   ANIONGAP 6 6 8 9   < > 15*   PANKAJ 8.8 8.5 8.5 8.8   < > 9.1   * 130* 132* 107*   < > 121*   ALBUMIN  --   --   --  2.5*  --   --     < > = values in this interval not displayed.       No results found for this or any previous visit (from the past 24 hour(s)).

## 2019-05-14 NOTE — PLAN OF CARE
Pt here with AMS encephalopathy. A&Ox4, but forgetful. Neuros intact. VSS. Tele Afib w/ CVR. Regular vegetarian diet, thin liquids. Takes pills crushed in applesauce. Up with A1 w/ GB and walker. Voiding in the urinal, but incontinent at times. Denies pain. Plan to continue working w/ therapies and promoting sleep hygiene. Discharge plan pending, likely TCU. Nursing will continue to monitor.

## 2019-05-14 NOTE — PLAN OF CARE
"Pt here with encephalopathy w/ unknown etiology and R flank contusion. A&Ox3, disoriented to time, states it is \"2018\". Speaks quickly/rambles at times. Neuros intact, lethargic at times. VSS. Tele afib CVR. Reg vegetarian diet, thin liquids. Takes pills crushed with pudding. SZR precautions, no activity noted. Up with A1/GB/W, incontinent at times. Denies pain. Plan to discharge to TCU d/t SO feeling that she cannot care for him and would like him to rehabilitate first.     "

## 2019-05-14 NOTE — PLAN OF CARE
Discharge Planner PT   Patient plan for discharge: home vs TCU, pt open to TCU   Current status:     Pt supine upon arrival. IND with bed mobility. STS with FWW braces BLE against bed. Poor eccentric control stand > sit transfers, education provided, improvement with cues for proper technique.     Pt ambulated 150' with FWW and SBA, cues for improved step length, fwd gaze, heel toe pattern. Pt does make good improvement with cues however requires frequent cues. Tends to shuffle and ambulate with short step length, no LOB negotiating over threshole. Seated rest. Pt ambulated additional 100'. Stair training up/down 6 steps, reciprocal ascend unilateral HR, step to pattern descend, CGA for safety. Inc PHAM O2 sats 94%  bpm.     Pt engaged in seated exercises and standing exercises    Sit > supine IND. Edu on DC rec remaining TCU to improve strength, endurance, balance, gait as pt below baseline     Barriers to return to prior living situation: Impaired dynamic balance, endurance, strength, cognition   Recommendations for discharge: TCU  Rationale for recommendations: TCU to improve above impairments as pt typically IND at baseline, currently fall risk. If pt and family decide to discharge to home then recommend home with SBA-CGA for mobility until pt progresses to mod I with FWW. HHPT recommended if pt discharges to home          Entered by: Naina Linares 05/14/2019 5:02 PM

## 2019-05-14 NOTE — PROGRESS NOTES
CM    I:  SW attempted to reach significant other (listed as spouse in Epic), Melva at: [phone]. N/A. Left VM asking for call back asap to discuss discharge planning.    P: Continue to assist as needed.    REYES Alcala

## 2019-05-14 NOTE — PLAN OF CARE
Discharge Planner SLP   Patient plan for discharge: Did not state   Current status: Swallow Tx was provided this am.  Patient tolerated solids and thin liquids during a snack observation without overt signs of aspiration given min-no cues to use strategies.  Educated pt to continue strategies with a regular diet and thin liquids.  Swallow Tx goal has been met.  Will defer cognitive eval and Tx to OT/SLP at TCU given baseline cognitive deficits/no new neuro findings on MRI.  Barriers to return to prior living situation: Level of assist, confusion  Recommendations for discharge: TCU; SLP cognitive eval and Tx as indicated after discharge  Rationale for recommendations: OT and/or SLP cognitive eval and Tx after discharge to TCU to maximize function as indicated; baseline dementia may limit progress; SLP swallow Tx goal has been met       Entered by: Luana Moore 05/14/2019 11:21 AM      Speech Language Therapy Discharge Summary    Reason for therapy discharge:    Swallow Tx goal met     Progress towards therapy goal(s). See goals on Care Plan in Epic electronic health record for goal details.  Goals met  For swallowing    Therapy recommendation(s):    SLP and/or OT cognitive eval and Tx after discharge as felt appropriate; baseline cognitive deficits reported

## 2019-05-15 ENCOUNTER — APPOINTMENT (OUTPATIENT)
Dept: PHYSICAL THERAPY | Facility: CLINIC | Age: 74
DRG: 070 | End: 2019-05-15
Attending: INTERNAL MEDICINE
Payer: COMMERCIAL

## 2019-05-15 ENCOUNTER — APPOINTMENT (OUTPATIENT)
Dept: OCCUPATIONAL THERAPY | Facility: CLINIC | Age: 74
DRG: 070 | End: 2019-05-15
Attending: INTERNAL MEDICINE
Payer: COMMERCIAL

## 2019-05-15 PROCEDURE — 25000132 ZZH RX MED GY IP 250 OP 250 PS 637: Performed by: INTERNAL MEDICINE

## 2019-05-15 PROCEDURE — 12000000 ZZH R&B MED SURG/OB

## 2019-05-15 PROCEDURE — 97116 GAIT TRAINING THERAPY: CPT | Mod: GP

## 2019-05-15 PROCEDURE — 25000132 ZZH RX MED GY IP 250 OP 250 PS 637: Performed by: PSYCHIATRY & NEUROLOGY

## 2019-05-15 PROCEDURE — 97110 THERAPEUTIC EXERCISES: CPT | Mod: GP

## 2019-05-15 PROCEDURE — 99232 SBSQ HOSP IP/OBS MODERATE 35: CPT | Performed by: INTERNAL MEDICINE

## 2019-05-15 PROCEDURE — 97535 SELF CARE MNGMENT TRAINING: CPT | Mod: GO

## 2019-05-15 RX ORDER — LISINOPRIL 40 MG/1
40 TABLET ORAL DAILY
Status: DISCONTINUED | OUTPATIENT
Start: 2019-05-15 | End: 2019-05-16 | Stop reason: HOSPADM

## 2019-05-15 RX ADMIN — POTASSIUM CHLORIDE 40 MEQ: 1500 TABLET, EXTENDED RELEASE ORAL at 20:58

## 2019-05-15 RX ADMIN — METOPROLOL TARTRATE 75 MG: 25 TABLET, FILM COATED ORAL at 08:06

## 2019-05-15 RX ADMIN — DONEPEZIL HYDROCHLORIDE 10 MG: 10 TABLET ORAL at 20:59

## 2019-05-15 RX ADMIN — POTASSIUM CHLORIDE 40 MEQ: 1500 TABLET, EXTENDED RELEASE ORAL at 08:05

## 2019-05-15 RX ADMIN — Medication 1 MG: at 20:58

## 2019-05-15 RX ADMIN — AMLODIPINE BESYLATE 5 MG: 5 TABLET ORAL at 08:06

## 2019-05-15 RX ADMIN — METOPROLOL TARTRATE 75 MG: 25 TABLET, FILM COATED ORAL at 20:59

## 2019-05-15 RX ADMIN — LISINOPRIL 40 MG: 40 TABLET ORAL at 17:42

## 2019-05-15 RX ADMIN — DIGOXIN 125 MCG: 0.12 TABLET ORAL at 08:06

## 2019-05-15 ASSESSMENT — ACTIVITIES OF DAILY LIVING (ADL)
ADLS_ACUITY_SCORE: 21
ADLS_ACUITY_SCORE: 21
ADLS_ACUITY_SCORE: 20
ADLS_ACUITY_SCORE: 21
ADLS_ACUITY_SCORE: 20
ADLS_ACUITY_SCORE: 20

## 2019-05-15 ASSESSMENT — MIFFLIN-ST. JEOR: SCORE: 1621

## 2019-05-15 NOTE — PROGRESS NOTES
"[facility]    Hospitalist Progress Note    Assessment & Plan   Navdeep Spann is a 74 year old male with PMHx of hypertension, chronic afib not on anticoagulation, BPH, dementia, depression, anxiety, hx of UTIs, alcohol use and marijuana use who was admitted from [facility] [place] ED on 5/5/2019 for evaluation of altered mental status with suspected stroke after he was found down.     Encephalopathy, unclear etiology, in setting of dementia: Imporved  Unwitnessed fall w/right flank contusion   Brought to [facility] [place] ED after he was found down. Initial head CT showed a subacute left thalamic infarct and a chronic right thalamic infarct which was initially thought to be the culprit of his altered mental status. Subsequent MRI did not show acute pathology. No reported hx of EtOH or drug use. Basic labs unremarkable, including BMP, CBC, LFTs. No s/sx of infection. Neurology following this stay. Additional workup pursued including an LP. CSF studies unrevealing. EEG obtained and was consistent with encephalopathy. ?possible underlying infection precipitated changes in mentation but no infection was found during stay.   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 Per wife, has had progressive cognitive decline. Uses cane with ambulation at baseline. Underwent workup for encephalopathy this stay as above.  -- cont Aricept    Fever, with concern for possible sepsis: Resolved.  Noted to have a fever on 5/6 with Tmax 101.3 with some associated tachypnea. Procal neg, " lactate nl. Was placed on Zosyn on 5/6. Underwent workup with LP on 5/6 as above. CSF cultures (viral and bacterial) remained neg. Blood cultures this stay remained neg. UA on 5/10 bland.   -- given all cultures remained negative and he had no recurrent fevers, Zosyn was dc'd on 5/11  -- had isolated fever on 5/12 (Tmax 101.7) and 5/14 (Tmax 100.9)  -- WBC nl and no localized s/sx of infection   -- monitor for now, if fevers persist will need to re-culture and consider resumption of antibiotics  -- encourage OOB and IS     Persistent Hypokalemia: Improved  K steadily low this stay. Mag nl. Have been replacing K nearly daily. Cortisol level nl this stay. Nephrology following, suspect low K due to prior diuretic use and poor nutrition  -- cont sched KCl (40mEq BID) and replacement protocol as needed  -- repeat BMP in AM    Hypernatremia: Resolved  Attributed to poor oral intake and encephalopathy. Na peaked at 151 on 5/9 - 5/10. Per nephrology recs, PTA meds including chlorthalidone and trimethoprim were not restarted. Was placed on D5W and Na normalized. IVFs dc'd on 5/11  -- Na remains stable off IVFs     Hyperglycemia  A1C 5.8 this stay. Had monitoring accuchecks AC/HS this stay with low dose sliding scale insulin but needs were minimal this stay.    Hypertension  Hyperlipidemia  Chronic Afib:  Stable on PTA meds, which include metoprolol 75mg BID, amlodipine 5mg daily and digoxin 125mcg daily  Chlorthalidone (25mg daily) stopped this stay dt electrolyte disturbances, would not resume at discharge  Lisinopril 40mg daily initially held -- resumed on 5/15  Not on anticoagulation prior to admission dt prior hx of falls (anticoagulation was stopped in summer 2018)    Depression/anxiety  Chronic and stable on citalopram      BPH  History of frequent UTIs, on prophylactic trimethoprim  UA this stay was not suggestive of UTI   Prophylactic trimethoprim (100mg daily) on held electrolyte disturbances as above -- consider  resumption at disharge     Severe right C3-C4, C5-C6 and left C4-C5 foraminal stenosis  Incidental findings on CT C-spine. Can follow up with PCP after discharge.     FEN: off IVFs, lytes stable, advanced to regular diet w/thin liquids per SLP assessment on 5/13  DVT Prophylaxis: PCDs  Code Status: Full Code    Disposition: Medically stable to discharge to TCU. Awaiting insurance approval. SW following, likely discharge tomorrow.    Significant other at bedside. Questions answered, in agreement with care plan.    Jennie Herrera    Interval History   Seen this afternoon. Resting comfortably.No specific complaints. Some lingering situational confusion. Denies cp/sob/cough, abd pain/n/v.    -Data reviewed today: I reviewed all new labs and imaging results over the last 24 hours. I personally reviewed no images or EKG's today.    Physical Exam   Temp: 98.1  F (36.7  C) Temp src: Oral BP: 120/75   Heart Rate: 76 Resp: 18 SpO2: 94 % O2 Device: None (Room air)    Vitals:    05/13/19 0620 05/14/19 0500 05/15/19 0609   Weight: 86.4 kg (190 lb 7.6 oz) 86.1 kg (189 lb 13.1 oz) 83.3 kg (183 lb 10.3 oz)     Vital Signs with Ranges  Temp:  [97.8  F (36.6  C)-98.3  F (36.8  C)] 98.1  F (36.7  C)  Heart Rate:  [72-83] 76  Resp:  [16-18] 18  BP: (120-143)/(67-75) 120/75  SpO2:  [93 %-95 %] 94 %  I/O last 3 completed shifts:  In: 500 [P.O.:500]  Out: 630 [Urine:630]    Constitutional: Resting comfortably, alert and orientedx3, answering questions appropriately  Respiratory: CTAB, no wheeze/rales/rhonchi, no increased work of breathing  Cardiovascular: HR irregular, no MGR, no LE edema  GI: S, NT, ND, +BS  Skin/Integumen: warm/dry  Other:      Medications       amLODIPine  5 mg Oral Daily     digoxin  125 mcg Oral Daily     donepezil  10 mg Oral At Bedtime     melatonin  1 mg Oral Q24H     metoprolol tartrate  75 mg Oral BID     potassium chloride  40 mEq Oral BID       Data   Recent Labs   Lab 05/14/19  0721 05/13/19  0861  05/12/19 0912 05/11/19  0857   WBC 11.0  --   --   --    HGB 12.3*  --   --   --    MCV 93  --   --   --      --   --   --     138 138 147*   POTASSIUM 3.6 3.5 3.6 3.3*   CHLORIDE 107 107 107 113*   CO2 24 25 23 25   BUN 20 17 18 21   CR 1.01 1.01 0.90 0.99   ANIONGAP 6 6 8 9   PANKAJ 8.8 8.5 8.5 8.8   * 130* 132* 107*   ALBUMIN  --   --   --  2.5*       No results found for this or any previous visit (from the past 24 hour(s)).

## 2019-05-15 NOTE — PLAN OF CARE
Discharge Planner OT   Patient plan for discharge: none stated   Current status: CGA for sit <> stand with FWW. Cues for safety as pt wanting to leave walker to side. CGA for ambulation into bathroom. CGA for toilet transfer due to unsteadiness. Min A to don/doff briefs. SBA for pericares. CGA for g/h tasks in stance with emphasis on improving activity tolerance. Cues for sequencing as pt forgot what he was supposed to be doing. SBA to complete LE dressing. SBA for bed mobility sit > supine.   Barriers to return to prior living situation: cognition, fall risk, unsteadiness, cues needed for safety with ADL's   Recommendations for discharge: TCU   Rationale for recommendations: Pt would benefit from continued skilled OT services to improve independence and safety with ADL's and functional transfers as pt is not at baseline.  If pt/family decline TCU - pt would benefit from HHOT for home safety eval as pt requires assistive device and assist of another person to leave home environment. Pt will need 24/7 A with ADL's and transfers due to unsteadiness cognition therefore requiring SBA-CGA.          Entered by: Hui Snowden 05/15/2019 8:59 AM

## 2019-05-15 NOTE — PLAN OF CARE
Pt here with AMS/enephalopathy. A&O x4, forgetful at times. Neuros intact. R flank bruising. VSS. Tele A fib CVR w/ PVC. Regular diet, thin liquids. Takes pills crushed w/ pudding. Up with Ax1, GB, W. Reg diet--vegetarian. Denies pain. Slept well overnight. On seizure precautions, no seizure activity noted. Plan for TCU at discharge.

## 2019-05-15 NOTE — PLAN OF CARE
Discharge Planner PT   Patient plan for discharge: rehab  Current status: Pt demonstrates modified independence with bed mobility. He requires CGA for transfers and gait 200' x 2 with a FWW.  Barriers to return to prior living situation: level of assist required, weakness, fall risk  Recommendations for discharge: TCU  Rationale for recommendations: Pt would benefit from PT at TCU to progress strength, balance and mobility so pt can return home safely.        Entered by: Sabrina Newberry 05/15/2019 5:03 PM

## 2019-05-15 NOTE — PLAN OF CARE
[id number]: Pt here with AMS w/ encephalopathy of unknown etiology and R flank contusion. A&Ox4, forgetful at times. Neuros intact. VSS. Tele afb CVR. Reg vegetarian diet, thin liquids. Takes pills whole with pudding. Up with A1/GB/W. Diaphoretic, offered shampoo camp and shower, refused. R abdominal bruising. Saline locked. Denies pain. [facility] SW note.

## 2019-05-15 NOTE — PLAN OF CARE
Pt here with AMS. A&Ox4, forgetful. Neuros intact. VSS. Tele Afib w/ CVR. Regular vegetarian diet, thin liquids. Takes pills whole in pudding. Voiding in urinal, incontinent at times. Up with A1 w/ GB and walker. Denies pain. Plan to continue work w/ therapies. Discharge to TCU pending insurance auth.

## 2019-05-15 NOTE — PROGRESS NOTES
CM    I:  EDIS received update patient is ready for discharge today. EDIS attempted to meet with patient again to discuss discharge, however, pt appeared to be in a deep sleep. SW left VM for [facility] regarding referrals sent yesterday. EDIS also spoke w/Megan, [facility] in [place] who asked for updated PT/OT notes, which were faxed. Per Megan, they will review and call SW back as they do have available beds today.  SW will attempt to see patient asap to confirm a discharge plan. Home vs TCU. Will update Significant other once this occurs.    P: Continue to assist as needed.    REYES Alcala    UPDATE@1101: EDIS met with patient who agrees with TCU. [facility] calls back to say they have no available beds. [facility] in [place] has accepted patient but need to send for [employer] auth. EDIS updated Significant other, Melva, who also agrees with plan and stated she will transport patient. Melva is planning to drive down today to visit and has been made aware patient may/may not go today based on insurance auth. EDIS awaiting call back from [facility].    UPDATE@5175: [facility] has still not received auth.,but stated they can admit patient's up until 7pm should auth come in by 4:30 today. EDIS called patient's significant other, Melva, who stated they are on their way to see patient today. Melva is aware we may not get auth today, so she confirmed to  she is willing to transport whichever day the discharge takes place.     UPDATE@1501: Per [facility], they do not anticipated getting auth from [employer] today, thus will defer final decision until tomorrow. EDIS updated staff.

## 2019-05-16 ENCOUNTER — APPOINTMENT (OUTPATIENT)
Dept: OCCUPATIONAL THERAPY | Facility: CLINIC | Age: 74
DRG: 070 | End: 2019-05-16
Attending: INTERNAL MEDICINE
Payer: COMMERCIAL

## 2019-05-16 VITALS
SYSTOLIC BLOOD PRESSURE: 122 MMHG | DIASTOLIC BLOOD PRESSURE: 72 MMHG | WEIGHT: 184.97 LBS | TEMPERATURE: 97.8 F | BODY MASS INDEX: 25.05 KG/M2 | HEART RATE: 68 BPM | HEIGHT: 72 IN | RESPIRATION RATE: 18 BRPM | OXYGEN SATURATION: 96 %

## 2019-05-16 LAB
ANION GAP SERPL CALCULATED.3IONS-SCNC: 8 MMOL/L (ref 3–14)
BUN SERPL-MCNC: 18 MG/DL (ref 7–30)
CALCIUM SERPL-MCNC: 9 MG/DL (ref 8.5–10.1)
CHLORIDE SERPL-SCNC: 107 MMOL/L (ref 94–109)
CO2 SERPL-SCNC: 24 MMOL/L (ref 20–32)
CREAT SERPL-MCNC: 0.99 MG/DL (ref 0.66–1.25)
GFR SERPL CREATININE-BSD FRML MDRD: 76 ML/MIN/{1.73_M2}
GLUCOSE SERPL-MCNC: 110 MG/DL (ref 70–99)
POTASSIUM SERPL-SCNC: 4.1 MMOL/L (ref 3.4–5.3)
SODIUM SERPL-SCNC: 139 MMOL/L (ref 133–144)

## 2019-05-16 PROCEDURE — 25000132 ZZH RX MED GY IP 250 OP 250 PS 637: Performed by: INTERNAL MEDICINE

## 2019-05-16 PROCEDURE — 99239 HOSP IP/OBS DSCHRG MGMT >30: CPT | Performed by: HOSPITALIST

## 2019-05-16 PROCEDURE — 97530 THERAPEUTIC ACTIVITIES: CPT | Mod: GO | Performed by: OCCUPATIONAL THERAPY ASSISTANT

## 2019-05-16 PROCEDURE — 80048 BASIC METABOLIC PNL TOTAL CA: CPT | Performed by: INTERNAL MEDICINE

## 2019-05-16 PROCEDURE — 36415 COLL VENOUS BLD VENIPUNCTURE: CPT | Performed by: INTERNAL MEDICINE

## 2019-05-16 PROCEDURE — 97535 SELF CARE MNGMENT TRAINING: CPT | Mod: GO | Performed by: OCCUPATIONAL THERAPY ASSISTANT

## 2019-05-16 RX ORDER — ACETAMINOPHEN 325 MG/1
650 TABLET ORAL EVERY 4 HOURS PRN
Start: 2019-05-16

## 2019-05-16 RX ORDER — POLYETHYLENE GLYCOL 3350 17 G/17G
17 POWDER, FOR SOLUTION ORAL DAILY PRN
Start: 2019-05-16 | End: 2021-04-28

## 2019-05-16 RX ORDER — AMLODIPINE BESYLATE 5 MG/1
5 TABLET ORAL DAILY
Start: 2019-05-17

## 2019-05-16 RX ORDER — POTASSIUM CHLORIDE 1500 MG/1
40 TABLET, EXTENDED RELEASE ORAL DAILY
Start: 2019-05-16 | End: 2019-05-22

## 2019-05-16 RX ORDER — METOPROLOL TARTRATE 75 MG/1
75 TABLET, FILM COATED ORAL 2 TIMES DAILY
DISCHARGE
Start: 2019-05-16

## 2019-05-16 RX ADMIN — DIGOXIN 125 MCG: 0.12 TABLET ORAL at 10:14

## 2019-05-16 RX ADMIN — AMLODIPINE BESYLATE 5 MG: 5 TABLET ORAL at 10:14

## 2019-05-16 RX ADMIN — LISINOPRIL 40 MG: 40 TABLET ORAL at 10:14

## 2019-05-16 RX ADMIN — METOPROLOL TARTRATE 75 MG: 25 TABLET, FILM COATED ORAL at 10:14

## 2019-05-16 RX ADMIN — POTASSIUM CHLORIDE 40 MEQ: 1500 TABLET, EXTENDED RELEASE ORAL at 10:14

## 2019-05-16 ASSESSMENT — MIFFLIN-ST. JEOR: SCORE: 1627

## 2019-05-16 ASSESSMENT — ACTIVITIES OF DAILY LIVING (ADL)
ADLS_ACUITY_SCORE: 20

## 2019-05-16 NOTE — PLAN OF CARE
Pt here with altered mental status and encephalopathy. A&O x4. Neuros intact. VSS. Tele Afib w/ CVR.   Regular diet, vegetarian preference, with thin liquids. Takes pills whole w/ pudding. Up with Ax1 GBW. Denies pain. [facility], SW following. Continue monitoring.

## 2019-05-16 NOTE — PLAN OF CARE
Discharge Planner OT   Patient plan for discharge: none stated   Current status: pt completed sit to stand SBA, amb with FWW CGA to/from bathroom, cues for walker safety, stood at sink for ADLS SBA, SBA to complete LE dressing with rest periods due to pt fatigues with activity  Barriers to return to prior living situation: cognition, fall risk, unsteadiness, cues needed for safety with ADL's   Recommendations for discharge: TCU per plan established by the Occupational Therapist  Rationale for recommendations: Pt would benefit from continued skilled OT services to improve independence and safety with ADL's and functional transfers as pt is not at baseline.  If pt/family decline TCU - pt would benefit from HHOT for home safety eval as pt requires assistive device and assist of another person to leave home environment. Pt will need 24/7 A with ADL's and transfers due to unsteadiness cognition therefore requiring SBA-CGA.         Entered by: Nimco Barber 05/16/2019 9:27 AM

## 2019-05-16 NOTE — PROGRESS NOTES
GINNA    I:  SW received call from Megan, tonia at [facility] in [place], stating they have received [employer] auth for patient. Significant other will arrive at 2pm to transport today. SW updated Megan of discharge time. Updated [facility] staff and patient. Awaiting orders.    P: Continue to assist as needed.    REYES Alcala    UPDATE@1230: Orders and PAS were faxed to facility. No further SW interventions anticipated at this time.  PAS-RR    D: Per DHS regulation, SW completed and submitted PAS-RR to [place] [employer] Direct Connect via the [employer] Line.  PAS-RR confirmation # is : [id number]    I: SW spoke with patient and they are aware a PAS-RR has been submitted.  SW reviewed with patient that they may be contacted for a follow up appointment within 10 days of hospital discharge if their SNF stay is < 30 days.  Contact information for [employer] was also provided.    A: patient verbalized understanding.    P: Further questions may be directed to [employer] at #[phone], option #4 for PAS-RR staff.

## 2019-05-16 NOTE — DISCHARGE SUMMARY
"[facility]  Hospitalist Discharge Summary       Date of Admission:  5/5/2019  Date of Discharge:  5/16/2019  2:46 PM  Discharging Provider: Sd Woodward,       Discharge Diagnoses   Encephalopathy, unclear etiology: Imporved  Unwitnessed fall w/right flank contusion     Follow-ups Needed After Discharge       Unresulted Labs Ordered in the Past 30 Days of this Admission     Date and Time Order Name Status Description    5/6/2019 1438 Anaerobic CSF culture Tube 2 Preliminary       These results will be followed up by PCP or nursing home provider    Discharge Disposition   Discharged to home  Condition at discharge: Stable    Hospital Course   Encephalopathy, unclear etiology: Imporved  Unwitnessed fall w/right flank contusion   Brought to [facility] ED after he was found down. Initial head CT showed a subacute left thalamic infarct and a chronic right thalamic infarct which was initially thought to be the culprit of his altered mental status. Subsequent MRI did not show acute pathology. No reported hx of EtOH or drug use. Basic labs unremarkable, including BMP, CBC, LFTs. Neurology following this stay. Additional workup pursued including an LP. CSF studies unrevealing. EEG obtained and was consistent with encephalopathy. ?possible underlying infection precipitated changes in mentation but no infection was found during stay.   -- appreciate ongoing help per neurology -- no additional workup needed at this time beyond delirium precautions (recommended sched melatonin 1mg in the evening)  -- has had clinical improvement during stay with supportive cares  -- neurochecks per protocol     Alzheimer's Dementia  Reportedly diagnosed 3-4 years ago following an episode where he reportedly accidentally took his son's Clozaril prescription and went into a \"coma.\" Diagnosis was confirmed on subsequent neuropsych testing. Per wife, has had progressive cognitive decline. Uses cane with ambulation at " baseline. Underwent workup for encephalopathy this stay as above.  -- cont Aricept  -- PT/OT as able     Fever, with concern for possible sepsis:   Noted to have a fever on 5/6 with Tmax 101.3 with some associated tachypnea. Procal neg, lactate nl. Was placed on Zosyn on 5/6. Underwent workup with LP on 5/6 as above. CSF cultures (viral and bacterial) remained neg. Blood cultures this stay remained neg. UA on 5/10 bland.   -- given all cultures remained negative and he had no recurrent fevers, Zosyn was dc'd on 5/11  -- no obvious infection on discharge     Persistent Hypokalemia: Improved  K steadily low this stay. Mag nl. Have been replacing K nearly daily. Cortisol level nl this stay. Nephrology following, suspect low K due to prior diuretic use and poor nutrition  -- recheck bmp in 3 days  -- reduce to daily k supplementation     Hypernatremia: Resolved  Attributed to poor oral intake and encephalopathy. Na peaked at 151 on 5/9 - 5/10. Per nephrology recs, PTA meds including chlorthalidone and trimethoprim were not restarted. Was placed on D5W and Na normalized. IVFs dc'd on 5/11  -- Na remains stable off IVFs      Hyperglycemia  A1C 5.8 this stay. Had monitoring accuchecks AC/HS this stay with low dose sliding scale insulin but needs were minimal this stay.     Hypertension  Hyperlipidemia  Chronic Afib:  Stable on PTA meds, which include metoprolol 75mg BID, amlodipine 5mg daily and digoxin 125mcg daily  Chlorthalidone (25mg daily) stopped this stay dt electrolyte disturbances  Lisinopril 40mg daily remains on hold  Not on anticoagulation prior to admission dt prior hx of falls (anticoagulation was stopped in summer 2018)     Depression/anxiety  Chronic and stable on citalopram      BPH  History of frequent UTIs, on prophylactic trimethoprim  UA this stay was not suggestive of UTI   Prophylactic trimethoprim (100mg daily) on hold given electrolyte disturbances as above     Severe right C3-C4, C5-C6 and left  C4-C5 foraminal stenosis  Incidental findings on CT C-spine. Can follow up with PCP after discharge.    Lung nodule  - suggest PCP follow up for continued surveillance.        Consultations This Hospital Stay   NEUROLOGY IP CONSULT  SWALLOW EVAL SPEECH PATH AT BEDSIDE IP CONSULT  SOCIAL WORK IP CONSULT  PHYSICAL THERAPY ADULT IP CONSULT  OCCUPATIONAL THERAPY ADULT IP CONSULT  NEPHROLOGY IP CONSULT  CARE TRANSITION RN/SW IP CONSULT    Code Status   Full Code    Time Spent on this Encounter   I, Sd Woodward, personally saw the patient today and spent greater than 30 minutes discharging this patient.       Sd Woodward, DO  [facility]  ______________________________________________________________________    Physical Exam   Vital Signs: Temp: 98.5  F (36.9  C) Temp src: Oral BP: 126/62   Heart Rate: 72 Resp: 18 SpO2: 95 % O2 Device: None (Room air)    Weight: 184 lbs 15.46 oz    Constitutional: Resting comfortably, alert and orientedx3, answering questions appropriately  Respiratory: CTAB, no wheeze/rales/rhonchi, no increased work of breathing  Cardiovascular: HR irregular, no MGR, no LE edema  GI: S, NT, ND, +BS  Skin/Integumen: warm/dry  Other:                Primary Care Physician   Physician No Ref-Primary    Discharge Orders   No discharge procedures on file.    Significant Results and Procedures   Most Recent 3 CBC's:  Recent Labs   Lab Test 05/14/19  0721 05/08/19  1040 05/07/19  0837 05/05/19  0725   WBC 11.0 11.9*  --  10.8   HGB 12.3* 13.6 12.6* 13.7   MCV 93 95  --  96    171  --  192     Most Recent 3 BMP's:  Recent Labs   Lab Test 05/16/19  0821 05/14/19  0721 05/13/19  0834    137 138   POTASSIUM 4.1 3.6 3.5   CHLORIDE 107 107 107   CO2 24 24 25   BUN 18 20 17   CR 0.99 1.01 1.01   ANIONGAP 8 6 6   PANKAJ 9.0 8.8 8.5   * 132* 130*     Most Recent 2 LFT's:  Recent Labs   Lab Test 05/05/19  0725   AST 18   ALT 18   ALKPHOS 109   BILITOTAL 0.6   ,   Results  for orders placed or performed during the hospital encounter of 05/05/19   XR Chest Port 1 View    Narrative    CHEST PORTABLE ONE VIEW  5/5/2019 4:41 PM     COMPARISON: None.    HISTORY: AMS.      Impression    IMPRESSION: There is moderate cardiomegaly. There is mild prominence  of the pulmonary vasculature but no definite evidence for pulmonary  edema. There is no pleural effusion or pneumothorax. There is no  evidence for pneumonia.    KESHAV LOUIS MD   XR Chest Port 1 View    Narrative    CHEST ONE VIEW PORTABLE   5/6/2019 3:00 PM     HISTORY: Hypoxia.    COMPARISON: 5/5/2019.      Impression    IMPRESSION: The heart is enlarged. There is vascular congestion. No  pulmonary edema or pleural effusion. No pneumothorax. No lobar  consolidation.    SUDHA BALDERAS MD       Discharge Medications   Current Discharge Medication List      CONTINUE these medications which have NOT CHANGED    Details   ALPRAZolam (XANAX) 0.25 MG tablet Take 0.25 mg by mouth daily as needed for anxiety      aspirin 81 MG EC tablet Take 81 mg by mouth daily      chlorthalidone (HYGROTON) 25 MG tablet Take 25 mg by mouth daily      citalopram (CELEXA) 20 MG tablet Take 20 mg by mouth daily      cyanocobalamin (VITAMIN B-12) 2500 MCG SUBL sublingual tablet Place 2,500 mcg under the tongue daily      digoxin (DIGOX) 125 MCG tablet Take 125 mcg by mouth daily      donepezil (ARICEPT) 10 MG tablet Take 10 mg by mouth At Bedtime       ferrous sulfate (FEROSUL) 325 (65 Fe) MG tablet Take 1 tablet by mouth 3 times daily (with meals)      lisinopril (PRINIVIL/ZESTRIL) 40 MG tablet Take 40 mg by mouth daily      magnesium oxide (MAG-OX) 400 MG tablet Take 400 mg by mouth daily      metoprolol tartrate (LOPRESSOR) 100 MG tablet Take 150 mg by mouth 2 times daily      potassium chloride ER (KLOR-CON) 20 MEQ CR tablet Take 20 mEq by mouth 3 times daily      trimethoprim (TRIMPEX) 100 MG tablet Take 100 mg by mouth daily            Allergies   No  Known Allergies

## 2019-05-16 NOTE — PLAN OF CARE
Physical Therapy Discharge Summary    Reason for therapy discharge:    Discharged to transitional care facility.    Progress towards therapy goal(s). See goals on Care Plan in [facility] electronic health record for goal details.  Goals not met.  Barriers to achieving goals:   discharge from facility.    Therapy recommendation(s):    Continued therapy is recommended.  Rationale/Recommendations:  Pt will benefit from continued skilled PT services to progress safety and IND with functional mobility prior to returning home.

## 2019-05-16 NOTE — DISCHARGE INSTRUCTIONS
You are discharging to:    [facility]  [address] [place] [id number]    [phone]

## 2019-05-17 ENCOUNTER — NURSING HOME VISIT (OUTPATIENT)
Dept: GERIATRICS | Facility: CLINIC | Age: 74
End: 2019-05-17
Payer: COMMERCIAL

## 2019-05-17 VITALS
DIASTOLIC BLOOD PRESSURE: 88 MMHG | OXYGEN SATURATION: 97 % | SYSTOLIC BLOOD PRESSURE: 131 MMHG | RESPIRATION RATE: 16 BRPM | TEMPERATURE: 97.6 F | HEART RATE: 82 BPM

## 2019-05-17 DIAGNOSIS — D64.9 ANEMIA, UNSPECIFIED TYPE: ICD-10-CM

## 2019-05-17 DIAGNOSIS — G30.1 LATE ONSET ALZHEIMER'S DISEASE WITHOUT BEHAVIORAL DISTURBANCE (H): ICD-10-CM

## 2019-05-17 DIAGNOSIS — F41.1 GAD (GENERALIZED ANXIETY DISORDER): ICD-10-CM

## 2019-05-17 DIAGNOSIS — E87.6 HYPOKALEMIA: ICD-10-CM

## 2019-05-17 DIAGNOSIS — R40.4 TRANSIENT ALTERATION OF AWARENESS: Primary | ICD-10-CM

## 2019-05-17 DIAGNOSIS — F02.80 LATE ONSET ALZHEIMER'S DISEASE WITHOUT BEHAVIORAL DISTURBANCE (H): ICD-10-CM

## 2019-05-17 DIAGNOSIS — I10 ESSENTIAL HYPERTENSION: ICD-10-CM

## 2019-05-17 DIAGNOSIS — F33.41 RECURRENT MAJOR DEPRESSIVE DISORDER, IN PARTIAL REMISSION (H): ICD-10-CM

## 2019-05-17 PROBLEM — N40.0 BPH (BENIGN PROSTATIC HYPERPLASIA): Status: ACTIVE | Noted: 2017-11-30

## 2019-05-17 PROBLEM — F12.90 MARIJUANA USE: Status: ACTIVE | Noted: 2017-06-14

## 2019-05-17 PROBLEM — Z53.09 CONTRAINDICATION TO ANTICOAGULATION THERAPY: Status: ACTIVE | Noted: 2017-09-19

## 2019-05-17 PROCEDURE — 99207 ZZC CDG-MDM COMPONENT: MEETS LOW - DOWN CODED: CPT | Performed by: NURSE PRACTITIONER

## 2019-05-17 PROCEDURE — 99309 SBSQ NF CARE MODERATE MDM 30: CPT | Performed by: NURSE PRACTITIONER

## 2019-05-17 SDOH — HEALTH STABILITY: MENTAL HEALTH: HOW OFTEN DO YOU HAVE A DRINK CONTAINING ALCOHOL?: NEVER

## 2019-05-17 SDOH — HEALTH STABILITY: MENTAL HEALTH: HOW OFTEN DO YOU HAVE 6 OR MORE DRINKS ON ONE OCCASION?: NEVER

## 2019-05-17 NOTE — PROGRESS NOTES
[facility]  PRIMARY CARE PROVIDER AND CLINIC:  Pt isn't sure who he sees. - [place].     Chief Complaint   Patient presents with     Hospital F/U     [place] Medical Record Number:  [id number]  Place of Service where encounter took place:  [facility]  ([facility]) [[id number]]    Navdeep Spann  is a 74 year old  (1945), admitted to the above facility from  [facility]. Hospital stay 5/5/19 through 5/16/19.  Admitted to this facility for  rehab, medical management and nursing care.    HPI:    HPI information obtained from: facility chart records, facility staff, patient report and [facility] chart review.   Brief Summary of Hospital Course: brought to ER after fall and confusion and fever. Initial imaging showing acute process of infarct, subsequent f/u MRI showed no acute process. All lab and CSF from LP  w/u negative. Cognitive status did return to baseline and pt sent to SNF.   Noted PMH: Dementia, A fib, depression , anxiety, HTN, frequent UTIs. Noted norvasc added in hosp and BB decreased from 150 bid to 75 bid.     Pt continues to improve cognitively at SNF and doesn't have any questions    CODE STATUS/ADVANCE DIRECTIVES DISCUSSION:   CPR/Full code   Patient's living condition: lives at girlfriends ex 's house with girlfriend and her son  ALLERGIES: Patient has no known allergies.   PAST MEDICAL HISTORY:  has a past medical history of A-fib (H), Anemia, Anxiety, Aortic valve disorder, Arthritis, BPH (benign prostatic hyperplasia), Carotid stenosis, Depression, Dyslipidemia, Hard of hearing, Hypertension, Lymphedema, and Syncope and collapse (2009, 2010).  PAST SURGICAL HISTORY:   has a past surgical history that includes appendectomy; Colon surgery; joint replacement; and picc insertion (10/23/2014).  FAMILY HISTORY: family history is not on file.  SOCIAL HISTORY:   reports that he has quit smoking. His smoking use included  cigarettes. He has never used smokeless tobacco. He reports that he drank alcohol. He reports that he does not use drugs.    Post Discharge Medication Reconciliation Status: discharge medications reconciled, continue medications without change    Current Outpatient Medications   Medication Sig Dispense Refill     acetaminophen (TYLENOL) 325 MG tablet Take 2 tablets (650 mg) by mouth every 4 hours as needed for mild pain       amLODIPine (NORVASC) 5 MG tablet Take 1 tablet (5 mg) by mouth daily       aspirin 81 MG EC tablet Take 81 mg by mouth daily       citalopram (CELEXA) 20 MG tablet Take 20 mg by mouth daily       cyanocobalamin (VITAMIN B-12) 2500 MCG SUBL sublingual tablet Place 2,500 mcg under the tongue daily       digoxin (DIGOX) 125 MCG tablet Take 125 mcg by mouth daily       donepezil (ARICEPT) 10 MG tablet Take 10 mg by mouth At Bedtime        ferrous sulfate (FEROSUL) 325 (65 Fe) MG tablet Take 1 tablet by mouth 3 times daily (with meals)       lisinopril (PRINIVIL/ZESTRIL) 40 MG tablet Take 40 mg by mouth daily       magnesium oxide (MAG-OX) 400 MG tablet Take 400 mg by mouth daily       metoprolol tartrate 75 MG TABS Take 75 mg by mouth 2 times daily       polyethylene glycol (MIRALAX/GLYCOLAX) packet Take 17 g by mouth daily as needed for constipation       potassium chloride ER (K-DUR/KLOR-CON M) 20 MEQ CR tablet Take 2 tablets (40 mEq) by mouth daily for 3 days       trimethoprim (TRIMPEX) 100 MG tablet Take 100 mg by mouth daily          ROS:  10 point ROS of systems including Constitutional, Eyes, Respiratory, Cardiovascular, Gastroenterology, Genitourinary, Integumentary, Musculoskeletal, Psychiatric were all negative except for pertinent positives noted in my HPI.    Vitals:  /88   Pulse 82   Temp 97.6  F (36.4  C)   Resp 16   SpO2 97%   Exam:  GENERAL APPEARANCE:  Alert, in no distress  RESP:  respiratory effort and palpation of chest normal, auscultation of lungs clear , no  respiratory distress  CV:  Palpation and auscultation of heart done , rate and rhythm reg, no murmur, no  peripheral edema  ABDOMEN:  normal bowel sounds, soft, nontender, no hepatosplenomegaly or other masses  M/S:   Gait and station SBA1 , Digits and nails normal  SKIN:  Inspection and Palpation of skin and subcutaneous tissue intact  NEURO: 2-12 in normal limits and at patient's baseline  PSYCH:  insight and judgement, memory poor , affect and mood normal    Recent Labs   Lab Test 05/14/19  0721 05/08/19  1040   WBC 11.0 11.9*   RBC 3.87* 4.22*   HGB 12.3* 13.6   HCT 36.1* 40.0   MCV 93 95   MCH 31.8 32.2   MCHC 34.1 34.0   RDW 13.1 13.5    171     Recent Labs   Lab Test 05/16/19  0821 05/14/19  0721    137   POTASSIUM 4.1 3.6   CHLORIDE 107 107   PANKAJ 9.0 8.8   CO2 24 24   BUN 18 20   CR 0.99 1.01   * 132*     Recent Labs   Lab Test 05/11/19  0857 05/05/19  0725   PROTTOTAL  --  7.6   ALBUMIN 2.5* 3.7   BILITOTAL  --  0.6   ALKPHOS  --  109   AST  --  18   ALT  --  18     ASSESSMENT/PLAN:  Transient alteration of awareness  Appears to be improving to baseline.     Recurrent major depressive disorder, in partial remission (H)/SHERRELL (generalized anxiety disorder)  Stable - cont PTA SSRI    Late onset Alzheimer's disease without behavioral disturbance  Stable - cont   OT to do cog testing SLUMS 18 today    Essential hypertension  Noted BP changes - f/u t/o SNF stay  Stable today    Hypokalemia  Noted -will recheck     Anemia, unspecified type  Noted TID Fe -unlikely helping - will reduce to q day and montior        Orders written by provider at facility and transcribed by : Jed Parker  1. potassium lab on 5/22  2. Decrease ferrous sulfate to 325 mg PO every day ay Dx: Anemia      Electronically signed by:  TERESA Briseno CNP

## 2019-05-17 NOTE — PLAN OF CARE
Occupational Therapy Discharge Summary    Reason for therapy discharge:    Discharged to transitional care facility.    Progress towards therapy goal(s). See goals on Care Plan in [facility] electronic health record for goal details.  Goals not met.  Barriers to achieving goals:   discharge from facility.    Therapy recommendation(s):    Pt would benefit from continued skilled OT services, in TCU setting,  to improve independence and safety with ADL's and functional transfers as pt is not at baseline.

## 2019-05-17 NOTE — LETTER
5/17/2019        RE: Navdeep Spann  [address]  [place] [address]        [facility]  PRIMARY CARE PROVIDER AND CLINIC:  Pt isn't sure who he sees. - [place].     Chief Complaint   Patient presents with     Hospital F/U     [place] Medical Record Number:  [id number]  Place of Service where encounter took place:  [facility]  ([facility]) [[id number]]    Navdeep Spann  is a 74 year old  (1945), admitted to the above facility from  [facility]. Hospital stay 5/5/19 through 5/16/19.  Admitted to this facility for  rehab, medical management and nursing care.    HPI:    HPI information obtained from: facility chart records, facility staff, patient report and [facility] chart review.   Brief Summary of Hospital Course: brought to ER after fall and confusion and fever. Initial imaging showing acute process of infarct, subsequent f/u MRI showed no acute process. All lab and CSF from LP  w/u negative. Cognitive status did return to baseline and pt sent to SNF.   Noted PMH: Dementia, A fib, depression , anxiety, HTN, frequent UTIs. Noted norvasc added in hosp and BB decreased from 150 bid to 75 bid.     Pt continues to improve cognitively at SNF and doesn't have any questions    CODE STATUS/ADVANCE DIRECTIVES DISCUSSION:   CPR/Full code   Patient's living condition: lives at girlfriends ex 's house with girlfriend and her son  ALLERGIES: Patient has no known allergies.   PAST MEDICAL HISTORY:  has a past medical history of A-fib (H), Anemia, Anxiety, Aortic valve disorder, Arthritis, BPH (benign prostatic hyperplasia), Carotid stenosis, Depression, Dyslipidemia, Hard of hearing, Hypertension, Lymphedema, and Syncope and collapse (2009, 2010).  PAST SURGICAL HISTORY:   has a past surgical history that includes appendectomy; Colon surgery; joint replacement; and picc insertion (10/23/2014).  FAMILY HISTORY: family history is not  on file.  SOCIAL HISTORY:   reports that he has quit smoking. His smoking use included cigarettes. He has never used smokeless tobacco. He reports that he drank alcohol. He reports that he does not use drugs.    Post Discharge Medication Reconciliation Status: discharge medications reconciled, continue medications without change    Current Outpatient Medications   Medication Sig Dispense Refill     acetaminophen (TYLENOL) 325 MG tablet Take 2 tablets (650 mg) by mouth every 4 hours as needed for mild pain       amLODIPine (NORVASC) 5 MG tablet Take 1 tablet (5 mg) by mouth daily       aspirin 81 MG EC tablet Take 81 mg by mouth daily       citalopram (CELEXA) 20 MG tablet Take 20 mg by mouth daily       cyanocobalamin (VITAMIN B-12) 2500 MCG SUBL sublingual tablet Place 2,500 mcg under the tongue daily       digoxin (DIGOX) 125 MCG tablet Take 125 mcg by mouth daily       donepezil (ARICEPT) 10 MG tablet Take 10 mg by mouth At Bedtime        ferrous sulfate (FEROSUL) 325 (65 Fe) MG tablet Take 1 tablet by mouth 3 times daily (with meals)       lisinopril (PRINIVIL/ZESTRIL) 40 MG tablet Take 40 mg by mouth daily       magnesium oxide (MAG-OX) 400 MG tablet Take 400 mg by mouth daily       metoprolol tartrate 75 MG TABS Take 75 mg by mouth 2 times daily       polyethylene glycol (MIRALAX/GLYCOLAX) packet Take 17 g by mouth daily as needed for constipation       potassium chloride ER (K-DUR/KLOR-CON M) 20 MEQ CR tablet Take 2 tablets (40 mEq) by mouth daily for 3 days       trimethoprim (TRIMPEX) 100 MG tablet Take 100 mg by mouth daily          ROS:  10 point ROS of systems including Constitutional, Eyes, Respiratory, Cardiovascular, Gastroenterology, Genitourinary, Integumentary, Musculoskeletal, Psychiatric were all negative except for pertinent positives noted in my HPI.    Vitals:  /88   Pulse 82   Temp 97.6  F (36.4  C)   Resp 16   SpO2 97%   Exam:  GENERAL APPEARANCE:  Alert, in no distress  RESP:   respiratory effort and palpation of chest normal, auscultation of lungs clear , no respiratory distress  CV:  Palpation and auscultation of heart done , rate and rhythm reg, no murmur, no  peripheral edema  ABDOMEN:  normal bowel sounds, soft, nontender, no hepatosplenomegaly or other masses  M/S:   Gait and station SBA1 , Digits and nails normal  SKIN:  Inspection and Palpation of skin and subcutaneous tissue intact  NEURO: 2-12 in normal limits and at patient's baseline  PSYCH:  insight and judgement, memory poor , affect and mood normal    Recent Labs   Lab Test 05/14/19  0721 05/08/19  1040   WBC 11.0 11.9*   RBC 3.87* 4.22*   HGB 12.3* 13.6   HCT 36.1* 40.0   MCV 93 95   MCH 31.8 32.2   MCHC 34.1 34.0   RDW 13.1 13.5    171     Recent Labs   Lab Test 05/16/19  0821 05/14/19  0721    137   POTASSIUM 4.1 3.6   CHLORIDE 107 107   PANKAJ 9.0 8.8   CO2 24 24   BUN 18 20   CR 0.99 1.01   * 132*     Recent Labs   Lab Test 05/11/19  0857 05/05/19  0725   PROTTOTAL  --  7.6   ALBUMIN 2.5* 3.7   BILITOTAL  --  0.6   ALKPHOS  --  109   AST  --  18   ALT  --  18     ASSESSMENT/PLAN:  Transient alteration of awareness  Appears to be improving to baseline.     Recurrent major depressive disorder, in partial remission (H)/SHERRELL (generalized anxiety disorder)  Stable - cont PTA SSRI    Late onset Alzheimer's disease without behavioral disturbance  Stable - cont   OT to do cog testing SLUMS 18 today    Essential hypertension  Noted BP changes - f/u t/o SNF stay  Stable today    Hypokalemia  Noted -will recheck     Anemia, unspecified type  Noted TID Fe -unlikely helping - will reduce to q day and montior        Orders written by provider at facility and transcribed by : Jed Parker  1. potassium lab on 5/22  2. Decrease ferrous sulfate to 325 mg PO every day ay Dx: Anemia      Electronically signed by:  TERESA Briseno CNP             Sincerely,        TERESA Briseno  CNP

## 2019-05-20 ENCOUNTER — NURSING HOME VISIT (OUTPATIENT)
Dept: GERIATRICS | Facility: CLINIC | Age: 74
End: 2019-05-20
Payer: COMMERCIAL

## 2019-05-20 VITALS
HEART RATE: 61 BPM | TEMPERATURE: 97.1 F | SYSTOLIC BLOOD PRESSURE: 137 MMHG | OXYGEN SATURATION: 97 % | RESPIRATION RATE: 18 BRPM | DIASTOLIC BLOOD PRESSURE: 69 MMHG | BODY MASS INDEX: 25.51 KG/M2 | WEIGHT: 188.1 LBS

## 2019-05-20 DIAGNOSIS — I10 BENIGN ESSENTIAL HYPERTENSION: ICD-10-CM

## 2019-05-20 DIAGNOSIS — F32.89 DEPRESSIVE DISORDER, ATYPICAL: ICD-10-CM

## 2019-05-20 DIAGNOSIS — M62.81 GENERALIZED MUSCLE WEAKNESS: ICD-10-CM

## 2019-05-20 DIAGNOSIS — F41.1 GAD (GENERALIZED ANXIETY DISORDER): ICD-10-CM

## 2019-05-20 DIAGNOSIS — I48.20 CHRONIC ATRIAL FIBRILLATION (H): ICD-10-CM
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
## 2019-05-20 DIAGNOSIS — G93.40 ENCEPHALOPATHY: Primary | ICD-10-CM

## 2019-05-20 LAB
BACTERIA SPEC CULT: NORMAL
Lab: NORMAL
SPECIMEN SOURCE: NORMAL

## 2019-05-20 PROCEDURE — 99305 1ST NF CARE MODERATE MDM 35: CPT | Performed by: FAMILY MEDICINE

## 2019-05-20 NOTE — PROGRESS NOTES
[facility]  PRIMARY CARE PROVIDER AND CLINIC:  Physician No Ref-Primary, No address on file  Chief Complaint   Patient presents with     Hospital F/U     [facility] Medical Record Number:  [id number]  Place of Service where encounter took place:  [facility]  (S) [[id number]]    Navdeep Spann  is a 74 year old  (1945), admitted to the above facility from  [facility]. Hospital stay 5/5/19 through 5/16/19.  Admitted to this facility for  rehab, medical management and nursing care.    HPI:    HPI information obtained from: facility staff, patient report and [facility] chart review.   Brief Summary of Hospital Course:   - - Pt with significant PMH of AD was found down admitted to the above hospital for encephalopathy, head Ct showed subacute left thalamic infarct and a chronic right thalamic infarct  But MRI negative for acute pathology, basic w/up negative, neurlogy consulted, LP done, CSF unrevealing, EEG c/w encephalopathy unknown etilogy, neuro recommends delirium precaution, started on melatonin 1 mg  - Hospitalization was c/w hypokalemia  And hypernatremia corrected, and fever but no source was found    Updates on Status Since Skilled nursing Admission:   - Pt seen and examined. Reports started rehab program and making a progress, uses a cane for ambulation.   - denies fever, chills, n/v/v/ha/ or palpitation.     CODE STATUS/ADVANCE DIRECTIVES DISCUSSION:   CPR/Full code   Patient's living condition: lives at girlfriends ex 's house with girlfriend and her son  ALLERGIES: Patient has no known allergies.  PAST MEDICAL HISTORY:  has a past medical history of A-fib (H), Anemia, Anxiety, Aortic valve disorder, Arthritis, BPH (benign prostatic hyperplasia), Carotid stenosis, Depression, Dyslipidemia, Hard of hearing, Hypertension, Lymphedema, and Syncope and collapse (2009, 2010).  PAST SURGICAL HISTORY:   has a past surgical history that includes  appendectomy; Colon surgery; joint replacement; and picc insertion (10/23/2014).  FAMILY HISTORY: family history is not on file.  SOCIAL HISTORY:   reports that he has quit smoking. His smoking use included cigarettes. He has never used smokeless tobacco. He reports that he drank alcohol. He reports that he does not use drugs.    Post Discharge Medication Reconciliation Status: discharge medications reconciled and changed, per note/orders (see AVS)  Current Outpatient Medications   Medication Sig Dispense Refill     acetaminophen (TYLENOL) 325 MG tablet Take 2 tablets (650 mg) by mouth every 4 hours as needed for mild pain       amLODIPine (NORVASC) 5 MG tablet Take 1 tablet (5 mg) by mouth daily       aspirin 81 MG EC tablet Take 81 mg by mouth daily       citalopram (CELEXA) 20 MG tablet Take 20 mg by mouth daily       cyanocobalamin (VITAMIN B-12) 2500 MCG SUBL sublingual tablet Place 2,500 mcg under the tongue daily       digoxin (DIGOX) 125 MCG tablet Take 125 mcg by mouth daily       donepezil (ARICEPT) 10 MG tablet Take 10 mg by mouth At Bedtime        ferrous sulfate (FEROSUL) 325 (65 Fe) MG tablet Take 1 tablet by mouth 3 times daily (with meals)       lisinopril (PRINIVIL/ZESTRIL) 40 MG tablet Take 40 mg by mouth daily       magnesium oxide (MAG-OX) 400 MG tablet Take 400 mg by mouth daily       metoprolol tartrate 75 MG TABS Take 75 mg by mouth 2 times daily       polyethylene glycol (MIRALAX/GLYCOLAX) packet Take 17 g by mouth daily as needed for constipation       trimethoprim (TRIMPEX) 100 MG tablet Take 100 mg by mouth daily      ROS:  10 point ROS of systems including Constitutional, Eyes, Respiratory, Cardiovascular, Gastroenterology, Genitourinary, Integumentary, Musculoskeletal, Psychiatric were all negative except for pertinent positives noted in my HPI.    Vitals:  /69   Pulse 61   Temp 97.1  F (36.2  C)   Resp 18   Wt 85.3 kg (188 lb 1.6 oz)   SpO2 97%   BMI 25.51 kg/m     Exam:  GENERAL APPEARANCE:  in no distress, cooperative  ENT:  Mouth and posterior oropharynx normal, moist mucous membranes, oral mucosa moist, no lesion noted.   EYES:  EOMI, Pupil rounded and equal.  RESP:  lungs clear to auscultation   CV:  S1S2 audible, irregular HR, no murmur appreciated.   ABDOMEN:  soft, NT/ND, BS audible. no mass appreciated on palpation.   M/S:   no joint deformity noted on observation.   SKIN:  No rash.   NEURO:   No NFD appreciated on observation. Hand  5/5 b/l  PSYCH:  normal insight, judgement and memory, affect and mood normal    Lab/Diagnostic data: Reviewed in the chart and EHR.      ASSESSMENT/PLAN:  Encephalopathy, unclear etiology: resolved.   Unwitnessed fall w/right flank contusion   General Weakness:  - w/up negative for acute pathology including brain imaging and LP and CSF cx and BCx.   - started rehab program, making a progress, continue until desired goal is achieved.     Chronic Afib: CVR, on metoprolol and digoxin    Hypertension  Hyperlipidemia  - BP controlled, stable. Off chlorthalidone due to hypokalemia.   - monitor electrolytes.     Recurrent major depressive disorder, in partial remission (H)/SHERRELL (generalized anxiety disorder)  - Stable.    BPH: no concern.   History of frequent UTIs: , on prophylactic trimethoprim.     Severe right C3-C4, C5-C6 and left C4-C5 foraminal stenosis  - Incidental findings on CT C-spine, likely contributing to fall and physical decondition.   - analgesia optima.    Lung nodule:  - incidental, PCP to follow up on outpatient basis.     Alzheimer's Dementia  - Continue to anticipate needs. Chronic condition, ongoing decline expected.   -  Continue to provide redirection and reassurance as needed. Maintain safe living situation with goals focused on comfort.  - on donepezil.     Orders:  - See above, otherwise, continue the rest of the current POC.     Electronically signed by:  Curtis Tuttle MD

## 2019-05-20 NOTE — LETTER
5/20/2019        RE: Navdeep Spann  [address]  [place] [address]        [facility]  PRIMARY CARE PROVIDER AND CLINIC:  Physician No Ref-Primary, No address on file  Chief Complaint   Patient presents with     Hospital F/U     [facility] Medical Record Number:  [id number]  Place of Service where encounter took place:  [facility]  (S) [[id number]]    Navdeep Spann  is a 74 year old  (1945), admitted to the above facility from  [facility]. Hospital stay 5/5/19 through 5/16/19.  Admitted to this facility for  rehab, medical management and nursing care.    HPI:    HPI information obtained from: facility staff, patient report and [facility] chart review.   Brief Summary of Hospital Course:   - - Pt with significant PMH of AD was found down admitted to the above hospital for encephalopathy, head Ct showed subacute left thalamic infarct and a chronic right thalamic infarct  But MRI negative for acute pathology, basic w/up negative, neurlogy consulted, LP done, CSF unrevealing, EEG c/w encephalopathy unknown etilogy, neuro recommends delirium precaution, started on melatonin 1 mg  - Hospitalization was c/w hypokalemia  And hypernatremia corrected, and fever but no source was found    Updates on Status Since Skilled nursing Admission:   - Pt seen and examined. Reports started rehab program and making a progress, uses a cane for ambulation.   - denies fever, chills, n/v/v/ha/ or palpitation.     CODE STATUS/ADVANCE DIRECTIVES DISCUSSION:   CPR/Full code   Patient's living condition: lives at girlfriends ex 's house with girlfriend and her son  ALLERGIES: Patient has no known allergies.  PAST MEDICAL HISTORY:  has a past medical history of A-fib (H), Anemia, Anxiety, Aortic valve disorder, Arthritis, BPH (benign prostatic hyperplasia), Carotid stenosis, Depression, Dyslipidemia, Hard of hearing, Hypertension, Lymphedema, and Syncope and  collapse (2009, 2010).  PAST SURGICAL HISTORY:   has a past surgical history that includes appendectomy; Colon surgery; joint replacement; and picc insertion (10/23/2014).  FAMILY HISTORY: family history is not on file.  SOCIAL HISTORY:   reports that he has quit smoking. His smoking use included cigarettes. He has never used smokeless tobacco. He reports that he drank alcohol. He reports that he does not use drugs.    Post Discharge Medication Reconciliation Status: discharge medications reconciled and changed, per note/orders (see AVS)  Current Outpatient Medications   Medication Sig Dispense Refill     acetaminophen (TYLENOL) 325 MG tablet Take 2 tablets (650 mg) by mouth every 4 hours as needed for mild pain       amLODIPine (NORVASC) 5 MG tablet Take 1 tablet (5 mg) by mouth daily       aspirin 81 MG EC tablet Take 81 mg by mouth daily       citalopram (CELEXA) 20 MG tablet Take 20 mg by mouth daily       cyanocobalamin (VITAMIN B-12) 2500 MCG SUBL sublingual tablet Place 2,500 mcg under the tongue daily       digoxin (DIGOX) 125 MCG tablet Take 125 mcg by mouth daily       donepezil (ARICEPT) 10 MG tablet Take 10 mg by mouth At Bedtime        ferrous sulfate (FEROSUL) 325 (65 Fe) MG tablet Take 1 tablet by mouth 3 times daily (with meals)       lisinopril (PRINIVIL/ZESTRIL) 40 MG tablet Take 40 mg by mouth daily       magnesium oxide (MAG-OX) 400 MG tablet Take 400 mg by mouth daily       metoprolol tartrate 75 MG TABS Take 75 mg by mouth 2 times daily       polyethylene glycol (MIRALAX/GLYCOLAX) packet Take 17 g by mouth daily as needed for constipation       trimethoprim (TRIMPEX) 100 MG tablet Take 100 mg by mouth daily      ROS:  10 point ROS of systems including Constitutional, Eyes, Respiratory, Cardiovascular, Gastroenterology, Genitourinary, Integumentary, Musculoskeletal, Psychiatric were all negative except for pertinent positives noted in my HPI.    Vitals:  /69   Pulse 61   Temp 97.1  F  (36.2  C)   Resp 18   Wt 85.3 kg (188 lb 1.6 oz)   SpO2 97%   BMI 25.51 kg/m     Exam:  GENERAL APPEARANCE:  in no distress, cooperative  ENT:  Mouth and posterior oropharynx normal, moist mucous membranes, oral mucosa moist, no lesion noted.   EYES:  EOMI, Pupil rounded and equal.  RESP:  lungs clear to auscultation   CV:  S1S2 audible, irregular HR, no murmur appreciated.   ABDOMEN:  soft, NT/ND, BS audible. no mass appreciated on palpation.   M/S:   no joint deformity noted on observation.   SKIN:  No rash.   NEURO:   No NFD appreciated on observation. Hand  5/5 b/l  PSYCH:  normal insight, judgement and memory, affect and mood normal    Lab/Diagnostic data: Reviewed in the chart and EHR.      ASSESSMENT/PLAN:  Encephalopathy, unclear etiology: resolved.   Unwitnessed fall w/right flank contusion   General Weakness:  - w/up negative for acute pathology including brain imaging and LP and CSF cx and BCx.   - started rehab program, making a progress, continue until desired goal is achieved.     Chronic Afib: CVR, on metoprolol and digoxin    Hypertension  Hyperlipidemia  - BP controlled, stable. Off chlorthalidone due to hypokalemia.   - monitor electrolytes.     Recurrent major depressive disorder, in partial remission (H)/SHERRELL (generalized anxiety disorder)  - Stable.    BPH: no concern.   History of frequent UTIs: , on prophylactic trimethoprim.     Severe right C3-C4, C5-C6 and left C4-C5 foraminal stenosis  - Incidental findings on CT C-spine, likely contributing to fall and physical decondition.   - analgesia optima.    Lung nodule:  - incidental, PCP to follow up on outpatient basis.     Alzheimer's Dementia  - Continue to anticipate needs. Chronic condition, ongoing decline expected.   -  Continue to provide redirection and reassurance as needed. Maintain safe living situation with goals focused on comfort.  - on donepezil.     Orders:  - See above, otherwise, continue the rest of the current POC.      Electronically signed by:  Curtis Tuttle MD         Sincerely,        Curtis Tuttle MD

## 2019-05-21 ENCOUNTER — RECORDS - HEALTHEAST (OUTPATIENT)
Dept: LAB | Facility: CLINIC | Age: 74
End: 2019-05-21

## 2019-05-22 ENCOUNTER — NURSING HOME VISIT (OUTPATIENT)
Dept: GERIATRICS | Facility: CLINIC | Age: 74
End: 2019-05-22
Payer: COMMERCIAL

## 2019-05-22 VITALS
HEART RATE: 61 BPM | OXYGEN SATURATION: 99 % | RESPIRATION RATE: 20 BRPM | SYSTOLIC BLOOD PRESSURE: 121 MMHG | BODY MASS INDEX: 25.51 KG/M2 | TEMPERATURE: 97.5 F | WEIGHT: 188.1 LBS | DIASTOLIC BLOOD PRESSURE: 73 MMHG

## 2019-05-22 DIAGNOSIS — D50.9 IRON DEFICIENCY ANEMIA, UNSPECIFIED IRON DEFICIENCY ANEMIA TYPE: Primary | ICD-10-CM

## 2019-05-22 DIAGNOSIS — E87.6 HYPOKALEMIA: ICD-10-CM

## 2019-05-22 DIAGNOSIS — G30.1 LATE ONSET ALZHEIMER'S DISEASE WITHOUT BEHAVIORAL DISTURBANCE (H): ICD-10-CM

## 2019-05-22 DIAGNOSIS — F02.80 LATE ONSET ALZHEIMER'S DISEASE WITHOUT BEHAVIORAL DISTURBANCE (H): ICD-10-CM

## 2019-05-22 LAB — POTASSIUM BLD-SCNC: 4 MMOL/L (ref 3.5–5)

## 2019-05-22 PROCEDURE — 99309 SBSQ NF CARE MODERATE MDM 30: CPT | Performed by: NURSE PRACTITIONER

## 2019-05-22 NOTE — PROGRESS NOTES
[facility]  [facility] Medical Record Number:  [id number]  Place of Service where encounter took place:  [facility]  (S) [[id number]]  Chief Complaint   Patient presents with     Nursing Home Acute       HPI:    Navdeep Spann  is a 74 year old (1945), who is being seen today for an episodic care visit.  HPI information obtained from: facility chart records, facility staff, patient report and [facility] chart review. Today's concern is:     Iron deficiency anemia, unspecified iron deficiency anemia type  Hypokalemia  Late onset Alzheimer's disease without behavioral disturbance     Patient seen today taking nap, pleasantly confused, SLUMS 18/30, no distress, recently had Fe reduced, also was previously on K supplement x3 days, no acute concerns.    Past Medical and Surgical History reviewed in Epic today.    MEDICATIONS:  Current Outpatient Medications   Medication Sig Dispense Refill     acetaminophen (TYLENOL) 325 MG tablet Take 2 tablets (650 mg) by mouth every 4 hours as needed for mild pain       amLODIPine (NORVASC) 5 MG tablet Take 1 tablet (5 mg) by mouth daily       aspirin 81 MG EC tablet Take 81 mg by mouth daily       citalopram (CELEXA) 20 MG tablet Take 20 mg by mouth daily       cyanocobalamin (VITAMIN B-12) 2500 MCG SUBL sublingual tablet Place 2,500 mcg under the tongue daily       digoxin (DIGOX) 125 MCG tablet Take 125 mcg by mouth daily       donepezil (ARICEPT) 10 MG tablet Take 10 mg by mouth At Bedtime        ferrous sulfate (FEROSUL) 325 (65 Fe) MG tablet Take 1 tablet by mouth 3 times daily (with meals)       lisinopril (PRINIVIL/ZESTRIL) 40 MG tablet Take 40 mg by mouth daily       magnesium oxide (MAG-OX) 400 MG tablet Take 400 mg by mouth daily       metoprolol tartrate 75 MG TABS Take 75 mg by mouth 2 times daily       polyethylene glycol (MIRALAX/GLYCOLAX) packet Take 17 g by mouth daily as needed for constipation       trimethoprim (TRIMPEX)  100 MG tablet Take 100 mg by mouth daily          REVIEW OF SYSTEMS:  4 point ROS including Respiratory, CV, GI and , other than that noted in the HPI,  is negative    Objective:  /73   Pulse 61   Temp 97.5  F (36.4  C)   Resp 20   Wt 85.3 kg (188 lb 1.6 oz)   SpO2 99%   BMI 25.51 kg/m    Exam:  GENERAL APPEARANCE:  in no distress, thin  ENT:  Mouth and posterior oropharynx normal, moist mucous membranes  RESP:  lungs clear to auscultation   CV:  regular rate and rhythm, no murmur, rub, or gallop, no edema  ABDOMEN:  bowel sounds normal  M/S:   Gait and station abnormal weak  SKIN:  Inspection of skin and subcutaneous tissue baseline  NEURO:   Examination of sensation by touch normal  PSYCH:  oriented to self, memory impaired     Labs:   Recent labs in [facility] reviewed by me today.     ASSESSMENT/PLAN:  Iron deficiency anemia, unspecified iron deficiency anemia type  -Hgb's trending in the 12.3-13.6 range  -previously on Fe supplement TID, changed to Qday  -follow up Hgb on 5/29    Hypokalemia  -K on 5/14 was 3.6, on 5/16 was 4.1  -was given K 40mEq x3 days  -recheck BMP on Wed 5/29    Late onset Alzheimer's disease without behavioral disturbance  -pleasantly confused, SLUMs 18/30, mild/moderate impairment  -requesting to return home this week  -PT/OT to continue working with  -labs on Wed 5/29      Orders written by provider at facility and transcribed by : Jed Parker  1. Hgb and BMP wednesday 5/29      Electronically signed by:  TERESA Longoria CNP

## 2019-05-22 NOTE — LETTER
5/22/2019        RE: Navdeep Spann  [address]  [place] [address]        [facility]  [facility] Record Number:  [id number]  Place of Service where encounter took place:  [facility]  (S) [[id number]]  Chief Complaint   Patient presents with     Nursing Home Acute       HPI:    Navdeep Spann  is a 74 year old (1945), who is being seen today for an episodic care visit.  HPI information obtained from: facility chart records, facility staff, patient report and [facility] chart review. Today's concern is:     Iron deficiency anemia, unspecified iron deficiency anemia type  Hypokalemia  Late onset Alzheimer's disease without behavioral disturbance     Patient seen today taking nap, pleasantly confused, SLUMS 18/30, no distress, recently had Fe reduced, also was previously on K supplement x3 days, no acute concerns.    Past Medical and Surgical History reviewed in Epic today.    MEDICATIONS:  Current Outpatient Medications   Medication Sig Dispense Refill     acetaminophen (TYLENOL) 325 MG tablet Take 2 tablets (650 mg) by mouth every 4 hours as needed for mild pain       amLODIPine (NORVASC) 5 MG tablet Take 1 tablet (5 mg) by mouth daily       aspirin 81 MG EC tablet Take 81 mg by mouth daily       citalopram (CELEXA) 20 MG tablet Take 20 mg by mouth daily       cyanocobalamin (VITAMIN B-12) 2500 MCG SUBL sublingual tablet Place 2,500 mcg under the tongue daily       digoxin (DIGOX) 125 MCG tablet Take 125 mcg by mouth daily       donepezil (ARICEPT) 10 MG tablet Take 10 mg by mouth At Bedtime        ferrous sulfate (FEROSUL) 325 (65 Fe) MG tablet Take 1 tablet by mouth 3 times daily (with meals)       lisinopril (PRINIVIL/ZESTRIL) 40 MG tablet Take 40 mg by mouth daily       magnesium oxide (MAG-OX) 400 MG tablet Take 400 mg by mouth daily       metoprolol tartrate 75 MG TABS Take 75 mg by mouth 2 times daily       polyethylene glycol  (MIRALAX/GLYCOLAX) packet Take 17 g by mouth daily as needed for constipation       trimethoprim (TRIMPEX) 100 MG tablet Take 100 mg by mouth daily          REVIEW OF SYSTEMS:  4 point ROS including Respiratory, CV, GI and , other than that noted in the HPI,  is negative    Objective:  /73   Pulse 61   Temp 97.5  F (36.4  C)   Resp 20   Wt 85.3 kg (188 lb 1.6 oz)   SpO2 99%   BMI 25.51 kg/m     Exam:  GENERAL APPEARANCE:  in no distress, thin  ENT:  Mouth and posterior oropharynx normal, moist mucous membranes  RESP:  lungs clear to auscultation   CV:  regular rate and rhythm, no murmur, rub, or gallop, no edema  ABDOMEN:  bowel sounds normal  M/S:   Gait and station abnormal weak  SKIN:  Inspection of skin and subcutaneous tissue baseline  NEURO:   Examination of sensation by touch normal  PSYCH:  oriented to self, memory impaired     Labs:   Recent labs in [facility] reviewed by me today.     ASSESSMENT/PLAN:  Iron deficiency anemia, unspecified iron deficiency anemia type  -Hgb's trending in the 12.3-13.6 range  -previously on Fe supplement TID, changed to Qday  -follow up Hgb on 5/29    Hypokalemia  -K on 5/14 was 3.6, on 5/16 was 4.1  -was given K 40mEq x3 days  -recheck BMP on Wed 5/29    Late onset Alzheimer's disease without behavioral disturbance  -pleasantly confused, SLUMs 18/30, mild/moderate impairment  -requesting to return home this week  -PT/OT to continue working with  -labs on Wed 5/29      Orders written by provider at facility and transcribed by : Jed Parker  1. Hgb and BMP wednesday 5/29      Electronically signed by:  TERESA Longoria CNP           Sincerely,        TERESA Longoria CNP

## 2019-05-23 ENCOUNTER — COMMUNICATION - HEALTHEAST (OUTPATIENT)
Dept: FAMILY MEDICINE | Facility: CLINIC | Age: 74
End: 2019-05-23

## 2019-05-23 DIAGNOSIS — I48.91 ATRIAL FIBRILLATION (H): ICD-10-CM

## 2019-05-24 ENCOUNTER — DISCHARGE SUMMARY NURSING HOME (OUTPATIENT)
Dept: GERIATRICS | Facility: CLINIC | Age: 74
End: 2019-05-24
Payer: COMMERCIAL

## 2019-05-24 VITALS
HEART RATE: 73 BPM | BODY MASS INDEX: 25.51 KG/M2 | SYSTOLIC BLOOD PRESSURE: 118 MMHG | WEIGHT: 188.1 LBS | DIASTOLIC BLOOD PRESSURE: 69 MMHG | TEMPERATURE: 98.5 F | RESPIRATION RATE: 18 BRPM | OXYGEN SATURATION: 98 %

## 2019-05-24 DIAGNOSIS — G30.1 LATE ONSET ALZHEIMER'S DISEASE WITHOUT BEHAVIORAL DISTURBANCE (H): Primary | ICD-10-CM

## 2019-05-24 DIAGNOSIS — I10 ESSENTIAL HYPERTENSION: ICD-10-CM

## 2019-05-24 DIAGNOSIS — N39.0 RECURRENT UTI: ICD-10-CM

## 2019-05-24 DIAGNOSIS — R40.4 TRANSIENT ALTERATION OF AWARENESS: ICD-10-CM

## 2019-05-24 DIAGNOSIS — F33.41 RECURRENT MAJOR DEPRESSIVE DISORDER, IN PARTIAL REMISSION (H): ICD-10-CM

## 2019-05-24 DIAGNOSIS — E87.6 HYPOKALEMIA: ICD-10-CM

## 2019-05-24 DIAGNOSIS — F41.1 GAD (GENERALIZED ANXIETY DISORDER): ICD-10-CM

## 2019-05-24 DIAGNOSIS — D50.9 IRON DEFICIENCY ANEMIA, UNSPECIFIED IRON DEFICIENCY ANEMIA TYPE: ICD-10-CM

## 2019-05-24 DIAGNOSIS — F02.80 LATE ONSET ALZHEIMER'S DISEASE WITHOUT BEHAVIORAL DISTURBANCE (H): Primary | ICD-10-CM

## 2019-05-24 DIAGNOSIS — R53.81 PHYSICAL DECONDITIONING: ICD-10-CM

## 2019-05-24 PROCEDURE — 99316 NF DSCHRG MGMT 30 MIN+: CPT | Performed by: NURSE PRACTITIONER

## 2019-05-24 NOTE — PROGRESS NOTES
[facility] DISCHARGE SUMMARY  PATIENT'S NAME: Navdeep Spann  YOB: 1945  MEDICAL RECORD NUMBER:  [id number]  Place of Service where encounter took place:  [facility]  (S) [[id number]]    PRIMARY CARE PROVIDER AND CLINIC RESPONSIBLE AFTER TRANSFER:   Physician No Ref-Primary, No address on file    Non-FMG Provider     Transferring providers: TERESA Sauceda CNP, Curtis Tuttle MD  Recent Hospitalization/ED:  [facility] stay 5/5/19 to 5/16/19.  Date of SNF Admission: May / 16 / 2019  Date of SNF (anticipated) Discharge: May / 24 / 2019  Discharged to: previous independent home and with family lives with significant other  Cognitive Scores: SLUMS: 19/30 and CPT: 4.4/5.6  Physical Function: Ambulating with walker  DME: Walker    CODE STATUS/ADVANCE DIRECTIVES DISCUSSION:  Full Code   ALLERGIES: Patient has no known allergies.    DISCHARGE DIAGNOSIS/NURSING FACILITY COURSE:   From Admission note: brought to ER after fall and confusion and fever. Initial imaging showing acute process of infarct, subsequent f/u MRI showed no acute process. All lab and CSF from LP  w/u negative. Cognitive status did return to baseline and pt sent to SNF.   Late onset Alzheimer's disease without behavioral disturbance  Transient alteration of awareness  Physical deconditioning  See cognitive testing as above. [place] to be at baseline mentation. Continues on donepezil.  Made improvements with PT and OT. Discharge is patient driven - he would benefit from further therapy and skilled nursing care. He is medically stable, so will discharge home today per his request and request of his significant other. Plans to do outpatient physical therapy.     Iron deficiency anemia, unspecified iron deficiency anemia type  Initially on iron supplement TID, but Hgb trending 12-13, so this was decreased to daily. Had f/u hgb scheduled for 5/29, but is discharging before this  - recommended f/u with PCP upon discharge.     Hypokalemia  K on 5/14 was 3.6, was given K 40mEq x3 days and on 5/16 was 4.1. Recheck scheduled for 5/29 but he is discharging home before this date, so recommend f/u with PCP    Recurrent major depressive disorder, in partial remission (H)  SHERRELL (generalized anxiety disorder)  Has been stable on celexa 10mg daily .    Essential hypertension  BP controlled on lisinopril 40mg daily, metoprolol 75mg BID, digoxin 125mcg daily, ASA 81mg daily, amlodipine 5mg daily,     Recurrent UTI  Remains on trimethoprim 100mg daily, no concerns during TCU stay.     Past Medical History:  has a past medical history of A-fib (H), Anemia, Anxiety, Aortic valve disorder, Arthritis, BPH (benign prostatic hyperplasia), Carotid stenosis, Depression, Dyslipidemia, Hard of hearing, Hypertension, Lymphedema, and Syncope and collapse (2009, 2010).    Discharge Medications:  Current Outpatient Medications   Medication Sig Dispense Refill     acetaminophen (TYLENOL) 325 MG tablet Take 2 tablets (650 mg) by mouth every 4 hours as needed for mild pain       amLODIPine (NORVASC) 5 MG tablet Take 1 tablet (5 mg) by mouth daily       aspirin 81 MG EC tablet Take 81 mg by mouth daily       citalopram (CELEXA) 20 MG tablet Take 20 mg by mouth daily       cyanocobalamin (VITAMIN B-12) 2500 MCG SUBL sublingual tablet Place 2,500 mcg under the tongue daily       digoxin (DIGOX) 125 MCG tablet Take 125 mcg by mouth daily       donepezil (ARICEPT) 10 MG tablet Take 10 mg by mouth At Bedtime        ferrous sulfate (FEROSUL) 325 (65 Fe) MG tablet Take 1 tablet by mouth 3 times daily (with meals)       lisinopril (PRINIVIL/ZESTRIL) 40 MG tablet Take 40 mg by mouth daily       magnesium oxide (MAG-OX) 400 MG tablet Take 400 mg by mouth daily       metoprolol tartrate 75 MG TABS Take 75 mg by mouth 2 times daily       polyethylene glycol (MIRALAX/GLYCOLAX) packet Take 17 g by mouth daily as needed for constipation        trimethoprim (TRIMPEX) 100 MG tablet Take 100 mg by mouth daily          Medication Changes/Rationale:     See above    Controlled medications sent with patient:   not applicable/none     ROS:   10 point ROS of systems including Constitutional, Eyes, Respiratory, Cardiovascular, Gastroenterology, Genitourinary, Integumentary, Musculoskeletal, Psychiatric were all negative except for pertinent positives noted in my HPI.    Physical Exam:   Vitals: /69   Pulse 73   Temp 98.5  F (36.9  C)   Resp 18   Wt 85.3 kg (188 lb 1.6 oz)   SpO2 98%   BMI 25.51 kg/m    BMI= Body mass index is 25.51 kg/m .  GENERAL APPEARANCE:  Alert, in no distress, thin, cooperative  RESP:  respiratory effort and palpation of chest normal, lungs clear to auscultation , no respiratory distress  CV:  Palpation and auscultation of heart done , regular rate and rhythm, no murmur, rub, or gallop, no edema, +2 pedal pulses  ABDOMEN:  normal bowel sounds, soft, nontender, no hepatosplenomegaly or other masses  M/S:   generalized weakness, no gross joint deformities or tenderness  SKIN:  Inspection of skin and subcutaneous tissue baseline  NEURO:   Cranial nerves 2-12 are normal tested and grossly at patient's baseline  PSYCH:  memory impaired , affect and mood normal, judgement fair     SNF labs: Labs done while at Skilled Nursing Facility done by [facility]. Pertinent results are: noted above      DISCHARGE PLAN:    Follow up labs: Recommend Hgb, K+ with PCP    Medical Follow Up:      Follow up with primary care provider in 1 weeks    MTM referral needed and placed by this provider: No    Current [place] scheduled appointments:   None    Discharge Services: Out Patient:  physical therapy (recommended)    Discharge Instructions Verbalized to Patient at Discharge:     None    Driving is not recommended until cleared by PCP to resume.       TOTAL DISCHARGE TIME:   Greater than 30 minutes  Electronically signed by:  Kimberlee Dias  APRN CNP

## 2019-05-24 NOTE — LETTER
5/24/2019        RE: Navdeep Spann  [address]  [place] [address]        [facility] DISCHARGE SUMMARY  PATIENT'S NAME: Navdeep Spann  YOB: 1945  MEDICAL RECORD NUMBER:  [id number]  Place of Service where encounter took place:  [facility]) [[id number]]    PRIMARY CARE PROVIDER AND CLINIC RESPONSIBLE AFTER TRANSFER:   Physician No Ref-Primary, No address on file    Non-FMG Provider     Transferring providers: TERESA Sauceda CNP, Curtis Tuttle MD  Recent Hospitalization/ED:  [facility] stay 5/5/19 to 5/16/19.  Date of SNF Admission: May / 16 / 2019  Date of SNF (anticipated) Discharge: May / 24 / 2019  Discharged to: previous independent home and with family lives with significant other  Cognitive Scores: SLUMS: 19/30 and CPT: 4.4/5.6  Physical Function: Ambulating with walker  DME: Walker    CODE STATUS/ADVANCE DIRECTIVES DISCUSSION:  Full Code   ALLERGIES: Patient has no known allergies.    DISCHARGE DIAGNOSIS/NURSING FACILITY COURSE:   From Admission note: brought to ER after fall and confusion and fever. Initial imaging showing acute process of infarct, subsequent f/u MRI showed no acute process. All lab and CSF from [facility]  w/u negative. Cognitive status did return to baseline and pt sent to SNF.   Late onset Alzheimer's disease without behavioral disturbance  Transient alteration of awareness  Physical deconditioning  See cognitive testing as above. [place] to be at baseline mentation. Continues on donepezil.  Made improvements with PT and OT. Discharge is patient driven - he would benefit from further therapy and skilled nursing care. He is medically stable, so will discharge home today per his request and request of his significant other. Plans to do outpatient physical therapy.     Iron deficiency anemia, unspecified iron deficiency anemia type  Initially on iron supplement TID, but Hgb trending 12-13,  so this was decreased to daily. Had f/u hgb scheduled for 5/29, but is discharging before this - recommended f/u with PCP upon discharge.     Hypokalemia  K on 5/14 was 3.6, was given K 40mEq x3 days and on 5/16 was 4.1. Recheck scheduled for 5/29 but he is discharging home before this date, so recommend f/u with PCP    Recurrent major depressive disorder, in partial remission (H)  SHERRELL (generalized anxiety disorder)  Has been stable on celexa 10mg daily .    Essential hypertension  BP controlled on lisinopril 40mg daily, metoprolol 75mg BID, digoxin 125mcg daily, ASA 81mg daily, amlodipine 5mg daily,     Recurrent UTI  Remains on trimethoprim 100mg daily, no concerns during TCU stay.     Past Medical History:  has a past medical history of A-fib (H), Anemia, Anxiety, Aortic valve disorder, Arthritis, BPH (benign prostatic hyperplasia), Carotid stenosis, Depression, Dyslipidemia, Hard of hearing, Hypertension, Lymphedema, and Syncope and collapse (2009, 2010).    Discharge Medications:  Current Outpatient Medications   Medication Sig Dispense Refill     acetaminophen (TYLENOL) 325 MG tablet Take 2 tablets (650 mg) by mouth every 4 hours as needed for mild pain       amLODIPine (NORVASC) 5 MG tablet Take 1 tablet (5 mg) by mouth daily       aspirin 81 MG EC tablet Take 81 mg by mouth daily       citalopram (CELEXA) 20 MG tablet Take 20 mg by mouth daily       cyanocobalamin (VITAMIN B-12) 2500 MCG SUBL sublingual tablet Place 2,500 mcg under the tongue daily       digoxin (DIGOX) 125 MCG tablet Take 125 mcg by mouth daily       donepezil (ARICEPT) 10 MG tablet Take 10 mg by mouth At Bedtime        ferrous sulfate (FEROSUL) 325 (65 Fe) MG tablet Take 1 tablet by mouth 3 times daily (with meals)       lisinopril (PRINIVIL/ZESTRIL) 40 MG tablet Take 40 mg by mouth daily       magnesium oxide (MAG-OX) 400 MG tablet Take 400 mg by mouth daily       metoprolol tartrate 75 MG TABS Take 75 mg by mouth 2 times daily        polyethylene glycol (MIRALAX/GLYCOLAX) packet Take 17 g by mouth daily as needed for constipation       trimethoprim (TRIMPEX) 100 MG tablet Take 100 mg by mouth daily          Medication Changes/Rationale:     See above    Controlled medications sent with patient:   not applicable/none     ROS:   10 point ROS of systems including Constitutional, Eyes, Respiratory, Cardiovascular, Gastroenterology, Genitourinary, Integumentary, Musculoskeletal, Psychiatric were all negative except for pertinent positives noted in my HPI.    Physical Exam:   Vitals: /69   Pulse 73   Temp 98.5  F (36.9  C)   Resp 18   Wt 85.3 kg (188 lb 1.6 oz)   SpO2 98%   BMI 25.51 kg/m     BMI= Body mass index is 25.51 kg/m .  GENERAL APPEARANCE:  Alert, in no distress, thin, cooperative  RESP:  respiratory effort and palpation of chest normal, lungs clear to auscultation , no respiratory distress  CV:  Palpation and auscultation of heart done , regular rate and rhythm, no murmur, rub, or gallop, no edema, +2 pedal pulses  ABDOMEN:  normal bowel sounds, soft, nontender, no hepatosplenomegaly or other masses  M/S:   generalized weakness, no gross joint deformities or tenderness  SKIN:  Inspection of skin and subcutaneous tissue baseline  NEURO:   Cranial nerves 2-12 are normal tested and grossly at patient's baseline  PSYCH:  memory impaired , affect and mood normal, judgement fair     SNF labs: Labs done while at Skilled Nursing Facility done by [facility]. Pertinent results are: noted above      DISCHARGE PLAN:    Follow up labs: Recommend Hgb, K+ with PCP    Medical Follow Up:      Follow up with primary care provider in 1 weeks    MTM referral needed and placed by this provider: No    Current [place] scheduled appointments:   None    Discharge Services: Out Patient:  physical therapy (recommended)    Discharge Instructions Verbalized to Patient at Discharge:     None    Driving is not recommended until cleared by PCP to resume.        TOTAL DISCHARGE TIME:   Greater than 30 minutes  Electronically signed by:  TERESA Sauceda CNP               Sincerely,        TERESA Sauceda CNP

## 2019-05-27 ENCOUNTER — COMMUNICATION - HEALTHEAST (OUTPATIENT)
Dept: FAMILY MEDICINE | Facility: CLINIC | Age: 74
End: 2019-05-27

## 2019-05-27 DIAGNOSIS — F41.1 ANXIETY STATE: ICD-10-CM

## 2019-06-12 ENCOUNTER — COMMUNICATION - HEALTHEAST (OUTPATIENT)
Dept: FAMILY MEDICINE | Facility: CLINIC | Age: 74
End: 2019-06-12

## 2019-06-12 DIAGNOSIS — I48.91 A-FIB (H): ICD-10-CM

## 2019-06-20 ENCOUNTER — OFFICE VISIT - HEALTHEAST (OUTPATIENT)
Dept: FAMILY MEDICINE | Facility: CLINIC | Age: 74
End: 2019-06-20

## 2019-06-20 ENCOUNTER — COMMUNICATION - HEALTHEAST (OUTPATIENT)
Dept: FAMILY MEDICINE | Facility: CLINIC | Age: 74
End: 2019-06-20

## 2019-06-20 DIAGNOSIS — I10 ESSENTIAL HYPERTENSION: ICD-10-CM

## 2019-06-20 DIAGNOSIS — G93.40 ENCEPHALOPATHY: ICD-10-CM

## 2019-06-20 DIAGNOSIS — F03.90 DEMENTIA WITHOUT BEHAVIORAL DISTURBANCE, UNSPECIFIED DEMENTIA TYPE: ICD-10-CM

## 2019-06-20 DIAGNOSIS — I48.20 CHRONIC ATRIAL FIBRILLATION (H): ICD-10-CM

## 2019-06-20 LAB
ANION GAP SERPL CALCULATED.3IONS-SCNC: 12 MMOL/L (ref 5–18)
BUN SERPL-MCNC: 17 MG/DL (ref 8–28)
CALCIUM SERPL-MCNC: 9.4 MG/DL (ref 8.5–10.5)
CHLORIDE BLD-SCNC: 97 MMOL/L (ref 98–107)
CO2 SERPL-SCNC: 31 MMOL/L (ref 22–31)
CREAT SERPL-MCNC: 1.33 MG/DL (ref 0.7–1.3)
GFR SERPL CREATININE-BSD FRML MDRD: 53 ML/MIN/1.73M2
GLUCOSE BLD-MCNC: 111 MG/DL (ref 70–125)
POTASSIUM BLD-SCNC: 2.9 MMOL/L (ref 3.5–5)
SODIUM SERPL-SCNC: 140 MMOL/L (ref 136–145)

## 2019-06-21 ENCOUNTER — COMMUNICATION - HEALTHEAST (OUTPATIENT)
Dept: FAMILY MEDICINE | Facility: CLINIC | Age: 74
End: 2019-06-21

## 2019-06-25 ENCOUNTER — OFFICE VISIT - HEALTHEAST (OUTPATIENT)
Dept: FAMILY MEDICINE | Facility: CLINIC | Age: 74
End: 2019-06-25

## 2019-06-25 DIAGNOSIS — E83.42 HYPOMAGNESEMIA: ICD-10-CM

## 2019-06-25 DIAGNOSIS — E87.6 HYPOKALEMIA: ICD-10-CM

## 2019-06-25 LAB
ANION GAP SERPL CALCULATED.3IONS-SCNC: 11 MMOL/L (ref 5–18)
BUN SERPL-MCNC: 15 MG/DL (ref 8–28)
CALCIUM SERPL-MCNC: 9.4 MG/DL (ref 8.5–10.5)
CHLORIDE BLD-SCNC: 101 MMOL/L (ref 98–107)
CO2 SERPL-SCNC: 28 MMOL/L (ref 22–31)
CREAT SERPL-MCNC: 1.09 MG/DL (ref 0.7–1.3)
GFR SERPL CREATININE-BSD FRML MDRD: >60 ML/MIN/1.73M2
GLUCOSE BLD-MCNC: 107 MG/DL (ref 70–125)
MAGNESIUM SERPL-MCNC: 1.7 MG/DL (ref 1.8–2.6)
POTASSIUM BLD-SCNC: 3.6 MMOL/L (ref 3.5–5)
SODIUM SERPL-SCNC: 140 MMOL/L (ref 136–145)

## 2019-06-26 ENCOUNTER — COMMUNICATION - HEALTHEAST (OUTPATIENT)
Dept: FAMILY MEDICINE | Facility: CLINIC | Age: 74
End: 2019-06-26

## 2019-06-26 DIAGNOSIS — E83.42 HYPOMAGNESEMIA: ICD-10-CM

## 2019-06-28 ENCOUNTER — COMMUNICATION - HEALTHEAST (OUTPATIENT)
Dept: FAMILY MEDICINE | Facility: CLINIC | Age: 74
End: 2019-06-28

## 2019-06-28 DIAGNOSIS — I10 ESSENTIAL HYPERTENSION: ICD-10-CM

## 2019-07-03 ENCOUNTER — COMMUNICATION - HEALTHEAST (OUTPATIENT)
Dept: FAMILY MEDICINE | Facility: CLINIC | Age: 74
End: 2019-07-03

## 2019-07-11 ENCOUNTER — RECORDS - HEALTHEAST (OUTPATIENT)
Dept: ADMINISTRATIVE | Facility: OTHER | Age: 74
End: 2019-07-11

## 2019-07-12 ENCOUNTER — COMMUNICATION - HEALTHEAST (OUTPATIENT)
Dept: FAMILY MEDICINE | Facility: CLINIC | Age: 74
End: 2019-07-12

## 2019-07-12 DIAGNOSIS — I10 ESSENTIAL HYPERTENSION: ICD-10-CM

## 2019-07-22 ENCOUNTER — RECORDS - HEALTHEAST (OUTPATIENT)
Dept: ADMINISTRATIVE | Facility: OTHER | Age: 74
End: 2019-07-22

## 2019-07-23 ENCOUNTER — OFFICE VISIT - HEALTHEAST (OUTPATIENT)
Dept: FAMILY MEDICINE | Facility: CLINIC | Age: 74
End: 2019-07-23

## 2019-07-23 DIAGNOSIS — G92.9 TOXIC ENCEPHALOPATHY: ICD-10-CM

## 2019-07-23 DIAGNOSIS — R79.9 ABNORMAL FINDING OF BLOOD CHEMISTRY: ICD-10-CM

## 2019-07-23 DIAGNOSIS — M62.81 GENERALIZED MUSCLE WEAKNESS: ICD-10-CM

## 2019-07-24 ENCOUNTER — RECORDS - HEALTHEAST (OUTPATIENT)
Dept: ADMINISTRATIVE | Facility: OTHER | Age: 74
End: 2019-07-24

## 2019-07-25 ENCOUNTER — COMMUNICATION - HEALTHEAST (OUTPATIENT)
Dept: FAMILY MEDICINE | Facility: CLINIC | Age: 74
End: 2019-07-25

## 2019-07-26 ENCOUNTER — COMMUNICATION - HEALTHEAST (OUTPATIENT)
Dept: FAMILY MEDICINE | Facility: CLINIC | Age: 74
End: 2019-07-26

## 2019-07-26 ENCOUNTER — COMMUNICATION - HEALTHEAST (OUTPATIENT)
Dept: LAB | Facility: CLINIC | Age: 74
End: 2019-07-26

## 2019-07-29 ENCOUNTER — AMBULATORY - HEALTHEAST (OUTPATIENT)
Dept: LAB | Facility: CLINIC | Age: 74
End: 2019-07-29

## 2019-07-29 ENCOUNTER — COMMUNICATION - HEALTHEAST (OUTPATIENT)
Dept: FAMILY MEDICINE | Facility: CLINIC | Age: 74
End: 2019-07-29

## 2019-07-29 ENCOUNTER — OFFICE VISIT - HEALTHEAST (OUTPATIENT)
Dept: FAMILY MEDICINE | Facility: CLINIC | Age: 74
End: 2019-07-29

## 2019-07-29 DIAGNOSIS — I48.20 CHRONIC ATRIAL FIBRILLATION (H): ICD-10-CM

## 2019-07-29 DIAGNOSIS — R79.9 ABNORMAL FINDING OF BLOOD CHEMISTRY: ICD-10-CM

## 2019-07-29 DIAGNOSIS — G93.40 ACUTE ENCEPHALOPATHY: ICD-10-CM

## 2019-07-29 DIAGNOSIS — G31.84 MCI (MILD COGNITIVE IMPAIRMENT) WITH MEMORY LOSS: ICD-10-CM

## 2019-07-29 DIAGNOSIS — G92.9 TOXIC ENCEPHALOPATHY: ICD-10-CM

## 2019-07-29 DIAGNOSIS — M62.81 GENERALIZED MUSCLE WEAKNESS: ICD-10-CM

## 2019-07-29 LAB
ANION GAP SERPL CALCULATED.3IONS-SCNC: 11 MMOL/L (ref 5–18)
BUN SERPL-MCNC: 14 MG/DL (ref 8–28)
CALCIUM SERPL-MCNC: 9.5 MG/DL (ref 8.5–10.5)
CHLORIDE BLD-SCNC: 105 MMOL/L (ref 98–107)
CO2 SERPL-SCNC: 26 MMOL/L (ref 22–31)
CREAT SERPL-MCNC: 1.06 MG/DL (ref 0.7–1.3)
GFR SERPL CREATININE-BSD FRML MDRD: >60 ML/MIN/1.73M2
GLUCOSE BLD-MCNC: 116 MG/DL (ref 70–125)
MAGNESIUM SERPL-MCNC: 1.6 MG/DL (ref 1.8–2.6)
POTASSIUM BLD-SCNC: 3.3 MMOL/L (ref 3.5–5)
SODIUM SERPL-SCNC: 142 MMOL/L (ref 136–145)

## 2019-07-31 ENCOUNTER — AMBULATORY - HEALTHEAST (OUTPATIENT)
Dept: LAB | Facility: CLINIC | Age: 74
End: 2019-07-31

## 2019-07-31 ENCOUNTER — RECORDS - HEALTHEAST (OUTPATIENT)
Dept: ADMINISTRATIVE | Facility: OTHER | Age: 74
End: 2019-07-31

## 2019-07-31 DIAGNOSIS — G93.40 ACUTE ENCEPHALOPATHY: ICD-10-CM

## 2019-07-31 DIAGNOSIS — G31.84 MCI (MILD COGNITIVE IMPAIRMENT) WITH MEMORY LOSS: ICD-10-CM

## 2019-07-31 LAB
AMMONIA PLAS-SCNC: 33 UMOL/L (ref 11–35)
TSH SERPL DL<=0.005 MIU/L-ACNC: 2.05 UIU/ML (ref 0.3–5)
VIT B12 SERPL-MCNC: 658 PG/ML (ref 213–816)

## 2019-08-01 LAB — LYME TOTAL ANTIBODY - HISTORICAL: 0.05 INDEX VALUE

## 2019-08-03 LAB
METHYLMALONATE SERPL-SCNC: 0.28 UMOL/L (ref 0–0.4)
VIT B1 PYROPHOSHATE BLD-SCNC: 114 NMOL/L (ref 70–180)

## 2019-08-12 ENCOUNTER — OFFICE VISIT - HEALTHEAST (OUTPATIENT)
Dept: PHYSICAL THERAPY | Facility: REHABILITATION | Age: 74
End: 2019-08-12

## 2019-08-12 DIAGNOSIS — R26.81 UNSTEADINESS ON FEET: ICD-10-CM

## 2019-08-12 DIAGNOSIS — M62.81 GENERALIZED MUSCLE WEAKNESS: ICD-10-CM

## 2019-08-20 ENCOUNTER — OFFICE VISIT - HEALTHEAST (OUTPATIENT)
Dept: PHYSICAL THERAPY | Facility: REHABILITATION | Age: 74
End: 2019-08-20

## 2019-08-20 DIAGNOSIS — R26.81 UNSTEADINESS ON FEET: ICD-10-CM

## 2019-08-20 DIAGNOSIS — M62.81 GENERALIZED MUSCLE WEAKNESS: ICD-10-CM

## 2019-09-03 ENCOUNTER — COMMUNICATION - HEALTHEAST (OUTPATIENT)
Dept: PHYSICAL THERAPY | Facility: REHABILITATION | Age: 74
End: 2019-09-03

## 2019-09-16 ENCOUNTER — COMMUNICATION - HEALTHEAST (OUTPATIENT)
Dept: FAMILY MEDICINE | Facility: CLINIC | Age: 74
End: 2019-09-16

## 2019-09-16 ENCOUNTER — OFFICE VISIT - HEALTHEAST (OUTPATIENT)
Dept: PHYSICAL THERAPY | Facility: REHABILITATION | Age: 74
End: 2019-09-16

## 2019-09-16 DIAGNOSIS — I10 ESSENTIAL HYPERTENSION: ICD-10-CM

## 2019-09-16 DIAGNOSIS — R26.81 UNSTEADINESS ON FEET: ICD-10-CM

## 2019-09-16 DIAGNOSIS — M62.81 GENERALIZED MUSCLE WEAKNESS: ICD-10-CM

## 2019-09-25 ENCOUNTER — RECORDS - HEALTHEAST (OUTPATIENT)
Dept: ADMINISTRATIVE | Facility: OTHER | Age: 74
End: 2019-09-25

## 2019-09-26 ENCOUNTER — OFFICE VISIT - HEALTHEAST (OUTPATIENT)
Dept: FAMILY MEDICINE | Facility: CLINIC | Age: 74
End: 2019-09-26

## 2019-09-26 DIAGNOSIS — F03.90 DEMENTIA WITHOUT BEHAVIORAL DISTURBANCE, UNSPECIFIED DEMENTIA TYPE: ICD-10-CM

## 2019-09-26 DIAGNOSIS — E83.42 HYPOMAGNESEMIA: ICD-10-CM

## 2019-09-26 DIAGNOSIS — I48.20 CHRONIC ATRIAL FIBRILLATION (H): ICD-10-CM

## 2019-09-26 DIAGNOSIS — E87.6 HYPOKALEMIA: ICD-10-CM

## 2019-09-26 LAB
MAGNESIUM SERPL-MCNC: 1.7 MG/DL (ref 1.8–2.6)
POTASSIUM BLD-SCNC: 3.4 MMOL/L (ref 3.5–5)

## 2019-10-02 ENCOUNTER — COMMUNICATION - HEALTHEAST (OUTPATIENT)
Dept: PHYSICAL THERAPY | Facility: REHABILITATION | Age: 74
End: 2019-10-02

## 2019-10-04 ENCOUNTER — OFFICE VISIT - HEALTHEAST (OUTPATIENT)
Dept: PHYSICAL THERAPY | Facility: REHABILITATION | Age: 74
End: 2019-10-04

## 2019-10-04 DIAGNOSIS — R26.81 UNSTEADINESS ON FEET: ICD-10-CM

## 2019-10-04 DIAGNOSIS — M62.81 GENERALIZED MUSCLE WEAKNESS: ICD-10-CM

## 2019-10-08 ENCOUNTER — OFFICE VISIT - HEALTHEAST (OUTPATIENT)
Dept: PHYSICAL THERAPY | Facility: REHABILITATION | Age: 74
End: 2019-10-08

## 2019-10-08 DIAGNOSIS — R26.81 UNSTEADINESS ON FEET: ICD-10-CM

## 2019-10-08 DIAGNOSIS — M62.81 GENERALIZED MUSCLE WEAKNESS: ICD-10-CM

## 2019-10-24 ENCOUNTER — RECORDS - HEALTHEAST (OUTPATIENT)
Dept: LAB | Facility: CLINIC | Age: 74
End: 2019-10-24

## 2019-10-24 LAB
ALBUMIN SERPL-MCNC: 2.6 G/DL (ref 3.5–5)
ANION GAP SERPL CALCULATED.3IONS-SCNC: 9 MMOL/L (ref 5–18)
BASOPHILS # BLD AUTO: 0.1 THOU/UL (ref 0–0.2)
BASOPHILS NFR BLD AUTO: 1 % (ref 0–2)
BUN SERPL-MCNC: 19 MG/DL (ref 8–28)
CALCIUM SERPL-MCNC: 8.6 MG/DL (ref 8.5–10.5)
CHLORIDE BLD-SCNC: 107 MMOL/L (ref 98–107)
CO2 SERPL-SCNC: 23 MMOL/L (ref 22–31)
CREAT SERPL-MCNC: 0.98 MG/DL (ref 0.7–1.3)
DIGOXIN LEVEL LHE- HISTORICAL: 0.4 NG/ML (ref 0.5–2)
EOSINOPHIL # BLD AUTO: 0.2 THOU/UL (ref 0–0.4)
EOSINOPHIL NFR BLD AUTO: 2 % (ref 0–6)
ERYTHROCYTE [DISTWIDTH] IN BLOOD BY AUTOMATED COUNT: 12.2 % (ref 11–14.5)
GFR SERPL CREATININE-BSD FRML MDRD: >60 ML/MIN/1.73M2
GLUCOSE BLD-MCNC: 95 MG/DL (ref 70–125)
HCT VFR BLD AUTO: 33.1 % (ref 40–54)
HGB BLD-MCNC: 11.3 G/DL (ref 14–18)
LEVETIRACETAM (KEPPRA): 21.8 UG/ML (ref 6–46)
LYMPHOCYTES # BLD AUTO: 1.3 THOU/UL (ref 0.8–4.4)
LYMPHOCYTES NFR BLD AUTO: 16 % (ref 20–40)
MAGNESIUM SERPL-MCNC: 1.6 MG/DL (ref 1.8–2.6)
MCH RBC QN AUTO: 31.6 PG (ref 27–34)
MCHC RBC AUTO-ENTMCNC: 34.1 G/DL (ref 32–36)
MCV RBC AUTO: 93 FL (ref 80–100)
MONOCYTES # BLD AUTO: 0.8 THOU/UL (ref 0–0.9)
MONOCYTES NFR BLD AUTO: 10 % (ref 2–10)
NEUTROPHILS # BLD AUTO: 5.8 THOU/UL (ref 2–7.7)
NEUTROPHILS NFR BLD AUTO: 71 % (ref 50–70)
PHOSPHATE SERPL-MCNC: 2.8 MG/DL (ref 2.5–4.5)
PLATELET # BLD AUTO: 289 THOU/UL (ref 140–440)
PMV BLD AUTO: 10.3 FL (ref 8.5–12.5)
POTASSIUM BLD-SCNC: 3.4 MMOL/L (ref 3.5–5)
RBC # BLD AUTO: 3.58 MILL/UL (ref 4.4–6.2)
SODIUM SERPL-SCNC: 139 MMOL/L (ref 136–145)
WBC: 8.2 THOU/UL (ref 4–11)

## 2019-10-25 ENCOUNTER — COMMUNICATION - HEALTHEAST (OUTPATIENT)
Dept: FAMILY MEDICINE | Facility: CLINIC | Age: 74
End: 2019-10-25

## 2019-10-29 ENCOUNTER — RECORDS - HEALTHEAST (OUTPATIENT)
Dept: LAB | Facility: CLINIC | Age: 74
End: 2019-10-29

## 2019-10-30 LAB — MAGNESIUM SERPL-MCNC: 1.8 MG/DL (ref 1.8–2.6)

## 2019-11-01 ENCOUNTER — COMMUNICATION - HEALTHEAST (OUTPATIENT)
Dept: FAMILY MEDICINE | Facility: CLINIC | Age: 74
End: 2019-11-01

## 2019-11-04 ENCOUNTER — RECORDS - HEALTHEAST (OUTPATIENT)
Dept: ADMINISTRATIVE | Facility: OTHER | Age: 74
End: 2019-11-04

## 2019-11-05 ENCOUNTER — COMMUNICATION - HEALTHEAST (OUTPATIENT)
Dept: SCHEDULING | Facility: CLINIC | Age: 74
End: 2019-11-05

## 2019-11-05 ENCOUNTER — COMMUNICATION - HEALTHEAST (OUTPATIENT)
Dept: FAMILY MEDICINE | Facility: CLINIC | Age: 74
End: 2019-11-05

## 2019-11-05 DIAGNOSIS — F41.1 ANXIETY STATE: ICD-10-CM

## 2019-11-09 ENCOUNTER — COMMUNICATION - HEALTHEAST (OUTPATIENT)
Dept: FAMILY MEDICINE | Facility: CLINIC | Age: 74
End: 2019-11-09

## 2019-11-09 DIAGNOSIS — E87.6 HYPOKALEMIA: ICD-10-CM

## 2019-11-13 ENCOUNTER — COMMUNICATION - HEALTHEAST (OUTPATIENT)
Dept: FAMILY MEDICINE | Facility: CLINIC | Age: 74
End: 2019-11-13

## 2019-11-13 DIAGNOSIS — G40.209 PARTIAL SYMPTOMATIC EPILEPSY WITH COMPLEX PARTIAL SEIZURES, NOT INTRACTABLE, WITHOUT STATUS EPILEPTICUS (H): ICD-10-CM

## 2019-11-13 DIAGNOSIS — K21.9 GASTROESOPHAGEAL REFLUX DISEASE WITHOUT ESOPHAGITIS: ICD-10-CM

## 2019-11-14 ENCOUNTER — RECORDS - HEALTHEAST (OUTPATIENT)
Dept: LAB | Facility: HOSPITAL | Age: 74
End: 2019-11-14

## 2019-11-14 ENCOUNTER — COMMUNICATION - HEALTHEAST (OUTPATIENT)
Dept: FAMILY MEDICINE | Facility: CLINIC | Age: 74
End: 2019-11-14

## 2019-11-14 ENCOUNTER — RECORDS - HEALTHEAST (OUTPATIENT)
Dept: ADMINISTRATIVE | Facility: OTHER | Age: 74
End: 2019-11-14

## 2019-11-14 LAB
ALBUMIN SERPL-MCNC: 3.6 G/DL (ref 3.5–5)
ALP SERPL-CCNC: 103 U/L (ref 45–120)
ALT SERPL W P-5'-P-CCNC: 10 U/L (ref 0–45)
ANION GAP SERPL CALCULATED.3IONS-SCNC: 7 MMOL/L (ref 5–18)
AST SERPL W P-5'-P-CCNC: 16 U/L (ref 0–40)
BILIRUB SERPL-MCNC: 0.6 MG/DL (ref 0–1)
BUN SERPL-MCNC: 19 MG/DL (ref 8–28)
CALCIUM SERPL-MCNC: 9.4 MG/DL (ref 8.5–10.5)
CHLORIDE BLD-SCNC: 105 MMOL/L (ref 98–107)
CO2 SERPL-SCNC: 29 MMOL/L (ref 22–31)
CREAT SERPL-MCNC: 1.24 MG/DL (ref 0.7–1.3)
GFR SERPL CREATININE-BSD FRML MDRD: 57 ML/MIN/1.73M2
GLUCOSE BLD-MCNC: 103 MG/DL (ref 70–125)
LEVETIRACETAM (KEPPRA): 32.8 UG/ML (ref 6–46)
POTASSIUM BLD-SCNC: 3.7 MMOL/L (ref 3.5–5)
PROT SERPL-MCNC: 7.2 G/DL (ref 6–8)
SODIUM SERPL-SCNC: 141 MMOL/L (ref 136–145)

## 2019-11-17 ENCOUNTER — COMMUNICATION - HEALTHEAST (OUTPATIENT)
Dept: FAMILY MEDICINE | Facility: CLINIC | Age: 74
End: 2019-11-17

## 2019-11-17 DIAGNOSIS — I10 ESSENTIAL HYPERTENSION: ICD-10-CM

## 2019-11-22 ENCOUNTER — COMMUNICATION - HEALTHEAST (OUTPATIENT)
Dept: FAMILY MEDICINE | Facility: CLINIC | Age: 74
End: 2019-11-22

## 2019-11-22 DIAGNOSIS — I10 ESSENTIAL HYPERTENSION: ICD-10-CM

## 2019-12-19 ENCOUNTER — OFFICE VISIT - HEALTHEAST (OUTPATIENT)
Dept: FAMILY MEDICINE | Facility: CLINIC | Age: 74
End: 2019-12-19

## 2019-12-19 DIAGNOSIS — F03.90 DEMENTIA WITHOUT BEHAVIORAL DISTURBANCE, UNSPECIFIED DEMENTIA TYPE: ICD-10-CM

## 2019-12-19 DIAGNOSIS — I48.20 CHRONIC ATRIAL FIBRILLATION (H): ICD-10-CM

## 2019-12-19 DIAGNOSIS — R35.0 URINARY FREQUENCY: ICD-10-CM

## 2019-12-19 DIAGNOSIS — R41.89 UNRESPONSIVE STATE: ICD-10-CM

## 2019-12-19 DIAGNOSIS — I10 ESSENTIAL HYPERTENSION: ICD-10-CM

## 2019-12-19 LAB
ALBUMIN UR-MCNC: ABNORMAL MG/DL
ANION GAP SERPL CALCULATED.3IONS-SCNC: 9 MMOL/L (ref 5–18)
APPEARANCE UR: CLEAR
BACTERIA #/AREA URNS HPF: ABNORMAL HPF
BILIRUB UR QL STRIP: NEGATIVE
BUN SERPL-MCNC: 16 MG/DL (ref 8–28)
CALCIUM SERPL-MCNC: 9 MG/DL (ref 8.5–10.5)
CHLORIDE BLD-SCNC: 104 MMOL/L (ref 98–107)
CO2 SERPL-SCNC: 27 MMOL/L (ref 22–31)
COLOR UR AUTO: YELLOW
CREAT SERPL-MCNC: 1.1 MG/DL (ref 0.7–1.3)
GFR SERPL CREATININE-BSD FRML MDRD: >60 ML/MIN/1.73M2
GLUCOSE BLD-MCNC: 128 MG/DL (ref 70–125)
GLUCOSE UR STRIP-MCNC: NEGATIVE MG/DL
HGB UR QL STRIP: ABNORMAL
KETONES UR STRIP-MCNC: NEGATIVE MG/DL
LEUKOCYTE ESTERASE UR QL STRIP: NEGATIVE
NITRATE UR QL: NEGATIVE
PH UR STRIP: 7 [PH] (ref 5–8)
POTASSIUM BLD-SCNC: 3.8 MMOL/L (ref 3.5–5)
PSA SERPL-MCNC: 0.6 NG/ML (ref 0–6.5)
RBC #/AREA URNS AUTO: ABNORMAL HPF
SODIUM SERPL-SCNC: 140 MMOL/L (ref 136–145)
SP GR UR STRIP: 1.02 (ref 1–1.03)
SQUAMOUS #/AREA URNS AUTO: ABNORMAL LPF
UROBILINOGEN UR STRIP-ACNC: ABNORMAL
WBC #/AREA URNS AUTO: ABNORMAL HPF

## 2019-12-19 ASSESSMENT — PATIENT HEALTH QUESTIONNAIRE - PHQ9: SUM OF ALL RESPONSES TO PHQ QUESTIONS 1-9: 0

## 2019-12-20 ENCOUNTER — COMMUNICATION - HEALTHEAST (OUTPATIENT)
Dept: FAMILY MEDICINE | Facility: CLINIC | Age: 74
End: 2019-12-20

## 2019-12-22 ENCOUNTER — COMMUNICATION - HEALTHEAST (OUTPATIENT)
Dept: FAMILY MEDICINE | Facility: CLINIC | Age: 74
End: 2019-12-22

## 2019-12-22 DIAGNOSIS — I10 ESSENTIAL HYPERTENSION: ICD-10-CM

## 2019-12-27 ENCOUNTER — COMMUNICATION - HEALTHEAST (OUTPATIENT)
Dept: FAMILY MEDICINE | Facility: CLINIC | Age: 74
End: 2019-12-27

## 2019-12-27 DIAGNOSIS — I10 ESSENTIAL HYPERTENSION: ICD-10-CM

## 2020-02-18 ENCOUNTER — COMMUNICATION - HEALTHEAST (OUTPATIENT)
Dept: FAMILY MEDICINE | Facility: CLINIC | Age: 75
End: 2020-02-18

## 2020-02-18 DIAGNOSIS — I48.20 CHRONIC ATRIAL FIBRILLATION (H): ICD-10-CM

## 2020-02-18 DIAGNOSIS — I10 ESSENTIAL HYPERTENSION: ICD-10-CM

## 2020-03-13 ENCOUNTER — COMMUNICATION - HEALTHEAST (OUTPATIENT)
Dept: FAMILY MEDICINE | Facility: CLINIC | Age: 75
End: 2020-03-13

## 2020-03-18 ENCOUNTER — RECORDS - HEALTHEAST (OUTPATIENT)
Dept: LAB | Facility: CLINIC | Age: 75
End: 2020-03-18

## 2020-03-18 ENCOUNTER — RECORDS - HEALTHEAST (OUTPATIENT)
Dept: ADMINISTRATIVE | Facility: OTHER | Age: 75
End: 2020-03-18

## 2020-03-18 LAB
ALBUMIN SERPL-MCNC: 3.6 G/DL (ref 3.5–5)
ANION GAP SERPL CALCULATED.3IONS-SCNC: 11 MMOL/L (ref 5–18)
BASOPHILS # BLD AUTO: 0.1 THOU/UL (ref 0–0.2)
BASOPHILS NFR BLD AUTO: 1 % (ref 0–2)
BUN SERPL-MCNC: 17 MG/DL (ref 8–28)
CALCIUM SERPL-MCNC: 9.3 MG/DL (ref 8.5–10.5)
CHLORIDE BLD-SCNC: 104 MMOL/L (ref 98–107)
CO2 SERPL-SCNC: 24 MMOL/L (ref 22–31)
CREAT SERPL-MCNC: 1.08 MG/DL (ref 0.7–1.3)
EOSINOPHIL # BLD AUTO: 0.1 THOU/UL (ref 0–0.4)
EOSINOPHIL NFR BLD AUTO: 1 % (ref 0–6)
ERYTHROCYTE [DISTWIDTH] IN BLOOD BY AUTOMATED COUNT: 12.5 % (ref 11–14.5)
GFR SERPL CREATININE-BSD FRML MDRD: >60 ML/MIN/1.73M2
GLUCOSE BLD-MCNC: 85 MG/DL (ref 70–125)
HCT VFR BLD AUTO: 39.8 % (ref 40–54)
HGB BLD-MCNC: 13.4 G/DL (ref 14–18)
LEVETIRACETAM (KEPPRA): 27.2 UG/ML (ref 6–46)
LYMPHOCYTES # BLD AUTO: 1.2 THOU/UL (ref 0.8–4.4)
LYMPHOCYTES NFR BLD AUTO: 18 % (ref 20–40)
MCH RBC QN AUTO: 32.3 PG (ref 27–34)
MCHC RBC AUTO-ENTMCNC: 33.7 G/DL (ref 32–36)
MCV RBC AUTO: 96 FL (ref 80–100)
MONOCYTES # BLD AUTO: 0.6 THOU/UL (ref 0–0.9)
MONOCYTES NFR BLD AUTO: 9 % (ref 2–10)
NEUTROPHILS # BLD AUTO: 4.6 THOU/UL (ref 2–7.7)
NEUTROPHILS NFR BLD AUTO: 71 % (ref 50–70)
PHOSPHATE SERPL-MCNC: 3.6 MG/DL (ref 2.5–4.5)
PLATELET # BLD AUTO: 236 THOU/UL (ref 140–440)
PMV BLD AUTO: 10.3 FL (ref 8.5–12.5)
POTASSIUM BLD-SCNC: 3.8 MMOL/L (ref 3.5–5)
RBC # BLD AUTO: 4.15 MILL/UL (ref 4.4–6.2)
SODIUM SERPL-SCNC: 139 MMOL/L (ref 136–145)
WBC: 6.5 THOU/UL (ref 4–11)

## 2020-03-22 ENCOUNTER — RECORDS - HEALTHEAST (OUTPATIENT)
Dept: ADMINISTRATIVE | Facility: OTHER | Age: 75
End: 2020-03-22

## 2020-03-23 ENCOUNTER — COMMUNICATION - HEALTHEAST (OUTPATIENT)
Dept: FAMILY MEDICINE | Facility: CLINIC | Age: 75
End: 2020-03-23

## 2020-03-24 ENCOUNTER — COMMUNICATION - HEALTHEAST (OUTPATIENT)
Dept: FAMILY MEDICINE | Facility: CLINIC | Age: 75
End: 2020-03-24

## 2020-03-26 ENCOUNTER — COMMUNICATION - HEALTHEAST (OUTPATIENT)
Dept: FAMILY MEDICINE | Facility: CLINIC | Age: 75
End: 2020-03-26

## 2020-03-27 ENCOUNTER — COMMUNICATION - HEALTHEAST (OUTPATIENT)
Dept: FAMILY MEDICINE | Facility: CLINIC | Age: 75
End: 2020-03-27

## 2020-03-31 ENCOUNTER — COMMUNICATION - HEALTHEAST (OUTPATIENT)
Dept: FAMILY MEDICINE | Facility: CLINIC | Age: 75
End: 2020-03-31

## 2020-03-31 ENCOUNTER — OFFICE VISIT - HEALTHEAST (OUTPATIENT)
Dept: FAMILY MEDICINE | Facility: CLINIC | Age: 75
End: 2020-03-31

## 2020-03-31 DIAGNOSIS — I48.20 CHRONIC ATRIAL FIBRILLATION (H): ICD-10-CM

## 2020-03-31 DIAGNOSIS — I10 ESSENTIAL HYPERTENSION: ICD-10-CM

## 2020-03-31 DIAGNOSIS — R40.4 UNRESPONSIVE EPISODE: ICD-10-CM

## 2020-03-31 DIAGNOSIS — F33.41 RECURRENT MAJOR DEPRESSIVE DISORDER, IN PARTIAL REMISSION (H): ICD-10-CM

## 2020-03-31 DIAGNOSIS — G40.209 PARTIAL SYMPTOMATIC EPILEPSY WITH COMPLEX PARTIAL SEIZURES, NOT INTRACTABLE, WITHOUT STATUS EPILEPTICUS (H): ICD-10-CM

## 2020-04-09 ENCOUNTER — RECORDS - HEALTHEAST (OUTPATIENT)
Dept: ADMINISTRATIVE | Facility: OTHER | Age: 75
End: 2020-04-09

## 2020-04-10 ENCOUNTER — RECORDS - HEALTHEAST (OUTPATIENT)
Dept: ADMINISTRATIVE | Facility: OTHER | Age: 75
End: 2020-04-10

## 2020-04-10 ENCOUNTER — COMMUNICATION - HEALTHEAST (OUTPATIENT)
Dept: FAMILY MEDICINE | Facility: CLINIC | Age: 75
End: 2020-04-10

## 2020-04-16 ENCOUNTER — RECORDS - HEALTHEAST (OUTPATIENT)
Dept: ADMINISTRATIVE | Facility: OTHER | Age: 75
End: 2020-04-16

## 2020-04-19 ENCOUNTER — COMMUNICATION - HEALTHEAST (OUTPATIENT)
Dept: FAMILY MEDICINE | Facility: CLINIC | Age: 75
End: 2020-04-19

## 2020-04-19 DIAGNOSIS — F32.89 DEPRESSIVE DISORDER, ATYPICAL: ICD-10-CM

## 2020-04-20 ENCOUNTER — COMMUNICATION - HEALTHEAST (OUTPATIENT)
Dept: FAMILY MEDICINE | Facility: CLINIC | Age: 75
End: 2020-04-20

## 2020-04-20 DIAGNOSIS — I48.91 A-FIB (H): ICD-10-CM

## 2020-04-21 ENCOUNTER — OFFICE VISIT - HEALTHEAST (OUTPATIENT)
Dept: FAMILY MEDICINE | Facility: CLINIC | Age: 75
End: 2020-04-21

## 2020-04-21 DIAGNOSIS — R40.4 UNRESPONSIVE EPISODE: ICD-10-CM

## 2020-06-17 ENCOUNTER — COMMUNICATION - HEALTHEAST (OUTPATIENT)
Dept: FAMILY MEDICINE | Facility: CLINIC | Age: 75
End: 2020-06-17

## 2020-06-17 DIAGNOSIS — F33.41 RECURRENT MAJOR DEPRESSIVE DISORDER, IN PARTIAL REMISSION (H): ICD-10-CM

## 2020-06-24 ENCOUNTER — COMMUNICATION - HEALTHEAST (OUTPATIENT)
Dept: SCHEDULING | Facility: CLINIC | Age: 75
End: 2020-06-24

## 2020-06-25 ENCOUNTER — OFFICE VISIT - HEALTHEAST (OUTPATIENT)
Dept: FAMILY MEDICINE | Facility: CLINIC | Age: 75
End: 2020-06-25

## 2020-06-25 DIAGNOSIS — R06.02 SHORTNESS OF BREATH: ICD-10-CM

## 2020-06-25 ASSESSMENT — PATIENT HEALTH QUESTIONNAIRE - PHQ9: SUM OF ALL RESPONSES TO PHQ QUESTIONS 1-9: 5

## 2020-06-26 ENCOUNTER — OFFICE VISIT - HEALTHEAST (OUTPATIENT)
Dept: FAMILY MEDICINE | Facility: CLINIC | Age: 75
End: 2020-06-26

## 2020-06-26 DIAGNOSIS — I48.20 CHRONIC ATRIAL FIBRILLATION (H): ICD-10-CM

## 2020-06-26 DIAGNOSIS — I10 ESSENTIAL HYPERTENSION: ICD-10-CM

## 2020-06-26 DIAGNOSIS — R19.5 LOOSE STOOLS: ICD-10-CM

## 2020-06-26 DIAGNOSIS — R06.09 DOE (DYSPNEA ON EXERTION): ICD-10-CM

## 2020-06-26 DIAGNOSIS — Z12.11 COLON CANCER SCREENING: ICD-10-CM

## 2020-06-26 DIAGNOSIS — F03.90 DEMENTIA WITHOUT BEHAVIORAL DISTURBANCE, UNSPECIFIED DEMENTIA TYPE: ICD-10-CM

## 2020-06-26 LAB
ALBUMIN SERPL-MCNC: 3.5 G/DL (ref 3.5–5)
ALP SERPL-CCNC: 124 U/L (ref 45–120)
ALT SERPL W P-5'-P-CCNC: 14 U/L (ref 0–45)
ANION GAP SERPL CALCULATED.3IONS-SCNC: 13 MMOL/L (ref 5–18)
AST SERPL W P-5'-P-CCNC: 20 U/L (ref 0–40)
BILIRUB SERPL-MCNC: 0.6 MG/DL (ref 0–1)
BUN SERPL-MCNC: 16 MG/DL (ref 8–28)
CALCIUM SERPL-MCNC: 9 MG/DL (ref 8.5–10.5)
CHLORIDE BLD-SCNC: 106 MMOL/L (ref 98–107)
CO2 SERPL-SCNC: 22 MMOL/L (ref 22–31)
CREAT SERPL-MCNC: 1.19 MG/DL (ref 0.7–1.3)
ERYTHROCYTE [DISTWIDTH] IN BLOOD BY AUTOMATED COUNT: 12.5 % (ref 11–14.5)
GFR SERPL CREATININE-BSD FRML MDRD: 60 ML/MIN/1.73M2
GLUCOSE BLD-MCNC: 161 MG/DL (ref 70–125)
HCT VFR BLD AUTO: 41.8 % (ref 40–54)
HGB BLD-MCNC: 14 G/DL (ref 14–18)
MCH RBC QN AUTO: 30.7 PG (ref 27–34)
MCHC RBC AUTO-ENTMCNC: 33.5 G/DL (ref 32–36)
MCV RBC AUTO: 92 FL (ref 80–100)
PLATELET # BLD AUTO: 230 THOU/UL (ref 140–440)
PMV BLD AUTO: 10.4 FL (ref 8.5–12.5)
POTASSIUM BLD-SCNC: 3.6 MMOL/L (ref 3.5–5)
PROT SERPL-MCNC: 7 G/DL (ref 6–8)
RBC # BLD AUTO: 4.56 MILL/UL (ref 4.4–6.2)
SODIUM SERPL-SCNC: 141 MMOL/L (ref 136–145)
WBC: 7 THOU/UL (ref 4–11)

## 2020-06-27 LAB
ATRIAL RATE - MUSE: 98 BPM
DIASTOLIC BLOOD PRESSURE - MUSE: NORMAL
INTERPRETATION ECG - MUSE: NORMAL
P AXIS - MUSE: NORMAL
PR INTERVAL - MUSE: NORMAL
QRS DURATION - MUSE: 126 MS
QT - MUSE: 402 MS
QTC - MUSE: 491 MS
R AXIS - MUSE: -59 DEGREES
SYSTOLIC BLOOD PRESSURE - MUSE: NORMAL
T AXIS - MUSE: 102 DEGREES
VENTRICULAR RATE- MUSE: 90 BPM

## 2020-06-30 ENCOUNTER — COMMUNICATION - HEALTHEAST (OUTPATIENT)
Dept: FAMILY MEDICINE | Facility: CLINIC | Age: 75
End: 2020-06-30

## 2020-06-30 ENCOUNTER — AMBULATORY - HEALTHEAST (OUTPATIENT)
Dept: FAMILY MEDICINE | Facility: CLINIC | Age: 75
End: 2020-06-30

## 2020-06-30 DIAGNOSIS — R06.09 DOE (DYSPNEA ON EXERTION): ICD-10-CM

## 2020-06-30 DIAGNOSIS — I48.20 CHRONIC ATRIAL FIBRILLATION (H): ICD-10-CM

## 2020-07-10 ENCOUNTER — HOME CARE/HOSPICE - HEALTHEAST (OUTPATIENT)
Dept: HOME HEALTH SERVICES | Facility: HOME HEALTH | Age: 75
End: 2020-07-10

## 2020-07-13 ENCOUNTER — COMMUNICATION - HEALTHEAST (OUTPATIENT)
Dept: HOME HEALTH SERVICES | Facility: HOME HEALTH | Age: 75
End: 2020-07-13

## 2020-07-22 ENCOUNTER — AMBULATORY - HEALTHEAST (OUTPATIENT)
Dept: CARE COORDINATION | Facility: CLINIC | Age: 75
End: 2020-07-22

## 2020-07-22 ENCOUNTER — COMMUNICATION - HEALTHEAST (OUTPATIENT)
Dept: NURSING | Facility: CLINIC | Age: 75
End: 2020-07-22

## 2020-07-22 DIAGNOSIS — A41.9 SEPSIS WITHOUT ACUTE ORGAN DYSFUNCTION, DUE TO UNSPECIFIED ORGANISM (H): ICD-10-CM

## 2020-07-23 ENCOUNTER — COMMUNICATION - HEALTHEAST (OUTPATIENT)
Dept: FAMILY MEDICINE | Facility: CLINIC | Age: 75
End: 2020-07-23

## 2020-07-23 DIAGNOSIS — K21.9 GASTROESOPHAGEAL REFLUX DISEASE WITHOUT ESOPHAGITIS: ICD-10-CM

## 2020-07-23 DIAGNOSIS — E87.6 HYPOKALEMIA: ICD-10-CM

## 2020-07-27 ENCOUNTER — COMMUNICATION - HEALTHEAST (OUTPATIENT)
Dept: FAMILY MEDICINE | Facility: CLINIC | Age: 75
End: 2020-07-27

## 2020-07-27 DIAGNOSIS — E83.42 HYPOMAGNESEMIA: ICD-10-CM

## 2020-08-21 ENCOUNTER — COMMUNICATION - HEALTHEAST (OUTPATIENT)
Dept: FAMILY MEDICINE | Facility: CLINIC | Age: 75
End: 2020-08-21

## 2020-08-21 DIAGNOSIS — G40.209 PARTIAL SYMPTOMATIC EPILEPSY WITH COMPLEX PARTIAL SEIZURES, NOT INTRACTABLE, WITHOUT STATUS EPILEPTICUS (H): ICD-10-CM

## 2020-08-24 ENCOUNTER — COMMUNICATION - HEALTHEAST (OUTPATIENT)
Dept: NURSING | Facility: CLINIC | Age: 75
End: 2020-08-24

## 2020-08-26 ENCOUNTER — COMMUNICATION - HEALTHEAST (OUTPATIENT)
Dept: FAMILY MEDICINE | Facility: CLINIC | Age: 75
End: 2020-08-26

## 2020-08-26 DIAGNOSIS — I10 ESSENTIAL HYPERTENSION: ICD-10-CM

## 2020-09-17 ENCOUNTER — COMMUNICATION - HEALTHEAST (OUTPATIENT)
Dept: SCHEDULING | Facility: CLINIC | Age: 75
End: 2020-09-17

## 2020-09-27 ENCOUNTER — RECORDS - HEALTHEAST (OUTPATIENT)
Dept: LAB | Facility: CLINIC | Age: 75
End: 2020-09-27

## 2020-09-28 ENCOUNTER — OFFICE VISIT - HEALTHEAST (OUTPATIENT)
Dept: GERIATRICS | Facility: CLINIC | Age: 75
End: 2020-09-28

## 2020-09-28 DIAGNOSIS — I10 ESSENTIAL HYPERTENSION: ICD-10-CM

## 2020-09-28 DIAGNOSIS — F03.91 DEMENTIA WITH BEHAVIORAL DISTURBANCE, UNSPECIFIED DEMENTIA TYPE: ICD-10-CM

## 2020-09-28 DIAGNOSIS — I48.11 LONGSTANDING PERSISTENT ATRIAL FIBRILLATION (H): ICD-10-CM

## 2020-09-28 LAB
ANION GAP SERPL CALCULATED.3IONS-SCNC: 8 MMOL/L (ref 5–18)
BUN SERPL-MCNC: 18 MG/DL (ref 8–28)
CALCIUM SERPL-MCNC: 8.9 MG/DL (ref 8.5–10.5)
CHLORIDE BLD-SCNC: 107 MMOL/L (ref 98–107)
CO2 SERPL-SCNC: 26 MMOL/L (ref 22–31)
CREAT SERPL-MCNC: 0.91 MG/DL (ref 0.7–1.3)
GFR SERPL CREATININE-BSD FRML MDRD: >60 ML/MIN/1.73M2
GLUCOSE BLD-MCNC: 104 MG/DL (ref 70–125)
MAGNESIUM SERPL-MCNC: 1.8 MG/DL (ref 1.8–2.6)
POTASSIUM BLD-SCNC: 3.6 MMOL/L (ref 3.5–5)
SODIUM SERPL-SCNC: 141 MMOL/L (ref 136–145)

## 2020-09-30 ENCOUNTER — OFFICE VISIT - HEALTHEAST (OUTPATIENT)
Dept: GERIATRICS | Facility: CLINIC | Age: 75
End: 2020-09-30

## 2020-09-30 DIAGNOSIS — F02.80 LATE ONSET ALZHEIMER'S DISEASE WITHOUT BEHAVIORAL DISTURBANCE (H): ICD-10-CM

## 2020-09-30 DIAGNOSIS — R53.81 PHYSICAL DECONDITIONING: ICD-10-CM

## 2020-09-30 DIAGNOSIS — F41.1 ANXIETY STATE: ICD-10-CM

## 2020-09-30 DIAGNOSIS — F03.91 DEMENTIA WITH BEHAVIORAL DISTURBANCE, UNSPECIFIED DEMENTIA TYPE: ICD-10-CM

## 2020-09-30 DIAGNOSIS — G30.1 LATE ONSET ALZHEIMER'S DISEASE WITHOUT BEHAVIORAL DISTURBANCE (H): ICD-10-CM

## 2020-09-30 DIAGNOSIS — F32.89 DEPRESSIVE DISORDER, ATYPICAL: ICD-10-CM

## 2020-09-30 DIAGNOSIS — R40.4 TRANSIENT ALTERATION OF AWARENESS: ICD-10-CM

## 2020-10-02 ENCOUNTER — OFFICE VISIT - HEALTHEAST (OUTPATIENT)
Dept: GERIATRICS | Facility: CLINIC | Age: 75
End: 2020-10-02

## 2020-10-02 DIAGNOSIS — F02.80 LATE ONSET ALZHEIMER'S DISEASE WITHOUT BEHAVIORAL DISTURBANCE (H): ICD-10-CM

## 2020-10-02 DIAGNOSIS — G30.1 LATE ONSET ALZHEIMER'S DISEASE WITHOUT BEHAVIORAL DISTURBANCE (H): ICD-10-CM

## 2020-10-02 DIAGNOSIS — R53.81 PHYSICAL DECONDITIONING: ICD-10-CM

## 2020-10-02 DIAGNOSIS — F32.89 DEPRESSIVE DISORDER, ATYPICAL: ICD-10-CM

## 2020-10-05 ENCOUNTER — OFFICE VISIT - HEALTHEAST (OUTPATIENT)
Dept: GERIATRICS | Facility: CLINIC | Age: 75
End: 2020-10-05

## 2020-10-05 ENCOUNTER — COMMUNICATION - HEALTHEAST (OUTPATIENT)
Dept: CARE COORDINATION | Facility: CLINIC | Age: 75
End: 2020-10-05

## 2020-10-05 DIAGNOSIS — F03.91 DEMENTIA WITH BEHAVIORAL DISTURBANCE, UNSPECIFIED DEMENTIA TYPE: ICD-10-CM

## 2020-10-05 DIAGNOSIS — R53.81 PHYSICAL DECONDITIONING: ICD-10-CM

## 2020-10-05 DIAGNOSIS — I48.11 LONGSTANDING PERSISTENT ATRIAL FIBRILLATION (H): ICD-10-CM

## 2020-10-07 ENCOUNTER — OFFICE VISIT - HEALTHEAST (OUTPATIENT)
Dept: GERIATRICS | Facility: CLINIC | Age: 75
End: 2020-10-07

## 2020-10-07 DIAGNOSIS — F02.80 LATE ONSET ALZHEIMER'S DISEASE WITHOUT BEHAVIORAL DISTURBANCE (H): ICD-10-CM

## 2020-10-07 DIAGNOSIS — F06.30 MOOD DISORDER DUE TO A GENERAL MEDICAL CONDITION: ICD-10-CM

## 2020-10-07 DIAGNOSIS — R53.81 PHYSICAL DECONDITIONING: ICD-10-CM

## 2020-10-07 DIAGNOSIS — G30.1 LATE ONSET ALZHEIMER'S DISEASE WITHOUT BEHAVIORAL DISTURBANCE (H): ICD-10-CM

## 2020-10-07 DIAGNOSIS — R40.4 TRANSIENT ALTERATION OF AWARENESS: ICD-10-CM

## 2020-10-12 ENCOUNTER — COMMUNICATION - HEALTHEAST (OUTPATIENT)
Dept: GERIATRICS | Facility: CLINIC | Age: 75
End: 2020-10-12

## 2020-10-12 ENCOUNTER — COMMUNICATION - HEALTHEAST (OUTPATIENT)
Dept: FAMILY MEDICINE | Facility: CLINIC | Age: 75
End: 2020-10-12

## 2020-10-12 ENCOUNTER — COMMUNICATION - HEALTHEAST (OUTPATIENT)
Dept: CARE COORDINATION | Facility: CLINIC | Age: 75
End: 2020-10-12

## 2020-10-12 ENCOUNTER — AMBULATORY - HEALTHEAST (OUTPATIENT)
Dept: GERIATRICS | Facility: CLINIC | Age: 75
End: 2020-10-12

## 2020-10-13 ENCOUNTER — COMMUNICATION - HEALTHEAST (OUTPATIENT)
Dept: FAMILY MEDICINE | Facility: CLINIC | Age: 75
End: 2020-10-13

## 2020-10-13 DIAGNOSIS — G30.1 LATE ONSET ALZHEIMER'S DISEASE WITHOUT BEHAVIORAL DISTURBANCE (H): ICD-10-CM

## 2020-10-13 DIAGNOSIS — G47.00 INSOMNIA, UNSPECIFIED TYPE: ICD-10-CM

## 2020-10-13 DIAGNOSIS — K59.00 CONSTIPATION, UNSPECIFIED CONSTIPATION TYPE: ICD-10-CM

## 2020-10-13 DIAGNOSIS — F02.80 LATE ONSET ALZHEIMER'S DISEASE WITHOUT BEHAVIORAL DISTURBANCE (H): ICD-10-CM

## 2020-10-14 ASSESSMENT — ACTIVITIES OF DAILY LIVING (ADL): DEPENDENT_IADLS:: MEDICATION MANAGEMENT;MONEY MANAGEMENT;TRANSPORTATION

## 2020-10-18 ENCOUNTER — COMMUNICATION - HEALTHEAST (OUTPATIENT)
Dept: FAMILY MEDICINE | Facility: CLINIC | Age: 75
End: 2020-10-18

## 2020-10-18 DIAGNOSIS — E83.42 HYPOMAGNESEMIA: ICD-10-CM

## 2020-10-31 DIAGNOSIS — F02.80 LATE ONSET ALZHEIMER'S DISEASE WITHOUT BEHAVIORAL DISTURBANCE (H): Primary | ICD-10-CM

## 2020-10-31 DIAGNOSIS — G30.1 LATE ONSET ALZHEIMER'S DISEASE WITHOUT BEHAVIORAL DISTURBANCE (H): Primary | ICD-10-CM

## 2020-10-31 NOTE — LETTER
10/31/2020        RE: Navdeep Spann  [address]  [place] [address]            Dear Navdeep,      We recently provided you with medication refills.  Many medications require routine follow-up with your doctor.    Your prescription(s) have been refilled for 30 days so you may have time for the above noted follow-up. Please call to schedule soon so we can assure you have an appointment before your next refills are needed. If you have already made a follow up appointment, please disregard this letter.           Sincerely,        Alonzo Moreno MD  [facility]     (Formerly known as [facility])

## 2020-11-02 RX ORDER — DONEPEZIL HYDROCHLORIDE 10 MG/1
10 TABLET, FILM COATED ORAL AT BEDTIME
Qty: 30 TABLET | Refills: 0 | Status: SHIPPED | OUTPATIENT
Start: 2020-11-02 | End: 2021-03-29

## 2020-11-02 NOTE — TELEPHONE ENCOUNTER
Refill request for Aricept   Letter mailed to patient to schedule follow up  Medication T'd for review and signature  Ximena Hsu CMA on 11/2/2020 at 2:46 PM

## 2020-11-05 ENCOUNTER — RECORDS - HEALTHEAST (OUTPATIENT)
Dept: ADMINISTRATIVE | Facility: OTHER | Age: 75
End: 2020-11-05

## 2020-11-10 ENCOUNTER — OFFICE VISIT - HEALTHEAST (OUTPATIENT)
Dept: FAMILY MEDICINE | Facility: CLINIC | Age: 75
End: 2020-11-10

## 2020-11-10 DIAGNOSIS — G30.1 LATE ONSET ALZHEIMER'S DISEASE WITHOUT BEHAVIORAL DISTURBANCE (H): ICD-10-CM

## 2020-11-10 DIAGNOSIS — J18.9 PNEUMONIA OF RIGHT LOWER LOBE DUE TO INFECTIOUS ORGANISM: ICD-10-CM

## 2020-11-10 DIAGNOSIS — F02.80 LATE ONSET ALZHEIMER'S DISEASE WITHOUT BEHAVIORAL DISTURBANCE (H): ICD-10-CM

## 2020-12-07 ENCOUNTER — COMMUNICATION - HEALTHEAST (OUTPATIENT)
Dept: SCHEDULING | Facility: CLINIC | Age: 75
End: 2020-12-07

## 2020-12-10 ENCOUNTER — COMMUNICATION - HEALTHEAST (OUTPATIENT)
Dept: FAMILY MEDICINE | Facility: CLINIC | Age: 75
End: 2020-12-10

## 2020-12-10 DIAGNOSIS — I10 ESSENTIAL HYPERTENSION: ICD-10-CM

## 2020-12-16 ENCOUNTER — COMMUNICATION - HEALTHEAST (OUTPATIENT)
Dept: FAMILY MEDICINE | Facility: CLINIC | Age: 75
End: 2020-12-16

## 2020-12-23 ENCOUNTER — COMMUNICATION - HEALTHEAST (OUTPATIENT)
Dept: FAMILY MEDICINE | Facility: CLINIC | Age: 75
End: 2020-12-23

## 2020-12-23 DIAGNOSIS — I10 ESSENTIAL HYPERTENSION: ICD-10-CM

## 2021-01-26 ENCOUNTER — OFFICE VISIT - HEALTHEAST (OUTPATIENT)
Dept: FAMILY MEDICINE | Facility: CLINIC | Age: 76
End: 2021-01-26

## 2021-01-26 DIAGNOSIS — I95.9 HYPOTENSION, UNSPECIFIED HYPOTENSION TYPE: ICD-10-CM

## 2021-01-26 DIAGNOSIS — I48.20 CHRONIC ATRIAL FIBRILLATION (H): ICD-10-CM

## 2021-01-26 DIAGNOSIS — R53.81 PHYSICAL DECONDITIONING: ICD-10-CM

## 2021-01-26 DIAGNOSIS — G40.909 NONINTRACTABLE EPILEPSY WITHOUT STATUS EPILEPTICUS, UNSPECIFIED EPILEPSY TYPE (H): ICD-10-CM

## 2021-01-26 DIAGNOSIS — E87.6 HYPOKALEMIA: ICD-10-CM

## 2021-01-26 DIAGNOSIS — F03.91 DEMENTIA WITH BEHAVIORAL DISTURBANCE, UNSPECIFIED DEMENTIA TYPE: ICD-10-CM

## 2021-01-26 DIAGNOSIS — F33.41 RECURRENT MAJOR DEPRESSIVE DISORDER, IN PARTIAL REMISSION (H): ICD-10-CM

## 2021-01-26 LAB
ANION GAP SERPL CALCULATED.3IONS-SCNC: 11 MMOL/L (ref 5–18)
BUN SERPL-MCNC: 13 MG/DL (ref 8–28)
CALCIUM SERPL-MCNC: 8.7 MG/DL (ref 8.5–10.5)
CHLORIDE BLD-SCNC: 108 MMOL/L (ref 98–107)
CO2 SERPL-SCNC: 22 MMOL/L (ref 22–31)
CREAT SERPL-MCNC: 1.02 MG/DL (ref 0.7–1.3)
ERYTHROCYTE [DISTWIDTH] IN BLOOD BY AUTOMATED COUNT: 12.7 % (ref 11–14.5)
GFR SERPL CREATININE-BSD FRML MDRD: >60 ML/MIN/1.73M2
GLUCOSE BLD-MCNC: 136 MG/DL (ref 70–125)
HCT VFR BLD AUTO: 37 % (ref 40–54)
HGB BLD-MCNC: 12.7 G/DL (ref 14–18)
MAGNESIUM SERPL-MCNC: 1.8 MG/DL (ref 1.8–2.6)
MCH RBC QN AUTO: 31.5 PG (ref 27–34)
MCHC RBC AUTO-ENTMCNC: 34.3 G/DL (ref 32–36)
MCV RBC AUTO: 92 FL (ref 80–100)
PLATELET # BLD AUTO: 208 THOU/UL (ref 140–440)
PMV BLD AUTO: 10.1 FL (ref 8.5–12.5)
POTASSIUM BLD-SCNC: 3.8 MMOL/L (ref 3.5–5)
RBC # BLD AUTO: 4.03 MILL/UL (ref 4.4–6.2)
SODIUM SERPL-SCNC: 141 MMOL/L (ref 136–145)
WBC: 7.3 THOU/UL (ref 4–11)

## 2021-01-26 ASSESSMENT — PATIENT HEALTH QUESTIONNAIRE - PHQ9: SUM OF ALL RESPONSES TO PHQ QUESTIONS 1-9: 1

## 2021-01-27 ENCOUNTER — COMMUNICATION - HEALTHEAST (OUTPATIENT)
Dept: ANTICOAGULATION | Facility: CLINIC | Age: 76
End: 2021-01-27

## 2021-01-27 ENCOUNTER — HOME CARE/HOSPICE - HEALTHEAST (OUTPATIENT)
Dept: HOME HEALTH SERVICES | Facility: HOME HEALTH | Age: 76
End: 2021-01-27

## 2021-01-27 DIAGNOSIS — I48.20 CHRONIC ATRIAL FIBRILLATION (H): ICD-10-CM

## 2021-01-28 ENCOUNTER — COMMUNICATION - HEALTHEAST (OUTPATIENT)
Dept: FAMILY MEDICINE | Facility: CLINIC | Age: 76
End: 2021-01-28

## 2021-01-28 DIAGNOSIS — G30.1 LATE ONSET ALZHEIMER'S DISEASE WITHOUT BEHAVIORAL DISTURBANCE (H): ICD-10-CM

## 2021-01-28 DIAGNOSIS — F02.80 LATE ONSET ALZHEIMER'S DISEASE WITHOUT BEHAVIORAL DISTURBANCE (H): ICD-10-CM

## 2021-01-29 ENCOUNTER — COMMUNICATION - HEALTHEAST (OUTPATIENT)
Dept: HOME HEALTH SERVICES | Facility: HOME HEALTH | Age: 76
End: 2021-01-29

## 2021-01-29 ENCOUNTER — COMMUNICATION - HEALTHEAST (OUTPATIENT)
Dept: FAMILY MEDICINE | Facility: CLINIC | Age: 76
End: 2021-01-29

## 2021-02-01 ENCOUNTER — COMMUNICATION - HEALTHEAST (OUTPATIENT)
Dept: FAMILY MEDICINE | Facility: CLINIC | Age: 76
End: 2021-02-01

## 2021-02-01 DIAGNOSIS — I48.20 CHRONIC ATRIAL FIBRILLATION (H): ICD-10-CM

## 2021-02-02 ENCOUNTER — AMBULATORY - HEALTHEAST (OUTPATIENT)
Dept: LAB | Facility: CLINIC | Age: 76
End: 2021-02-02

## 2021-02-02 ENCOUNTER — COMMUNICATION - HEALTHEAST (OUTPATIENT)
Dept: ANTICOAGULATION | Facility: CLINIC | Age: 76
End: 2021-02-02

## 2021-02-02 DIAGNOSIS — I48.20 CHRONIC ATRIAL FIBRILLATION (H): ICD-10-CM

## 2021-02-02 LAB — INR PPP: 2 (ref 0.9–1.1)

## 2021-02-03 ENCOUNTER — COMMUNICATION - HEALTHEAST (OUTPATIENT)
Dept: CARDIOLOGY | Facility: CLINIC | Age: 76
End: 2021-02-03

## 2021-02-04 ENCOUNTER — OFFICE VISIT - HEALTHEAST (OUTPATIENT)
Dept: CARDIOLOGY | Facility: CLINIC | Age: 76
End: 2021-02-04

## 2021-02-04 DIAGNOSIS — Z53.09 CONTRAINDICATION TO ANTICOAGULATION THERAPY: ICD-10-CM

## 2021-02-04 DIAGNOSIS — I10 ESSENTIAL HYPERTENSION: ICD-10-CM

## 2021-02-04 DIAGNOSIS — F03.91 DEMENTIA WITH BEHAVIORAL DISTURBANCE, UNSPECIFIED DEMENTIA TYPE: ICD-10-CM

## 2021-02-04 DIAGNOSIS — I48.20 CHRONIC ATRIAL FIBRILLATION (H): ICD-10-CM

## 2021-02-04 ASSESSMENT — MIFFLIN-ST. JEOR: SCORE: 1651.18

## 2021-02-05 ENCOUNTER — AMBULATORY - HEALTHEAST (OUTPATIENT)
Dept: LAB | Facility: CLINIC | Age: 76
End: 2021-02-05

## 2021-02-05 ENCOUNTER — COMMUNICATION - HEALTHEAST (OUTPATIENT)
Dept: ANTICOAGULATION | Facility: CLINIC | Age: 76
End: 2021-02-05

## 2021-02-05 DIAGNOSIS — I48.20 CHRONIC ATRIAL FIBRILLATION (H): ICD-10-CM

## 2021-02-05 LAB — INR PPP: 2.6 (ref 0.9–1.1)

## 2021-02-09 ENCOUNTER — COMMUNICATION - HEALTHEAST (OUTPATIENT)
Dept: LAB | Facility: CLINIC | Age: 76
End: 2021-02-09

## 2021-02-09 ENCOUNTER — AMBULATORY - HEALTHEAST (OUTPATIENT)
Dept: LAB | Facility: CLINIC | Age: 76
End: 2021-02-09

## 2021-02-09 ENCOUNTER — COMMUNICATION - HEALTHEAST (OUTPATIENT)
Dept: FAMILY MEDICINE | Facility: CLINIC | Age: 76
End: 2021-02-09

## 2021-02-09 DIAGNOSIS — F02.80 LATE ONSET ALZHEIMER'S DISEASE WITHOUT BEHAVIORAL DISTURBANCE (H): ICD-10-CM

## 2021-02-09 DIAGNOSIS — G30.1 LATE ONSET ALZHEIMER'S DISEASE WITHOUT BEHAVIORAL DISTURBANCE (H): ICD-10-CM

## 2021-02-09 DIAGNOSIS — I48.20 CHRONIC ATRIAL FIBRILLATION (H): ICD-10-CM

## 2021-02-09 LAB — INR PPP: 5.42 (ref 0.9–1.1)

## 2021-02-10 ENCOUNTER — COMMUNICATION - HEALTHEAST (OUTPATIENT)
Dept: FAMILY MEDICINE | Facility: CLINIC | Age: 76
End: 2021-02-10

## 2021-02-10 DIAGNOSIS — I10 ESSENTIAL HYPERTENSION: ICD-10-CM

## 2021-02-12 ENCOUNTER — AMBULATORY - HEALTHEAST (OUTPATIENT)
Dept: LAB | Facility: CLINIC | Age: 76
End: 2021-02-12

## 2021-02-12 ENCOUNTER — COMMUNICATION - HEALTHEAST (OUTPATIENT)
Dept: ANTICOAGULATION | Facility: CLINIC | Age: 76
End: 2021-02-12

## 2021-02-12 ENCOUNTER — HOSPITAL ENCOUNTER (OUTPATIENT)
Dept: CARDIOLOGY | Facility: HOSPITAL | Age: 76
Discharge: HOME | End: 2021-02-12
Attending: NURSE PRACTITIONER

## 2021-02-12 DIAGNOSIS — I48.20 CHRONIC ATRIAL FIBRILLATION (H): ICD-10-CM

## 2021-02-12 LAB — INR PPP: 3.9 (ref 0.9–1.1)

## 2021-02-15 ENCOUNTER — HOME CARE/HOSPICE - HEALTHEAST (OUTPATIENT)
Dept: HOME HEALTH SERVICES | Facility: HOME HEALTH | Age: 76
End: 2021-02-15

## 2021-02-15 ENCOUNTER — OFFICE VISIT - HEALTHEAST (OUTPATIENT)
Dept: FAMILY MEDICINE | Facility: CLINIC | Age: 76
End: 2021-02-15

## 2021-02-15 ENCOUNTER — COMMUNICATION - HEALTHEAST (OUTPATIENT)
Dept: ANTICOAGULATION | Facility: CLINIC | Age: 76
End: 2021-02-15

## 2021-02-15 DIAGNOSIS — I48.20 CHRONIC ATRIAL FIBRILLATION (H): ICD-10-CM

## 2021-02-15 DIAGNOSIS — I10 BENIGN ESSENTIAL HYPERTENSION: ICD-10-CM

## 2021-02-15 DIAGNOSIS — M62.81 GENERALIZED MUSCLE WEAKNESS: ICD-10-CM

## 2021-02-15 DIAGNOSIS — F03.91 DEMENTIA WITH BEHAVIORAL DISTURBANCE, UNSPECIFIED DEMENTIA TYPE: ICD-10-CM

## 2021-02-15 LAB — INR PPP: 3.3 (ref 0.9–1.1)

## 2021-02-15 ASSESSMENT — MIFFLIN-ST. JEOR: SCORE: 1652.99

## 2021-02-18 ENCOUNTER — COMMUNICATION - HEALTHEAST (OUTPATIENT)
Dept: HOME HEALTH SERVICES | Facility: HOME HEALTH | Age: 76
End: 2021-02-18

## 2021-02-19 ENCOUNTER — COMMUNICATION - HEALTHEAST (OUTPATIENT)
Dept: FAMILY MEDICINE | Facility: CLINIC | Age: 76
End: 2021-02-19

## 2021-02-19 DIAGNOSIS — G30.1 LATE ONSET ALZHEIMER'S DISEASE WITHOUT BEHAVIORAL DISTURBANCE (H): ICD-10-CM

## 2021-02-19 DIAGNOSIS — F02.80 LATE ONSET ALZHEIMER'S DISEASE WITHOUT BEHAVIORAL DISTURBANCE (H): ICD-10-CM

## 2021-02-22 ENCOUNTER — COMMUNICATION - HEALTHEAST (OUTPATIENT)
Dept: HOME HEALTH SERVICES | Facility: HOME HEALTH | Age: 76
End: 2021-02-22

## 2021-02-24 ENCOUNTER — HOME CARE/HOSPICE - HEALTHEAST (OUTPATIENT)
Dept: ADMINISTRATIVE | Facility: OTHER | Age: 76
End: 2021-02-24

## 2021-03-09 ENCOUNTER — COMMUNICATION - HEALTHEAST (OUTPATIENT)
Dept: PHYSICAL THERAPY | Age: 76
End: 2021-03-09

## 2021-03-12 ENCOUNTER — COMMUNICATION - HEALTHEAST (OUTPATIENT)
Dept: FAMILY MEDICINE | Facility: CLINIC | Age: 76
End: 2021-03-12

## 2021-03-12 DIAGNOSIS — G30.1 LATE ONSET ALZHEIMER'S DISEASE WITHOUT BEHAVIORAL DISTURBANCE (H): ICD-10-CM

## 2021-03-12 DIAGNOSIS — F02.80 LATE ONSET ALZHEIMER'S DISEASE WITHOUT BEHAVIORAL DISTURBANCE (H): ICD-10-CM

## 2021-03-16 ENCOUNTER — RECORDS - HEALTHEAST (OUTPATIENT)
Dept: ADMINISTRATIVE | Facility: OTHER | Age: 76
End: 2021-03-16

## 2021-03-18 ENCOUNTER — RECORDS - HEALTHEAST (OUTPATIENT)
Dept: ADMINISTRATIVE | Facility: OTHER | Age: 76
End: 2021-03-18

## 2021-03-23 ENCOUNTER — RECORDS - HEALTHEAST (OUTPATIENT)
Dept: ADMINISTRATIVE | Facility: OTHER | Age: 76
End: 2021-03-23

## 2021-03-24 ENCOUNTER — COMMUNICATION - HEALTHEAST (OUTPATIENT)
Dept: FAMILY MEDICINE | Facility: CLINIC | Age: 76
End: 2021-03-24

## 2021-03-24 DIAGNOSIS — I10 ESSENTIAL HYPERTENSION: ICD-10-CM

## 2021-03-27 DIAGNOSIS — F02.80 LATE ONSET ALZHEIMER'S DISEASE WITHOUT BEHAVIORAL DISTURBANCE (H): ICD-10-CM

## 2021-03-27 DIAGNOSIS — G30.1 LATE ONSET ALZHEIMER'S DISEASE WITHOUT BEHAVIORAL DISTURBANCE (H): ICD-10-CM

## 2021-03-29 RX ORDER — DONEPEZIL HYDROCHLORIDE 10 MG/1
10 TABLET, FILM COATED ORAL AT BEDTIME
Qty: 14 TABLET | Refills: 0 | Status: SHIPPED | OUTPATIENT
Start: 2021-03-29 | End: 2021-04-09

## 2021-03-29 NOTE — TELEPHONE ENCOUNTER
Refill request for Aricept  14 day supply with 0 refills Medication T'd for review and signature  Ximena Hsu CMA on 3/29/2021 at 2:06 PM

## 2021-04-09 DIAGNOSIS — F02.80 LATE ONSET ALZHEIMER'S DISEASE WITHOUT BEHAVIORAL DISTURBANCE (H): ICD-10-CM

## 2021-04-09 DIAGNOSIS — G30.1 LATE ONSET ALZHEIMER'S DISEASE WITHOUT BEHAVIORAL DISTURBANCE (H): ICD-10-CM

## 2021-04-09 RX ORDER — DONEPEZIL HYDROCHLORIDE 10 MG/1
10 TABLET, FILM COATED ORAL AT BEDTIME
Qty: 7 TABLET | Refills: 0 | Status: SHIPPED | OUTPATIENT
Start: 2021-04-09 | End: 2021-04-16

## 2021-04-15 ENCOUNTER — COMMUNICATION - HEALTHEAST (OUTPATIENT)
Dept: FAMILY MEDICINE | Facility: CLINIC | Age: 76
End: 2021-04-15

## 2021-04-15 DIAGNOSIS — F02.80 LATE ONSET ALZHEIMER'S DISEASE WITHOUT BEHAVIORAL DISTURBANCE (H): ICD-10-CM

## 2021-04-15 DIAGNOSIS — G30.1 LATE ONSET ALZHEIMER'S DISEASE WITHOUT BEHAVIORAL DISTURBANCE (H): ICD-10-CM

## 2021-04-15 NOTE — TELEPHONE ENCOUNTER
They will need more refills on this as he is not scheduled again until April 28th for a follow up appointment. Thank you!

## 2021-04-16 ENCOUNTER — COMMUNICATION - HEALTHEAST (OUTPATIENT)
Dept: FAMILY MEDICINE | Facility: CLINIC | Age: 76
End: 2021-04-16

## 2021-04-16 DIAGNOSIS — G30.1 LATE ONSET ALZHEIMER'S DISEASE WITHOUT BEHAVIORAL DISTURBANCE (H): ICD-10-CM

## 2021-04-16 DIAGNOSIS — F02.80 LATE ONSET ALZHEIMER'S DISEASE WITHOUT BEHAVIORAL DISTURBANCE (H): ICD-10-CM

## 2021-04-16 RX ORDER — DONEPEZIL HYDROCHLORIDE 10 MG/1
10 TABLET, FILM COATED ORAL AT BEDTIME
Qty: 30 TABLET | Refills: 0 | Status: SHIPPED | OUTPATIENT
Start: 2021-04-16 | End: 2021-04-28

## 2021-04-28 ENCOUNTER — OFFICE VISIT (OUTPATIENT)
Dept: NEUROLOGY | Facility: CLINIC | Age: 76
End: 2021-04-28
Payer: COMMERCIAL

## 2021-04-28 ENCOUNTER — AMBULATORY - HEALTHEAST (OUTPATIENT)
Dept: LAB | Facility: HOSPITAL | Age: 76
End: 2021-04-28

## 2021-04-28 ENCOUNTER — RECORDS - HEALTHEAST (OUTPATIENT)
Dept: ADMINISTRATIVE | Facility: OTHER | Age: 76
End: 2021-04-28

## 2021-04-28 VITALS
HEART RATE: 65 BPM | BODY MASS INDEX: 26.01 KG/M2 | DIASTOLIC BLOOD PRESSURE: 70 MMHG | HEIGHT: 72 IN | WEIGHT: 192 LBS | SYSTOLIC BLOOD PRESSURE: 125 MMHG

## 2021-04-28 DIAGNOSIS — G30.1 LATE ONSET ALZHEIMER'S DISEASE WITHOUT BEHAVIORAL DISTURBANCE (H): Primary | ICD-10-CM

## 2021-04-28 DIAGNOSIS — G40.209 PARTIAL SYMPTOMATIC EPILEPSY WITH COMPLEX PARTIAL SEIZURES, NOT INTRACTABLE, WITHOUT STATUS EPILEPTICUS (H): ICD-10-CM

## 2021-04-28 DIAGNOSIS — F02.80 LATE ONSET ALZHEIMER'S DISEASE WITHOUT BEHAVIORAL DISTURBANCE (H): Primary | ICD-10-CM

## 2021-04-28 PROBLEM — E78.5 HYPERLIPIDEMIA: Status: ACTIVE | Noted: 2021-04-28

## 2021-04-28 PROBLEM — R41.82 ALTERED MENTAL STATUS: Status: RESOLVED | Noted: 2019-05-05 | Resolved: 2021-04-28

## 2021-04-28 LAB
ALBUMIN SERPL-MCNC: 3.6 G/DL (ref 3.5–5)
ALP SERPL-CCNC: 90 U/L (ref 45–120)
ALT SERPL W P-5'-P-CCNC: 10 U/L (ref 0–45)
ANION GAP SERPL CALCULATED.3IONS-SCNC: 10 MMOL/L (ref 5–18)
AST SERPL W P-5'-P-CCNC: 17 U/L (ref 0–40)
BILIRUB SERPL-MCNC: 0.6 MG/DL (ref 0–1)
BUN SERPL-MCNC: 13 MG/DL (ref 8–28)
CALCIUM SERPL-MCNC: 8.8 MG/DL (ref 8.5–10.5)
CHLORIDE BLD-SCNC: 109 MMOL/L (ref 98–107)
CO2 SERPL-SCNC: 21 MMOL/L (ref 22–31)
CREAT SERPL-MCNC: 1 MG/DL (ref 0.7–1.3)
GFR SERPL CREATININE-BSD FRML MDRD: >60 ML/MIN/1.73M2
GLUCOSE BLD-MCNC: 129 MG/DL (ref 70–125)
LEVETIRACETAM (KEPPRA): 49.2 UG/ML (ref 6–46)
POTASSIUM BLD-SCNC: 4.1 MMOL/L (ref 3.5–5)
PROT SERPL-MCNC: 6.8 G/DL (ref 6–8)
SODIUM SERPL-SCNC: 140 MMOL/L (ref 136–145)

## 2021-04-28 PROCEDURE — 99214 OFFICE O/P EST MOD 30 MIN: CPT | Performed by: PSYCHIATRY & NEUROLOGY

## 2021-04-28 RX ORDER — LEVETIRACETAM 500 MG/1
500 TABLET ORAL 2 TIMES DAILY
Qty: 180 TABLET | Refills: 3 | Status: SHIPPED | OUTPATIENT
Start: 2021-04-28

## 2021-04-28 RX ORDER — DONEPEZIL HYDROCHLORIDE 10 MG/1
10 TABLET, FILM COATED ORAL AT BEDTIME
Qty: 90 TABLET | Refills: 3 | Status: SHIPPED | OUTPATIENT
Start: 2021-04-28

## 2021-04-28 SDOH — HEALTH STABILITY: MENTAL HEALTH: HOW MANY STANDARD DRINKS CONTAINING ALCOHOL DO YOU HAVE ON A TYPICAL DAY?: NOT ASKED

## 2021-04-28 ASSESSMENT — MONTREAL COGNITIVE ASSESSMENT (MOCA)
8. SERIAL SUBTRACTION OF 7S: 3
WHAT IS THE TOTAL SCORE (OUT OF 30): 19
4. NAME EACH OF THE THREE ANIMALS SHOWN: 3
13. ORIENTATION SUBSCORE: 3
10. [FLUENCY] NAME WORDS STARTING WITH DESIGNATED LETTER: 1
11. FOR EACH PAIR OF WORDS, WHAT CATEGORY DO THEY BELONG TO (OUT OF 2): 2
12. MEMORY INDEX SCORE: 0
9. REPEAT EACH SENTENCE: 2
VISUOSPATIAL/EXECUTIVE SUBSCORE: 3
7. [VIGILENCE] TAP WHEN HEARING DESIGNATED LETTER: 0
6. READ LIST OF DIGITS [FORWARD/BACKWARD]: 2
WHAT LEVEL OF EDUCATION WAS ATTAINED: 0

## 2021-04-28 ASSESSMENT — MIFFLIN-ST. JEOR: SCORE: 1648.91

## 2021-04-28 NOTE — PROGRESS NOTES
NEUROLOGY FOLLOW UP VISIT  NOTE       [facility] [place]  [address] [place] [address]  Tel: [phone]  Fax: [phone]  [email or web address]     Navdeep Spann,  1945, MRN [id number]  PCP: Patricia Muro  Date: 2021      ASSESSMENT & PLAN     Diagnosis code  1. Late onset Alzheimer's dementia  2. Complex partial seizure     Late onset Alzheimer's dementia  76-year-old male with history of atrial fibrillation, carotid stenosis, HTN, HLD, Alzheimer's dementia who returns for yearly follow-up.  Patient had multiple episodes in the past during which he would have confusion and eventually was diagnosed with seizures.  His cognitive issues are stable and he scored 19/30 on [place] cognitive assessment today that is stable compared to 2019.  I have refilled his prescription for Aricept 10 mg daily.  I am checking comprehensive metabolic panel.  Complicating his clinical picture is his marijuana use and sensorineural hearing loss that at times makes it difficult for him to hear.  Follow-up will be in 1 year    Complex partial seizure   Patient had multiple episodes in the past during which he had episodes of loss of consciousness and had extensive work-up including MRI, EEG and 24-hour EEG that were normal.  However 1 EEG showed left temporal slowing and he was started on Keppra and he claims he is taking it regularly.  I have refilled his prescriptions.  I am checking Keppra level and electrolyte panel.  Follow-up will be in 1 year.    Thank you again for this referral, please feel free to contact me if you have any questions.    Alonzo Moreno MD  [facility]  (Formerly, [facility]     HISTORY OF PRESENT ILLNESS     Patient is a 76-year-old male with history of atrial fibrillation, carotid stenosis, HTN, HLD, Alzheimer's dementia who returns for follow-up.  He was last seen in 2020 when he  was admitted to the hospital for spell of loss of consciousness.  He had similar episodes in the previous few years and extensive testing was negative.  During one EEG he was found to have slowing in the left temporal head region and he was started on Keppra that did seem to help.  During his last admission to the hospital in March 2020 he reported he had few episodes afterwards he was confused.  There was no convulsive activity, tongue biting or any bowel or bladder incontinence.  He had a EEG during that hospitalization that was normal and an MRI scan showed age-related changes.  Since his last visit he reports no significant change.  He had a fall in December 2020 when he was taken to the emergency room and had x-rays but no etiology was found.     PROBLEM LIST   Patient Active Problem List   Diagnosis Code     Recurrent major depressive disorder, in partial remission (H) F33.41     SHERRELL (generalized anxiety disorder) F41.1     Late onset Alzheimer's disease without behavioral disturbance (H) G30.1, F02.80     Marijuana use F12.90     Hypertension I10     Hearing loss H91.90     Contraindication to anticoagulation therapy Z53.09     BPH (benign prostatic hyperplasia) N40.0     Anxiety state F41.1     Anemia D64.9     A-fib (H) I48.91     Complex partial seizures (H) G40.209     Hyperlipidemia E78.5         PAST MEDICAL & SURGICAL HISTORY     Past Medical History:   Patient  has a past medical history of A-fib (H), Acute respiratory failure (H) (2/23/2014), Anemia, Anxiety, Aortic valve disorder, Arthritis, BPH (benign prostatic hyperplasia), Carotid stenosis, Depression, Drug overdose (2/23/2014), Dyslipidemia, Hard of hearing, Hypertension, Lymphedema, and Syncope and collapse (2009, 2010).    Surgical History:  He  has a past surgical history that includes appendectomy; Colon surgery; joint replacement; and picc insertion (10/23/2014).     SOCIAL HISTORY     Reviewed, and he  reports that he has never smoked. He  has never used smokeless tobacco. He reports previous alcohol use. He reports that he does not use drugs.     FAMILY HISTORY     Reviewed, and family history includes Cancer in his mother.     ALLERGIES     No Known Allergies      REVIEW OF SYSTEMS     A 12 point review of system was performed and was negative except as outlined in the history of present illness.     HOME MEDICATIONS     Current Outpatient Rx   Medication Sig Dispense Refill     acetaminophen (TYLENOL) 325 MG tablet Take 2 tablets (650 mg) by mouth every 4 hours as needed for mild pain       amLODIPine (NORVASC) 5 MG tablet Take 1 tablet (5 mg) by mouth daily       aspirin 81 MG EC tablet Take 81 mg by mouth daily       citalopram (CELEXA) 20 MG tablet Take 20 mg by mouth daily       cyanocobalamin (VITAMIN B-12) 2500 MCG SUBL sublingual tablet Place 2,500 mcg under the tongue daily       digoxin (DIGOX) 125 MCG tablet Take 125 mcg by mouth daily       donepezil (ARICEPT) 10 MG tablet Take 1 tablet (10 mg) by mouth At Bedtime 90 tablet 3     ferrous sulfate (FEROSUL) 325 (65 Fe) MG tablet Take 1 tablet by mouth 3 times daily (with meals)       levETIRAcetam (KEPPRA) 500 MG tablet Take 1 tablet (500 mg) by mouth 2 times daily 180 tablet 3     lisinopril (PRINIVIL/ZESTRIL) 40 MG tablet Take 40 mg by mouth daily       magnesium oxide (MAG-OX) 400 MG tablet Take 400 mg by mouth daily       metoprolol tartrate 75 MG TABS Take 75 mg by mouth 2 times daily       trimethoprim (TRIMPEX) 100 MG tablet Take 100 mg by mouth daily            PHYSICAL EXAM     Vital signs  /70 (BP Location: Right arm, Patient Position: Sitting, Cuff Size: Adult Regular)   Pulse 65   Ht 1.829 m (6')   Wt 87.1 kg (192 lb)   BMI 26.04 kg/m      Weight:   192 lbs 0 oz  [place] Cognitive Assessment:    Lv Cognitive Assessment (MOCA)  Visuospatial/Executive : 3  Naming: 3  Attention - Digits: 2  Attention - Letters: 0  Attention - Subtraction: 3  Language - Repeat:  2  Language - Fluency : 1  Abstraction: 2  Delayed Recall: 0  Orientation: 3  Education: 0  MOCA Score: 19  Administered by: : SASHA     Lv Cognitive Assessment Score:  MOCA Score: 19/30.    General physical exam reveals a elderly gentleman who is alert and oriented vital signs were reviewed and are documented in electronic medical record neck was supple no carotid bruit thyromegaly JVD or lymphadenopathy noted. Speech mentation and affect was normal. Funduscopic exam normal. Cranial nerves II through XII are intact, except he is hard of hearing. Strength 5/5. Reflexes symmetrical. Gait normal. Romberg negative.     DIAGNOSTIC STUDIES     PERTINENT RADIOLOGY  Following imaging studies were reviewed:     CT HEAD 1/13/19  1. No evidence of acute intracranial hemorrhage, mass, or herniation.  2. Marked periventricular white matter changes likely due to chronic  microvascular ischemic disease.  3. Chronic appearing lacunar infarct in the ventral right thalamus.  4. Nonspecific small air-fluid layers in the maxillary sinuses  bilaterally. This can be seen in acute sinusitis in the appropriate  clinical setting.  5. Mild soft tissue swelling overlying the left preorbital region. No  underlying orbital fracture appreciated.    MRI BRAIN 3/10/2020  1. No discrete mass lesion, hemorrhage or focal area suggestive of acute infarct.  2. Advanced age related changes along with old lacunar infarcts thalami bilaterally.    MRI BRAIN 7/1/19  1. No acute areas of ischemia, hemorrhage or mass effect.   2. Fairly extensive chronic white matter change.   3. Pansinusitis and bilateral mastoiditis. This may be chronic.     NEUROPSYCHOLOGICAL TESTING 6/27/2016  suggested neurocognitive disorder.     BRAINPET SCAN  6/10/2016   Fairly uniformly decreased cerebral glucose metabolism is nonspecific, but not typical of a neurodegenerative process. This pattern can be seen with caffeine, alcohol, corticosteroid, or benzodiazepine use and  correlation with medication and substance use is requested.    EEG 3/10/2020  This is a normal EEG during wakefulness and drowsiness except for mild background dysrhythmia. Further clinical correlation is needed.    24 HR EEG 9/10/19  This is a normal awake and sleep 24 hour EEG     PERTINENT LABS  Following labs were reviewed:   Ref Range & Units 3mo ago   Sodium 136 - 145 mmol/L 141    Potassium 3.5 - 5.0 mmol/L 3.8    Chloride 98 - 107 mmol/L 108High     CO2 22 - 31 mmol/L 22    Anion Gap, Calculation 5 - 18 mmol/L 11    Glucose 70 - 125 mg/dL 136High     Calcium 8.5 - 10.5 mg/dL 8.7    BUN 8 - 28 mg/dL 13    Creatinine 0.70 - 1.30 mg/dL 1.02    GFR MDRD Af Amer >60 mL/min/1.73m2 >60    GFR MDRD Non Af Amer >60 mL/min/1.73m2 >60      Ref Range & Units 4mo ago    Levetiracetam 6.0 - 46.0 ug/mL 28.1    Resulting Agency       Suggested Trough Concentrations: 6.0 - 46.0 ug/mL  Specimen Collected: 12/07/20  7:04 AM              Total time spent for face to face visit, reviewing labs/imaging studies, counseling and coordination of care was: 30 Minutes spent on the date of the encounter doing chart review, review of outside records, review of test results, interpretation of tests, patient visit and documentation       This note was dictated using voice recognition software.  Any grammatical or context distortions are unintentional and inherent to the software.    Orders Placed This Encounter   Procedures     Keppra (Levetiracetam) Level     Comprehensive Metabolic ([facility])      New Prescriptions    LEVETIRACETAM (KEPPRA) 500 MG TABLET    Take 1 tablet (500 mg) by mouth 2 times daily     Modified Medications    Modified Medication Previous Medication    DONEPEZIL (ARICEPT) 10 MG TABLET donepezil (ARICEPT) 10 MG tablet       Take 1 tablet (10 mg) by mouth At Bedtime    Take 1 tablet (10 mg) by mouth At Bedtime

## 2021-04-28 NOTE — LETTER
2021         RE: Navdeep Spann  [address]  [place] [address]        Dear Colleague,    Thank you for referring your patient, Navdeep Spann, to the [facility]. Please see a copy of my visit note below.    NEUROLOGY FOLLOW UP VISIT  NOTE       [facility]  [address] [place] [address]  Tel: [phone]  Fax: [phone]  [email or web address]     Navdeep Spann,  1945, MRN [id number]  PCP: Patricia Muro  Date: 2021      ASSESSMENT & PLAN     Diagnosis code  1. Late onset Alzheimer's dementia  2. Complex partial seizure     Late onset Alzheimer's dementia  76-year-old male with history of atrial fibrillation, carotid stenosis, HTN, HLD, Alzheimer's dementia who returns for yearly follow-up.  Patient had multiple episodes in the past during which he would have confusion and eventually was diagnosed with seizures.  His cognitive issues are stable and he scored 19/30 on [facility] cognitive assessment today that is stable compared to 2019.  I have refilled his prescription for Aricept 10 mg daily.  I am checking comprehensive metabolic panel.  Complicating his clinical picture is his marijuana use and sensorineural hearing loss that at times makes it difficult for him to hear.  Follow-up will be in 1 year    Complex partial seizure   Patient had multiple episodes in the past during which he had episodes of loss of consciousness and had extensive work-up including MRI, EEG and 24-hour EEG that were normal.  However 1 EEG showed left temporal slowing and he was started on Keppra and he claims he is taking it regularly.  I have refilled his prescriptions.  I am checking Keppra level and electrolyte panel.  Follow-up will be in 1 year.    Thank you again for this referral, please feel free to contact me if you have any questions.    Alonzo Moreno MD  [facility]  [place]  (Formerly, [facility]     HISTORY OF PRESENT ILLNESS     Patient is a 76-year-old male with history of atrial fibrillation, carotid stenosis, HTN, HLD, Alzheimer's dementia who returns for follow-up.  He was last seen in March 2020 when he was admitted to the hospital for spell of loss of consciousness.  He had similar episodes in the previous few years and extensive testing was negative.  During one EEG he was found to have slowing in the left temporal head region and he was started on Keppra that did seem to help.  During his last admission to the hospital in March 2020 he reported he had few episodes afterwards he was confused.  There was no convulsive activity, tongue biting or any bowel or bladder incontinence.  He had a EEG during that hospitalization that was normal and an MRI scan showed age-related changes.  Since his last visit he reports no significant change.  He had a fall in December 2020 when he was taken to the emergency room and had x-rays but no etiology was found.     PROBLEM LIST   Patient Active Problem List   Diagnosis Code     Recurrent major depressive disorder, in partial remission (H) F33.41     SHERRELL (generalized anxiety disorder) F41.1     Late onset Alzheimer's disease without behavioral disturbance (H) G30.1, F02.80     Marijuana use F12.90     Hypertension I10     Hearing loss H91.90     Contraindication to anticoagulation therapy Z53.09     BPH (benign prostatic hyperplasia) N40.0     Anxiety state F41.1     Anemia D64.9     A-fib (H) I48.91     Complex partial seizures (H) G40.209     Hyperlipidemia E78.5         PAST MEDICAL & SURGICAL HISTORY     Past Medical History:   Patient  has a past medical history of A-fib (H), Acute respiratory failure (H) (2/23/2014), Anemia, Anxiety, Aortic valve disorder, Arthritis, BPH (benign prostatic hyperplasia), Carotid stenosis, Depression, Drug overdose (2/23/2014), Dyslipidemia, Hard of hearing,  Hypertension, Lymphedema, and Syncope and collapse (2009, 2010).    Surgical History:  He  has a past surgical history that includes appendectomy; Colon surgery; joint replacement; and picc insertion (10/23/2014).     SOCIAL HISTORY     Reviewed, and he  reports that he has never smoked. He has never used smokeless tobacco. He reports previous alcohol use. He reports that he does not use drugs.     FAMILY HISTORY     Reviewed, and family history includes Cancer in his mother.     ALLERGIES     No Known Allergies      REVIEW OF SYSTEMS     A 12 point review of system was performed and was negative except as outlined in the history of present illness.     HOME MEDICATIONS     Current Outpatient Rx   Medication Sig Dispense Refill     acetaminophen (TYLENOL) 325 MG tablet Take 2 tablets (650 mg) by mouth every 4 hours as needed for mild pain       amLODIPine (NORVASC) 5 MG tablet Take 1 tablet (5 mg) by mouth daily       aspirin 81 MG EC tablet Take 81 mg by mouth daily       citalopram (CELEXA) 20 MG tablet Take 20 mg by mouth daily       cyanocobalamin (VITAMIN B-12) 2500 MCG SUBL sublingual tablet Place 2,500 mcg under the tongue daily       digoxin (DIGOX) 125 MCG tablet Take 125 mcg by mouth daily       donepezil (ARICEPT) 10 MG tablet Take 1 tablet (10 mg) by mouth At Bedtime 90 tablet 3     ferrous sulfate (FEROSUL) 325 (65 Fe) MG tablet Take 1 tablet by mouth 3 times daily (with meals)       levETIRAcetam (KEPPRA) 500 MG tablet Take 1 tablet (500 mg) by mouth 2 times daily 180 tablet 3     lisinopril (PRINIVIL/ZESTRIL) 40 MG tablet Take 40 mg by mouth daily       magnesium oxide (MAG-OX) 400 MG tablet Take 400 mg by mouth daily       metoprolol tartrate 75 MG TABS Take 75 mg by mouth 2 times daily       trimethoprim (TRIMPEX) 100 MG tablet Take 100 mg by mouth daily            PHYSICAL EXAM     Vital signs  /70 (BP Location: Right arm, Patient Position: Sitting, Cuff Size: Adult Regular)   Pulse 65   Ht  1.829 m (6')   Wt 87.1 kg (192 lb)   BMI 26.04 kg/m      Weight:   192 lbs 0 oz  [place] Cognitive Assessment:    Lv Cognitive Assessment (MOCA)  Visuospatial/Executive : 3  Naming: 3  Attention - Digits: 2  Attention - Letters: 0  Attention - Subtraction: 3  Language - Repeat: 2  Language - Fluency : 1  Abstraction: 2  Delayed Recall: 0  Orientation: 3  Education: 0  MOCA Score: 19  Administered by: : DLL     [place] Cognitive Assessment Score:  MOCA Score: 19/30.    General physical exam reveals a elderly gentleman who is alert and oriented vital signs were reviewed and are documented in electronic medical record neck was supple no carotid bruit thyromegaly JVD or lymphadenopathy noted. Speech mentation and affect was normal. Funduscopic exam normal. Cranial nerves II through XII are intact, except he is hard of hearing. Strength 5/5. Reflexes symmetrical. Gait normal. Romberg negative.     DIAGNOSTIC STUDIES     PERTINENT RADIOLOGY  Following imaging studies were reviewed:     CT HEAD 1/13/19  1. No evidence of acute intracranial hemorrhage, mass, or herniation.  2. Marked periventricular white matter changes likely due to chronic  microvascular ischemic disease.  3. Chronic appearing lacunar infarct in the ventral right thalamus.  4. Nonspecific small air-fluid layers in the maxillary sinuses  bilaterally. This can be seen in acute sinusitis in the appropriate  clinical setting.  5. Mild soft tissue swelling overlying the left preorbital region. No  underlying orbital fracture appreciated.    MRI BRAIN 3/10/2020  1. No discrete mass lesion, hemorrhage or focal area suggestive of acute infarct.  2. Advanced age related changes along with old lacunar infarcts thalami bilaterally.    MRI BRAIN 7/1/19  1. No acute areas of ischemia, hemorrhage or mass effect.   2. Fairly extensive chronic white matter change.   3. Pansinusitis and bilateral mastoiditis. This may be chronic.     NEUROPSYCHOLOGICAL TESTING  6/27/2016  suggested neurocognitive disorder.     BRAINPET SCAN  6/10/2016   Fairly uniformly decreased cerebral glucose metabolism is nonspecific, but not typical of a neurodegenerative process. This pattern can be seen with caffeine, alcohol, corticosteroid, or benzodiazepine use and correlation with medication and substance use is requested.    EEG 3/10/2020  This is a normal EEG during wakefulness and drowsiness except for mild background dysrhythmia. Further clinical correlation is needed.    24 HR EEG 9/10/19  This is a normal awake and sleep 24 hour EEG     PERTINENT LABS  Following labs were reviewed:   Ref Range & Units 3mo ago   Sodium 136 - 145 mmol/L 141    Potassium 3.5 - 5.0 mmol/L 3.8    Chloride 98 - 107 mmol/L 108High     CO2 22 - 31 mmol/L 22    Anion Gap, Calculation 5 - 18 mmol/L 11    Glucose 70 - 125 mg/dL 136High     Calcium 8.5 - 10.5 mg/dL 8.7    BUN 8 - 28 mg/dL 13    Creatinine 0.70 - 1.30 mg/dL 1.02    GFR MDRD Af Amer >60 mL/min/1.73m2 >60    GFR MDRD Non Af Amer >60 mL/min/1.73m2 >60      Ref Range & Units 4mo ago    Levetiracetam 6.0 - 46.0 ug/mL 28.1    Resulting Agency       Suggested Trough Concentrations: 6.0 - 46.0 ug/mL  Specimen Collected: 12/07/20  7:04 AM              Total time spent for face to face visit, reviewing labs/imaging studies, counseling and coordination of care was: 30 Minutes spent on the date of the encounter doing chart review, review of outside records, review of test results, interpretation of tests, patient visit and documentation       This note was dictated using voice recognition software.  Any grammatical or context distortions are unintentional and inherent to the software.    Orders Placed This Encounter   Procedures     Keppra (Levetiracetam) Level     Comprehensive Metabolic ([facility])      New Prescriptions    LEVETIRACETAM (KEPPRA) 500 MG TABLET    Take 1 tablet (500 mg) by mouth 2 times daily     Modified Medications    Modified Medication  Previous Medication    DONEPEZIL (ARICEPT) 10 MG TABLET donepezil (ARICEPT) 10 MG tablet       Take 1 tablet (10 mg) by mouth At Bedtime    Take 1 tablet (10 mg) by mouth At Bedtime                     Again, thank you for allowing me to participate in the care of your patient.        Sincerely,        Alonzo Moreno MD

## 2021-04-28 NOTE — NURSING NOTE
SAJI COGNITIVE ASSESSMENT (MOCA)  Version 7.1 Original Version  VISUOSPATIAL/EXECUTIVE               COPY CUBE      [  0  ]                                [   0 ] DRAW CLOCK (Ten past eleven)  (3 points)    [ 1   ]                    [  1 ]               [  1  ]       Contour            Numbers     Hands POINTS                  3 / 5   NAMING    [  1 ]                                                                        [   1 ]                                             [ 1  ]  Liabril Mckeon                                Camel                  3   / 3   MEMORY Read list of words, subject must repeat them. Do 2 trials, even if 1st trial is successful. Do a recall after 5 minutes  FACE VELVET Gnosticism MUNDO RED No Points    1st          2nd         ATTENTION Read list of digits (1 digit/sec) Subject has to repeat in the forward order       [  1  ]   2  1  8  5  4                                [ 1   ] 7 4 2                         2 /2   Read list of letters. The subject must tap with his hand at each letter A. No points if > 2 errors.  [  0  ] F B A C M N A A J K L B A F A K D E A A A J A M O F A A B           0   /1   Serial 7 subtraction starting at 100          [    ] 93         [    ] 86          [    ] 79          [    ] 72         [    ] 65   4 or 5 correct subtractions: 3 points,  2 or 3 correct: 2 points,  1correct: 1 point,   0 correct: 0 points          3  /3   LANGUAGE Repeat: I only know that Hima is the one to help today. [   1]                                      The cat always hid under the couch when dogs were in the room. [ 1  ]              2 /2   Fluency: Name maximum number of words in one minute that begin with the letter F                                                                                                                    [1  ] ___ (N > 11 words)            1   /1   ABSTRACTION Similarity between e.g.  banana-orange=fruit                                                                   [   1 ] train-bicycle                      [  1  ] watch-ruler           2   /2   DELAYED  RECALL Has to recall words  WITH NO CUE FACE  [    ] VELVET  [    ] Jain  [    ]  MUNDO  [    ] RED  [    ] Points for UNCUED recall only        0    /5           OPTIONAL Category cue           Multiple choice cue          ORIENTATION  [    ] Date     [    ] Month       [    ] Year      [  1  ] Day      [  1 ] Place        [ 1  ] City       3   /6   TOTAL  Normal > 26/30 Add 1 point if < 12 years education      19 /30

## 2021-04-29 ENCOUNTER — OFFICE VISIT - HEALTHEAST (OUTPATIENT)
Dept: FAMILY MEDICINE | Facility: CLINIC | Age: 76
End: 2021-04-29

## 2021-04-29 DIAGNOSIS — R26.81 UNSTABLE GAIT: ICD-10-CM

## 2021-04-29 DIAGNOSIS — R19.5 LOOSE STOOLS: ICD-10-CM

## 2021-04-29 DIAGNOSIS — L21.9 SEBORRHEIC DERMATITIS, UNSPECIFIED: ICD-10-CM

## 2021-04-29 DIAGNOSIS — Z12.11 COLON CANCER SCREENING: ICD-10-CM

## 2021-05-03 ENCOUNTER — COMMUNICATION - HEALTHEAST (OUTPATIENT)
Dept: HOME HEALTH SERVICES | Facility: HOME HEALTH | Age: 76
End: 2021-05-03

## 2021-05-19 ENCOUNTER — COMMUNICATION - HEALTHEAST (OUTPATIENT)
Dept: FAMILY MEDICINE | Facility: CLINIC | Age: 76
End: 2021-05-19

## 2021-05-19 DIAGNOSIS — R53.81 PHYSICAL DECONDITIONING: ICD-10-CM

## 2021-05-19 DIAGNOSIS — F03.91 DEMENTIA WITH BEHAVIORAL DISTURBANCE, UNSPECIFIED DEMENTIA TYPE: ICD-10-CM

## 2021-05-23 ENCOUNTER — COMMUNICATION - HEALTHEAST (OUTPATIENT)
Dept: FAMILY MEDICINE | Facility: CLINIC | Age: 76
End: 2021-05-23

## 2021-05-23 DIAGNOSIS — I10 ESSENTIAL HYPERTENSION: ICD-10-CM

## 2021-05-24 ENCOUNTER — COMMUNICATION - HEALTHEAST (OUTPATIENT)
Dept: FAMILY MEDICINE | Facility: CLINIC | Age: 76
End: 2021-05-24

## 2021-05-26 ENCOUNTER — RECORDS - HEALTHEAST (OUTPATIENT)
Dept: ADMINISTRATIVE | Facility: CLINIC | Age: 76
End: 2021-05-26

## 2021-05-26 ASSESSMENT — PATIENT HEALTH QUESTIONNAIRE - PHQ9
SUM OF ALL RESPONSES TO PHQ QUESTIONS 1-9: 0
SUM OF ALL RESPONSES TO PHQ QUESTIONS 1-9: 5

## 2021-05-27 VITALS — SYSTOLIC BLOOD PRESSURE: 104 MMHG | TEMPERATURE: 97 F | DIASTOLIC BLOOD PRESSURE: 64 MMHG

## 2021-05-27 ASSESSMENT — PATIENT HEALTH QUESTIONNAIRE - PHQ9: SUM OF ALL RESPONSES TO PHQ QUESTIONS 1-9: 1

## 2021-05-27 NOTE — PATIENT INSTRUCTIONS - HE
A prescription for alprazolam is sent to your pharmacy.  - Use this sparingly as needed.    Continue on your citalopram as prescribed.    Labs done today, results will be available via [employer].

## 2021-05-27 NOTE — PROGRESS NOTES
"ASSESSMENT/PLAN:  1. Anxiety  Here for recheck of his anxiety.  We did start him on citalopram and his significant other Melva seems to feel that he is less anxious.  He is a difficult historian but also feels that he agrees with her.  He does sometimes have some more amplified times of anxiety and for that reason we prescribed alprazolam.  We discussed the importance of using this very sparingly and suggest that he use no more than 5 tablets a month for these more breakthrough anxiety attacks.  We discussed some of the side effects of this including confusion and sedation.  - ALPRAZolam (XANAX) 0.25 MG tablet; Take 1 tab daily as needed for anxiety- use sparingly.  Dispense: 5 tablet; Refill: 1    2. Elevated blood sugar  - Glycosylated Hemoglobin A1c    Patient Instructions   A prescription for alprazolam is sent to your pharmacy.  - Use this sparingly as needed.    Continue on your citalopram as prescribed.    Labs done today, results will be available via [employer].      Orders Placed This Encounter   Procedures     Glycosylated Hemoglobin A1c     There are no discontinued medications.    Return in about 6 months (around 10/2/2019), or sooner as needed, for routine follow up.    CHIEF COMPLAINT;  Chief Complaint   Patient presents with     Follow-up       HISTORY OF PRESENT ILLNESS:  Navdeep is a 74 y.o. male presenting to the clinic today for medication management. He feels well.     Depression: His fluoxetine was discontinued 2/14/19 and he was started on citalopram 20 mg daily. He has been tolerating this well. His PHQ-9 is completed today. He felt vulnerable, weak, and like he was \"opening up too much\" while filling out the PHQ-9 in clinic today. He had some flashbacks to when he was in AA while he was filling out the questionnaire. He states that he has always been private and typically keeps to himself. He feels that his anxiety comes and goes; his significant other agrees and states he gets an acute " "anxiety episode 2-3 times per month. His significant other states that it is sometimes hard for him to calm down. His significant other feels that this has improved since taking citalopram versus fluoxetine.     Dementia: His significant other states that he has stopped going to Alzheimer's therapy. He feels that his therapist seemed more interested in all of the \"close calls\" he had.     REVIEW OF SYSTEMS:  He wears glasses. His significant other would like his blood sugar checked as he has been eating a lot of candy and drinking quite a bit of soda recently. All other systems are negative.    PFSH:  He has a history of alcohol abuse. He had never gotten a DUI. He quit drinking in 1990. His significant other states that he used to be angry when he was drunk. He did go to some anger management group classes in 1977. His significant other states that he now sits down to talk when he is angry and they are arguing.     TOBACCO USE:  Social History     Tobacco Use   Smoking Status Former Smoker   Smokeless Tobacco Never Used       VITALS:  Vitals:    04/02/19 1321   BP: 122/80   Pulse: 71   SpO2: 98%   Weight: 194 lb (88 kg)   Height: 5' 11\" (1.803 m)     Wt Readings from Last 3 Encounters:   04/02/19 194 lb (88 kg)   02/14/19 180 lb (81.6 kg)   12/10/18 174 lb (78.9 kg)     Body mass index is 27.06 kg/m .    PHYSICAL EXAM:  GENERAL APPEARANCE: Alert, cooperative, no distress, appears stated age  HEAD: Normocephalic, without obvious abnormality, atraumatic  LUNGS: Clear to auscultation bilaterally, respirations unlabored  CHEST WALL: No tenderness or deformity  HEART: Regular rate and rhythm, S1 and S2 normal, no murmur, rub or gallop  NEUROLOGIC: CNII-XII intact.     RECENT RESULTS  No results found for this or any previous visit (from the past 48 hour(s)).    QUALITY MEASURES:  The following are part of a depression follow up plan for the patient:  mental health screening assessment    ADDITIONAL HISTORY SUMMARIZED " (2): None.  DECISION TO OBTAIN EXTRA INFORMATION (1): None.  RADIOLOGY TESTS (1): None.  LABS (1): Labs ordered today.  MEDICINE TESTS (1): None.  INDEPENDENT REVIEW (2 each): None.    The visit lasted a total of 16 minutes face to face with the patient. Over 50% of the time was spent counseling and educating the patient about depression and dementia.    IPam, am scribing for and in the presence of, Dr. Muro.    I, Dr. Muro, personally performed the services described in this documentation, as scribed by Pam Pinto in my presence, and it is both accurate and complete.    Dragon dictation was used for this note.  Speech recognition errors are a possibility.    MEDICATIONS:  Current Outpatient Medications   Medication Sig Dispense Refill     chlorthalidone (HYGROTEN) 25 MG tablet Take 1 tablet (25 mg total) by mouth every morning. 90 tablet 3     citalopram (CELEXA) 20 MG tablet Take 1 tablet (20 mg total) by mouth daily. 30 tablet 2     cyanocobalamin, vitamin B-12, 2,500 mcg Subl Place 2,500 mcg under the tongue daily.       DIGOX 125 mcg tablet TAKE ONE TABLET BY MOUTH IN THE MORNING 30 tablet 0     donepezil (ARICEPT) 10 MG tablet Take 10 mg by mouth bedtime.       ferrous sulfate 325 (65 FE) MG tablet Take 1 tablet by mouth 3 (three) times a day with meals. 270 tablet 3     LACTOBACILLUS ACIDOPHILUS (PROBIOTIC ACIDOPHILUS ORAL) Take 1 capsule by mouth daily.        magnesium oxide (MAG-OX) 400 mg (241.3 mg magnesium) tablet Take 1 tablet (400 mg total) by mouth daily. 200 tablet 3     metoprolol tartrate (LOPRESSOR) 100 MG tablet TAKE 1 and 1/2 TABLETS BY MOUTH 2 TIMES A DAY (Patient taking differently: TAKE 1 tab in the morning and 1/2 TABLET in the evening) 270 tablet 3     nystatin (MYCOSTATIN) cream Apply 3 times daily to affected area 30 g 2     OMEGA-3S/DHA/EPA/FISH OIL (OMEGA 3 ORAL) Take 1,000 mg by mouth 2 (two) times a day.        potassium chloride (KLOR-CON M20) 20 MEQ tablet  Take 1 tablet (20 mEq total) by mouth 3 (three) times a day. 270 tablet 3     SAW PALMETTO ORAL Take 450 mg by mouth daily.       trimethoprim (TRIMPEX) 100 mg tablet Take 100 mg by mouth once daily.       VIT B1 MN/B2/B3/B5/B6/B12/C/FA (B COMPLEX W-VIT C ORAL) Take 1 tablet by mouth daily.        vitamin E 400 UNIT capsule Take by mouth see administration instructions. 1200 mg in the morning and 800 mg in the evening.       acetaminophen (TYLENOL) 500 MG tablet Take 500-1,000 mg by mouth every 6 (six) hours as needed.       ALPRAZolam (XANAX) 0.25 MG tablet Take 1 tab daily as needed for anxiety- use sparingly. 5 tablet 1     ARGININE, L-ARGININE, ORAL Take 1 tablet by mouth 2 (two) times a day.       aspirin 81 MG EC tablet Take 1 tablet (81 mg total) by mouth daily. 90 tablet 0     biotin 2,500 mcg cap Take 5,000 mcg by mouth daily.       CALCIUM CARB-MAG OXIDE-ZINC OX ORAL Take 1 tablet by mouth daily.       cholecalciferol, vitamin D3, 5,000 unit Tab Take 5,000 Units by mouth daily.       CRANBERRY FRUIT EXTRACT (CRANBERRY ORAL) Take 1 capsule by mouth daily.       No current facility-administered medications for this visit.        Total data points: 1

## 2021-05-27 NOTE — TELEPHONE ENCOUNTER
RN cannot approve Refill Request    RN can NOT refill this medication Protocol failed and NO refill given.      Doreen Fuentes, [facility] Triage/Med Refill 3/26/2019    Requested Prescriptions   Pending Prescriptions Disp Refills     DIGOX 125 mcg tablet [Pharmacy Med Name: Digox Oral Tablet 125 MCG] 30 tablet 11     Sig: TAKE ONE TABLET BY MOUTH IN THE MORNING    Refill Protocol for Digoxin Failed - 3/25/2019  9:25 PM       Failed - ECG in last 12 months    ECG rhythm strip: No results found for this or any previous visit. ECG 12 lead MUSE:   Results for orders placed or performed during the hospital encounter of 07/24/14   ECG 12 lead   Result Value Ref Range    SYSTOLIC BLOOD PRESSURE  mmHg    DIASTOLIC BLOOD PRESSURE  mmHg    VENTRICULAR RATE 91 BPM    ATRIAL RATE 104 BPM    P-R INTERVAL  ms    QRS DURATION 112 ms    Q-T INTERVAL 422 ms    QTC CALCULATION (BEZET) 519 ms    P Axis  degrees    R AXIS -15 degrees    T AXIS 131 degrees    MUSE DIAGNOSIS       Atrial fibrillation  Non-specific ST & T wave changes  Leftward axis  Prolonged QT  Abnormal ECG  When compared with ECG of 24-JUL-2014 11:28,  slightly less anterior ST depression    Confirmed by ELBA ZELAYA MD LOC:[facility] ([id number]) on 7/25/2014 8:17:56 AM    ECG 12 lead nursing unit:   Results for orders placed or performed during the hospital encounter of 06/20/17   ECG 12 lead nursing unit performed   Result Value Ref Range    SYSTOLIC BLOOD PRESSURE  mmHg    DIASTOLIC BLOOD PRESSURE  mmHg    VENTRICULAR RATE 91 BPM    ATRIAL RATE 91 BPM    P-R INTERVAL  ms    QRS DURATION 114 ms    Q-T INTERVAL 396 ms    QTC CALCULATION (BEZET) 487 ms    P Axis  degrees    R AXIS -24 degrees    T AXIS 99 degrees    MUSE DIAGNOSIS       Atrial fibrillation  T wave abnormality, consider lateral ischemia or digitalis effect  Prolonged QT  Abnormal ECG  When compared with ECG of 14-JUN-2017 06:10,  No significant change was found  Confirmed by SURJIT MCDONALD MD LOC:[facility] ([id number])  on 6/20/2017 5:32:14 PM              Failed - Normal digoxin level in last 12 months    No results found for: PHENOBARB         Passed - LFT or AST or ALT on file in last 12 months    Albumin   Date Value Ref Range Status   02/18/2019 3.8 3.5 - 5.0 g/dL Final     Bilirubin, Total   Date Value Ref Range Status   02/18/2019 0.7 0.0 - 1.0 mg/dL Final     Bilirubin, Direct   Date Value Ref Range Status   02/18/2019 0.2 <=0.5 mg/dL Final     Alkaline Phosphatase   Date Value Ref Range Status   02/18/2019 106 45 - 120 U/L Final     AST   Date Value Ref Range Status   02/18/2019 16 0 - 40 U/L Final     ALT   Date Value Ref Range Status   02/18/2019 11 0 - 45 U/L Final     Protein, Total   Date Value Ref Range Status   02/18/2019 7.4 6.0 - 8.0 g/dL Final               Passed - PCP or prescribing provider visit in past 6 months or next 3 months    Last office visit with prescriber/PCP: 2/14/2019 OR same dept: 2/14/2019 Patricia Muro MD OR same specialty: 2/14/2019 Patricia Muro MD Last physical: Visit date not found  Last MTM visit: Visit date not found     Next appt within 3 mo: Visit date not found  Next physical within 3 mo: Visit date not found  Prescriber OR PCP: Patricia Muro MD  Last diagnosis associated with med order: 1. Atrial fibrillation (H)  - DIGOX 125 mcg tablet [Pharmacy Med Name: Digox Oral Tablet 125 MCG]; TAKE ONE TABLET BY MOUTH IN THE MORNING  Dispense: 30 tablet; Refill: 0     If protocol passes may refill for 6 months if within 3 months of last provider visit (or a total of 9 months).         Passed - BMP on file in last 12 months    Sodium   Date Value Ref Range Status   07/31/2018 143 136 - 145 mmol/L Final     Potassium   Date Value Ref Range Status   07/31/2018 3.6 3.5 - 5.0 mmol/L Final     Chloride   Date Value Ref Range Status   07/31/2018 107 98 - 107 mmol/L Final     CO2   Date Value Ref Range Status   07/31/2018 27 22 - 31 mmol/L Final     BUN   Date Value Ref Range Status    07/31/2018 25 8 - 28 mg/dL Final     Creatinine   Date Value Ref Range Status   07/31/2018 1.08 0.70 - 1.30 mg/dL Final            Passed - CBC w/plts (hm2) on file in last 12 months    WBC   Date Value Ref Range Status   07/31/2018 9.0 4.0 - 11.0 thou/uL Final     Hemoglobin   Date Value Ref Range Status   10/18/2018 12.5 (L) 14.0 - 18.0 g/dL Final     Hematocrit   Date Value Ref Range Status   07/31/2018 35.0 (L) 40.0 - 54.0 % Final     Platelets   Date Value Ref Range Status   07/31/2018 242 140 - 440 thou/uL Final            Passed - Magnesium in last 12 months    Magnesium   Date Value Ref Range Status   07/31/2018 1.9 1.8 - 2.6 mg/dL Final             Passed - Serum creatinine in last 12 months    Creatinine   Date Value Ref Range Status   07/31/2018 1.08 0.70 - 1.30 mg/dL Final

## 2021-05-28 NOTE — TELEPHONE ENCOUNTER
Refill Approved    Rx renewed per Medication Renewal Policy. Medication was last renewed on 5/1/18.    Doreen Fuentes, [facility] Triage/Med Refill 4/19/2019     Requested Prescriptions   Pending Prescriptions Disp Refills     KLOR-CON M20 20 mEq tablet [Pharmacy Med Name: Klor-Con M20 Oral Tablet Extended Release 20 MEQ] 270 tablet 2     Sig: Take 1 tablet (20 mEq total) by mouth 3 (three) times a day.       Potassium Supplements Refill Protocol Passed - 4/18/2019  7:01 AM        Passed - PCP or prescribing provider visit in past 12 months       Last office visit with prescriber/PCP: 1/30/2018 Neva Whitlock MD OR same dept: 4/2/2019 Patricia Muro MD OR same specialty: 4/2/2019 Patricia Muro MD  Last physical: Visit date not found Last MTM visit: Visit date not found   Next visit within 3 mo: Visit date not found  Next physical within 3 mo: Visit date not found  Prescriber OR PCP: Neva Whitlock MD  Last diagnosis associated with med order: 1. Hypokalemia  - KLOR-CON M20 20 mEq tablet [Pharmacy Med Name: Klor-Con M20 Oral Tablet Extended Release 20 MEQ]; Take 1 tablet (20 mEq total) by mouth 3 (three) times a day.  Dispense: 270 tablet; Refill: 2    If protocol passes may refill for 12 months if within 3 months of last provider visit (or a total of 15 months).             Passed - Potassium level in last 12 months     Lab Results   Component Value Date    Potassium 3.6 07/31/2018

## 2021-05-28 NOTE — TELEPHONE ENCOUNTER
RN cannot approve Refill Request    RN can NOT refill this medication overdue for office visits and/or labs.    Griffin Still, [facility] Triage/Med Refill 4/23/2019    Requested Prescriptions   Pending Prescriptions Disp Refills     DIGOX 125 mcg tablet [Pharmacy Med Name: Digox Oral Tablet 125 MCG] 30 tablet 0     Sig: TAKE ONE TABLET BY MOUTH IN THE MORNING       Refill Protocol for Digoxin Failed - 4/23/2019  7:01 AM        Failed - ECG in last 12 months     ECG rhythm strip: No results found for this or any previous visit. ECG 12 lead MUSE:   Results for orders placed or performed during the hospital encounter of 07/24/14   ECG 12 lead   Result Value Ref Range    SYSTOLIC BLOOD PRESSURE  mmHg    DIASTOLIC BLOOD PRESSURE  mmHg    VENTRICULAR RATE 91 BPM    ATRIAL RATE 104 BPM    P-R INTERVAL  ms    QRS DURATION 112 ms    Q-T INTERVAL 422 ms    QTC CALCULATION (BEZET) 519 ms    P Axis  degrees    R AXIS -15 degrees    T AXIS 131 degrees    MUSE DIAGNOSIS       Atrial fibrillation  Non-specific ST & T wave changes  Leftward axis  Prolonged QT  Abnormal ECG  When compared with ECG of 24-JUL-2014 11:28,  slightly less anterior ST depression    Confirmed by ELBA ZELAYA MD LOC: ([id number]) on 7/25/2014 8:17:56 AM    ECG 12 lead nursing unit:   Results for orders placed or performed during the hospital encounter of 06/20/17   ECG 12 lead nursing unit performed   Result Value Ref Range    SYSTOLIC BLOOD PRESSURE  mmHg    DIASTOLIC BLOOD PRESSURE  mmHg    VENTRICULAR RATE 91 BPM    ATRIAL RATE 91 BPM    P-R INTERVAL  ms    QRS DURATION 114 ms    Q-T INTERVAL 396 ms    QTC CALCULATION (BEZET) 487 ms    P Axis  degrees    R AXIS -24 degrees    T AXIS 99 degrees    MUSE DIAGNOSIS       Atrial fibrillation  T wave abnormality, consider lateral ischemia or digitalis effect  Prolonged QT  Abnormal ECG  When compared with ECG of 14-JUN-2017 06:10,  No significant change was found  Confirmed by SURJIT MCDONALD MD LOC:[facility]  ([id number]) on 6/20/2017 5:32:14 PM               Failed - Normal digoxin level in last 12 months     No results found for: PHENOBARB          Passed - LFT or AST or ALT on file in last 12 months     Albumin   Date Value Ref Range Status   02/18/2019 3.8 3.5 - 5.0 g/dL Final     Bilirubin, Total   Date Value Ref Range Status   02/18/2019 0.7 0.0 - 1.0 mg/dL Final     Bilirubin, Direct   Date Value Ref Range Status   02/18/2019 0.2 <=0.5 mg/dL Final     Alkaline Phosphatase   Date Value Ref Range Status   02/18/2019 106 45 - 120 U/L Final     AST   Date Value Ref Range Status   02/18/2019 16 0 - 40 U/L Final     ALT   Date Value Ref Range Status   02/18/2019 11 0 - 45 U/L Final     Protein, Total   Date Value Ref Range Status   02/18/2019 7.4 6.0 - 8.0 g/dL Final                Passed - PCP or prescribing provider visit in past 6 months or next 3 months     Last office visit with prescriber/PCP: Visit date not found OR same dept: 4/2/2019 Patricia Muro MD OR same specialty: 4/2/2019 Patricia Muro MD Last physical: Visit date not found  Last MTM visit: Visit date not found     Next appt within 3 mo: Visit date not found  Next physical within 3 mo: Visit date not found  Prescriber OR PCP: Trisha King MD  Last diagnosis associated with med order: 1. Atrial fibrillation (H)  - DIGOX 125 mcg tablet [Pharmacy Med Name: Digox Oral Tablet 125 MCG]; TAKE ONE TABLET BY MOUTH IN THE MORNING  Dispense: 30 tablet; Refill: 0     If protocol passes may refill for 6 months if within 3 months of last provider visit (or a total of 9 months).          Passed - BMP on file in last 12 months     Sodium   Date Value Ref Range Status   07/31/2018 143 136 - 145 mmol/L Final     Potassium   Date Value Ref Range Status   07/31/2018 3.6 3.5 - 5.0 mmol/L Final     Chloride   Date Value Ref Range Status   07/31/2018 107 98 - 107 mmol/L Final     CO2   Date Value Ref Range Status   07/31/2018 27 22 - 31 mmol/L Final     BUN   Date  Value Ref Range Status   07/31/2018 25 8 - 28 mg/dL Final     Creatinine   Date Value Ref Range Status   07/31/2018 1.08 0.70 - 1.30 mg/dL Final             Passed - CBC w/plts (hm2) on file in last 12 months     WBC   Date Value Ref Range Status   07/31/2018 9.0 4.0 - 11.0 thou/uL Final     Hemoglobin   Date Value Ref Range Status   10/18/2018 12.5 (L) 14.0 - 18.0 g/dL Final     Hematocrit   Date Value Ref Range Status   07/31/2018 35.0 (L) 40.0 - 54.0 % Final     Platelets   Date Value Ref Range Status   07/31/2018 242 140 - 440 thou/uL Final             Passed - Magnesium in last 12 months     Magnesium   Date Value Ref Range Status   07/31/2018 1.9 1.8 - 2.6 mg/dL Final              Passed - Serum creatinine in last 12 months     Creatinine   Date Value Ref Range Status   07/31/2018 1.08 0.70 - 1.30 mg/dL Final

## 2021-05-28 NOTE — TELEPHONE ENCOUNTER
Refill Approved    Rx renewed per Medication Renewal Policy. Medication was last renewed on 2/14/2019.         Braxton Hanson, [facility] Triage/Med Refill 5/4/2019     Requested Prescriptions   Pending Prescriptions Disp Refills     citalopram (CELEXA) 20 MG tablet [Pharmacy Med Name: Citalopram Hydrobromide Oral Tablet 20 MG] 30 tablet 1     Sig: Take 1 tablet (20 mg total) by mouth daily.       SSRI Refill Protocol  Passed - 5/4/2019  7:03 AM        Passed - PCP or prescribing provider visit in last year     Last office visit with prescriber/PCP: 4/2/2019 Patricia Muro MD OR same dept: 4/2/2019 Patricia Muro MD OR same specialty: 4/2/2019 Patricia Muro MD  Last physical: 7/31/2018 Last MTM visit: Visit date not found   Next visit within 3 mo: Visit date not found  Next physical within 3 mo: Visit date not found  Prescriber OR PCP: Patricia Muro MD  Last diagnosis associated with med order: There are no diagnoses linked to this encounter.  If protocol passes may refill for 12 months if within 3 months of last provider visit (or a total of 15 months).

## 2021-05-29 NOTE — TELEPHONE ENCOUNTER
Left message to call back for: results  Information to relay to patient:  Below message. Please also notify patient that Dr. Muro agrees with the medication changes from his hospital stay.

## 2021-05-29 NOTE — TELEPHONE ENCOUNTER
RN cannot approve Refill Request    RN can NOT refill this medication Protocol failed and NO refill given.         Doreen Fuentes, [facility] Triage/Med Refill 5/24/2019    Requested Prescriptions   Pending Prescriptions Disp Refills     DIGOX 125 mcg tablet [Pharmacy Med Name: Digox Oral Tablet 125 MCG] 30 tablet 0     Sig: TAKE ONE TABLET BY MOUTH IN THE MORNING       Refill Protocol for Digoxin Failed - 5/23/2019  7:01 AM        Failed - ECG in last 12 months     ECG rhythm strip: No results found for this or any previous visit. ECG 12 lead MUSE:   Results for orders placed or performed during the hospital encounter of 07/24/14   ECG 12 lead   Result Value Ref Range    SYSTOLIC BLOOD PRESSURE  mmHg    DIASTOLIC BLOOD PRESSURE  mmHg    VENTRICULAR RATE 91 BPM    ATRIAL RATE 104 BPM    P-R INTERVAL  ms    QRS DURATION 112 ms    Q-T INTERVAL 422 ms    QTC CALCULATION (BEZET) 519 ms    P Axis  degrees    R AXIS -15 degrees    T AXIS 131 degrees    MUSE DIAGNOSIS       Atrial fibrillation  Non-specific ST & T wave changes  Leftward axis  Prolonged QT  Abnormal ECG  When compared with ECG of 24-JUL-2014 11:28,  slightly less anterior ST depression    Confirmed by ELBA ZELAYA MD LOC: ([id number]) on 7/25/2014 8:17:56 AM    ECG 12 lead nursing unit:   Results for orders placed or performed during the hospital encounter of 06/20/17   ECG 12 lead nursing unit performed   Result Value Ref Range    SYSTOLIC BLOOD PRESSURE  mmHg    DIASTOLIC BLOOD PRESSURE  mmHg    VENTRICULAR RATE 91 BPM    ATRIAL RATE 91 BPM    P-R INTERVAL  ms    QRS DURATION 114 ms    Q-T INTERVAL 396 ms    QTC CALCULATION (BEZET) 487 ms    P Axis  degrees    R AXIS -24 degrees    T AXIS 99 degrees    MUSE DIAGNOSIS       Atrial fibrillation  T wave abnormality, consider lateral ischemia or digitalis effect  Prolonged QT  Abnormal ECG  When compared with ECG of 14-JUN-2017 06:10,  No significant change was found  Confirmed by SURJIT MCDONALD MD LOC:[facility]  ([id number]) on 6/20/2017 5:32:14 PM               Failed - Normal digoxin level in last 12 months     No results found for: PHENOBARB          Passed - LFT or AST or ALT on file in last 12 months     Albumin   Date Value Ref Range Status   02/18/2019 3.8 3.5 - 5.0 g/dL Final     Bilirubin, Total   Date Value Ref Range Status   02/18/2019 0.7 0.0 - 1.0 mg/dL Final     Bilirubin, Direct   Date Value Ref Range Status   02/18/2019 0.2 <=0.5 mg/dL Final     Alkaline Phosphatase   Date Value Ref Range Status   02/18/2019 106 45 - 120 U/L Final     AST   Date Value Ref Range Status   02/18/2019 16 0 - 40 U/L Final     ALT   Date Value Ref Range Status   02/18/2019 11 0 - 45 U/L Final     Protein, Total   Date Value Ref Range Status   02/18/2019 7.4 6.0 - 8.0 g/dL Final                Passed - PCP or prescribing provider visit in past 6 months or next 3 months     Last office visit with prescriber/PCP: 4/2/2019 OR same dept: 4/2/2019 Patricia Muro MD OR same specialty: 4/2/2019 Patricia Muro MD Last physical: Visit date not found  Last MTM visit: Visit date not found     Next appt within 3 mo: Visit date not found  Next physical within 3 mo: Visit date not found  Prescriber OR PCP: Patricia Muro MD  Last diagnosis associated with med order: 1. Atrial fibrillation (H)  - DIGOX 125 mcg tablet [Pharmacy Med Name: Digox Oral Tablet 125 MCG]; TAKE ONE TABLET BY MOUTH IN THE MORNING  Dispense: 30 tablet; Refill: 0     If protocol passes may refill for 6 months if within 3 months of last provider visit (or a total of 9 months).          Passed - BMP on file in last 12 months     Sodium   Date Value Ref Range Status   07/31/2018 143 136 - 145 mmol/L Final     Potassium   Date Value Ref Range Status   05/22/2019 4.0 3.5 - 5.0 mmol/L Final     Chloride   Date Value Ref Range Status   07/31/2018 107 98 - 107 mmol/L Final     CO2   Date Value Ref Range Status   07/31/2018 27 22 - 31 mmol/L Final     BUN   Date Value Ref  Range Status   07/31/2018 25 8 - 28 mg/dL Final     Creatinine   Date Value Ref Range Status   07/31/2018 1.08 0.70 - 1.30 mg/dL Final             Passed - CBC w/plts (hm2) on file in last 12 months     WBC   Date Value Ref Range Status   07/31/2018 9.0 4.0 - 11.0 thou/uL Final     Hemoglobin   Date Value Ref Range Status   10/18/2018 12.5 (L) 14.0 - 18.0 g/dL Final     Hematocrit   Date Value Ref Range Status   07/31/2018 35.0 (L) 40.0 - 54.0 % Final     Platelets   Date Value Ref Range Status   07/31/2018 242 140 - 440 thou/uL Final             Passed - Magnesium in last 12 months     Magnesium   Date Value Ref Range Status   07/31/2018 1.9 1.8 - 2.6 mg/dL Final              Passed - Serum creatinine in last 12 months     Creatinine   Date Value Ref Range Status   07/31/2018 1.08 0.70 - 1.30 mg/dL Final

## 2021-05-29 NOTE — TELEPHONE ENCOUNTER
Patient Returning Call  Reason for call:  Girlfriend, Melva calling back. Consent to communicate is on file  Information relayed to patient:  Relayed below message. She reports they will go to the ER today.  Patient has additional questions:  No  If YES, what are your questions/concerns:  none  Okay to leave a detailed message?: Yes

## 2021-05-29 NOTE — TELEPHONE ENCOUNTER
Controlled Substance Refill Request  Medication:   Requested Prescriptions     Pending Prescriptions Disp Refills     ALPRAZolam (XANAX) 0.25 MG tablet [Pharmacy Med Name: ALPRAZolam Oral Tablet 0.25 MG] 5 tablet 0     Sig: Take 1 tablet by mouth once daily as needed for anxiety- Use sparingly     Date Last Fill: 4/2/19  Ph[facility]acy: [facility]   Submit electronically to pharmacy  Controlled Substance Agreement on File:   Encounter-Level CSA Scan Date:    There are no encounter-level csa scan date.       Last office visit: Last office visit pertaining to requested medication was 4/2/19.

## 2021-05-29 NOTE — TELEPHONE ENCOUNTER
Refill Approved    Rx renewed per Medication Renewal Policy. Medication was last renewed on 3/17/19.    Last office visit 4/2/19    Griffin Still, [facility] Triage/Med Refill 6/13/2019     Requested Prescriptions   Pending Prescriptions Disp Refills     aspirin 81 MG EC tablet [Pharmacy Med Name: EQL Aspirin Low Dose Oral Tablet Delayed Release 81 MG] 90 tablet 0     Sig: Take 1 tablet (81 mg total) by mouth daily.       Aspirin/Dipyridamole Refill Protocol Passed - 6/12/2019  7:00 AM        Passed - PCP or prescribing provider visit in past 12 months       Last office visit with prescriber/PCP: 4/2/2019 Patricia Muro MD OR same dept: 4/2/2019 Patricia Muro MD OR same specialty: 4/2/2019 Patricia Muro MD  Last physical: 7/31/2018 Last MTM visit: Visit date not found    Next appt within 3 mo: Visit date not found Next physical within 3 mo: Visit date not found  Prescriber OR PCP: Patricia Muro MD  Last diagnosis associated with med order: 1. A-fib (H)  - aspirin 81 MG EC tablet [Pharmacy Med Name: EQL Aspirin Low Dose Oral Tablet Delayed Release 81 MG]; Take 1 tablet (81 mg total) by mouth daily.  Dispense: 90 tablet; Refill: 0    If protocol passes may refill for 6 months if within 3 months of last provider visit (or a total of 9 months).

## 2021-05-29 NOTE — TELEPHONE ENCOUNTER
----- Message from Cheryle Alfred MD sent at 6/21/2019  6:49 AM CDT -----      ----- Message -----  From: Lab, Background User  Sent: 6/20/2019   4:55 PM  To: Patricia Muro MD

## 2021-05-29 NOTE — TELEPHONE ENCOUNTER
Left message to call back for: medication question  Information to relay to patient:  Below message. Please also notify of lab results documented in a separate encounter.

## 2021-05-29 NOTE — TELEPHONE ENCOUNTER
Called home number first.  Left message.  Then called Melva Lugo at the home number. This is not in service.    There are no other numbers available to try to contact the patient.  I am not convinced that this lab value is severe enough to warrant a welfare check by the police, but I do feel that patient should present to the emergency department for evaluation and potassium repletion.  I will forward this to our staff to continue calling patient throughout the day.

## 2021-05-30 VITALS — HEIGHT: 71 IN | BODY MASS INDEX: 25.7 KG/M2 | WEIGHT: 183.6 LBS

## 2021-05-30 VITALS — BODY MASS INDEX: 24.54 KG/M2 | WEIGHT: 186 LBS

## 2021-05-30 NOTE — TELEPHONE ENCOUNTER
Notified via  Dr. Muro is not in clinic today and Dr. Alfred is requesting patient discuss this at his scheduled appointment on Monday.

## 2021-05-30 NOTE — TELEPHONE ENCOUNTER
Who is calling:  Melva Lugo  Reason for Call:  She wanted to make sure that all blood work was done even the order from Dr. Moreno  Date of last appointment with primary care:6/20/19  Okay to leave a detailed message: Yes

## 2021-05-30 NOTE — TELEPHONE ENCOUNTER
FYI - Status Update  Who is Calling: SONAM -   Update:  PATIENT CURRENTLY ADMITTED TO [facility] ON SUNDAY 6/31/19,  CALLER REPORTS THEY COULD NOT WAKE PATIENT UP  SO THEY CALLED AN AMBULANCE , PATIENT IS AWAKE TODAY  AN STILL IN PATIENT.   Okay to leave a detailed message?:  Yes

## 2021-05-30 NOTE — TELEPHONE ENCOUNTER
Refill Approved    Rx renewed per Medication Renewal Policy. Medication was last renewed on 6.25.19.    Kassandra Luna, [facility] Triage/Med Refill 6/28/2019     Requested Prescriptions   Pending Prescriptions Disp Refills     metoprolol tartrate (LOPRESSOR) 100 MG tablet 270 tablet 3     Sig: TAKE 1 and 1/2 TABLETS BY MOUTH 2 TIMES A DAY       Beta-Blockers Refill Protocol Passed - 6/28/2019 12:54 PM        Passed - PCP or prescribing provider visit in past 12 months or next 3 months     Last office visit with prescriber/PCP: 6/25/2019 Patricia Muro MD OR same dept: 6/25/2019 Patricia Muro MD OR same specialty: 6/25/2019 Patricia Muro MD  Last physical: 7/31/2018 Last MTM visit: Visit date not found   Next visit within 3 mo: Visit date not found  Next physical within 3 mo: Visit date not found  Prescriber OR PCP: Patricia Muro MD  Last diagnosis associated with med order: 1. Essential hypertension  - metoprolol tartrate (LOPRESSOR) 100 MG tablet; TAKE 1 and 1/2 TABLETS BY MOUTH 2 TIMES A DAY  Dispense: 270 tablet; Refill: 3    If protocol passes may refill for 12 months if within 3 months of last provider visit (or a total of 15 months).             Passed - Blood pressure filed in past 12 months     BP Readings from Last 1 Encounters:   06/25/19 110/54

## 2021-05-30 NOTE — TELEPHONE ENCOUNTER
Left msg for patient to return call. Received orders from Dr. Moreno ([facility]) need to get more blood.  Please schedule a lab only appointment.  Orders are in the system      Patient has a fasting lab appointment on Monday.  Please place orders.  Thank you!

## 2021-05-30 NOTE — PROGRESS NOTES
ASSESSMENT/PLAN:  1. Encephalopathy  75 yo male with recurrent encephalopathy of unclear etiology.  It has been over a year since his last hospitalization for this cause.  He is now recovered and backed to baseline which does include memory loss and dementia. His significant other is his caretaker and administers med.  Will recheck potassium which was low during hospitalization. He is currently off potassium replacement.  2. Hypertension  - Basic Metabolic Panel    3. Chronic atrial fibrillation (H)  Due to his frequent falls and dementia he is not a candidate for anticoagulation.  He is on digoxin    4. Dementia without behavioral disturbance, unspecified dementia type  Aricept at 10 mg daily      Dragon dictation was used for this note.  Speech recognition errors are a possibility.    Return in about 3 months (around 9/20/2019).  There are no Patient Instructions on file for this visit.    Orders Placed This Encounter   Procedures     Basic Metabolic Panel     Medications Discontinued During This Encounter   Medication Reason     ALPRAZolam (XANAX) 0.25 MG tablet Therapy completed     ARGININE, L-ARGININE, ORAL Therapy completed     biotin 2,500 mcg cap Therapy completed     CALCIUM CARB-MAG OXIDE-ZINC OX ORAL Therapy completed     chlorthalidone (HYGROTEN) 25 MG tablet Therapy completed     cholecalciferol, vitamin D3, 5,000 unit Tab Therapy completed     KLOR-CON M20 20 mEq tablet Therapy completed     LACTOBACILLUS ACIDOPHILUS (PROBIOTIC ACIDOPHILUS ORAL) Therapy completed     nystatin (MYCOSTATIN) cream Therapy completed         CHIEF COMPLAINT;  Chief Complaint   Patient presents with     Hospital Visit Follow Up       HISTORY OF PRESENT ILLNESS:  Navdeep is a 74 y.o. male presenting to the clinic today for hospital follow-up.  He has been doing well since being home.  They were not unable to determine the exact cause of his encephalopathy which resulted in his hospitalization.  This is been a recurrent trend  for him where he will become confused and is difficult to pinpoint the exact reason.  He was hypokalemic during that hospitalization underwent replacement.  They did adjust his medications and during his TCU visit he received some supplemental potassium but now he is off.  His significant other Melva administers his medication and his significant other adult son also lives.  They were unable to identify any precipitating hospitalization either.  Is been doing well since being home and is at his baseline.  .    Remainder of 12-point ROS is negative.    TOBACCO USE:  Social History     Tobacco Use   Smoking Status Former Smoker   Smokeless Tobacco Never Used       VITALS:  Vitals:    06/20/19 0909   BP: 100/68   Patient Site: Left Arm   Patient Position: Sitting   Cuff Size: Adult Regular   Pulse: 64   SpO2: 98%   Weight: 178 lb 4.8 oz (80.9 kg)     Wt Readings from Last 3 Encounters:   06/25/19 187 lb 6.4 oz (85 kg)   06/21/19 178 lb (80.7 kg)   06/20/19 178 lb 4.8 oz (80.9 kg)     Body mass index is 24.87 kg/m .    PHYSICAL EXAM:  GENERAL APPEARANCE: Alert, cooperative, no distress, appears stated age  HEAD: Normocephalic, without obvious abnormality, atraumatic  LUNGS: Clear to auscultation bilaterally, respirations unlabored  CHEST WALL: No tenderness or deformity  HEART: Regular rate and rhythm, S1 and S2 normal, no murmur, rub or gallop  ABDOMEN: Soft, non-tender, bowel sounds active all four quadrants,     no masses, no organomegaly  EXTREMITIES: Extremities normal, atraumatic, no cyanosis or edema  NEUROLOGIC: CNII-XII intact. Normal strength, sensation and reflexes       throughout    RECENT RESULTS  No results found for this or any previous visit (from the past 48 hour(s)).    MEDICATIONS:  Current Outpatient Medications   Medication Sig Dispense Refill     aspirin 81 MG EC tablet Take 1 tablet (81 mg total) by mouth daily. 90 tablet 1     citalopram (CELEXA) 20 MG tablet Take 1 tablet (20 mg total) by  mouth daily. 90 tablet 3     CRANBERRY FRUIT EXTRACT (CRANBERRY ORAL) Take 1 capsule by mouth daily.       cyanocobalamin, vitamin B-12, 2,500 mcg Subl Place 2,500 mcg under the tongue daily.       DIGOX 125 mcg tablet TAKE ONE TABLET BY MOUTH IN THE MORNING 30 tablet 10     donepezil (ARICEPT) 10 MG tablet Take 10 mg by mouth bedtime.       ferrous sulfate 325 (65 FE) MG tablet Take 1 tablet by mouth 3 (three) times a day with meals. 270 tablet 3     lisinopril (PRINIVIL,ZESTRIL) 40 MG tablet Take 40 mg by mouth daily.       OMEGA-3S/DHA/EPA/FISH OIL (OMEGA 3 ORAL) Take 1,000 mg by mouth 2 (two) times a day.        SAW PALMETTO ORAL Take 450 mg by mouth daily.       trimethoprim (TRIMPEX) 100 mg tablet Take 100 mg by mouth once daily.       VIT B1 MN/B2/B3/B5/B6/B12/C/FA (B COMPLEX W-VIT C ORAL) Take 1 tablet by mouth daily.        vitamin E 400 UNIT capsule Take by mouth see administration instructions. 1200 mg in the morning and 800 mg in the evening.       acetaminophen (TYLENOL) 500 MG tablet Take 500-1,000 mg by mouth every 6 (six) hours as needed.       amLODIPine (NORVASC) 5 MG tablet Take 1 tablet (5 mg total) by mouth daily. 30 tablet 2     magnesium oxide (MAG-OX) 400 mg (241.3 mg magnesium) tablet Take 1 tablet (400 mg total) by mouth daily. 100 tablet 3     metoprolol tartrate (LOPRESSOR) 100 MG tablet TAKE 1 and 1/2 TABLETS BY MOUTH 2 TIMES A  tablet 0     potassium chloride (K-DUR,KLOR-CON) 20 MEQ tablet Take 1 tablet (20 mEq total) by mouth 2 (two) times a day. 30 tablet 0     No current facility-administered medications for this visit.

## 2021-05-30 NOTE — TELEPHONE ENCOUNTER
Reason contacted:  Results   Information relayed:  Below message. Please send magnesium supplement to the pharmacy.   Additional questions:  No  Further follow-up needed:  No  Okay to leave a detailed message:  No

## 2021-05-30 NOTE — TELEPHONE ENCOUNTER
----- Message from Patricia Muro MD sent at 6/26/2019  7:58 AM CDT -----  Please call Pt's signficant other Yoselyn with results.  Harpreet's potassium is normal but still on the low side.  Continue with current potassium supplementation.  Also, magnesium levels are mildly low. Start a daily magnesium supplement of magnesium oxide 400 mg daily.

## 2021-05-30 NOTE — TELEPHONE ENCOUNTER
Left message to call back for: lab question  Information to relay to patient:  Below message via VM

## 2021-05-30 NOTE — PROGRESS NOTES
ASSESSMENT/PLAN:  74-year-old gentleman seen again in follow-up after being evaluated with unresponsive episode.  This 1 was more minor than the previous.  It is unclear what is leading into these episodes.  He has been evaluated by neurology and ruled out for infection.  Certainly it could be his advancing dementia.  I also wonder if his cardiac medications, in particular the digoxin may be contributing to these episodes.  His levels have not been toxic however he has had some coinciding hypokalemia.  I am asking him to see cardiology to review medications and consider an alternative to digoxin.  We will recheck potassium and magnesium today to assure that these are appropriately stable.  I do recommend that he stay well-hydrated and use a walker when ambulating to avoid falls.  Despite his chronic A. fib, I continue to recommend against anticoagulation due to the high risk for falls and frequent unresponsive episodes.    1. Toxic encephalopathy    2. Chronic atrial fibrillation (H)  - Basic Metabolic Panel    3. Generalized muscle weakness  - Magnesium    4. Abnormal finding of blood chemistry       Dragon dictation was used for this note.  Speech recognition errors are a possibility.    No follow-ups on file.  Patient Instructions   Schedule with cardiology  Get walker  Check name of the memory pill- he has been on Aricept (donepezil)      Orders Placed This Encounter   Procedures     Basic Metabolic Panel     There are no discontinued medications.      CHIEF COMPLAINT;  Chief Complaint   Patient presents with     Hospital Visit Follow Up     Loss of memory- everything is good per pt.       HISTORY OF PRESENT ILLNESS:  Navdeep is a 74 y.o. male presenting to the clinic today for recurrence in his unresponsive episodes.  This 1 was more mild and he was released home from the emergency room.  Is not clear with leading into these.  There is no signs of infection.  His medications have been stable and his wife does  administer his medications due to his memory loss.  He is having worsening problems with dementia but he does come back to his baseline after these episodes.  He is denying any headaches, chest pain, breathing difficulties or abdominal pain.  He does not really know when he is leading into these episodes and does not remember when they happen.  He has had evaluation with neurology and extensive infectious evaluation when he has been admitted and they have not been able to identify a cause.  It has been labile toxic encephalopathy without any exact etiology.  He is on Aricept for his memory.  His advancing dementia is probably making these more prominent..    Remainder of 12-point ROS is negative.    TOBACCO USE:  Social History     Tobacco Use   Smoking Status Former Smoker   Smokeless Tobacco Never Used       VITALS:  Vitals:    07/29/19 1425   BP: 122/60   Patient Site: Right Arm   Patient Position: Sitting   Cuff Size: Adult Regular   Pulse: 77   SpO2: 97%   Weight: 175 lb 11.2 oz (79.7 kg)     Wt Readings from Last 3 Encounters:   07/29/19 175 lb 11.2 oz (79.7 kg)   07/26/19 178 lb (80.7 kg)   07/23/19 175 lb 9.6 oz (79.7 kg)     Body mass index is 23.83 kg/m .    PHYSICAL EXAM:  GENERAL APPEARANCE: Alert, cooperative, no distress, appears stated age  HEAD: Normocephalic, without obvious abnormality, atraumatic  EYES:  PERRL, conjunctiva/corneas clear, EOM's intact, fundi     benign, both eyes       EARS: Normal TM's and external ear canals, both ears  NOSE:  Nares normal, septum midline, mucosa normal, no drainage or sinus tenderness  THROAT: Lips, mucosa, and tongue normal; teeth and gums normal  NECK: Supple, symmetrical, trachea midline, no adenopathy;    thyroid:  No enlargement/tenderness/nodules; no carotid    bruit or JVD  BACK: Symmetric, no curvature, ROM normal, no CVA tenderness  LUNGS: Clear to auscultation bilaterally, respirations unlabored  CHEST WALL: No tenderness or deformity  HEART: Regular  rate and rhythm, S1 and S2 normal, no murmur, rub or gallop  ABDOMEN: Soft, non-tender, bowel sounds active all four quadrants,     no masses, no organomegaly  EXTREMITIES:edema 1+ right leg, tr left  PULSES: 2+ and symmetric all extremities  SKIN: Skin color, texture, turgor normal, no rashes or lesions  LYMPH NODES: Cervical, supraclavicular, and axillary nodes normal  NEUROLOGIC: CNII-XII intact. Normal strength, sensation and reflexes       throughout    RECENT RESULTS  No results found for this or any previous visit (from the past 48 hour(s)).    MEDICATIONS:  Current Outpatient Medications   Medication Sig Dispense Refill     acetaminophen (TYLENOL) 500 MG tablet Take 500-1,000 mg by mouth every 6 (six) hours as needed.       amLODIPine (NORVASC) 5 MG tablet Take 1 tablet (5 mg total) by mouth daily. 30 tablet 2     aspirin 81 MG EC tablet Take 1 tablet (81 mg total) by mouth daily. 90 tablet 1     citalopram (CELEXA) 20 MG tablet Take 1 tablet (20 mg total) by mouth daily. 90 tablet 3     CRANBERRY FRUIT EXTRACT (CRANBERRY ORAL) Take 1 capsule by mouth daily.       cyanocobalamin, vitamin B-12, 2,500 mcg Subl Place 2,500 mcg under the tongue daily.       DIGOX 125 mcg tablet TAKE ONE TABLET BY MOUTH IN THE MORNING 30 tablet 10     donepezil (ARICEPT) 10 MG tablet Take 10 mg by mouth bedtime.       ferrous sulfate 325 (65 FE) MG tablet Take 1 tablet by mouth 3 (three) times a day with meals. 270 tablet 3     lisinopril (PRINIVIL,ZESTRIL) 40 MG tablet Take 40 mg by mouth daily.       magnesium oxide (MAG-OX) 400 mg (241.3 mg magnesium) tablet Take 1 tablet (400 mg total) by mouth daily. 100 tablet 3     metoprolol tartrate (LOPRESSOR) 100 MG tablet TAKE 1 and 1/2 TABLETS BY MOUTH 2 TIMES A DAY (Patient taking differently: 75 mg 2 (two) times a day. TAKE 1 and 1/2 TABLETS BY MOUTH 2 TIMES A DAY      ) 270 tablet 0     OMEGA-3S/DHA/EPA/FISH OIL (OMEGA 3 ORAL) Take 1,000 mg by mouth 2 (two) times a day.         potassium chloride (K-DUR,KLOR-CON) 20 MEQ tablet Take 1 tablet (20 mEq total) by mouth 2 (two) times a day. 30 tablet 0     SAW PALMETTO ORAL Take 450 mg by mouth daily.       trimethoprim (TRIMPEX) 100 mg tablet Take 100 mg by mouth once daily.       VIT B1 MN/B2/B3/B5/B6/B12/C/FA (B COMPLEX W-VIT C ORAL) Take 1 tablet by mouth daily.        vitamin E 400 UNIT capsule Take by mouth see administration instructions. 1200 mg in the morning and 800 mg in the evening.       No current facility-administered medications for this visit.

## 2021-05-30 NOTE — PROGRESS NOTES
ASSESSMENT/PLAN:  1. Hypokalemia  74-year-old gentleman with hypokalemia likely secondary to medications.  He is currently taking 20 mEq twice daily and is asymptomatic.  We will check a potassium level and suggest ongoing oral replacement.    2. Hypomagnesemia  He received some IV magnesium in the emergency room is not currently taking any oral therapy.  Pending magnesium results, will recommend a daily magnesium supplement.      Dragon dictation was used for this note.  Speech recognition errors are a possibility.    No follow-ups on file.  There are no Patient Instructions on file for this visit.    No orders of the defined types were placed in this encounter.    There are no discontinued medications.      CHIEF COMPLAINT;  Chief Complaint   Patient presents with     Follow-up       HISTORY OF PRESENT ILLNESS:  Navdeep is a 74 y.o. male presenting to the clinic today for emergency room follow-up after being sent to the emergency room for hypokalemia..  In the emergency room his potassium was 2.5.  He received an IV infusion and was sent home on Klor-Con 20 mEq twice daily.  He has been taking it regularly.  He remained asymptomatic through this.  He has had no palpitations, weakness or dizziness.  He has some memory loss however his wife is in charge of his medications and assures that he takes them.  His stepson is with him today and he also lives with him.  He has been eating normally and has not had any emesis.    Remainder of 12-point ROS is negative.    TOBACCO USE:  Social History     Tobacco Use   Smoking Status Former Smoker   Smokeless Tobacco Never Used       VITALS:  Vitals:    06/25/19 1340   BP: 110/54   Patient Site: Left Arm   Patient Position: Sitting   Cuff Size: Adult Large   Pulse: 63   SpO2: 99%   Weight: 187 lb 6.4 oz (85 kg)     Wt Readings from Last 3 Encounters:   06/25/19 187 lb 6.4 oz (85 kg)   06/21/19 178 lb (80.7 kg)   06/20/19 178 lb 4.8 oz (80.9 kg)     Body mass index is 25.42  kg/m .    PHYSICAL EXAM:  Irregularly irregular without murmurs  Lungs clear to auscultation bilaterally  Trace lower extremity edema.    RECENT RESULTS  No results found for this or any previous visit (from the past 48 hour(s)).    MEDICATIONS:  Current Outpatient Medications   Medication Sig Dispense Refill     acetaminophen (TYLENOL) 500 MG tablet Take 500-1,000 mg by mouth every 6 (six) hours as needed.       amLODIPine (NORVASC) 5 MG tablet Take 1 tablet (5 mg total) by mouth daily. 30 tablet 2     aspirin 81 MG EC tablet Take 1 tablet (81 mg total) by mouth daily. 90 tablet 1     citalopram (CELEXA) 20 MG tablet Take 1 tablet (20 mg total) by mouth daily. 90 tablet 3     CRANBERRY FRUIT EXTRACT (CRANBERRY ORAL) Take 1 capsule by mouth daily.       cyanocobalamin, vitamin B-12, 2,500 mcg Subl Place 2,500 mcg under the tongue daily.       DIGOX 125 mcg tablet TAKE ONE TABLET BY MOUTH IN THE MORNING 30 tablet 10     donepezil (ARICEPT) 10 MG tablet Take 10 mg by mouth bedtime.       ferrous sulfate 325 (65 FE) MG tablet Take 1 tablet by mouth 3 (three) times a day with meals. 270 tablet 3     lisinopril (PRINIVIL,ZESTRIL) 40 MG tablet Take 40 mg by mouth daily.       magnesium oxide (MAG-OX) 400 mg (241.3 mg magnesium) tablet Take 1 tablet (400 mg total) by mouth daily. 200 tablet 3     metoprolol tartrate (LOPRESSOR) 100 MG tablet TAKE 1 and 1/2 TABLETS BY MOUTH 2 TIMES A DAY (Patient taking differently: TAKE 1 tab in the morning and 1/2 TABLET in the evening) 270 tablet 3     OMEGA-3S/DHA/EPA/FISH OIL (OMEGA 3 ORAL) Take 1,000 mg by mouth 2 (two) times a day.        potassium chloride (K-DUR,KLOR-CON) 20 MEQ tablet Take 1 tablet (20 mEq total) by mouth 2 (two) times a day. 30 tablet 0     SAW PALMETTO ORAL Take 450 mg by mouth daily.       trimethoprim (TRIMPEX) 100 mg tablet Take 100 mg by mouth once daily.       VIT B1 MN/B2/B3/B5/B6/B12/C/FA (B COMPLEX W-VIT C ORAL) Take 1 tablet by mouth daily.         vitamin E 400 UNIT capsule Take by mouth see administration instructions. 1200 mg in the morning and 800 mg in the evening.       No current facility-administered medications for this visit.

## 2021-05-30 NOTE — TELEPHONE ENCOUNTER
New Appointment Needed  What is the reason for the visit:    Inpatient/ED Follow Up Appt Request  At what hospital or facility were you seen?: [facility]   What is the reason you were seen?: pneumonia - patient could not be woken up and taken by ambulance  What date were you admitted?: date: 06/31/19  What date were you discharged?: date: 07/11/19  What was the recommended timeframe for your follow up appointment?: within a few day to 1 week.   Provider Preference: patient would prefer pcp but may be open to [facility]  How soon do you need to be seen?: next week  Waitlist offered?: No  Okay to leave a detailed message:  Yes please contact Melva at [phone]

## 2021-05-30 NOTE — PATIENT INSTRUCTIONS - HE
Schedule with cardiology  Get walker  Check name of the memory pill- he has been on Aricept (donepezil)

## 2021-05-30 NOTE — TELEPHONE ENCOUNTER
Who is calling:  Melva  Reason for Call:  She's wondering if patient had labs drawn yesterday (07/24/19) when he was here. If not she is wondering if patient can have it drawn at his next lab visit on 07/31. Please call.   Date of last appointment with primary care: 07/23/19  Okay to leave a detailed message: Yes

## 2021-05-30 NOTE — TELEPHONE ENCOUNTER
I do not see he completed labs yesterday    Do you want him to complete a potassium, magnesium, and glucose when he comes in on 7/31/2019 (orders are already in the chart)?  Do you want any additional labs?

## 2021-05-30 NOTE — TELEPHONE ENCOUNTER
"Medication Request  Medication name: Memory pill  Pharmacy Name and Location: [id number] [address]  Reason for request: The patient was seen in the ED on 7/25/2019 for cognitive issues. The patient was given \"a memory pill\" the patient's girlfriend would like the patient to have a prescription for this if necessary.   When did you use medication last?:  7/25/2019  Patient offered appointment:  yes  Okay to leave a detailed message: yes    "

## 2021-05-30 NOTE — TELEPHONE ENCOUNTER
Do you recommend scheduling next week with a different provider?   You are rounding and it appears you are already overbooked every day.

## 2021-05-31 ENCOUNTER — RECORDS - HEALTHEAST (OUTPATIENT)
Dept: ADMINISTRATIVE | Facility: CLINIC | Age: 76
End: 2021-05-31

## 2021-05-31 VITALS — BODY MASS INDEX: 24.88 KG/M2 | HEIGHT: 71 IN | WEIGHT: 177.7 LBS

## 2021-05-31 VITALS — BODY MASS INDEX: 25.5 KG/M2 | WEIGHT: 188 LBS

## 2021-05-31 VITALS — HEIGHT: 72 IN | BODY MASS INDEX: 24.24 KG/M2 | WEIGHT: 179 LBS

## 2021-05-31 VITALS — WEIGHT: 180 LBS | BODY MASS INDEX: 25.1 KG/M2

## 2021-05-31 VITALS — WEIGHT: 184 LBS | BODY MASS INDEX: 24.95 KG/M2

## 2021-05-31 NOTE — PROGRESS NOTES
ASSESSMENT/PLAN:  1. 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  It is been difficult to really isolate the cause for these episodes.  We discussed the possibility of digoxin being a contributor here.  Neurology is helping with additional testing and continuing to be involved in his case.  Certainly he had although these episodes seem outside of the realm of what would be considered associated with typical dementia.  He does have his wife delivering his medications and thus medication errors are less likely.  He does need strengthening and will refer him to physical therapy.  Neurology ordered labs are drawn in addition to his potassium, magnesium and glucose levels.  He is given a prescription for a walker with a platform.  I will see him again in a month for a recheck.  - Ambulatory referral to PT/OT  - Glucose; Future    2. Generalized muscle weakness  - Ambulatory referral to PT/OT  - Walker, Platform w/ Wheels  - Glucose; Future  - Magnesium; Future  - Potassium; Future    3. Abnormal finding of blood chemistry   - Glucose; Future      Dragon dictation was used for this note.  Speech recognition errors are a possibility.    No follow-ups on file.  There are no Patient Instructions on file for this visit.    Orders Placed This Encounter   Procedures     Walker, Platform w/ Wheels     Glucose     Standing Status:   Future     Number of Occurrences:   1     Standing Expiration Date:   7/23/2020     Magnesium     Standing Status:   Future     Number of Occurrences:   1     Standing Expiration Date:   7/23/2020     Potassium     Standing Status:   Future     Number of Occurrences:   1     Standing Expiration Date:   7/23/2020     Ambulatory referral to PT/OT     Referral Priority:   Routine     Referral Type:   Physical Therapy or Occupational Therapy     Referral Reason:   Evaluation and Treatment     Requested Specialty:   Physical Therapy     Number of Visits  Requested:   1     There are no discontinued medications.      CHIEF COMPLAINT;  Chief Complaint   Patient presents with     Hospital Visit Follow Up       HISTORY OF PRESENT ILLNESS:  Navdeep is a 74 y.o. male presenting to the clinic today for hospital follow-up after being admitted for decreased level of consciousness.  This is his third admission for the same problem over the last several months.  He had a similar series of admissions about 2 years ago related to unresponsive episodes.  The course of those we were unable to identify a cause and it is the same with the series of unresponsive episodes.  I reviewed the medical records and they did identify hypokalemia and hypomagnesemia.  He was also diagnosed with a pneumonia.  His symptoms are unusual for these current concerns.  Since being out of the hospital, he feels like himself.  He does not remember the episodes.  He is accompanied by his stepson Griffin.  Griffin states since being home he has been acting normally.  Temp significant other continues to administer his medications.  Drug tox screen was negative.  He has a history of being an alcoholic but no longer drinks.  He was evaluated with neurology and does have follow-up with them. .    Remainder of 12-point ROS is negative.    TOBACCO USE:  Social History     Tobacco Use   Smoking Status Former Smoker   Smokeless Tobacco Never Used       VITALS:  Vitals:    07/23/19 1425   BP: 110/70   Patient Site: Left Arm   Patient Position: Sitting   Cuff Size: Adult Regular   Pulse: 60   SpO2: 99%   Weight: 175 lb 9.6 oz (79.7 kg)     Wt Readings from Last 3 Encounters:   07/29/19 175 lb 11.2 oz (79.7 kg)   07/26/19 178 lb (80.7 kg)   07/23/19 175 lb 9.6 oz (79.7 kg)     Body mass index is 23.82 kg/m .    PHYSICAL EXAM  Patient is alert and oriented and answers questions appropriately.  He is oriented to person place and time.  He makes good eye contact.  Lungs are clear to auscultation bilaterally  Heart  regular rate and rhythm without murmurs  Abdomen is soft with positive bowel sounds  Trace to 1+ lower extremity edema    RECENT RESULTS  No results found for this or any previous visit (from the past 48 hour(s)).    MEDICATIONS:  Current Outpatient Medications   Medication Sig Dispense Refill     acetaminophen (TYLENOL) 500 MG tablet Take 500-1,000 mg by mouth every 6 (six) hours as needed.       amLODIPine (NORVASC) 5 MG tablet Take 1 tablet (5 mg total) by mouth daily. 30 tablet 2     aspirin 81 MG EC tablet Take 1 tablet (81 mg total) by mouth daily. 90 tablet 1     citalopram (CELEXA) 20 MG tablet Take 1 tablet (20 mg total) by mouth daily. 90 tablet 3     CRANBERRY FRUIT EXTRACT (CRANBERRY ORAL) Take 1 capsule by mouth daily.       cyanocobalamin, vitamin B-12, 2,500 mcg Subl Place 2,500 mcg under the tongue daily.       DIGOX 125 mcg tablet TAKE ONE TABLET BY MOUTH IN THE MORNING 30 tablet 10     donepezil (ARICEPT) 10 MG tablet Take 10 mg by mouth bedtime.       ferrous sulfate 325 (65 FE) MG tablet Take 1 tablet by mouth 3 (three) times a day with meals. 270 tablet 3     lisinopril (PRINIVIL,ZESTRIL) 40 MG tablet Take 40 mg by mouth daily.       magnesium oxide (MAG-OX) 400 mg (241.3 mg magnesium) tablet Take 1 tablet (400 mg total) by mouth daily. 100 tablet 3     metoprolol tartrate (LOPRESSOR) 100 MG tablet TAKE 1 and 1/2 TABLETS BY MOUTH 2 TIMES A DAY (Patient taking differently: 75 mg 2 (two) times a day. TAKE 1 and 1/2 TABLETS BY MOUTH 2 TIMES A DAY      ) 270 tablet 0     OMEGA-3S/DHA/EPA/FISH OIL (OMEGA 3 ORAL) Take 1,000 mg by mouth 2 (two) times a day.        potassium chloride (K-DUR,KLOR-CON) 20 MEQ tablet Take 1 tablet (20 mEq total) by mouth 2 (two) times a day. 30 tablet 0     SAW PALMETTO ORAL Take 450 mg by mouth daily.       trimethoprim (TRIMPEX) 100 mg tablet Take 100 mg by mouth once daily.       VIT B1 MN/B2/B3/B5/B6/B12/C/FA (B COMPLEX W-VIT C ORAL) Take 1 tablet by mouth daily.         vitamin E 400 UNIT capsule Take by mouth see administration instructions. 1200 mg in the morning and 800 mg in the evening.       No current facility-administered medications for this visit.

## 2021-05-31 NOTE — PROGRESS NOTES
"[facility] Daily Progress     Patient Name: Navdeep Spann  Date: 8/20/2019  Visit #:2  PTA visit #: 1  Referral Diagnosis: unsteadiness on feet  Referring provider: Patricia Muro MD  Visit Diagnosis:     ICD-10-CM    1. Unsteadiness on feet R26.81    2. Generalized muscle weakness M62.81          Assessment:   Pt returns today for his first follow up appointment.  Pt needing verbal and tactile cues with all exercises.     Patient is benefitting from skilled physical therapy and is making steady progress toward functional goals.  Patient is appropriate to continue with skilled physical therapy intervention, as indicated by initial plan of care.    Goal Status:  Pt. will show improved balance for safer : ambulation;standing;for household ambulation;independently;in 6 weeks    Pt will: Able to sit<>stand with more ease as leg strength improves within 8 weeks  Pt will: Able to walk on even surfaces with appropriate assistive device with less fear of falling as leg strength improves within 8 weeks      Plan / Patient Education:     Continue with initial plan of care.  Progress with home program as tolerated.   Go over HEP. Progress LE strength and balance drills.    Subjective:   Pt reports no falls or LOB since hie last visit.  Pt has been compliant with his HEP.  Pt reports he does have LE lymphedema. Pt wears compression stockings during the day.  Pain Rating: NA      Pt's step son Danny attended entire session  Objective:   Gait: Pt ambulates with a SEC. Pt with trunk and hips in slight flexion. Feet are wide apart B LEs externally rotated.    Exercises:  Exercise #1: sit<>stand   5X, 3 times per day  Comment #1: heel and toe raises  x 10-20  Exercise #2: standing on one leg   work up to 10 seconds  Comment #2: modified tandem stand 30\" x 2  Exercise #3: seated hip AB/ER, seated hip flexion with green band   Comment #3: x10 eaach  Exercise #4: seated ball squeezes  Comment #4: 5\" x10  Exercise " "#5: standing hip extension  Comment #5: x10 alternating  Exercise #6: modified tandem stand  Comment #6: 30\" x 2        Treatment Today    TREATMENT MINUTES COMMENTS   Evaluation     Self-care/ Home management     Manual therapy     Neuromuscular Re-education     Therapeutic Activity     Therapeutic Exercises 28 Nu step WL 5 x 5'  See flow sheet  Added to HEP:  -ball squeezes  -hip AB/ER with green band  -seated hip flexion green band   -standing hip extension  -tandem stand     Gait training     Modality__________________                Total 28    Blank areas are intentional and mean the treatment did not include these items.       Nabila Chavez,KULDIP  8/20/2019    "

## 2021-05-31 NOTE — PROGRESS NOTES
[facility]   Initial Evaluation    Patient Name: Navdeep Spann  Date of evaluation: 8/12/2019  Referral Diagnosis: Toxic encephalopathy, weakness  Referring provider: Patricia Muro MD  Visit Diagnosis:     ICD-10-CM    1. Unsteadiness on feet R26.81    2. Generalized muscle weakness M62.81        Assessment:         Patient presents with decrease strength and balance which has been getting worse over the past couple years.  He has been finding it more and more challenging to get out of a chair.  He demonstrates decrease Tinnetti, APTA, and TUG as well as MMT LE strength.  He feels his biggest Functional limitations are described as occurring with: sit<>stand, fear of falling, walking.  Feel he will benefit from physical therapy to work on his his balance and strength to improve his overall functional abilities and have less fear of falling.  Pt. is appropriate for skilled PT intervention as outlined in the Plan of Care (POC).  Pt. is a good candidate for skilled PT services to improve pain levels and function.    Goals:  Pt. will show improved balance for safer : ambulation;standing;for household ambulation;independently;in 6 weeks    Pt will: Able to sit<>stand with more ease as leg strength improves within 8 weeks  Pt will: Able to walk on even surfaces with appropriate assistive device with less fear of falling as leg strength improves within 8 weeks      Patient's expectations/goals are realistic.    Barriers to Learning or Achieving Goals:  Patient Active Problem List   Diagnosis     Anxiety     Hypertension     Anemia     Chronic atrial fibrillation (H)     Leukocytosis, unspecified type     Hearing loss with hearing aids     Depressive disorder, atypical     Dementia     Marijuana use     Contraindication to anticoagulation therapy     BPH (benign prostatic hyperplasia)          Plan / Patient Instructions:        Plan of Care:   Authorization / Certification Start Date:  19  Authorization / Certification End Date: 10/12/19  Authorization / Certification Number of Visits: 10  Communication with: Referral Source  Patient Related Instruction: Nature of Condition;Treatment plan and rationale;Self Care instruction;Basis of treatment  Times per Week: 1-2  Number of Weeks: 8  Number of Visits: 10  Therapeutic Exercise: ROM;Strengthening  Neuromuscular Reeducation: balance/proprioception  Gait Training: as needed      Plan for next visit:     Review HEP  Add strengthening and balance tasks in clinic  Limit number of exercises for HEP.    Floor<>stand transfers    Danny, his girlfriend's son, was present throughout the session     Subjective:         Social information:   Living Situation:apartment and lives with others    Equipment Available: SE cane and walker    History of Present Illness:    Navdeep is a 74 y.o. male who presents to therapy today with complaints of general weakness. Date of onset/duration of symptoms is 2019.  He has had weakness the past couple years but feels it is getting worse.  More recently he went to his MD as he was having difficulty getting out of a chair and they decided to try physical therapy to gain some strength.  He has been falling more recently and he did fall out of bed this AM..      Pain Ratin}  Pain rating at best:    Pain rating at worst:    Pain description: weakness    Functional limitations are described as occurring with:   sit<>stand, fear of falling, walking         Objective:      Patient Outcome Measures :    Tinnetti Total Score (calculated): 12    Balance scores range from 0-28, where a score of less than 19 ='s a high risk for falls; 19-24 ='s a moderate risk for falls; 24-28 ='s a low risk for falls.  APTA score: 0      Was able to perform 5 reps with both hands  Did not fill out LEFS appropriately so not able to calculate  TU.83 seconds    Examination  1. Unsteadiness on feet     2. Generalized muscle weakness        Involved side: Bilateral  Posture Observation:      General standing posture is poor.      Lumbar/LE AROM:   Needed to hold on with trunk AROM  LE AROM WFL    LE Strength:   Left hip flexion, knee flexion 4-/5  Other bilateral LE strength 4 to 4+/5 throughout    Able to pick object up from floor with stand by A  Supine<>sit independent  1 leg stance: L=4 seconds, R=0 seconds  Standing tandem:  Stand by A 15 seconds but unstable leading with right    Exercises:  Exercise #1: sit<>stand   5X, 3 times per day  Comment #1: heel and toe raises  x 10-20  Exercise #2: standing on one leg   work up to 10 seconds    Treatment Today     TREATMENT MINUTES COMMENTS   Evaluation 30 General weakness   Self-care/ Home management     Manual therapy     Neuromuscular Re-education     Therapeutic Activity     Therapeutic Exercises 25 See above or flow sheet  Many questions   Gait training     Modality__________________                Total 55    Blank areas are intentional and mean the treatment did not include these items.     PT Evaluation Code: (Please list factors)  Patient History/Comorbidities:   Past Medical History:   Diagnosis Date     Anemia      Anxiety      Aortic valve disorder      Arthritis      Atrial fibrillation (H)      BPH (benign prostatic hypertrophy) with urinary retention      Carotid stenosis      Depression      Dyslipidemia      [place] (hard of hearing)      Hypertension      Lymphedema      Syncope and collapse 2009, 2010    r/t urinary obstruction     Examination: decrease CAMILO, decrease APTA, decrease leg strength  Clinical Presentation: stable  Clinical Decision Making: low    Patient History/  Comorbidities Examination  (body structures and functions, activity limitations, and/or participation restrictions) Clinical Presentation Clinical Decision Making (Complexity)   No documented Comorbidities or personal factors 1-2 Elements Stable and/or uncomplicated Low   1-2 documented comorbidities or personal  factor 3 Elements Evolving clinical presentation with changing characteristics Moderate   3-4 documented comorbidities or personal factors 4 or more Unstable and unpredictable High            Alexandra Lew, PT  8/12/2019  7:40 AM

## 2021-06-01 ENCOUNTER — COMMUNICATION - HEALTHEAST (OUTPATIENT)
Dept: FAMILY MEDICINE | Facility: CLINIC | Age: 76
End: 2021-06-01

## 2021-06-01 ENCOUNTER — RECORDS - HEALTHEAST (OUTPATIENT)
Dept: ADMINISTRATIVE | Facility: OTHER | Age: 76
End: 2021-06-01

## 2021-06-01 ENCOUNTER — MEDICAL CORRESPONDENCE (OUTPATIENT)
Dept: HEALTH INFORMATION MANAGEMENT | Facility: CLINIC | Age: 76
End: 2021-06-01

## 2021-06-01 ENCOUNTER — RECORDS - HEALTHEAST (OUTPATIENT)
Dept: ADMINISTRATIVE | Facility: CLINIC | Age: 76
End: 2021-06-01

## 2021-06-01 VITALS — BODY MASS INDEX: 28.14 KG/M2 | HEIGHT: 69 IN | WEIGHT: 190 LBS

## 2021-06-01 VITALS — HEIGHT: 69 IN | WEIGHT: 190 LBS | BODY MASS INDEX: 28.14 KG/M2

## 2021-06-01 DIAGNOSIS — L21.9 SEBORRHEIC DERMATITIS: ICD-10-CM

## 2021-06-01 NOTE — PROGRESS NOTES
ASSESSMENT/PLAN:  74-year-old gentleman with back to back good admissions for episodes of toxic encephalopathy.  They were unable to identify the cause.  He has had a consultation with neurology and seizure disorders been ruled out.  He had similar episodes about 2 years ago and then went to years with being event free.  He does not remember the episodes and he seems to recover back to his baseline which is where he is today.    1. Chronic atrial fibrillation (H)  Chronic atrial fibrillation.  Because of these episodes of toxic encephalopathy of undetermined origin and falls, he is not a great candidate for anticoagulation.  He has been seen by cardiology in the past and I am asking them to recheck with cardiology.  I would like cardiology to review his current medications including digoxin and make any recommendations or changes if needed for control of his chronic atrial fibrillation.  - Ambulatory referral to Cardiology    2. Hypokalemia  - Potassium    3. Hypomagnesemia  - Magnesium    4. Dementia without behavioral disturbance, unspecified dementia type (H)  He is currently taking Aricept.  His dementia has been stable.  His significant other lays out and attributes his medications for him.  He lives with his significant other and her son.      Dragon dictation was used for this note.  Speech recognition errors are a possibility.    No follow-ups on file.  There are no Patient Instructions on file for this visit.    Orders Placed This Encounter   Procedures     Influenza High Dose, Seasonal 65+ yrs     Potassium     Magnesium     Ambulatory referral to Cardiology     Referral Priority:   Routine     Referral Type:   Consultation     Referral Reason:   Evaluation and Treatment     Requested Specialty:   Cardiology     Number of Visits Requested:   1     There are no discontinued medications.      CHIEF COMPLAINT;  Chief Complaint   Patient presents with     Follow-up       HISTORY OF PRESENT ILLNESS:  Navdeep avalos  a 74 y.o. male presenting to the clinic today for follow-up regarding his episodes of confusion and hospitalization.  He was found to have a toxic encephalopathy although it was not clear why.  He was followed by neurology and tested for seizures and it did not appear that he was having a seizure disorder.  We could not identify the cause for the toxic encephalopathy.  During that time he becomes unresponsive for long periods of time.  He had a couple episodes recently and the same trend happened about 2 years ago where he had several hospitalizations in a row for an undetermined toxic encephalopathy.  We reviewed his medications.  He has dementia but his significant other Melva administers his medications and she is reliable.  He does have a history of smoking marijuana although it is unclear how frequently he does do this.  No other drugs that were aware of.  He has a history of alcohol abuse but none recently that again more aware of.  He is here today with his stepson Griffin.  Griffin serves as the historian..    Remainder of 12-point ROS is negative.    TOBACCO USE:  Social History     Tobacco Use   Smoking Status Former Smoker   Smokeless Tobacco Never Used       VITALS:  Vitals:    09/26/19 1416   BP: 110/60   Patient Site: Left Arm   Patient Position: Sitting   Cuff Size: Adult Regular   Pulse: 79   SpO2: 97%   Weight: 188 lb 9.6 oz (85.5 kg)     Wt Readings from Last 3 Encounters:   09/26/19 188 lb 9.6 oz (85.5 kg)   07/29/19 175 lb 11.2 oz (79.7 kg)   07/26/19 178 lb (80.7 kg)     Body mass index is 25.58 kg/m .    PHYSICAL EXAM:  GENERAL APPEARANCE: Alert, cooperative, no distress, appears stated age  HEAD: Normocephalic, without obvious abnormality, atraumatic  LUNGS: Clear to auscultation bilaterally, respirations unlabored  CHEST WALL: No tenderness or deformity  HEART: Irregularly irregular, rate controlled  ABDOMEN: Soft, non-tender, bowel sounds active all four quadrants,     no masses, no  organomegaly  EXTREMITIES: Extremities normal, trace to 1+ edema  RECENT RESULTS  No results found for this or any previous visit (from the past 48 hour(s)).    MEDICATIONS:  Current Outpatient Medications   Medication Sig Dispense Refill     acetaminophen (TYLENOL) 500 MG tablet Take 500-1,000 mg by mouth every 6 (six) hours as needed.       amLODIPine (NORVASC) 5 MG tablet TAKE ONE TABLET BY MOUTH ONE TIME DAILY  90 tablet 3     aspirin 81 MG EC tablet Take 1 tablet (81 mg total) by mouth daily. 90 tablet 1     chlorthalidone (HYGROTEN) 25 MG tablet TAKE 1 TABLET (25 MG TOTAL) BY MOUTH EVERY MORNING. 30 tablet 0     citalopram (CELEXA) 20 MG tablet Take 1 tablet (20 mg total) by mouth daily. 90 tablet 3     CRANBERRY FRUIT EXTRACT (CRANBERRY ORAL) Take 1 capsule by mouth daily.       cyanocobalamin, vitamin B-12, 2,500 mcg Subl Place 2,500 mcg under the tongue daily.       DIGOX 125 mcg tablet TAKE ONE TABLET BY MOUTH IN THE MORNING 30 tablet 10     donepezil (ARICEPT) 10 MG tablet Take 10 mg by mouth bedtime.       ferrous sulfate 325 (65 FE) MG tablet Take 1 tablet by mouth 3 (three) times a day with meals. 270 tablet 3     lisinopril (PRINIVIL,ZESTRIL) 40 MG tablet Take 40 mg by mouth daily.       magnesium oxide (MAG-OX) 400 mg (241.3 mg magnesium) tablet Take 1 tablet (400 mg total) by mouth daily. 100 tablet 3     metoprolol tartrate (LOPRESSOR) 100 MG tablet TAKE 1 and 1/2 TABLETS BY MOUTH 2 TIMES A DAY (Patient taking differently: 75 mg 2 (two) times a day. TAKE 1 and 1/2 TABLETS BY MOUTH 2 TIMES A DAY      ) 270 tablet 0     OMEGA-3S/DHA/EPA/FISH OIL (OMEGA 3 ORAL) Take 1,000 mg by mouth 2 (two) times a day.        potassium chloride (K-DUR,KLOR-CON) 20 MEQ tablet Take 1 tablet (20 mEq total) by mouth 2 (two) times a day. 30 tablet 0     SAW PALMETTO ORAL Take 450 mg by mouth daily.       trimethoprim (TRIMPEX) 100 mg tablet Take 100 mg by mouth once daily.       VIT B1 MN/B2/B3/B5/B6/B12/C/FA (B COMPLEX  W-VIT C ORAL) Take 1 tablet by mouth daily.        vitamin E 400 UNIT capsule Take by mouth see administration instructions. 1200 mg in the morning and 800 mg in the evening.       No current facility-administered medications for this visit.

## 2021-06-01 NOTE — TELEPHONE ENCOUNTER
Refill Approved    Rx renewed per Medication Renewal Policy. Medication was last renewed on Norvasc 6/20/19 .    Tomasa IZAIAH Javier, [facility] Triage/Med Refill 9/16/2019     Requested Prescriptions   Pending Prescriptions Disp Refills     amLODIPine (NORVASC) 5 MG tablet [Pharmacy Med Name: amLODIPine Besylate Oral Tablet 5 MG] 30 tablet 0     Sig: TAKE ONE TABLET BY MOUTH ONE TIME DAILY       Calcium-Channel Blockers Protocol Passed - 9/16/2019  7:02 AM        Passed - PCP or prescribing provider visit in past 12 months or next 3 months     Last office visit with prescriber/PCP: 7/29/2019 Patricia Muro MD OR same dept: 7/29/2019 Patricia Muro MD OR same specialty: 7/29/2019 Patricia Muro MD  Last physical: 7/31/2018 Last MTM visit: Visit date not found   Next visit within 3 mo: Visit date not found  Next physical within 3 mo: Visit date not found  Prescriber OR PCP: Patricia Muro MD  Last diagnosis associated with med order: 1. Hypertension  - amLODIPine (NORVASC) 5 MG tablet [Pharmacy Med Name: amLODIPine Besylate Oral Tablet 5 MG]; TAKE ONE TABLET BY MOUTH ONE TIME DAILY   Dispense: 30 tablet; Refill: 0    If protocol passes may refill for 12 months if within 3 months of last provider visit (or a total of 15 months).             Passed - Blood pressure filed in past 12 months     BP Readings from Last 1 Encounters:   07/29/19 122/60             chlorthalidone (HYGROTEN) 25 MG tablet [Pharmacy Med Name: Chlorthalidone Oral Tablet 25 MG] 30 tablet 0     Sig: TAKE 1 TABLET (25 MG TOTAL) BY MOUTH EVERY MORNING.       Diuretics/Combination Diuretics Refill Protocol  Passed - 9/16/2019  7:02 AM        Passed - Visit with PCP or prescribing provider visit in past 12 months     Last office visit with prescriber/PCP: 7/29/2019 Patricia Muro MD OR same dept: 7/29/2019 Patricia Muro MD OR same specialty: 7/29/2019 Patricia Muro MD  Last physical: 7/31/2018 Last MTM visit:  Visit date not found   Next visit within 3 mo: Visit date not found  Next physical within 3 mo: Visit date not found  Prescriber OR PCP: Patricia Muro MD  Last diagnosis associated with med order: 1. Hypertension  - amLODIPine (NORVASC) 5 MG tablet [Pharmacy Med Name: amLODIPine Besylate Oral Tablet 5 MG]; TAKE ONE TABLET BY MOUTH ONE TIME DAILY   Dispense: 30 tablet; Refill: 0    If protocol passes may refill for 12 months if within 3 months of last provider visit (or a total of 15 months).             Passed - Serum Potassium in past 12 months      Lab Results   Component Value Date    Potassium 3.3 (L) 07/29/2019             Passed - Serum Sodium in past 12 months      Lab Results   Component Value Date    Sodium 142 07/29/2019             Passed - Blood pressure on file in past 12 months     BP Readings from Last 1 Encounters:   07/29/19 122/60             Passed - Serum Creatinine in past 12 months      Creatinine   Date Value Ref Range Status   07/29/2019 1.06 0.70 - 1.30 mg/dL Final

## 2021-06-01 NOTE — PROGRESS NOTES
"[facility] Daily Progress     Patient Name: Navdeep Spann  Date: 9/16/2019  Visit # 3/10  PTA visit #: 1   8/12/19 -10/12/19  Referral Diagnosis: unsteadiness on feet  Referring provider: Patricia Muro MD  Visit Diagnosis:     ICD-10-CM    1. Unsteadiness on feet R26.81    2. Generalized muscle weakness M62.81          Assessment:   Patient demonstrated good technique with floor<>stand transfers with very little Vc's.  He will work on this as a regular exercise to continue to work on functional strength in case he does fall.  He is compliant with his HEP demonstrated with his ability to stand longer with tandem stance  Pt needing verbal and tactile cues with all exercises, gretel to stand straight    Patient is benefitting from skilled physical therapy and is making steady progress toward functional goals.  Patient is appropriate to continue with skilled physical therapy intervention, as indicated by initial plan of care.    Goal Status:  Pt. will show improved balance for safer : ambulation;standing;for household ambulation;independently;in 6 weeks    Pt will: Able to sit<>stand with more ease as leg strength improves within 8 weeks  Pt will: Able to walk on even surfaces with appropriate assistive device with less fear of falling as leg strength improves within 8 weeks      Plan / Patient Education:     Add strengthening and balance tasks in clinic  Limit number of exercises for HEP.    Subjective:   Pain Rating: NA    I have not fallen since I was here last.  Exercises are going well.      Pt's step son Danny attended entire session  Objective:   Gait: Pt ambulates with a SEC. Pt with trunk and hips in slight flexion. Feet are wide apart B LEs externally rotated.    Exercises:  Exercise #1: sit<>stand   5X, 3 times per day  Comment #1: heel and toe raises  x 10-20  Exercise #2: standing on one leg   20 seconds with hand touches  TODAY  Comment #2: modified tandem stand 30\" x 2  Exercise #3: " "seated hip AB/ER, seated hip flexion with green band   X 10  Comment #3: floor<>stand transfers X 2 daily   (edu TODAY)  Exercise #4: seated ball squeezes  Comment #4: 5\" x10  Exercise #5: standing hip extension  Comment #5: x10 alternating  Exercise #6:  tandem stand, 1X leading with each leg  X 20 seconds  TODAY  Comment #6: trampoline:  marching, 1 hand for support, X 15 each leg  TODAY  Exercise #7: nu-step  seat 12, arms 10, workload 5   3'  TODAY  Comment #7: standing:  hip ext, abd, flex, knee ext, heel and toe raises  X 10 reps  TODAY        Treatment Today    TREATMENT MINUTES COMMENTS   Evaluation     Self-care/ Home management     Manual therapy     Neuromuscular Re-education 10 Floor<>stand transfers, pt to perform 2X/day at home   Therapeutic Activity     Therapeutic Exercises 15 See flow sheet or above   Gait training     Modality__________________                Total 25    Blank areas are intentional and mean the treatment did not include these items.       Alexandra Lew PT  9/16/2019    "

## 2021-06-01 NOTE — TELEPHONE ENCOUNTER
RN cannot approve Refill Request    RN can NOT refill this medication medication not on med list. Last office visit: 7/29/2019 Patricia Muro MD Last Physical: 7/31/2018 Last MTM visit: Visit date not found Last visit same specialty: 7/29/2019 Patricia Muro MD.  Next visit within 3 mo: Visit date not found  Next physical within 3 mo: Visit date not found      Tomasa BLISS Javier, [facility] Triage/Med Refill 9/16/2019    Requested Prescriptions   Pending Prescriptions Disp Refills     chlorthalidone (HYGROTEN) 25 MG tablet [Pharmacy Med Name: Chlorthalidone Oral Tablet 25 MG] 30 tablet 0     Sig: TAKE 1 TABLET (25 MG TOTAL) BY MOUTH EVERY MORNING.       Diuretics/Combination Diuretics Refill Protocol  Passed - 9/16/2019  7:02 AM        Passed - Visit with PCP or prescribing provider visit in past 12 months     Last office visit with prescriber/PCP: 7/29/2019 Patricia Muro MD OR same dept: 7/29/2019 Patricia Muro MD OR same specialty: 7/29/2019 Patricia Muro MD  Last physical: 7/31/2018 Last MTM visit: Visit date not found   Next visit within 3 mo: Visit date not found  Next physical within 3 mo: Visit date not found  Prescriber OR PCP: Patricia Muro MD  Last diagnosis associated with med order: 1. Hypertension  - amLODIPine (NORVASC) 5 MG tablet; TAKE ONE TABLET BY MOUTH ONE TIME DAILY   Dispense: 90 tablet; Refill: 3    If protocol passes may refill for 12 months if within 3 months of last provider visit (or a total of 15 months).             Passed - Serum Potassium in past 12 months      Lab Results   Component Value Date    Potassium 3.3 (L) 07/29/2019             Passed - Serum Sodium in past 12 months      Lab Results   Component Value Date    Sodium 142 07/29/2019             Passed - Blood pressure on file in past 12 months     BP Readings from Last 1 Encounters:   07/29/19 122/60             Passed - Serum Creatinine in past 12 months      Creatinine   Date Value Ref  Range Status   07/29/2019 1.06 0.70 - 1.30 mg/dL Final           Signed Prescriptions Disp Refills    amLODIPine (NORVASC) 5 MG tablet 90 tablet 3     Sig: TAKE ONE TABLET BY MOUTH ONE TIME DAILY       Calcium-Channel Blockers Protocol Passed - 9/16/2019  7:02 AM        Passed - PCP or prescribing provider visit in past 12 months or next 3 months     Last office visit with prescriber/PCP: 7/29/2019 Patricia Muro MD OR same dept: 7/29/2019 Patricia Muro MD OR same specialty: 7/29/2019 Patricia Muro MD  Last physical: 7/31/2018 Last MTM visit: Visit date not found   Next visit within 3 mo: Visit date not found  Next physical within 3 mo: Visit date not found  Prescriber OR PCP: Patricia Muro MD  Last diagnosis associated with med order: 1. Hypertension  - amLODIPine (NORVASC) 5 MG tablet; TAKE ONE TABLET BY MOUTH ONE TIME DAILY   Dispense: 90 tablet; Refill: 3    If protocol passes may refill for 12 months if within 3 months of last provider visit (or a total of 15 months).             Passed - Blood pressure filed in past 12 months     BP Readings from Last 1 Encounters:   07/29/19 122/60

## 2021-06-02 ENCOUNTER — HOSPITAL ENCOUNTER (EMERGENCY)
Dept: EMERGENCY MEDICINE | Facility: HOSPITAL | Age: 76
Discharge: ANOTHER HOSPITAL | End: 2021-06-02
Attending: FAMILY MEDICINE
Payer: COMMERCIAL

## 2021-06-02 ENCOUNTER — COMMUNICATION - HEALTHEAST (OUTPATIENT)
Dept: FAMILY MEDICINE | Facility: CLINIC | Age: 76
End: 2021-06-02

## 2021-06-02 ENCOUNTER — RECORDS - HEALTHEAST (OUTPATIENT)
Dept: ADMINISTRATIVE | Facility: OTHER | Age: 76
End: 2021-06-02

## 2021-06-02 VITALS — BODY MASS INDEX: 25.2 KG/M2 | HEIGHT: 71 IN | WEIGHT: 180 LBS

## 2021-06-02 VITALS — WEIGHT: 174 LBS | BODY MASS INDEX: 25.7 KG/M2

## 2021-06-02 VITALS — HEIGHT: 71 IN | BODY MASS INDEX: 27.16 KG/M2 | WEIGHT: 194 LBS

## 2021-06-02 VITALS — BODY MASS INDEX: 29.39 KG/M2 | WEIGHT: 199 LBS

## 2021-06-02 VITALS — BODY MASS INDEX: 29.24 KG/M2 | WEIGHT: 198 LBS

## 2021-06-02 DIAGNOSIS — I66.3 CEREBELLAR ARTERY OCCLUSION OR STENOSIS: ICD-10-CM

## 2021-06-02 DIAGNOSIS — I63.9 CEREBROVASCULAR ACCIDENT (CVA), UNSPECIFIED MECHANISM (H): ICD-10-CM
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
## 2021-06-02 LAB
ALBUMIN SERPL-MCNC: 3.3 G/DL (ref 3.5–5)
ALP SERPL-CCNC: 84 U/L (ref 45–120)
ALT SERPL W P-5'-P-CCNC: 13 U/L (ref 0–45)
ANION GAP SERPL CALCULATED.3IONS-SCNC: 10 MMOL/L (ref 5–18)
AST SERPL W P-5'-P-CCNC: 25 U/L (ref 0–40)
ATRIAL RATE - MUSE: 500 BPM
BASOPHILS # BLD AUTO: 0.1 THOU/UL (ref 0–0.2)
BASOPHILS NFR BLD AUTO: 1 % (ref 0–2)
BILIRUB SERPL-MCNC: 0.8 MG/DL (ref 0–1)
BUN SERPL-MCNC: 15 MG/DL (ref 8–28)
CALCIUM SERPL-MCNC: 8.8 MG/DL (ref 8.5–10.5)
CHLORIDE BLD-SCNC: 111 MMOL/L (ref 98–107)
CO2 SERPL-SCNC: 22 MMOL/L (ref 22–31)
CREAT SERPL-MCNC: 1.18 MG/DL (ref 0.7–1.3)
DIASTOLIC BLOOD PRESSURE - MUSE: NORMAL
EOSINOPHIL # BLD AUTO: 0.2 THOU/UL (ref 0–0.4)
EOSINOPHIL NFR BLD AUTO: 3 % (ref 0–6)
ERYTHROCYTE [DISTWIDTH] IN BLOOD BY AUTOMATED COUNT: 12.7 % (ref 11–14.5)
GFR SERPL CREATININE-BSD FRML MDRD: 60 ML/MIN/1.73M2
GLUCOSE BLD-MCNC: 156 MG/DL (ref 70–125)
HCT VFR BLD AUTO: 40.6 % (ref 40–54)
HGB BLD-MCNC: 13.6 G/DL (ref 14–18)
IMM GRANULOCYTES # BLD: 0 THOU/UL
IMM GRANULOCYTES NFR BLD: 0 %
INTERPRETATION ECG - MUSE: NORMAL
LYMPHOCYTES # BLD AUTO: 1.1 THOU/UL (ref 0.8–4.4)
LYMPHOCYTES NFR BLD AUTO: 13 % (ref 20–40)
MAGNESIUM SERPL-MCNC: 2 MG/DL (ref 1.8–2.6)
MCH RBC QN AUTO: 31.4 PG (ref 27–34)
MCHC RBC AUTO-ENTMCNC: 33.5 G/DL (ref 32–36)
MCV RBC AUTO: 94 FL (ref 80–100)
MONOCYTES # BLD AUTO: 1 THOU/UL (ref 0–0.9)
MONOCYTES NFR BLD AUTO: 11 % (ref 2–10)
NEUTROPHILS # BLD AUTO: 6.3 THOU/UL (ref 2–7.7)
NEUTROPHILS NFR BLD AUTO: 73 % (ref 50–70)
P AXIS - MUSE: NORMAL
PLATELET # BLD AUTO: 193 THOU/UL (ref 140–440)
PMV BLD AUTO: 9.9 FL (ref 8.5–12.5)
POTASSIUM BLD-SCNC: 4.1 MMOL/L (ref 3.5–5)
PR INTERVAL - MUSE: NORMAL
PROT SERPL-MCNC: 6.7 G/DL (ref 6–8)
QRS DURATION - MUSE: 122 MS
QT - MUSE: 406 MS
QTC - MUSE: 425 MS
R AXIS - MUSE: -55 DEGREES
RBC # BLD AUTO: 4.33 MILL/UL (ref 4.4–6.2)
SARS-COV-2 PCR RESULT-HE - HISTORICAL: NEGATIVE
SODIUM SERPL-SCNC: 143 MMOL/L (ref 136–145)
SYSTOLIC BLOOD PRESSURE - MUSE: NORMAL
T AXIS - MUSE: 164 DEGREES
VENTRICULAR RATE- MUSE: 66 BPM
WBC: 8.6 THOU/UL (ref 4–11)

## 2021-06-02 NOTE — TELEPHONE ENCOUNTER
FYI - Status Update  Who is Calling: Melva  Update: Melva wants to be sure Dr. Deo is aware that Harpreet was started on Keppra and Omeprazole during a recent hospital stay.  Medication are on medication list already.  Okay to leave a detailed message?:  No return call needed

## 2021-06-02 NOTE — TELEPHONE ENCOUNTER
I called and spoke with pt's wife, Melva. They are insisting to see Dr. Muro and willing to wait to see her when she returns to clinic.   Appointment made: on Tuesday, 11/19/2019 @ 3:20 PM OV with Dr. Muro.

## 2021-06-02 NOTE — TELEPHONE ENCOUNTER
New Appointment Needed  What is the reason for the visit:    Inpatient/ED Follow Up Appt Request  At what hospital or facility were you seen?: [facility]  What is the reason you were seen?: Seizures  What date were you admitted?: date: 10/20/19  What date were you discharged?: date: 11/01/19  What was the recommended timeframe for your follow up appointment?: 7-10 days  Provider Preference: Any available  How soon do you need to be seen?: next week  Waitlist offered?: No  Okay to leave a detailed message:  Yes, patient is discharging today at 1pm. Please call Cherie  By 1pm if possible.

## 2021-06-02 NOTE — PROGRESS NOTES
[facility] Daily Progress /Discharge    Patient Name: Navdeep Spann  Date: 10/8/2019  Visit # 5/10  PTA visit #: 1   19 -10/12/19  Referral Diagnosis: unsteadiness on feet  Referring provider: Patricia Muro MD  Visit Diagnosis:     ICD-10-CM    1. Unsteadiness on feet R26.81    2. Generalized muscle weakness M62.81          Assessment:   Patient no longer fears he will fall and feels he has gained good strength.  Will continue his exercises at home    Goal Status:   Pt. will show improved balance for safer : ambulation;standing;for household ambulation;independently;in 6 weeks;Met    Pt will: Able to sit<>stand with more ease as leg strength improves within 8 weeks  GOAL MET  Pt feels this is much easier as his leg strength has improved  Pt will: Able to walk on even surfaces with appropriate assistive device with less fear of falling as leg strength improves within 8 weeks  GOAL MET  pt no longer fears he will fall with walking.      Plan / Patient Education:     Discharged at this time.  He will keep doing his exercises at home    Subjective:   Pain Rating: NA    Overall feel stronger.  Think I am ready to be done in therapy.      Objective:   .  On initial eval:   Left hip flexion, knee flexion 4-/5  Other bilateral LE strength 4 to 4+/5 throughout    Today:        Left hip flexion, knee flexion: 4/5       Other LE strength 4+/5    Initial eval:  1 leg stance: L=4 seconds, R=0 seconds     Today:        R=11 seconds, L=8 seconds    Initial eval:   Tinnetti Total Score (calculated): 12    Balance scores range from 0-28, where a score of less than 19 ='s a high risk for falls; 19-24 ='s a moderate risk for falls; 24-28 ='s a low risk for falls.     Today:        Tinnetti Total Score (calculated): 17    Initial eval:   APTA score: 0      Was able to perform 5 reps with both hands     Today:     APTA score: 0         Able to perform 11 using hands    Initial eval:  TU.83 seconds      "Today:             28.83 seconds    Exercises:  Exercise #1: sit<>stand   5X, 3 times per day  Comment #1: heel and toe raises  x 10-20  Exercise #2: standing on one leg   20 seconds on left,   3 seconds on right (with hand touches)     TODAY  Comment #2: modified tandem stand 30\" x 2  Exercise #3: seated hip AB/ER, seated hip flexion with green band   X 10  Comment #3: floor<>stand transfers X 2 daily     Exercise #4: seated ball squeezes  Comment #4: 5\" x10  Exercise #5: standing hip extension  Comment #5: x10 alternating  Exercise #6:  tandem stand, 1X leading with each leg  X 45 seconds  TODAY  Comment #6: trampoline:  marching, 1 hand for support, X 15 each leg    Exercise #7: nu-step  seat 12, arms 10, workload 5   3'  TODAY  Comment #7: standing:  hip ext, abd, flex, knee ext, heel and toe raises  X 10 reps    Exercise #8: airex:  marching, no hands for support  X 20 each, stand by A   TODAY  Comment #8: standing with feet together on airex:   30 seconds with stand by A  TODAY  Exercise #9: 8\" step, toe tapping, no hands X 20  TODAY  Comment #9: 8 cups in semi-[place], picking up 1X with each hand, stand by A    Exercise #10: attempted carioca-pt unable to get sequencing     Comment #10: tandem standing on airex  TODAY        Treatment Today    TREATMENT MINUTES COMMENTS   Evaluation     Self-care/ Home management     Manual therapy     Neuromuscular Re-education 15 See above or flow sheet   Therapeutic Activity     Therapeutic Exercises     Gait training     Modality__________________     Physical performance 15 Tinnetti, Tug, APTA         Total 30    Blank areas are intentional and mean the treatment did not include these items.       Alexandra Lew PT  10/8/2019    "

## 2021-06-02 NOTE — PROGRESS NOTES
"[facility] Daily Progress     Patient Name: Navdeep Spann  Date: 10/4/2019  Visit # 4/10  PTA visit #: 1   8/12/19 -10/12/19  Referral Diagnosis: unsteadiness on feet  Referring provider: Patricia Muro MD  Visit Diagnosis:     ICD-10-CM    1. Unsteadiness on feet R26.81    2. Generalized muscle weakness M62.81          Assessment:   Patient has shown good progress since his last visit with increase times for balance exercises, progression of exercises and ease of HEP.  He feels he is at a point where he may be able to manage at home so will see pt 1 more visit for discharge.  He will continue his HEP and return with questions next visit    Goal Status: ongoing  Pt. will show improved balance for safer : ambulation;standing;for household ambulation;independently;in 6 weeks    Pt will: Able to sit<>stand with more ease as leg strength improves within 8 weeks  Pt will: Able to walk on even surfaces with appropriate assistive device with less fear of falling as leg strength improves within 8 weeks      Plan / Patient Education:     Discharge next visit    Subjective:   Pain Rating: NA    Have been doing exercises at home and think I am stronger.          Objective:   Gait: Pt ambulates with a SEC. Pt with trunk and hips in slight flexion. Feet are wide apart B LEs externally rotated.    Exercises:  Exercise #1: sit<>stand   5X, 3 times per day  Comment #1: heel and toe raises  x 10-20  Exercise #2: standing on one leg   20 seconds on left,   3 seconds on right (with hand touches)     TODAY  Comment #2: modified tandem stand 30\" x 2  Exercise #3: seated hip AB/ER, seated hip flexion with green band   X 10  Comment #3: floor<>stand transfers X 2 daily   (edu TODAY)  Exercise #4: seated ball squeezes  Comment #4: 5\" x10  Exercise #5: standing hip extension  Comment #5: x10 alternating  Exercise #6:  tandem stand, 1X leading with each leg  X 45 seconds  TODAY  Comment #6: trampoline:  marching, 1 " "hand for support, X 15 each leg    Exercise #7: nu-step  seat 12, arms 10, workload 5   3'  TODAY  Comment #7: standing:  hip ext, abd, flex, knee ext, heel and toe raises  X 10 reps  TODAY  Exercise #8: airex:  marching, no hands for support  X 20 each, stand by A   TODAY  Comment #8: standing with feet together on airex:   30 seconds with stand by A  TODAY  Exercise #9: 8\" step, toe tapping, no hands X 20  TODAY  Comment #9: 8 cups in semi-[place], picking up 1X with each hand, stand by A  TODAY  Exercise #10: attempted carioca-pt unable to get sequencing   TODAY        Treatment Today    TREATMENT MINUTES COMMENTS   Evaluation     Self-care/ Home management     Manual therapy     Neuromuscular Re-education 10 See above or flow sheet   Therapeutic Activity     Therapeutic Exercises 15 See flow sheet or above   Gait training     Modality__________________                Total 25    Blank areas are intentional and mean the treatment did not include these items.       Alexandra Lew PT  10/4/2019    "

## 2021-06-03 ENCOUNTER — RECORDS - HEALTHEAST (OUTPATIENT)
Dept: ADMINISTRATIVE | Facility: OTHER | Age: 76
End: 2021-06-03

## 2021-06-03 ENCOUNTER — COMMUNICATION - HEALTHEAST (OUTPATIENT)
Dept: FAMILY MEDICINE | Facility: CLINIC | Age: 76
End: 2021-06-03

## 2021-06-03 ENCOUNTER — COMMUNICATION - HEALTHEAST (OUTPATIENT)
Dept: SCHEDULING | Facility: CLINIC | Age: 76
End: 2021-06-03

## 2021-06-03 VITALS — WEIGHT: 175.6 LBS | BODY MASS INDEX: 23.82 KG/M2

## 2021-06-03 VITALS — WEIGHT: 187.4 LBS | BODY MASS INDEX: 25.42 KG/M2

## 2021-06-03 VITALS
SYSTOLIC BLOOD PRESSURE: 110 MMHG | WEIGHT: 188.6 LBS | DIASTOLIC BLOOD PRESSURE: 60 MMHG | HEART RATE: 79 BPM | OXYGEN SATURATION: 97 % | BODY MASS INDEX: 25.58 KG/M2

## 2021-06-03 VITALS — WEIGHT: 175.7 LBS | BODY MASS INDEX: 23.83 KG/M2

## 2021-06-03 VITALS — BODY MASS INDEX: 24.18 KG/M2 | WEIGHT: 178.3 LBS

## 2021-06-03 NOTE — TELEPHONE ENCOUNTER
"RN cannot approve Refill Request    RN can NOT refill this medication \"Patient taking differently\". Please clarify sig with provider.. Last office visit: 1/30/2018 Neva Whitlock MD Last Physical: Visit date not found Last MTM visit: Visit date not found Last visit same specialty: 9/26/2019 Patricia Muro MD.  Next visit within 3 mo: Visit date not found  Next physical within 3 mo: Visit date not found      Vy LOWE Manuela, [facility] Triage/Med Refill 11/9/2019    Requested Prescriptions   Pending Prescriptions Disp Refills     KLOR-CON M20 20 mEq tablet [Pharmacy Med Name: Klor-Con M20 Oral Tablet Extended Release 20 MEQ] 270 tablet 2     Sig: Take 1 tablet (20 mEq total) by mouth 3 (three) times a day.       Potassium Supplements Refill Protocol Passed - 11/9/2019  6:56 PM        Passed - PCP or prescribing provider visit in past 12 months       Last office visit with prescriber/PCP: 1/30/2018 Neva Whitlock MD OR same dept: 9/26/2019 Patricia Muro MD OR same specialty: 9/26/2019 Patricia Muro MD  Last physical: Visit date not found Last MTM visit: Visit date not found   Next visit within 3 mo: Visit date not found  Next physical within 3 mo: Visit date not found  Prescriber OR PCP: Neva Whitlock MD  Last diagnosis associated with med order: 1. Hypokalemia  - KLOR-CON M20 20 mEq tablet [Pharmacy Med Name: Klor-Con M20 Oral Tablet Extended Release 20 MEQ]; Take 1 tablet (20 mEq total) by mouth 3 (three) times a day.  Dispense: 270 tablet; Refill: 2    If protocol passes may refill for 12 months if within 3 months of last provider visit (or a total of 15 months).             Passed - Potassium level in last 12 months     Lab Results   Component Value Date    Potassium 3.4 (L) 10/24/2019                "

## 2021-06-03 NOTE — TELEPHONE ENCOUNTER
Call placed to [facility] for AMBER for muscle bx slides to be sent to Dr. Abbasi at [facility]   Prior Authorization Request  Who s requesting:  Pharmacy  Pharmacy Name and Location: [facility] #[id number]  Medication Name: hydroxyzine   Insurance Plan: [facility]   Insurance Member ID Number:  [id number]  Informed patient that prior authorizations can take up to 10 business days for response:   No  Okay to leave a detailed message: No        KEY: [id number]

## 2021-06-03 NOTE — TELEPHONE ENCOUNTER
FYI - Status Update  Who is Calling: Home Care  Update: Patient refused admission to home care on Saturday, 11/2/19. Patient stated he had a follow up scheduled & had all the medication & exercises that he needs.  Okay to leave a detailed message?:  Yes

## 2021-06-03 NOTE — TELEPHONE ENCOUNTER
Central PA team  [phone]  Pool: HE PA MED ([id number])          PA has been initiated.       PA form completed and faxed insurance via Cover My Meds     Key:  [id number]      Medication:  hydrOXYzine Pamoate 25MG capsules      Insurance:  [employer]        Response will be received via fax and may take up to 5-10 business days depending on plan

## 2021-06-03 NOTE — TELEPHONE ENCOUNTER
Refill Approved    Rx renewed per Medication Renewal Policy. Medication was last renewed on 10/22/19.    Nelly Orozco, [facility] Connection Triage/Med Refill 11/5/2019     Requested Prescriptions   Pending Prescriptions Disp Refills     hydrOXYzine pamoate (VISTARIL) 25 MG capsule 15 capsule 0     Sig: Take 1 capsule (25 mg total) by mouth at bedtime as needed.       Antihistamine Refill Protocol Passed - 11/5/2019  2:57 PM        Passed - Patient has had office visit/physical in last year     Last office visit with prescriber/PCP: 9/26/2019 Patricia Muro MD OR same dept: Visit date not found OR same specialty: Visit date not found  Last physical: 7/31/2018 Last MTM visit: 12/12/2016 Michelle Atkinson, PharmD   Next visit within 3 mo: Visit date not found  Next physical within 3 mo: Visit date not found  Prescriber OR PCP: Patricia Muro MD  Last diagnosis associated with med order: 1. Anxiety  - hydrOXYzine pamoate (VISTARIL) 25 MG capsule; Take 1 capsule (25 mg total) by mouth at bedtime as needed.  Dispense: 15 capsule; Refill: 0    If protocol passes may refill for 12 months if within 3 months of last provider visit (or a total of 15 months).

## 2021-06-03 NOTE — TELEPHONE ENCOUNTER
RN cannot approve Refill Request    RN can NOT refill this medication medication not on med list.         Doreen Fuentes, [facility] Triage/Med Refill 11/23/2019    Requested Prescriptions   Pending Prescriptions Disp Refills     chlorthalidone (HYGROTEN) 25 MG tablet [Pharmacy Med Name: Chlorthalidone Oral Tablet 25 MG] 30 tablet 0     Sig: TAKE 1 TABLET (25 MG TOTAL) BY MOUTH EVERY MORNING.       Diuretics/Combination Diuretics Refill Protocol  Passed - 11/22/2019  6:41 PM        Passed - Visit with PCP or prescribing provider visit in past 12 months     Last office visit with prescriber/PCP: 9/26/2019 Patricia Muro MD OR same dept: 9/26/2019 Patricia Muro MD OR same specialty: 9/26/2019 Patricia Muro MD  Last physical: 7/31/2018 Last MTM visit: Visit date not found   Next visit within 3 mo: Visit date not found  Next physical within 3 mo: Visit date not found  Prescriber OR PCP: Patricia Muro MD  Last diagnosis associated with med order: 1. Hypertension  - chlorthalidone (HYGROTEN) 25 MG tablet [Pharmacy Med Name: Chlorthalidone Oral Tablet 25 MG]; TAKE 1 TABLET (25 MG TOTAL) BY MOUTH EVERY MORNING.  Dispense: 30 tablet; Refill: 0    If protocol passes may refill for 12 months if within 3 months of last provider visit (or a total of 15 months).             Passed - Serum Potassium in past 12 months      Lab Results   Component Value Date    Potassium 3.7 11/14/2019             Passed - Serum Sodium in past 12 months      Lab Results   Component Value Date    Sodium 141 11/14/2019             Passed - Blood pressure on file in past 12 months     BP Readings from Last 1 Encounters:   10/22/19 134/71             Passed - Serum Creatinine in past 12 months      Creatinine   Date Value Ref Range Status   11/14/2019 1.24 0.70 - 1.30 mg/dL Final

## 2021-06-03 NOTE — TELEPHONE ENCOUNTER
RN cannot approve Refill Request    RN can NOT refill this medication medication not on med list.     Sheila Clayton, [facility] Triage/Med Refill 11/17/2019    Requested Prescriptions   Pending Prescriptions Disp Refills     chlorthalidone (HYGROTEN) 25 MG tablet [Pharmacy Med Name: Chlorthalidone Oral Tablet 25 MG] 30 tablet 0     Sig: TAKE 1 TABLET (25 MG TOTAL) BY MOUTH EVERY MORNING.       Diuretics/Combination Diuretics Refill Protocol  Passed - 11/17/2019  7:42 PM        Passed - Visit with PCP or prescribing provider visit in past 12 months     Last office visit with prescriber/PCP: 9/26/2019 Patricia Muro MD OR same dept: 9/26/2019 Patricia Muro MD OR same specialty: 9/26/2019 Patricia Muro MD  Last physical: 7/31/2018 Last MTM visit: Visit date not found   Next visit within 3 mo: Visit date not found  Next physical within 3 mo: Visit date not found  Prescriber OR PCP: Patricia Muro MD  Last diagnosis associated with med order: 1. Hypertension  - chlorthalidone (HYGROTEN) 25 MG tablet [Pharmacy Med Name: Chlorthalidone Oral Tablet 25 MG]; TAKE 1 TABLET (25 MG TOTAL) BY MOUTH EVERY MORNING.  Dispense: 30 tablet; Refill: 0    If protocol passes may refill for 12 months if within 3 months of last provider visit (or a total of 15 months).             Passed - Serum Potassium in past 12 months      Lab Results   Component Value Date    Potassium 3.7 11/14/2019             Passed - Serum Sodium in past 12 months      Lab Results   Component Value Date    Sodium 141 11/14/2019             Passed - Blood pressure on file in past 12 months     BP Readings from Last 1 Encounters:   10/22/19 134/71             Passed - Serum Creatinine in past 12 months      Creatinine   Date Value Ref Range Status   11/14/2019 1.24 0.70 - 1.30 mg/dL Final

## 2021-06-04 ENCOUNTER — RECORDS - HEALTHEAST (OUTPATIENT)
Dept: ADMINISTRATIVE | Facility: OTHER | Age: 76
End: 2021-06-04

## 2021-06-04 VITALS
OXYGEN SATURATION: 98 % | BODY MASS INDEX: 26.72 KG/M2 | HEART RATE: 95 BPM | WEIGHT: 197 LBS | TEMPERATURE: 98.1 F | DIASTOLIC BLOOD PRESSURE: 60 MMHG | SYSTOLIC BLOOD PRESSURE: 116 MMHG

## 2021-06-04 VITALS
OXYGEN SATURATION: 97 % | SYSTOLIC BLOOD PRESSURE: 130 MMHG | BODY MASS INDEX: 26.3 KG/M2 | DIASTOLIC BLOOD PRESSURE: 72 MMHG | HEART RATE: 70 BPM | WEIGHT: 183.3 LBS

## 2021-06-04 NOTE — TELEPHONE ENCOUNTER
Medication Question or Clarification  Who is calling: Patient  What medication are you calling about? (include dose and sig)   chlorthalidone (HYGROTEN) 25 MG tablet           Summary: TAKE 1 TABLET (25 MG TOTAL) BY MOUTH EVERY MORNING., Normal   Start: 12/24/2019  Ord/Sold: 12/24/2019 (O)  Report  Adh:   Taking:   Long-term:   Pharmacy: [facility][id number] - [address] Dose History        Who prescribed the medication?:Patricia Muro MD  What is your question/concern?: The patient went to  medication but it was 81 dollars a fill.  The pharmacist recommended to ask provider if order can be changed to HTCZ as this is more economical.  Please advise.   Pharmacy:  [facility] to leave a detailed message?: Yes Melva Wright [facility] - Please call the pharmacy to obtain any additional needed information.

## 2021-06-04 NOTE — TELEPHONE ENCOUNTER
Refill Approved    Rx renewed per Medication Renewal Policy. Medication was last renewed on 11/24/19    Jael Burgess, [facility] Triage/Med Refill 12/24/2019     Requested Prescriptions   Pending Prescriptions Disp Refills     chlorthalidone (HYGROTEN) 25 MG tablet [Pharmacy Med Name: Chlorthalidone Oral Tablet 25 MG] 30 tablet 0     Sig: TAKE 1 TABLET (25 MG TOTAL) BY MOUTH EVERY MORNING.       Diuretics/Combination Diuretics Refill Protocol  Passed - 12/22/2019  7:58 PM        Passed - Visit with PCP or prescribing provider visit in past 12 months     Last office visit with prescriber/PCP: 12/19/2019 Patricia Muro MD OR same dept: 12/19/2019 Patricia Muro MD OR same specialty: 12/19/2019 Patricia Muro MD  Last physical: 7/31/2018 Last MTM visit: Visit date not found   Next visit within 3 mo: Visit date not found  Next physical within 3 mo: Visit date not found  Prescriber OR PCP: Patricia Muro MD  Last diagnosis associated with med order: 1. Hypertension  - chlorthalidone (HYGROTEN) 25 MG tablet [Pharmacy Med Name: Chlorthalidone Oral Tablet 25 MG]; TAKE 1 TABLET (25 MG TOTAL) BY MOUTH EVERY MORNING.  Dispense: 30 tablet; Refill: 0    If protocol passes may refill for 12 months if within 3 months of last provider visit (or a total of 15 months).             Passed - Serum Potassium in past 12 months      Lab Results   Component Value Date    Potassium 3.8 12/19/2019             Passed - Serum Sodium in past 12 months      Lab Results   Component Value Date    Sodium 140 12/19/2019             Passed - Blood pressure on file in past 12 months     BP Readings from Last 1 Encounters:   12/19/19 130/72             Passed - Serum Creatinine in past 12 months      Creatinine   Date Value Ref Range Status   12/19/2019 1.10 0.70 - 1.30 mg/dL Final

## 2021-06-04 NOTE — PROGRESS NOTES
ASSESSMENT:  1. Unresponsive state  74-year-old gentleman with recurrent unresponsive episodes.  Finally with this last Hospitalization they seem to isolate a seizure disorder.  He has been started on Keppra and has been doing fine since that time.  His family states he has been eating normally his memory seems to be stable.  He is conversational today and states that he feels quite good.  We will continue to follow with neurology.  Continue on Keppra.    2. Hypertension  Blood pressures are well controlled today.  He had some hypotension when he was hospitalized in October and they stopped his diuretics.  He continues off of his diuretics and blood pressure is normal.  - Basic Metabolic Panel    3. Chronic atrial fibrillation  Chronic atrial fib on digoxin and metoprolol.  He is rate controlled and asymptomatic.  He is not a candidate for anticoagulation despite his chads 2 score due to his frequent episodes of falling and unresponsiveness.  He continues on an aspirin daily.    4. Dementia without behavioral disturbance, unspecified dementia type   Dementia is remained stable through this last hospitalization.  They have not noticed any furthering memory loss and he is maintained on Aricept daily.    5. Urinary frequency  He has some increased urinary frequency.  Not complaining of any burning, fevers or chills he is on trimethoprim for prophylaxis.  We will check a urine culture to date.  - Urinalysis-UC if Indicated  - PSA, Diagnostic (Prostatic-Specific Antigen)      PLAN:  There are no Patient Instructions on file for this visit.    Orders Placed This Encounter   Procedures     Urinalysis-UC if Indicated     PSA, Diagnostic (Prostatic-Specific Antigen)     Basic Metabolic Panel     There are no discontinued medications.    No follow-ups on file.    CHIEF COMPLAINT:  Chief Complaint   Patient presents with     Follow-up     Hosiptal- passed out       HISTORY OF PRESENT ILLNESS:  Navdeep is a 74 y.o. male  presenting to the clinic today with his son for a follow up regarding his hospitalization from 10/14/19-10/23/19 due to an episode of syncope. Pt followed up with neurology where they advised pt to stick with his anti-seizure medications as this episode could have been related to his frequent seizures. Pt denies any loss of appetite or recent falls.     Health maintenance:   Pt is due for a shingles vaccine.    Pt last colonoscopy was in 2010.     REVIEW OF SYSTEMS:   Denies CP, memory issues or breathing issues  Active  Improvement seen in lymphedema in pt legs  All other systems are negative.    PFSH:  Reviewed as below.     TOBACCO USE:  Social History     Tobacco Use   Smoking Status Former Smoker   Smokeless Tobacco Never Used       VITALS:  Vitals:    12/19/19 1318   BP: 130/72   Patient Site: Left Arm   Patient Position: Sitting   Cuff Size: Adult Regular   Pulse: 70   SpO2: 97%   Weight: 183 lb 4.8 oz (83.1 kg)     Wt Readings from Last 3 Encounters:   12/19/19 183 lb 4.8 oz (83.1 kg)   10/22/19 177 lb 8 oz (80.5 kg)   09/26/19 188 lb 9.6 oz (85.5 kg)     Body mass index is 26.3 kg/m .    PHYSICAL EXAM:    General Appearance:  Alert, cooperative, no distress, appears stated age           Lungs:   Clear to auscultation bilaterally, respirations unlabored.  No expiratory wheeze or inspiratory crackles noted.   Heart:  Regular rate and rhythm, S1, S2 normal, no murmur, rub or gallop               Neurologic: Nonfocal.     ADDITIONAL HISTORY SUMMARIZED (2): None.  DECISION TO OBTAIN EXTRA INFORMATION (1): None.   RADIOLOGY TESTS (1): None.  LABS (1): Labs ordered.  MEDICINE TESTS (1): None.  INDEPENDENT REVIEW (2 each): None.     The visit lasted a total of 14 minutes face to face with the patient. Over 50% of the time was spent counseling and educating the patient about their episode of syncope.    Tari CLARK, am scribing for and in the presence of, Dr. Muro.    Dr. Jina CLARK, personally performed the  services described in this documentation, as scribed by Tari Franklin in my presence, and it is both accurate and complete.    MEDICATIONS:  Current Outpatient Medications   Medication Sig Dispense Refill     acetaminophen (TYLENOL) 500 MG tablet Take 500-1,000 mg by mouth every 6 (six) hours as needed.       amLODIPine (NORVASC) 5 MG tablet TAKE ONE TABLET BY MOUTH ONE TIME DAILY  90 tablet 3     arginine HCl, L-arginine, 1,000 mg Tab Take 1,000 mg by mouth daily.       ascorbic acid, vitamin C, (VITAMIN C) 1000 MG tablet Take 1,000 mg by mouth daily.       aspirin 81 MG EC tablet Take 1 tablet (81 mg total) by mouth daily. 90 tablet 1     biotin 5,000 mcg TbDL Take 5,000 mcg by mouth daily.       chlorthalidone (HYGROTEN) 25 MG tablet TAKE 1 TABLET (25 MG TOTAL) BY MOUTH EVERY MORNING. 30 tablet 0     cholecalciferol, vitamin D3, 5,000 unit Tab Take 5,000 Units by mouth daily.       citalopram (CELEXA) 20 MG tablet Take 1 tablet (20 mg total) by mouth daily. 90 tablet 3     CRANBERRY FRUIT EXTRACT (CRANBERRY ORAL) Take 1 capsule by mouth daily.       DIGOX 125 mcg tablet TAKE ONE TABLET BY MOUTH IN THE MORNING 30 tablet 10     donepezil (ARICEPT) 10 MG tablet Take 10 mg by mouth bedtime.       ferrous sulfate 325 (65 FE) MG tablet Take 1 tablet by mouth 3 (three) times a day with meals. (Patient taking differently: Take 1-2 tablets by mouth 2 (two) times a day. Takes 2 tablets in the morning and 1 tablet at bedtime      ) 270 tablet 3     hydrOXYzine pamoate (VISTARIL) 25 MG capsule Take 1 capsule (25 mg total) by mouth at bedtime as needed. 90 capsule 3     KLOR-CON M20 20 mEq tablet Take 1 tablet (20 mEq total) by mouth 3 (three) times a day. 270 tablet 2     Lactobacillus rhamnosus GG (CULTURELLE) 10-15 Billion cell capsule Take 1 capsule by mouth daily.       levETIRAcetam (KEPPRA) 500 MG tablet Take 1 tablet (500 mg total) by mouth 2 (two) times a day. 180 tablet 2     magnesium oxide (MAG-OX) 400 mg (241.3 mg  magnesium) tablet Take 1 tablet (400 mg total) by mouth daily. (Patient taking differently: Take 400 mg by mouth 2 (two) times a day.       ) 100 tablet 3     melatonin 3 mg Tab tablet Take 1 tablet (3 mg total) by mouth at bedtime as needed.  0     metoprolol tartrate 75 mg Tab Take 75 mg by mouth 2 (two) times a day. 60 tablet 0     OMEGA-3S/DHA/EPA/FISH OIL (OMEGA 3 ORAL) Take 1,000-2,000 mg by mouth 2 (two) times a day. Takes 2000mg every morning and 1000mg daily at bedtime             omeprazole (PRILOSEC) 20 MG capsule Take 1 capsule (20 mg total) by mouth daily before breakfast. 90 capsule 2     potassium chloride (K-DUR,KLOR-CON) 20 MEQ tablet Take 1 tablet (20 mEq total) by mouth 2 (two) times a day. (Patient taking differently: Take 20-40 mEq by mouth 2 (two) times a day. Takes 2 tablets in the morning and 1 tablet at bedtime      ) 30 tablet 0     SAW PALMETTO ORAL Take 450 mg by mouth daily.       trimethoprim (TRIMPEX) 100 mg tablet Take 100 mg by mouth once daily.       VIT B1 MN/B2/B3/B5/B6/B12/C/FA (B COMPLEX W-VIT C ORAL) Take 1 tablet by mouth daily.        No current facility-administered medications for this visit.        Total data points: 1

## 2021-06-04 NOTE — TELEPHONE ENCOUNTER
I have sent in a new prescription for hydrochlorothiazide in place of chlorthalidone due to expense

## 2021-06-05 VITALS
DIASTOLIC BLOOD PRESSURE: 70 MMHG | BODY MASS INDEX: 25.5 KG/M2 | OXYGEN SATURATION: 96 % | SYSTOLIC BLOOD PRESSURE: 105 MMHG | RESPIRATION RATE: 16 BRPM | TEMPERATURE: 98 F | WEIGHT: 188 LBS | HEART RATE: 74 BPM

## 2021-06-05 VITALS
WEIGHT: 194 LBS | HEART RATE: 60 BPM | SYSTOLIC BLOOD PRESSURE: 110 MMHG | BODY MASS INDEX: 26.31 KG/M2 | DIASTOLIC BLOOD PRESSURE: 60 MMHG | OXYGEN SATURATION: 98 %

## 2021-06-05 VITALS
RESPIRATION RATE: 16 BRPM | DIASTOLIC BLOOD PRESSURE: 73 MMHG | BODY MASS INDEX: 25.69 KG/M2 | WEIGHT: 189.4 LBS | SYSTOLIC BLOOD PRESSURE: 123 MMHG | OXYGEN SATURATION: 95 % | HEART RATE: 83 BPM | TEMPERATURE: 98 F

## 2021-06-05 VITALS
HEIGHT: 71 IN | HEART RATE: 68 BPM | OXYGEN SATURATION: 98 % | WEIGHT: 196 LBS | DIASTOLIC BLOOD PRESSURE: 64 MMHG | BODY MASS INDEX: 27.44 KG/M2 | RESPIRATION RATE: 16 BRPM | SYSTOLIC BLOOD PRESSURE: 119 MMHG

## 2021-06-05 VITALS
SYSTOLIC BLOOD PRESSURE: 88 MMHG | BODY MASS INDEX: 25.87 KG/M2 | WEIGHT: 185.5 LBS | DIASTOLIC BLOOD PRESSURE: 58 MMHG | OXYGEN SATURATION: 98 % | HEART RATE: 61 BPM

## 2021-06-05 VITALS
DIASTOLIC BLOOD PRESSURE: 60 MMHG | HEART RATE: 66 BPM | TEMPERATURE: 97.6 F | OXYGEN SATURATION: 97 % | SYSTOLIC BLOOD PRESSURE: 100 MMHG | RESPIRATION RATE: 20 BRPM | HEIGHT: 71 IN | BODY MASS INDEX: 27.5 KG/M2 | WEIGHT: 196.4 LBS

## 2021-06-06 NOTE — TELEPHONE ENCOUNTER
Refill Approved    Rx renewed per Medication Renewal Policy. Medication was last renewed on 10/22/19.    Nelly Orozco, [facility] Connection Triage/Med Refill 2/18/2020     Requested Prescriptions   Pending Prescriptions Disp Refills     metoprolol tartrate 75 mg Tab 60 tablet 0     Sig: Take 75 mg by mouth 2 (two) times a day.       Beta-Blockers Refill Protocol Passed - 2/18/2020 10:43 AM        Passed - PCP or prescribing provider visit in past 12 months or next 3 months     Last office visit with prescriber/PCP: Visit date not found OR same dept: 12/19/2019 Patricia Muro MD OR same specialty: 12/19/2019 Patricia Muro MD  Last physical: Visit date not found Last MTM visit: Visit date not found   Next visit within 3 mo: Visit date not found  Next physical within 3 mo: Visit date not found  Prescriber OR PCP: Cheryle Alfred MD  Last diagnosis associated with med order: 1. Hypertension  - metoprolol tartrate 75 mg Tab; Take 75 mg by mouth 2 (two) times a day.  Dispense: 60 tablet; Refill: 0    2. Chronic atrial fibrillation  - metoprolol tartrate 75 mg Tab; Take 75 mg by mouth 2 (two) times a day.  Dispense: 60 tablet; Refill: 0    If protocol passes may refill for 12 months if within 3 months of last provider visit (or a total of 15 months).             Passed - Blood pressure filed in past 12 months     BP Readings from Last 1 Encounters:   12/19/19 130/72

## 2021-06-06 NOTE — TELEPHONE ENCOUNTER
Medication Question or Clarification  Who is calling: Patient Girl friend Melva Lugo  What medication are you calling about (include dose and sig)?:   citalopram (CELEXA) 20 MG tablet  90 tablet  3  5/4/2019      Sig - Route: Take 1 tablet (20 mg total) by mouth daily. - Oral     Who prescribed the medication?: Patricia Muro MD  What is your question/concern?: Caller states patient went ER on Monday night for seizure [facility] is [facility] caller states patient is been depressed for last 3 months requesting  to increase citaloprom dose . Please advise Requested Pharmacy: [id number]  Okay to leave a detailed message?: No

## 2021-06-06 NOTE — TELEPHONE ENCOUNTER
Typically while staying at TCU, patients will be seen by the TCU doctor - I would recommend asking that doctor about increasing the celexa dose as they will be monitoring Harpreet for the time being.    Dr King

## 2021-06-07 NOTE — TELEPHONE ENCOUNTER
We received the TCU discharge summary for pt from [facility] and placed it into provider's box for review.

## 2021-06-07 NOTE — TELEPHONE ENCOUNTER
Orders being requested: Home care   Skilled nursing    2 times a week for 2 weeks     1 time a week for 2 weeks   Physical Therapy     Evaluate and Treat  Occupational Therapy    Evaluate and Treat  Reason service is needed/diagnosis: for medication management and diease management.   When are orders needed by: ASAP  Where to send Orders: Phone:  to caller   Okay to leave detailed message?  Yes

## 2021-06-07 NOTE — TELEPHONE ENCOUNTER
Left message to call back for: orders request  Information to relay to patient:  Notified ok for requested order per Dr. Alfred via VM. Please advise if this is not ok.

## 2021-06-07 NOTE — PROGRESS NOTES
"Navdeep Spann is a 75 y.o. male who is being evaluated via a billable telephone visit.      The patient has been notified of following:     \"This telephone visit will be conducted via a call between you and your physician/provider. We have found that certain health care needs can be provided without the need for a physical exam.  This service lets us provide the care you need with a short phone conversation.  If a prescription is necessary we can send it directly to your pharmacy.  If lab work is needed we can place an order for that and you can then stop by our lab to have the test done at a later time.    If during the course of the call the physician/provider feels a telephone visit is not appropriate, you will not be charged for this service.\"     Patient has given verbal consent to a Telephone visit? Yes    Navdeep Spann complains of  No chief complaint on file.      I have reviewed and updated the patient's Past Medical History, Social History, Family History and Medication List.    ALLERGIES  Patient has no known allergies.    Assessment/Plan:  1. Unresponsive episode  75-year-old gentleman with a history of recurrent unresponsive episodes.  They start with syncope and then he will basically sleep for about 3 days.  This is been happening over the last several years and we have not identified a true cause for it.  He was seen by neurology and cardiology at the last hospitalization and the changes that were made include stopping his digoxin and decreasing his metoprolol to 12.5 mg twice daily.  They have not made these changes in his home medications yet so they will do that.  He is on Keppra with the thought that the episodes could be seizure related.  He continues on Keppra twice daily.  He has not had any since his hospitalization and will continue to monitor for them.    2. Partial symptomatic epilepsy with complex partial seizures, not intractable, without status epilepticus (H)  Question of " possible seizure activity.  He continues on Keppra twice daily.  He is tolerating this without side effects.  Follow-up with neurology as directed.    3. Recurrent major depressive disorder, in partial remission (H)  His significant other is concerned about his significant irritability.  He has depression and anxiety in the setting of his dementia.  He was on 20 mg of citalopram and given his cardiac history I am hesitant to increase it.  Instead will decrease to half a tab and start sertraline at 25 mg.  They will stop the citalopram after 5 days and then increase the sertraline to 50 mg daily and he will maintain on that.  We will have a phone visit in 2 weeks to check on how he is tolerating this.  His significant other is in charge of his medications and will make these changes.  - sertraline (ZOLOFT) 25 MG tablet; Take 1 tab po daily for 1 week then increase to 2 tabs daily  Dispense: 60 tablet; Refill: 2    4. Chronic atrial fibrillation  He has chronic atrial fibrillation and was previously maintained on metoprolol 75 mg twice daily in addition to digoxin.  During his last hospitalization cardiology recommended discontinuing digoxin and decreasing his metoprolol to 12.5 mg twice daily.  We will start these changes as they have not yet done this.  He is instructed to check his blood pressure as well as his heart rate.  Would anticipate that his resting heart rate release should be less than 90.  If his heart rate is consistently greater than 100 or asymptomatic, he will need to be seen.  He is maintained on just aspirin as he is not a candidate for warfarin anticoagulation due to his frequent syncopal episodes and falls.    5. Hypertension  Advised him to continue to monitor his blood pressure 3-4 times weekly.  Our goals are to have a systolic pressure greater than 110 but less than 160 and diastolic greater than 60 but less than 100.      Subjective:  75-year-old gentleman with multiple medical problems  recently hospitalized for a unresponsive episode.  He has had multiple unresponsive episodes over the last several years.  They have been unable to pinpoint a cause.  This was very similar to his prior episodes where he will fairly suddenly pass out and then sleep for 3 days.  He seen neurology and cardiology.  Was thought to be a possible toxic encephalopathy related to medications or marijuana use.  At this hospitalization they evaluated for possible seizure or contribution of arrhythmia.  The changes that were made were to stop his digoxin and decrease his metoprolol.  Unfortunately they have continued on both of these medications as they were previously from the hospitalization.  His work-up was otherwise unremarkable.  Per his wife he is not consuming any alcohol or smoking any marijuana anymore.  There is no other supplements that he is taking and these episodes are not preceded by any prodromal symptoms.  She feels he has been quite a bit more irritable lately.  She states he is not physical but can be very verbally aggressive when he becomes irritable.  The citalopram has been helpful for him and we previously had him on 40 mg.  She is in wondering if we can increase back to 40 mg daily.    He is still getting physical therapy and occupational therapy as an outpatient.  His potassium was rechecked after the hydrochlorothiazide was discontinued on March 12 and and March 18 his potassium was 3.8.  He is continue on a potassium supplement for now.  With his chronic atrial fibrillation he is maintained on aspirin daily but is not a candidate for Coumadin which she has previously been on due to his frequent falls.    Past Medical History:   Diagnosis Date     Anemia      Anxiety      Aortic valve disorder      Arthritis      Atrial fibrillation (H)      BPH (benign prostatic hypertrophy) with urinary retention      Carotid stenosis      Depression      Dyslipidemia      [place] (hard of hearing)      Hypertension       Lymphedema      Syncope and collapse 2009, 2010    r/t urinary obstruction     Family History   Problem Relation Age of Onset     Cancer Mother      Hospital records were reviewed.  Consultation letters from neurology and cardiology were reviewed and laboratory trends reviewed    Medication reconciliation was performed.    Initially started the conversation with him.  However he did not remember exact details of the hospitalization or was unable to answer questions about his medications.  He answered questions about how he was feeling and stated that he was doing well but then very quickly handed the phone to Yoselyn his significant other to discuss details of medications and observations that she was having about how he is doing.    Phone call duration:  18 minutes    Miriam Singleton CMA

## 2021-06-07 NOTE — TELEPHONE ENCOUNTER
Left message to call back for: orders request  Information to relay to patient:  Notified ok for requested order per Dr. King via VM

## 2021-06-07 NOTE — PROGRESS NOTES
"Navdeep Spann is a 75 y.o. male who is being evaluated via a billable telephone visit.      The patient has been notified of following:     \"This telephone visit will be conducted via a call between you and your physician/provider. We have found that certain health care needs can be provided without the need for a physical exam.  This service lets us provide the care you need with a short phone conversation.  If a prescription is necessary we can send it directly to your pharmacy.  If lab work is needed we can place an order for that and you can then stop by our lab to have the test done at a later time.    Telephone visits are billed at different rates depending on your insurance coverage. During this emergency period, for some insurers they may be billed the same as an in-person visit.  Please reach out to your insurance provider with any questions.    If during the course of the call the physician/provider feels a telephone visit is not appropriate, you will not be charged for this service.\"    Patient has given verbal consent to a Telephone visit? Yes    Patient would like to receive their AVS by AVS Preference: Mail a copy.    Additional provider notes:   75-year-old gentleman assessed via telephone for recent mental status changes and evaluation in the emergency room.  Mr. Spann he has a history of multiple episodes of syncope followed by encephalopathy.  They have been unable to identify an exact cause.  I spoke with him via phone on 331 and at that time his wife states he was doing well and seemed to be coherent.  Later that evening she noticed that he was becoming more confused and unable to track conversations that she became very concerned that he would have another episode.  For that reason she brought him to the emergency room.  They did laboratory evaluation and monitored him for several hours.  He remained alert and eventually they discharged him.  She states since then he has been back to his " usual state of health.  He does have underlying memory loss and that has been stable.  He has been eating well and conversing normally.  They have been sheltering in place due to COVID and have been remaining safe that way.  She states since we have switched him to the sertraline which was started after his most recent trip to the emergency room he actually seems more upbeat and less angry.  He is also less irritable.  She feels that this medication is working quite a bit better for him.  Current Outpatient Medications on File Prior to Visit   Medication Sig Dispense Refill     acetaminophen (TYLENOL) 650 MG CR tablet Take 1 tablet (650 mg total) by mouth every 6 (six) hours as needed for pain.  0     amLODIPine (NORVASC) 5 MG tablet TAKE ONE TABLET BY MOUTH ONE TIME DAILY  90 tablet 3     arginine HCl, L-arginine, 1,000 mg Tab Take 1,000 mg by mouth daily.       aspirin 81 MG EC tablet Take 1 tablet (81 mg total) by mouth daily. 90 tablet 1     bisacodyL (DULCOLAX) 10 mg suppository Insert 1 suppository (10 mg total) into the rectum daily as needed (Constipation).  0     cholecalciferol, vitamin D3, 5,000 unit Tab Take 5,000 Units by mouth daily.       CRANBERRY FRUIT EXTRACT (CRANBERRY ORAL) Take 450 mg by mouth daily.        cyanocobalamin (VITAMIN B-12) 250 MCG tablet Take 250 mcg by mouth daily.       donepezil (ARICEPT) 10 MG tablet Take 10 mg by mouth bedtime.       ferrous sulfate 325 (65 FE) MG tablet Take 1 tablet by mouth 3 (three) times a day with meals. (Patient taking differently: Take 1-2 tablets by mouth 2 (two) times a day. Takes 2 tablets in the morning and 1 tablet at bedtime      ) 270 tablet 3     hydrOXYzine pamoate (VISTARIL) 25 MG capsule Take 1 capsule (25 mg total) by mouth at bedtime as needed. 90 capsule 3     Lactobacillus rhamnosus GG (CULTURELLE) 10-15 Billion cell capsule Take 1 capsule by mouth daily.       levETIRAcetam (KEPPRA) 500 MG tablet Take 1 tablet (500 mg total) by mouth 2  (two) times a day. 180 tablet 2     magnesium hydroxide (MILK OF MAG) 400 mg/5 mL Susp suspension Take 30 mL by mouth daily as needed (Constipation).  0     magnesium oxide (MAG-OX) 400 mg (241.3 mg magnesium) tablet Take 1 tablet (400 mg total) by mouth daily. (Patient taking differently: Take 400 mg by mouth 2 (two) times a day.       ) 100 tablet 3     melatonin 3 mg Tab tablet Take 1 tablet (3 mg total) by mouth at bedtime as needed.  0     metoprolol tartrate (LOPRESSOR) 25 MG tablet Take 0.5 tablets (12.5 mg total) by mouth 2 (two) times a day.  0     OMEGA-3S/DHA/EPA/FISH OIL (OMEGA 3 ORAL) Take 1 tablet by mouth daily.        omeprazole (PRILOSEC) 20 MG capsule Take 1 capsule (20 mg total) by mouth daily before breakfast. 90 capsule 2     potassium chloride (K-DUR,KLOR-CON) 20 MEQ tablet Take 2 tablets (40 mEq total) by mouth daily before breakfast.  0     potassium chloride (K-DUR,KLOR-CON) 20 MEQ tablet Take 1 tablet (20 mEq total) by mouth every evening.  0     SAW PALMETTO ORAL Take 450 mg by mouth daily.       sertraline (ZOLOFT) 25 MG tablet Take 1 tab po daily for 1 week then increase to 2 tabs daily 60 tablet 2     VIT B1 MN/B2/B3/B5/B6/B12/C/FA (B COMPLEX W-VIT C ORAL) Take 1 tablet by mouth daily.        trimethoprim (TRIMPEX) 100 mg tablet Take 1 tablet (100 mg total) by mouth once daily.  0     No current facility-administered medications on file prior to visit.        Past Medical History:   Diagnosis Date     Anemia      Anxiety      Aortic valve disorder      Arthritis      Atrial fibrillation (H)      BPH (benign prostatic hypertrophy) with urinary retention      Carotid stenosis      Depression      Dyslipidemia      [place] (hard of hearing)      Hypertension      Lymphedema      Syncope and collapse 2009, 2010    r/t urinary obstruction       Assessment/Plan:  1. Unresponsive episode  75-year-old gentleman with a history of multiple unresponsive episodes.  Most recently his wife was concerned  when he was evaluated in the emergency room that he was going toward 1 of these episodes and brought him in for that reason.  They monitored him and it did not occur.  She states since that time he has been back to normal and she has no concerns.  She was worried that in the incisional care unit that they stopped his trimethoprim and she could not get an answer as to why.  He has a history of frequent urinary tract infections and urology had started him on that for that reason.  We discussed restarting the trimethoprim.  We will plan on following up in 3 months for a face-to-face evaluation, sooner with any other questions or concerns.        Phone call duration:  7 minutes    Miriam Singleton, JOHNATHON

## 2021-06-07 NOTE — TELEPHONE ENCOUNTER
Patient Returning Call  Reason for call:  Patient girl friend returning call   Information relayed to patient:  There is not message to really   Patient has additional questions:  Yes  If YES, what are your questions/concerns:  Caller states patient was scheduled for an appointment today it was canceled and received a call from clinic left message for conference call . Writer did not see any documentation about telephone appointment . Please call patient .  Okay to leave a detailed message?: No      
Patient has been scheduled for phone visit  
This was in regards to scheduling a telephone visit. Uncertain why a new encounter was created but will send to  staff to call and arrange for a telephone visit.   
Pt improved. Labs reviewed. Will dc with pmd, neuro f/u, meclizine rx. Patient is stable, vital signs stable. Patient with capacity, able to verbalize understanding of discharge instructions, return instructions, and need for close follow up.  Chaparro Fernando MD PGY-4

## 2021-06-07 NOTE — TELEPHONE ENCOUNTER
Who is calling:  Pt's Girlfriend Melva  Reason for Call:  Calling to ask if HCTZ 25 mg is to still be taken, Pt noticed it is not on his active medication list.  Date of last appointment with primary care: N/A  Okay to leave a detailed message: Yes [phone]

## 2021-06-07 NOTE — TELEPHONE ENCOUNTER
Left message to call back for: pt  Information to relay to patient:  Left message to call and schedule phone visit with any provider available for Harpreet.

## 2021-06-07 NOTE — TELEPHONE ENCOUNTER
I reached out to Yoselyn and we spoke. Needed to ask pt and Yoselyn which TCU they were seen at. Yoselyn states [facility] at the [place] location. Office is closed at this time. I will fax over to [facility] requesting discharge summary for current visit with them to be sent at this time.

## 2021-06-07 NOTE — TELEPHONE ENCOUNTER
Orders being requested: OT 2 X 3 weeks  Reason service is needed/diagnosis: Functional activity tolerance; For Home management; strengthening, and safety with ADL's  When are orders needed by: ASAP  Where to send Orders: Phone:  [phone]  Okay to leave detailed message?  Yes

## 2021-06-07 NOTE — TELEPHONE ENCOUNTER
Orders being requested: Physical Therapy Clarification Order: Physical Therapy two times a week for three weeks.  Reason service is needed/diagnosis: Strengthening, gait and balance  When are orders needed by: Within 48 hours  Where to send Orders: Phone:  Verbal order is ok  Okay to leave detailed message?  Yes

## 2021-06-07 NOTE — TELEPHONE ENCOUNTER
Patient Returning Call  Reason for call:  Melva, patient's Significant Other  Information relayed to patient:  Have TCU doctor increase citalopram.   Patient has additional questions:  Yes  If YES, what are your questions/concerns:  Patient is no longer at the TCU.  Will Dr. Muro new a higher dose?   Patient has been very depressed for past several months.  Melva called today to check on Harpreet's 3/26/20 appointment time but was told the appointment was cancelled.  She states she was not notified of this nor given an opportunity to reschedule.  The appointment was to discuss his desire to increase citalopram.  Can patient get a telephone visit?   Okay to leave a detailed message?: Yes

## 2021-06-07 NOTE — TELEPHONE ENCOUNTER
FYI - Status Update  Who is Calling: Home Care  Update: Юлия states patient was discharged today from PT as all goals were met.  Okay to leave a detailed message?:  No return call needed

## 2021-06-07 NOTE — TELEPHONE ENCOUNTER
Dr. King could I give a verbal ok per you? [facility] is covering Dr. Muro currently but cannot give home care orders.

## 2021-06-07 NOTE — TELEPHONE ENCOUNTER
Per his hospital discharge he should be off his hydrochlorothiazide.  We do not have his TCU discharge paperwork and will be getting that.

## 2021-06-07 NOTE — TELEPHONE ENCOUNTER
Left message to call back for: orders request  Information to relay to patient:  Notified ok for requested orders per Dr. Alfred (covering for Dr. Muro). Please advise if this is not ok.

## 2021-06-09 NOTE — TELEPHONE ENCOUNTER
Refill Approved    Rx renewed per Medication Renewal Policy. Medication was last renewed on 3/31/20.    Doreen Fuentes, [facility] Triage/Med Refill 6/18/2020     Requested Prescriptions   Pending Prescriptions Disp Refills     sertraline (ZOLOFT) 25 MG tablet [Pharmacy Med Name: Sertraline HCl Oral Tablet 25 MG] 60 tablet 0     Sig: Take 1 tablet by mouth once daily for 1 week then increase to 2 tablets daily       SSRI Refill Protocol  Passed - 6/17/2020  7:03 AM        Passed - PCP or prescribing provider visit in last year     Last office visit with prescriber/PCP: 12/19/2019 Patricia Muro MD OR same dept: 12/19/2019 Patricia Muro MD OR same specialty: 12/19/2019 Patricia Muro MD  Last physical: 7/31/2018 Last MTM visit: Visit date not found   Next visit within 3 mo: Visit date not found  Next physical within 3 mo: Visit date not found  Prescriber OR PCP: Patricia Muro MD  Last diagnosis associated with med order: 1. Recurrent major depressive disorder, in partial remission (H)  - sertraline (ZOLOFT) 25 MG tablet [Pharmacy Med Name: Sertraline HCl Oral Tablet 25 MG]; Take 1 tablet by mouth once daily for 1 week then increase to 2 tablets daily  Dispense: 60 tablet; Refill: 0    If protocol passes may refill for 12 months if within 3 months of last provider visit (or a total of 15 months).

## 2021-06-09 NOTE — PATIENT INSTRUCTIONS - HE
To evaluate your shortness of breath we did the following tests:    EKG: Your heart rate is 90, this is a normal heart rate but faster than what is usual for you.  There is nothing else specific to the EKG test that says there is a problem with your heart.  We may need to consider doing some additional heart testing depending on the results of the other tests.    Chest x-ray: The chest x-ray does not show any problem with your lungs such as infection or fluid in your lungs.  When I listen to your lungs they sound normal giving me further reassurance that there is not a problem with your lungs.    We have obtained the following lab tests:    Liver test, kidney test, test of electrolytes, blood sugar test, blood count tests including red blood cells, white blood cells and platelets.    The lab tests will be available on Monday.  Depending on the results of the lab tests we will decide on further testing which may include as mentioned above heart testing or something else.    Since your loose stool has been going on for so long it is not an emergency to get a colonoscopy.  We will confer with Dr. Muro about getting a colonoscopy set up in the future for colon cancer screening since her last colonoscopy was done in 2010.    Today I removed approximately a 1/4 inch long piece of glass from your foot.  There was no sign of infection.  Continue to monitor closely.

## 2021-06-09 NOTE — PROGRESS NOTES
Clinic Care Coordination Contact  [facility]/Voicemail       Clinical Data: Care Coordinator Outreach  Outreach attempted x 1.  Left message on patient's voicemail with call back information and requested return call.  Plan:Care Coordinator will try to reach patient again in 1-2 business days.

## 2021-06-09 NOTE — PROGRESS NOTES
Patient ID: Navdeep Spann is a 75 y.o. male.  /60   Pulse 95   Temp 98.1  F (36.7  C)   Wt 197 lb (89.4 kg)   SpO2 98%   BMI 26.72 kg/m      Assessment/Plan:                   Diagnoses and all orders for this visit:    PHAM (dyspnea on exertion)  -     XR Chest 2 Views  -     Electrocardiogram Perform and Read  -     HM2(CBC w/o Differential)  -     Comprehensive Metabolic Panel    Chronic atrial fibrillation (H)    Hypertension    Dementia without behavioral disturbance, unspecified dementia type (H)    Loose stools    Colon cancer screening          DISCUSSION  An EKG is performed shows a heart rate of 90 bpm which is somewhat faster than his previous EKGs that I reviewed.  There are some nonspecific T wave changes in comparison to previous which certainly bring up the possibility of need to work-up coronary vascular disease further.  There is no other EKG finding to suggest that there is an emergent situation.  A chest x-ray is obtained and is normal.  Laboratory tests at the time of his departure are pending.  There are numerous potential etiologies for his shortness of breath.  We will await laboratory test before making further plans.  We will give consideration to potential atrial fibrillation with rapid ventricular response triggering symptoms, will also need to consider the possibility of evolving coronary vascular disease.    For the loose stools as discussed above do not feel further work-up is warranted.  We will try to confer with Dr. Muro regarding considerations for referral for ongoing colon cancer screening.    A foreign body was found in his left foot.  I removed approximately 1 cm long sharp piece of clear glass.  There is no sign of infection I discussed continued close monitoring.  Tetanus shot is up-to-date.  Subjective:     HPI    Navdeep Spann is a 75 y.o. male he is here today with his girlfriend's son.    He was seen via a virtual visit yesterday but was  recommended to come in for an in person evaluation because of concerns about dyspnea on exertion.    His medical history is significant for dementia, history of encephalopathy, history of unresponsive episodes, complex partial seizures, chronic atrial fibrillation, chronic anemia, hypertension.    Patient is somewhat hard of hearing but describes that he feels out of breath with most activity.  He thinks this is been slowly progressive.  He does not have chest tightness or discomfort associated with shortness of breath or activity.  He denies dizziness or lightheadedness.  He has had no episodes of passing out.  He does not report coughing or wheezing.  He does not have a sensation of rapid heart rate.  No signs or symptoms of congestive heart failure.  Weight is noted to have increased slightly but in comparison to other weight seems to fall within what would be relatively normal fluctuations for this patient.  He does not have a known history of coronary artery disease.  He has had echocardiograms performed with most recent being October 2018.  He does have previous EKGs.  Cardiology testing is reviewed.    It is brought up in addition that he has loose stools.  When asked further patient states that as far as he can remember he has had loose stools about 3 times per day.  It was thought by family that because of his loose stool he should have a colonoscopy.  In review of his chart it turns out last colonoscopy was about 10 years ago with a polyp.  It is actually unclear as to what the follow-up interval should have been based on available records.  Discussed that we will further consider referral for colonoscopy for screening purposes but based on his bowel habits probably there is no concern that needs to be investigated further at this point in time.    It is also brought up that he has a chronic sore spot on his foot and he thinks he may have stepped on a piece of glass a few weeks ago.  There is an area with  callus skin and a hole that is small.  There is no redness or sign of infection.    Review of Systems  Complete review of systems is obtained.  Other than the specific considerations noted above complete review of systems is negative.          Objective:   Medications:  Current Outpatient Medications   Medication Sig     acetaminophen (TYLENOL) 650 MG CR tablet Take 1 tablet (650 mg total) by mouth every 6 (six) hours as needed for pain.     amLODIPine (NORVASC) 5 MG tablet TAKE ONE TABLET BY MOUTH ONE TIME DAILY      arginine HCl, L-arginine, 1,000 mg Tab Take 1,000 mg by mouth daily.     aspirin 81 MG EC tablet Take 1 tablet (81 mg total) by mouth daily.     bisacodyL (DULCOLAX) 10 mg suppository Insert 1 suppository (10 mg total) into the rectum daily as needed (Constipation).     cholecalciferol, vitamin D3, 5,000 unit Tab Take 5,000 Units by mouth daily.     CRANBERRY FRUIT EXTRACT (CRANBERRY ORAL) Take 450 mg by mouth daily.      cyanocobalamin (VITAMIN B-12) 250 MCG tablet Take 250 mcg by mouth daily.     docosahexaenoic acid-epa 120-180 mg cap Take 1,000 mg by mouth.     donepezil (ARICEPT) 10 MG tablet Take 10 mg by mouth bedtime.     ferrous sulfate 325 (65 FE) MG tablet Take 1 tablet by mouth 3 (three) times a day with meals. (Patient taking differently: Take 1-2 tablets by mouth 2 (two) times a day. Takes 2 tablets in the morning and 1 tablet at bedtime      )     hydroCHLOROthiazide (HYDRODIURIL) 25 MG tablet      hydrOXYzine pamoate (VISTARIL) 25 MG capsule Take 1 capsule (25 mg total) by mouth at bedtime as needed.     Lactobacillus rhamnosus GG (CULTURELLE) 10-15 Billion cell capsule Take 1 capsule by mouth daily.     levETIRAcetam (KEPPRA) 500 MG tablet Take 1 tablet (500 mg total) by mouth 2 (two) times a day.     magnesium hydroxide (MILK OF MAG) 400 mg/5 mL Susp suspension Take 30 mL by mouth daily as needed (Constipation).     magnesium oxide (MAG-OX) 400 mg (241.3 mg magnesium) tablet Take 1  tablet (400 mg total) by mouth daily. (Patient taking differently: Take 400 mg by mouth 2 (two) times a day.       )     melatonin 3 mg Tab tablet Take 1 tablet (3 mg total) by mouth at bedtime as needed.     metoprolol tartrate (LOPRESSOR) 25 MG tablet Take 0.5 tablets (12.5 mg total) by mouth 2 (two) times a day.     OMEGA-3S/DHA/EPA/FISH OIL (OMEGA 3 ORAL) Take 1 tablet by mouth daily.      omeprazole (PRILOSEC) 20 MG capsule Take 1 capsule (20 mg total) by mouth daily before breakfast.     potassium chloride (K-DUR,KLOR-CON) 20 MEQ tablet Take 2 tablets (40 mEq total) by mouth daily before breakfast.     potassium chloride (K-DUR,KLOR-CON) 20 MEQ tablet Take 1 tablet (20 mEq total) by mouth every evening.     SAW PALMETTO ORAL Take 450 mg by mouth daily.     sertraline (ZOLOFT) 25 MG tablet Take 1 tablet by mouth once daily for 1 week then increase to 2 tablets daily     trimethoprim (TRIMPEX) 100 mg tablet Take 1 tablet (100 mg total) by mouth once daily.     VIT B1 MN/B2/B3/B5/B6/B12/C/FA (B COMPLEX W-VIT C ORAL) Take 1 tablet by mouth daily.        Allergies:  No Known Allergies    Tobacco:   reports that he has quit smoking. He has never used smokeless tobacco.     Physical Exam          /60   Pulse 95   Temp 98.1  F (36.7  C)   Wt 197 lb (89.4 kg)   SpO2 98%   BMI 26.72 kg/m        General: Patient alert no signs of distress.  He is somewhat hard of hearing.    HEENT: No conjunctival irritation, no lesions in the mouth or pharynx mouth is moist, tympanograms are clear, neck is supple without lymphadenopathy or masses    Lungs: Good air movement throughout no wheeze or crackle    Heart: Regular rate and rhythm no murmur    Abdomen: Soft nondistended nontender    Extremities: Warm no significant appreciable edema, close examination of his left foot on the plantar surface reveals that there is an area in the medial aspect near the more distal aspect of the calcaneus on the plantar surface of the  foot where there is callus formation, there is some slight peeling away of the callus with some subcutaneous blood noted.      PROCEDURE NOTE:    Discussed with patient that I would scrape off additional callus skin and try to remove foreign body.  Discussed risks benefits and alternatives.  Patient agreed to proceed    Because there is concern for foreign body, specifically glass in the foot I elected to pare down the callus.  Immediately upon doing so it was apparent that there was a solid material just below the surface of the skin.  It appeared to be clear.  Upon further manipulation I was able to remove a rather large piece of glass measuring about 1 cm long.  Following removal there was no significant bleeding.  No purulent material or other signs of infection.  Patient educated on the importance of continued monitoring.

## 2021-06-09 NOTE — TELEPHONE ENCOUNTER
Refill Approved    Rx renewed per Medication Renewal Policy. Medication was last renewed on 11/13/19, last OV 6/26/20.    Haley Roque, [facility] Triage/Med Refill 7/25/2020     Requested Prescriptions   Pending Prescriptions Disp Refills     omeprazole (PRILOSEC) 20 MG capsule [Pharmacy Med Name: Omeprazole Oral Capsule Delayed Release 20 MG] 90 capsule 0     Sig: Take 1 capsule (20 mg total) by mouth daily before breakfast.       GI Medications Refill Protocol Passed - 7/23/2020  7:01 AM        Passed - PCP or prescribing provider visit in last 12 or next 3 months.     Last office visit with prescriber/PCP: 12/19/2019 Patricia Muro MD OR same dept: 6/26/2020 Griffin Dunbar MD OR same specialty: 6/26/2020 Griffin Dunbar MD  Last physical: 7/31/2018 Last MTM visit: Visit date not found   Next visit within 3 mo: Visit date not found  Next physical within 3 mo: Visit date not found  Prescriber OR PCP: Patricia Muro MD  Last diagnosis associated with med order: 1. Gastroesophageal reflux disease without esophagitis  - omeprazole (PRILOSEC) 20 MG capsule [Pharmacy Med Name: Omeprazole Oral Capsule Delayed Release 20 MG]; Take 1 capsule (20 mg total) by mouth daily before breakfast.  Dispense: 90 capsule; Refill: 0    If protocol passes may refill for 12 months if within 3 months of last provider visit (or a total of 15 months).

## 2021-06-09 NOTE — TELEPHONE ENCOUNTER
FYI--    This patient has been referred to [facility] by [facility] after their hospitalization. [facility] has been unable to reach patient and current home care orders have . We have cancelled this referral for home care as we are unable to reach patient.     Thank you,   [facility]

## 2021-06-09 NOTE — TELEPHONE ENCOUNTER
RN Triage  Melva, SO, calling today (consent in chart). She states that Harpreet has been short of breath for about a year.  She feels that it may be getting. Yesterday Harpreet's Alzheimer's therapist called attention to how quickly he gets winded. Not short of breath at rest but whenever he does any activity. No chest pain. Breathing is not necessarily worse today than in recent weeks but Melva thought she should call attention to it. No wheezing. No fevers. Not coughing ever.    Melva also reports that Harpreet has been having loose stools and diarrhea for a few months. 3-4 episodes of loose stool or diarrhea per day.  He has not had a colonoscopy in several years.    Melva also reports a sore on the bottom of his foot. He stepped on some glass about a month ago and doesn't seem to be healing. Not appearing to look infected or having any drainage. Painful at times.     I advised Melva that Harpreet should be seen regarding these issues. Because he is having shortness of breath I cannot authorize an in clinic visit. Scheduling assisted to set up a telephone visit (Melva does not think that she can do video visit)- telephone appointment set with Dr. Muro tomorrow at 1pm.     If you feel it would be better to see Harpreet in clinic to evaluate his breathing, etc. Please call Melva.       Shanelle Mays RN  [facility] Nurse Advisor            COVID 19 Nurse Triage Plan/Patient Instructions    Please be aware that novel coronavirus (COVID-19) may be circulating in the community. If you develop symptoms such as fever, cough, or SOB or if you have concerns about the presence of another infection including coronavirus (COVID-19), please contact your health care provider or visit [email or web address].     Disposition/Instructions    Patient to schedule a Virtual Visit with provider. Reference Visit Selection Guide.    Thank you for taking steps to prevent the spread of this virus.  o Limit your contact with others.  o Wear a  simple mask to cover your cough.  o Wash your hands well and often.    Resources    [facility]: About COVID-19: [email or web address]    CDC: What to Do If You're Sick: www.cdc.gov/coronavirus/2019-ncov/about/steps-when-sick.html    CDC: Ending Home Isolation: www.cdc.gov/coronavirus/2019-ncov/hcp/disposition-in-home-patients.html     CDC: Caring for Someone: www.cdc.gov/coronavirus/2019-ncov/if-you-are-sick/care-for-someone.html     [facility]: Interim Guidance for Hospital Discharge to Home: www.health.[facility].[place]/diseases/coronavirus/hcp/hospdischarge.pdf    [facility] clinical trials (COVID-19 research studies): [email or web address]-clinical-trials     Below are the COVID-19 hotlines at the [place] Department of [facility] ([facility]). Interpreters are available.   o For health questions: Call 310-933-8120 or 1-233.899.1465 (7 a.m. to 7 p.m.)  o For questions about schools and childcare: Call 576-768-8889 or 1-930.283.8126 (7 a.m. to 7 p.m.)       Reason for Disposition    MILD difficulty breathing (e.g., minimal/no SOB at rest, SOB with walking, pulse < 100) of new onset or worse than normal    [1] MODERATE longstanding difficulty breathing (e.g., speaks in phrases, SOB even at rest, pulse 100-120) AND [2] SAME as normal    [1] MILD diarrhea (e.g., 1-3 or more stools than normal in past 24 hours) without known cause AND [2] present >  7 days    Wound doesn't sound infected     Non healing- willl offer office visit    Additional Information    Negative: Breathing stopped and hasn't returned    Negative: Choking on something    Negative: SEVERE difficulty breathing (e.g., struggling for each breath, speaks in single words, pulse > 120)    Negative: Bluish (or gray) lips or face    Negative: Difficult to awaken or acting confused (e.g., disoriented, slurred speech)    Negative: Passed out (i.e., fainted, collapsed and was not responding)    Negative: Wheezing started suddenly after medicine,  "an allergic food, or bee sting    Negative: Stridor    Negative: Slow, shallow and weak breathing    Negative: Sounds like a life-threatening emergency to the triager    Negative: MODERATE difficulty breathing (e.g., speaks in phrases, SOB even at rest, pulse 100-120) of new onset or worse than normal    Negative: Wheezing can be heard across the room    Negative: Drooling or spitting out saliva (because can't swallow)    Negative: Any history of prior \"blood clot\" in leg or lungs    Negative: Recent illness requiring prolonged bedrest (i.e., immobilization)    Negative: Hip or leg fracture in past 2 months (e.g., or had cast on leg or ankle)    Negative: Major surgery in the past month    Negative: Recent long-distance travel with prolonged time in car, bus, plane, or train (i.e., within past 2 weeks; 6 or  more hours duration)    Negative: Extra heart beats OR irregular heart beating   (i.e., \"palpitations\")    Negative: Fever > 103 F (39.4 C)    Negative: Fever > 101 F (38.3 C) and over 60 years of age    Negative: Fever > 100.0 F (37.8 C) and bedridden (e.g., nursing home patient, stroke, chronic illness, recovering from surgery)    Negative: Fever > 100.0 F (37.8 C) and diabetes mellitus or weak immune system (e.g., HIV positive, cancer chemo, splenectomy, organ transplant, chronic steroids)    Negative: Periods where breathing stops and then resumes normally and bedridden (e.g., nursing home patient, CVA)    Negative: Pregnant or postpartum (from 0 to 6 weeks after delivery)    Negative: Patient sounds very sick or weak to the triager    Negative: Shock suspected (e.g., cold/pale/clammy skin, too weak to stand, low BP, rapid pulse)    Negative: Difficult to awaken or acting confused (e.g., disoriented, slurred speech)    Negative: Sounds like a life-threatening emergency to the triager    Negative: [1] SEVERE abdominal pain (e.g., excruciating) AND [2] present > 1 hour    Negative: [1] SEVERE abdominal pain " AND [2] age > 60    Negative: [1] Blood in the stool AND [2] moderate or large amount of blood    Negative: Black or tarry bowel movements  (Exception: chronic-unchanged  black-grey bowel movements AND is taking iron pills or Pepto-bismol)    Negative: [1] Drinking very little AND [2] dehydration suspected (e.g., no urine > 12 hours, very dry mouth, very lightheaded)    Negative: Patient sounds very sick or weak to the triager    Negative: [1] SEVERE diarrhea (e.g., 7 or more times / day more than normal) AND [2] age > 60 years    Negative: [1] Constant abdominal pain AND [2] present > 2 hours    Negative: [1] Fever > 103 F (39.4 C) AND [2] not able to get the fever down using Fever Care Advice    Negative: [1] SEVERE diarrhea (e.g., 7 or more times / day more than normal) AND [2] present > 24 hours (1 day)    Negative: [1] MODERATE diarrhea (e.g., 4-6 times / day more than normal) AND [2] present > 48 hours (2 days)    Negative: [1] MODERATE diarrhea (e.g., 4-6 times / day more than normal) AND [2] age > 70 years    Negative: Fever > 101 F (38.3 C)    Negative: Fever present > 3 days (72 hours)    Negative: Abdominal pain  (Exception: Pain clears with each passage of diarrhea stool)    Negative: [1] Blood in the stool AND [2] small amount of blood   (Exception: only on toilet paper. Reason: diarrhea can cause rectal irritation with blood on wiping)    Negative: [1] Mucus or pus in stool AND [2] present > 2 days AND [3] diarrhea is more than mild    Negative: [1] Recent antibiotic therapy (i.e., within last 2 months) AND [2] diarrhea present > 3 days since antibiotic was stopped    Negative: [1] Recent hospitalization AND [2] diarrhea present > 3 days    Negative: Weak immune system (e.g., HIV positive, cancer chemo, splenectomy, organ transplant, chronic steroids)    Negative: Tube feedings (e.g., nasogastric, g-tube, j-tube)    Negative: Travel to a foreign country in past month    Negative: [1] Widespread rash  AND [2] bright red, sunburn-like AND [3] too weak to stand    Negative: Sounds like a life-threatening emergency to the triager    Negative: [1] Widespread rash AND [2] bright red, sunburn-like    Negative: Severe pain in the wound    Negative: Black (necrotic) or blisters develop in wound    Negative: Patient sounds very sick or weak to the triager    Negative: [1] Looks infected (spreading redness, red streak, pus) AND [2] fever    Negative: [1] Red streak runs from the wound AND [2] longer than 1 inch (2.5 cm)    Negative: [1] Skin around the wound has become red AND [2] larger than 2 inches (5 cm)    Negative: [1] Face wound AND [2] looks infected (e.g., spreading redness)    Negative: [1] Finger wound AND [2] entire finger swollen    Negative: [1] Red area or streak AND [2] no fever    Negative: [1] Pus or cloudy fluid draining from wound AND [2] no fever    Negative: [1] Wound > 48 hours old AND [2] it becomes more tender    Negative: Other signs of wound infection    Negative: [1] Taking antibiotic > 48 hours (2 days) AND [2] fever persists    Negative: [1] Taking antibiotic > 72 hours (3 days) AND [2] infected wound not improved (i.e., pain, pus, redness)    Negative: [1] No prior tetanus shots (or is not fully vaccinated) AND [2] any wound (e.g., cut, scrape)    Protocols used: BREATHING DIFFICULTY-A-OH, BREATHING DIFFICULTY-A-AH, DIARRHEA-A-AH, WOUND INFECTION-A-AH

## 2021-06-09 NOTE — PROGRESS NOTES
"Navdeep Spann is a 75 y.o. male who is being evaluated via a billable telephone visit.      The patient has been notified of following:     \"This telephone visit will be conducted via a call between you and your physician/provider. We have found that certain health care needs can be provided without the need for a physical exam.  This service lets us provide the care you need with a short phone conversation.  If a prescription is necessary we can send it directly to your pharmacy.  If lab work is needed we can place an order for that and you can then stop by our lab to have the test done at a later time.    Telephone visits are billed at different rates depending on your insurance coverage. During this emergency period, for some insurers they may be billed the same as an in-person visit.  Please reach out to your insurance provider with any questions.    If during the course of the call the physician/provider feels a telephone visit is not appropriate, you will not be charged for this service.\"    Patient has given verbal consent to a Telephone visit? Yes    What phone number would you like to be contacted at? [phone]    Patient is reporting some shortness of breath which is more notable particularly on exertion.  I recommended that she be evaluated in person and within the next 1 to 2 days.  They are scheduled at clinic.  Phone call duration:  5 minutes      "

## 2021-06-10 ENCOUNTER — COMMUNICATION - HEALTHEAST (OUTPATIENT)
Dept: FAMILY MEDICINE | Facility: CLINIC | Age: 76
End: 2021-06-10

## 2021-06-10 ENCOUNTER — RECORDS - HEALTHEAST (OUTPATIENT)
Dept: ADMINISTRATIVE | Facility: OTHER | Age: 76
End: 2021-06-10

## 2021-06-10 DIAGNOSIS — G30.1 LATE ONSET ALZHEIMER'S DISEASE WITHOUT BEHAVIORAL DISTURBANCE (H): ICD-10-CM

## 2021-06-10 DIAGNOSIS — F02.80 LATE ONSET ALZHEIMER'S DISEASE WITHOUT BEHAVIORAL DISTURBANCE (H): ICD-10-CM

## 2021-06-10 NOTE — PROGRESS NOTES
Clinic Care Coordination Contact  [facility]/Voicemail       Clinical Data: Care Coordinator Outreach  Outreach attempted x 3.  Left message on patient's voicemail with call back information and requested return call.  Plan: Care Coordinator will try to reach patient again in 1-2 business days.

## 2021-06-10 NOTE — PROGRESS NOTES
Clinic Care Coordination Contact  [facility]/Voicemail       Clinical Data: Care Coordinator Outreach  Outreach attempted x 4.  Left message on patient's voicemail with call back information and requested return call.  Plan:Care Coordinator will try to reach patient again in 3-5 business days.

## 2021-06-10 NOTE — PROGRESS NOTES
Clinic Care Coordination Contact  [facility]/Voicemail       Clinical Data: Care Coordinator Outreach  Outreach attempted x 5.  Left message on patient's voicemail with call back information and requested return call.  Plan:  Care Coordinator will try to reach patient again in 3-5 business days.

## 2021-06-10 NOTE — PROGRESS NOTES
Clinic Care Coordination Contact  [facility]/Voicemail       Clinical Data: Care Coordinator Outreach  Outreach attempted x 2.  Left message on patient's voicemail with call back information and requested return call.  Plan: Care Coordinator will try to reach patient again in 3-5 business days.

## 2021-06-10 NOTE — PROGRESS NOTES
Clinic Care Coordination Contact  [facility]/Voicemail       Clinical Data: Care Coordinator Outreach  Outreach attempted x 6.  Left message on patient's voicemail with call back information and requested return call.  Plan:  Care Coordinator will try to reach patient again in 3-5 business days.

## 2021-06-10 NOTE — PROGRESS NOTES
Assessment/Plan:     1. Dementia  I am ultimately able to speak with patient's significant other, Melva, in detail to her my concerns regarding patient's dementia and my safety concerns.  I advised that he not drive, as she is aware of this and is able to state understanding.  I advised follow-up visit with Dr. Muro to further discuss dementia and additional support that may be required at home.  We reviewed measures to ensure that he does not drive as his ability understand that he should not drive may wax and wane with the dementia, and without measures to hide his keys or disable his car, he is at risk of driving again.  Danny, Melva son, arrives to clinic today to transport patient safely home.    2. Atrial fibrillation, unspecified type  Drawn today and will be managed by anticoagulation team  - INR    3. Hematuria  Urinalysis in ER not suggestive of infection.  Decline denies symptoms currently.  Will follow.  Anticipate resolution his INR improves.  He has a CT scan arranged for May and follow-up of bladder abnormality seen.    Total time of 40 minutes is spent today with patient in the exam room in detailing history, and obtaining contact information for his left once, and in discussing current medical status and dementia with his loved ones via phone.      Subjective:      Navdeep Spann is a 72 y.o. male presented clinic today for follow-up of recent emergency room visit on 4/15/2017 due to hematuria.  There are no urinalysis was performed showing trace blood but no other suggestive of infection.  Noted to have prior CT scan changes suggestive of thickening of his bladder well.  Today he denies any active symptoms.  Specifically denies dysuria or ongoing hematuria.  No back pain.  He denies fever or chills.    Patient has dementia.  He drove himself to the visit today.  He lives with Melva, his significant other of 40 years.  She also has a son who lives with the 2 of them.  We reviewed  hospital records and including occupational therapy assessment 4/5/17 indicating slums score of 11 with recommendation for 24-hour supervision and recommendation of no driving.  Patient states he was not aware of this.    During the visit today, patient does not recall emergency room visit, hospitalization in the past 3 months, her office visit last week with Dr. Muro.  When I suggest blood in his urine, he is able to recall details of that.  He is not able to recall the date or day of the week.  He is not able to recall that yesterday was Easter holiday.    Current Outpatient Prescriptions   Medication Sig Dispense Refill     acetaminophen (TYLENOL) 500 MG tablet Take 500-1,000 mg by mouth every 6 (six) hours as needed.       arginine 500 mg tablet Take 1,500 mg by mouth daily.        ascorbic acid (VITAMIN C) 500 MG tablet Take 1,000 mg by mouth daily.        biotin 2,500 mcg cap Take 5,000 mcg by mouth daily.       calcium-magnesium-zinc 333-133-8.3 mg Tab Take 1 tablet by mouth 3 (three) times a week. MWF       cholecalciferol, vitamin D3, 5,000 unit Tab Take 5,000 Units by mouth daily.       citalopram (CELEXA) 20 MG tablet Take 1 tablet (20 mg total) by mouth daily. 90 tablet 1     cranberry 400 mg cap Take 1 capsule by mouth daily.       cyanocobalamin 250 MCG tablet Take 500 mcg by mouth 3 (three) times a week. MWF        digoxin (LANOXIN) 125 mcg tablet TAKE 1 TABLET BY MOUTH EVERY MORNING 90 tablet 0     docoshexanoic acid-eicosapent 500 mg (FISH OIL) 500-100 mg cap capsule Take 1,500 mg by mouth daily.        donepezil (ARICEPT) 10 MG tablet Take 10 mg by mouth bedtime.       ferrous sulfate 325 (65 FE) MG tablet Take 1 tablet by mouth 3 (three) times a day with meals. 270 tablet 3     Lactobacillus rhamnosus GG (CULTURELLE) 10-15 Billion cell capsule Take 1 capsule by mouth daily.       metoprolol tartrate (LOPRESSOR) 100 MG tablet Take 100 mg by mouth every evening.       metoprolol tartrate  (LOPRESSOR) 50 MG tablet Take 50 mg by mouth every morning.       multivitamin (ONE A DAY) per tablet Take 1 tablet by mouth 3 (three) times a week. MWF       potassium chloride SA (K-DUR,KLOR-CON) 20 MEQ tablet Take 1 tablet (20 mEq total) by mouth daily. 5 tablet 0     SAW PALMETTO ORAL Take 450 mg by mouth daily.       trimethoprim (TRIMPEX) 100 mg tablet Take 100 mg by mouth once daily.       VIT B1 MN/B2/B3/B5/B6/B12/C/FA (B COMPLEX W-VIT C ORAL) Take by mouth.       vitamin E 400 UNIT capsule Take 2,000 Units by mouth daily.       warfarin (COUMADIN) 3 MG tablet Take 3 mg by mouth daily.  Adjust dose based on INR results as directed. 30 tablet 0     CALCIUM CARB-MAG OXIDE-ZINC OX ORAL Take 1 tablet by mouth daily.       potassium chloride SA (KLOR-CON M20) 20 MEQ tablet Take 2 tablets (40 mEq total) by mouth daily. (Patient taking differently: Take 20 mEq by mouth daily. Along with chlorthalidone.) 180 tablet 2     No current facility-administered medications for this visit.        Past Medical History, Family History, and Social History reviewed.  Past Medical History:   Diagnosis Date     Anemia      Anxiety      Aortic valve disorder      Arthritis      Atrial fibrillation      BPH (benign prostatic hypertrophy) with urinary retention      Carotid stenosis      Depression      Dyslipidemia      [place] (hard of hearing)      Hypertension      Lymphedema      Syncope and collapse 2009, 2010    r/t urinary obstruction     Past Surgical History:   Procedure Laterality Date     APPENDECTOMY       COLON SURGERY       JOINT REPLACEMENT       [facility]  10/23/2014          IA APPENDECTOMY      Description: Appendectomy;  Recorded: 05/21/2008;  Comments: at 16 years old     IA ARTHROPLASTY TIBIAL PLATEAU      Description: Knee Replacement;  Recorded: 05/21/2008;  Comments: right knee 8/1999; Left knee 5/2002     IA COLOSTOMY      Description: Colostomy;  Recorded: 07/22/2014;     IA LAP, SURG CLOSE ENTEROSTOMY RESECT ANAST  "N/A 2/11/2015    Procedure: LAPAROSCOPIC ASSISTED COLOSTOMY TAKEDOWN;  Surgeon: Jed Ritchie MD;  Location: [facility] OR;  Service: General     [place] LAP,SURG,COLECTOMY, PARTIAL, W/ANAST N/A 7/15/2014    Procedure: Exploratory Laparotomy, Sigmoid Colectomy,  Colostomy (Fernandez's);  Surgeon: Jed Ritchie MD;  Location: [facility] OR;  Service: General     [place] TRANSURETHRAL ELEC-SURG PROSTATECTOM  5/23/2008    Description: Transurethral Resection Of Prostate (TURP);  Proc Date: 05/23/2008;     Review of patient's allergies indicates no known allergies.  Family History   Problem Relation Age of Onset     Cancer Mother      Social History     Social History     Marital status:      Spouse name: N/A     Number of children: N/A     Years of education: N/A     Occupational History     Not on file.     Social History Main Topics     Smoking status: Former Smoker     Smokeless tobacco: Never Used     Alcohol use No      Comment: Past history- quit June 1st, 1990     Drug use: Yes     Special: Marijuana      Comment: none since 3/2014     Sexual activity: Not on file     Other Topics Concern     Not on file     Social History Narrative         Review of systems is as stated in HPI, and the remainder of the 10 system review is otherwise negative.    Objective:     Vitals:    04/17/17 1430   BP: 122/78   Patient Site: Left Arm   Patient Position: Sitting   Cuff Size: Adult Regular   Pulse: 80   Resp: 16   Temp: 98.1  F (36.7  C)   TempSrc: Oral   SpO2: 96%   Weight: 183 lb 9.6 oz (83.3 kg)   Height: 5' 11\" (1.803 m)    Body mass index is 25.61 kg/(m^2).    Alert and pleasant male.  He is not currently oriented to person or place as above.  He has difficulty in operating a cell phone during her visit.  He is able to answer general social questions such as \"how are you feeling\" but is not not able to answer specific questions.  Ambulation intact.  He responds very calmly when discussing memory " concerns.      This note has been dictated using voice recognition software. Any grammatical or context distortions are unintentional and inherent to the the software.

## 2021-06-10 NOTE — TELEPHONE ENCOUNTER
Refill Approved    Rx renewed per Medication Renewal Policy. Medication was last renewed on 7/10/20.    Doreen Fuentes, [facility] Triage/Med Refill 8/24/2020     Requested Prescriptions   Pending Prescriptions Disp Refills     levETIRAcetam (KEPPRA) 750 MG tablet 60 tablet 0     Sig: Take 1 tablet (750 mg total) by mouth 2 (two) times a day.       Gabapentin/Levetiracetam/Tiagabine Refill Protocol  Passed - 8/21/2020 10:15 AM        Passed - PCP or prescribing provider visit in past 12 months or next 3 months     Last office visit with prescriber/PCP: 12/19/2019 Patricia Muro MD OR same dept: 6/26/2020 Griffin Dunbar MD OR same specialty: 6/26/2020 Griffin Dunbar MD  Last physical: 7/31/2018 Last MTM visit: Visit date not found   Next visit within 3 mo: Visit date not found  Next physical within 3 mo: Visit date not found  Prescriber OR PCP: Patricia Muro MD  Last diagnosis associated with med order: 1. Partial symptomatic epilepsy with complex partial seizures, not intractable, without status epilepticus (H)  - levETIRAcetam (KEPPRA) 750 MG tablet; Take 1 tablet (750 mg total) by mouth 2 (two) times a day.  Dispense: 60 tablet; Refill: 0    If protocol passes may refill for 12 months if within 3 months of last provider visit (or a total of 15 months).

## 2021-06-10 NOTE — PROGRESS NOTES
Assessment/Plan:        Diagnoses and all orders for this visit:    History of UTI  -     Culture, Urine  -     CT Chest Abdomen Pelvis With Oral With IV Cont; Future; Expected date: 4/13/17    We will recheck a urine culture to assure complete resolution.  He does need follow-up CT scan which is scheduled due to lymph nodes in the chest and also some abnormal thickening around the bladder.    Atrial fibrillation, unspecified type  -     INR  -     Digoxin (Lanoxin )    he continues to work with our anticoagulation team.  Will check a digoxin level and adjust dosing if needed.  It is still unclear if he is taking his medications regularly.  In general he is high risk for readmission.  He needs consistent assistance with his medications.  We will continue to evaluate his functioning levels and memory.    Subjective:    Patient ID: Navdeep Spann is a 72 y.o. male.    HPI Comments: 72-year-old gentleman presents today in follow-up after being hospitalized with mental status changes.  He was found to be septic likely due to a UTI.  He was treated and his symptoms improved.  He has a history of frequent hospital admissions for mental status changes, confusion and drug overdoses.  He seems to be having some memory issues.  His wife is primarily managing his medications.  She  is not with him today as he is having her own health issues.  Is unclear if overall he is taking his medications regularly.  It does not appear that his digoxin levels in the hospital or now are what they should be he is.  Ultimately he may need a dose adjustment.  He is on high risk medication including warfarin.  They do have some difficulty regulating his INR levels.    Today he seems to be clear.  He states that his wife is has already set up his medications and he is taking them regularly.  He does have support from his son also.  He states he is feeling better.  No further fevers and his activity levels are normal.  He states he continues  to eat normally.     medication reconciliation was performed      The following portions of the patient's history were reviewed and updated as appropriate:   He  has a past medical history of Anemia; Anxiety; Aortic valve disorder; Arthritis; Atrial fibrillation; BPH (benign prostatic hypertrophy) with urinary retention; Carotid stenosis; Depression; Dyslipidemia; [place] (hard of hearing); Hypertension; Lymphedema; and Syncope and collapse (2009, 2010).  He  does not have any pertinent problems on file.  Current Outpatient Prescriptions   Medication Sig Dispense Refill     acetaminophen (TYLENOL) 500 MG tablet Take 500-1,000 mg by mouth every 6 (six) hours as needed.       arginine 500 mg tablet Take 1,500 mg by mouth daily.        ascorbic acid (VITAMIN C) 500 MG tablet Take 1,000 mg by mouth daily.        biotin 2,500 mcg cap Take 5,000 mcg by mouth daily.       CALCIUM CARB-MAG OXIDE-ZINC OX ORAL Take 1 tablet by mouth daily.       calcium-magnesium-zinc 333-133-8.3 mg Tab Take 1 tablet by mouth 3 (three) times a week. MWF       cholecalciferol, vitamin D3, 5,000 unit Tab Take 5,000 Units by mouth daily.       citalopram (CELEXA) 20 MG tablet Take 1 tablet (20 mg total) by mouth daily. 90 tablet 1     cranberry 400 mg cap Take 1 capsule by mouth daily.       cyanocobalamin 250 MCG tablet Take 500 mcg by mouth 3 (three) times a week. MWF        digoxin (LANOXIN) 125 mcg tablet TAKE 1 TABLET BY MOUTH EVERY MORNING 90 tablet 0     docoshexanoic acid-eicosapent 500 mg (FISH OIL) 500-100 mg cap capsule Take 1,500 mg by mouth daily.        donepezil (ARICEPT) 10 MG tablet Take 10 mg by mouth bedtime.       ferrous sulfate 325 (65 FE) MG tablet Take 1 tablet by mouth 3 (three) times a day with meals. 270 tablet 3     Lactobacillus rhamnosus GG (CULTURELLE) 10-15 Billion cell capsule Take 1 capsule by mouth daily.       metoprolol tartrate (LOPRESSOR) 100 MG tablet Take 100 mg by mouth every evening.       metoprolol  tartrate (LOPRESSOR) 50 MG tablet Take 50 mg by mouth every morning.       multivitamin (ONE A DAY) per tablet Take 1 tablet by mouth 3 (three) times a week. MWF       potassium chloride SA (K-DUR,KLOR-CON) 20 MEQ tablet Take 1 tablet (20 mEq total) by mouth daily. 5 tablet 0     SAW PALMETTO ORAL Take 450 mg by mouth daily.       trimethoprim (TRIMPEX) 100 mg tablet Take 100 mg by mouth once daily.       VIT B1 MN/B2/B3/B5/B6/B12/C/FA (B COMPLEX W-VIT C ORAL) Take by mouth.       vitamin E 400 UNIT capsule Take 2,000 Units by mouth daily.       warfarin (COUMADIN) 3 MG tablet Take 3 mg by mouth daily.  Adjust dose based on INR results as directed. 30 tablet 0     potassium chloride SA (KLOR-CON M20) 20 MEQ tablet Take 2 tablets (40 mEq total) by mouth daily. (Patient taking differently: Take 20 mEq by mouth daily. Along with chlorthalidone.) 180 tablet 2     No current facility-administered medications for this visit.    .    Review of Systems   Constitutional: Negative for appetite change, fatigue and fever.   Respiratory: Negative for shortness of breath.    Cardiovascular: Negative for chest pain.   Gastrointestinal: Negative for abdominal distention and abdominal pain.   Genitourinary: Negative for difficulty urinating, dysuria and hematuria.   Psychiatric/Behavioral: Negative for agitation.             Objective:    Physical Exam   Constitutional: He is oriented to person, place, and time. He appears well-developed and well-nourished.   Neck: Normal range of motion.   Cardiovascular: Normal rate.    irregular   Pulmonary/Chest: Effort normal and breath sounds normal. No respiratory distress.   Abdominal: Soft. He exhibits no distension.   Neurological: He is alert and oriented to person, place, and time.

## 2021-06-11 NOTE — PROGRESS NOTES
Clinic Care Coordination Contact  [facility]/Voicemail       Clinical Data: Care Coordinator Outreach  Outreach attempted x 8.  Left message on patient's voicemail with call back information and requested return call.  Plan:Care Coordinator will try to reach patient again in 10 business days.

## 2021-06-11 NOTE — PROGRESS NOTES
Assessment/Plan:        Diagnoses and all orders for this visit:    Lymphedema  He has chronic lymphedema in his right leg.  It has gotten a little worse as he has been resistant to wearing his support stocking.  I encouraged him to do so.    Dementia  Long-standing dementia.  Recent increase in his memory loss.  Continuing on Aricept 10 mg daily.  He will continue with his dementia support group.  I do recommend that he go to the [facility] for driving evaluation prior to resuming driving.    BPH (benign prostatic hyperplasia)  History of TURP about 10 years ago.  No current prostate symptoms.  He is back on Trimox for infection prophylaxis.    Hearing loss  He newly has hearing aids.  He is very happy about his ability to hear things.    Atrial fibrillation  -     Comprehensive Metabolic Panel  -     HM2(CBC w/o Differential)  -     INR  Currently rate controlled.  Will check an INR level today and continue management through the anticoagulation clinic.        Subjective:    Patient ID: Navdeep Spann is a 72 y.o. male.    HPI Comments: 72-year-old gentleman with multiple medical problems presents today in follow-up of his chronic medical conditions.  He has been hospitalized several times over the last year.  She has had some difficulty with medication management but most recently was admitted with a UTI sepsis.  He has a known history of BPH and follows with urology.  Prior to that point he had been taking Trimox for UTI prophylaxis but had stopped taking this medication.  He since followed up with urology.  He is now back on this prophylactic antibiotic.  He is no longer having any UTI symptoms.  He states he urinates regularly and feels that he can completely empty his bladder.  He drinks plenty of fluids.  He did have some increase in confusion and memory through the time of his infection.  He is back to baseline but does have baseline dementia.  He follows with neurology and sees his dementia  counselor at least once a month.  At his last visit due to concerns of his memory, he was advised not to drive and he has not been driving since that time.  They are wondering if he is able to drive at this point in time.  I reviewed his medical records and see that his INR has been consistently elevated the last several visits.  He does seem to be taking his medications appropriately.  We reeducated on some of the foods and medications that can affect his INR level.  There primarily vegetarian any quite a bit of leafy greens.  We emphasized the importance that while he can still eat these he should be consistent each day about how much he eats.    We reconciled his medications and they are updated as follows    The following portions of the patient's history were reviewed and updated as appropriate:   Current Outpatient Prescriptions   Medication Sig Dispense Refill     acetaminophen (TYLENOL) 500 MG tablet Take 500-1,000 mg by mouth every 6 (six) hours as needed.       arginine 500 mg tablet Take 1,500 mg by mouth daily.        ascorbic acid (VITAMIN C) 500 MG tablet Take 1,000 mg by mouth daily.        biotin 2,500 mcg cap Take 5,000 mcg by mouth daily.       CALCIUM CARB-MAG OXIDE-ZINC OX ORAL Take 1 tablet by mouth daily.       calcium-magnesium-zinc 333-133-8.3 mg Tab Take 1 tablet by mouth 3 (three) times a week. MWF       cholecalciferol, vitamin D3, 5,000 unit Tab Take 5,000 Units by mouth daily.       citalopram (CELEXA) 20 MG tablet Take 1 tablet (20 mg total) by mouth daily. 90 tablet 1     cranberry 400 mg cap Take 1 capsule by mouth daily.       cyanocobalamin 250 MCG tablet Take 500 mcg by mouth 3 (three) times a week. MWF        digoxin (LANOXIN) 125 mcg tablet TAKE 1 TABLET BY MOUTH EVERY MORNING 90 tablet 0     docoshexanoic acid-eicosapent 500 mg (FISH OIL) 500-100 mg cap capsule Take 1,500 mg by mouth daily.        donepezil (ARICEPT) 10 MG tablet Take 10 mg by mouth bedtime.       ferrous  sulfate 325 (65 FE) MG tablet Take 1 tablet by mouth 3 (three) times a day with meals. 270 tablet 3     Lactobacillus rhamnosus GG (CULTURELLE) 10-15 Billion cell capsule Take 1 capsule by mouth daily.       metoprolol tartrate (LOPRESSOR) 100 MG tablet Take 1 tablet (100 mg total) by mouth every morning and 0.5 tablet (50 mg total) every evening.  0     potassium chloride SA (KLOR-CON M20) 20 MEQ tablet Take 2 tablets (40 mEq total) by mouth daily. (Patient taking differently: Take 20 mEq by mouth daily. Along with chlorthalidone.) 180 tablet 2     SAW PALMETTO ORAL Take 450 mg by mouth daily.       trimethoprim (TRIMPEX) 100 mg tablet Take 100 mg by mouth once daily.       VIT B1 MN/B2/B3/B5/B6/B12/C/FA (B COMPLEX W-VIT C ORAL) Take by mouth.       vitamin E 400 UNIT capsule Take 2,000 Units by mouth daily.       warfarin (COUMADIN) 1 MG tablet Take 2 to 3 tablets (2 to 3 mg) by mouth daily. Adjust dose based on INR results as directed. 180 tablet 0     warfarin (COUMADIN) 3 MG tablet Take 3 mg by mouth daily.  Adjust dose based on INR results as directed. 30 tablet 0     No current facility-administered medications for this visit.    .    Review of Systems   Constitutional: Negative for activity change, chills, fatigue and fever.   Respiratory: Negative for cough and shortness of breath.    Genitourinary: Positive for frequency. Negative for difficulty urinating and dysuria.   Musculoskeletal: Negative for gait problem.   Neurological: Negative for light-headedness and headaches.   Psychiatric/Behavioral: Negative for agitation, behavioral problems and confusion. The patient is not nervous/anxious.              Objective:    Physical Exam   Constitutional: He appears well-developed and well-nourished.   HENT:   Head: Normocephalic.   Eyes: Pupils are equal, round, and reactive to light.   Neck: No thyromegaly present.   Cardiovascular:   No murmur heard.  Irregularly irregular, normal rate.   Pulmonary/Chest:  Effort normal and breath sounds normal.   Abdominal: He exhibits no distension. There is no rebound.   Lymphadenopathy:     He has no cervical adenopathy.

## 2021-06-11 NOTE — PROGRESS NOTES
[facility]    Facility:   [facility] [[id number]]   Code Status: POLST AVAILABLE      Admission evaluation to TCU. of 75-year-old male. History is taken accompanying hospital notes, patient provides limited history. Admitted to hospital 9/16 - 9/25 with altered mental status. Previous hospitalization July for altered minutes mental status,pneumonia and sepsis.   Medical history significant for chronic atrial fibrillation not anticoagulated in view of fall risk, dementia on donepezil, hypertension, dyslipidemia, BPH without obstruction, carotid stenosis, depression, anxiety, aortic valve disease, seizure disorder.   On hospitalization patient evaluated by neurology and psychiatry. Significant behavioral abnormalities present requiring initiation of Zyprexa 5 mg at HS, 2.5 mg q six hours PRN. Trazodone initiated for insomnia, metoprolol dose increased with increased blood pressure recordings. Sertraline increased from 25 to 50 mg. No acute CNS  otherwise identified. Hypomagnesemia and hypokalemia replaced. Stabilized and transferred to TCU for rehabilitation, management of multiple medical problems.    Past medical history, current medical problem list, drug allergies, current medication list, social history, family history, code status reviewed in epic.    Review of systems: somewhat aware of memory deficit. Denies current agitation. No current anxiety or depression. Denies chest pain or palpitations. No seizure activity. No focal neurologic deficits of new onset. No orthopnea or PND. Chronic right shoulder pain. Generalized weakness present. Remainder of 12 point review of systems obtained negative.    Exam: pleasant male lying supine in bed in no apparent distress, oriented times two, articulate. Blood pressure 124/70, heart rate 84, temperature 97.8, 02 97%, respiratory 16. No facial asymmetry. Extraocular movements intact. Mucous membranes moist. No HJR. S1 and  S2 irregular with 1/6 systolic murmur. Pulmonary exam without rales rhonchi or wheezes. Limited range of motion right greater than left shoulder. Joints without acute inflammatory change. Abdomen without hepatosplenomegaly masses or tenderness. Periphery without edema. Strength and muscle tone mildly diminished.    Impression and plan:   dementia, high baseline intellectual level, highly articulate, has insight into issues, recent significant agitation and behavioral issues in hospital, Zyprexa initiated, mood adequate at present, behavior acceptable, mmonitor.   Hypertension with recent increase in metoprolol to 75 mg b.i.d., blood pressure control satisfactory.   Seizure disorder without recent seizure activity, Keppra 750 BID, dose increased in July, no current seizure activity.   Anxiety and depression with anticipated increase in sertraline from 25 to 50 mg over next week.   Chronic anemia, ferrous sulfate 325 TID, follow Hgb.   Recent hypokalemia and hypomagnesemia, continues replacement, follow up levels over next two weeks.   Significant insomnia with use of melatonin PRN Zyprexa and trazodone at HS, adequate control at present.   Chronic atrial fibrillation with heart rate controlled, not anticoagulated in view of fall risk.   Significant deconditioning with need for rehabilitation.   History of carotid stenosis, no history of CVA, no focal neurologic deficiency.   BPH without obstruction.   Multiple medical issues are reviewed, assessment of psychiatric and neurologic status, assessment of mood, review of medications, discussion with nursing staff.    Electronically signed by: Nabila Landry MD

## 2021-06-11 NOTE — PROGRESS NOTES
Clinic Care Coordination Contact  [facility]/Voicemail       Clinical Data: Care Coordinator Outreach  Outreach attempted x 7.  Left message on patient's voicemail with call back information and requested return call.  Plan: Care Coordinator will try to reach patient again in 3-5 business days.

## 2021-06-11 NOTE — PROGRESS NOTES
ASSESSMENT:  1. Pneumonia  Recently hospitalized with pneumonia.  He states he seems to be recovering.  He has been taking his levofloxacin.  He had an elevated white blood cell count in the hospital.  We will recheck this today.  He denies any current symptoms or fevers.  - HM2(CBC w/o Differential)  - Comprehensive Metabolic Panel    2. Hypokalemia  Hypokalemia while in the hospital.  Will recheck levels.    3. Hypomagnesemia      4. Atrial fibrillation  Long-standing history of atrial fibrillation.  He is rate controlled currently.  He is sustained multiple falls and has intermittent issues with confusion and encephalopathy.  For that reason he will have significant variations in his INR levels.  This is concerning and felt that the risk of continuing on the warfarin is more significant than the benefit.  I will ask him to follow-up with cardiology.  At this point I am starting him on aspirin daily.  Anticoagulation clinic is been notified that we are discontinuing Coumadin at this time.  - Ambulatory referral to Cardiology    5. Dementia  Dementia with frequent recent hospitalizations.  He is not with his caretaker wife today.  Ultimately worry about his worsening dementia and his ability to sustain at home.  At this point time she is doing his medications.  He is not driving anymore.  I am concerned that at some point he would benefit from more consistent environment including something like assisted living.  I will discuss this with him at his next visit if Melva is present with him.          PLAN:  There are no Patient Instructions on file for this visit.    Orders Placed This Encounter   Procedures     HM2(CBC w/o Differential)     Comprehensive Metabolic Panel     Ambulatory referral to Cardiology     Referral Priority:   Routine     Referral Type:   Consultation     Referral Reason:   Evaluation and Treatment     Requested Specialty:   Cardiology     Number of Visits Requested:   1     Medications  Discontinued During This Encounter   Medication Reason     warfarin (COUMADIN) 1 MG tablet        No Follow-up on file.    CHIEF COMPLAINT:  Chief Complaint   Patient presents with     Follow-up     pneumonia       HISTORY OF PRESENT ILLNESS:  Navdeep is a 72 y.o. male presenting to the clinic today for follow up after ER visit on 6/20/17. He is accompanied by his stepson. He has a history of atrial fibrillation, hypertension, and dementia. He was brought to the ER by his family following a fall down several steps. His family reported that he had fallen twice in the previous three days. In the ER, he denied head trauma or loss of consciousness, though today he says that he fell down many steps and crashed and hit his head. He was noted to have multiple abrasions to the chest, back, and buttock. He also had a small abrasion on the left scalp. Head and spine CTs were unremarkable and chest CT was unremarkable aside from new mild airspace opacity within the right upper lobe. He did not have fever. Warfarin was discontinued and he was given levofloxacin 750mg for 7 days for possible pneumonia.     Since leaving the ER, he denies chest pain or breathing difficulty. He continues on the antibiotic. He continues off warfarin. He reports that his appetite is fine and he is eating regularly. His stepson contributes that he had been losing weight but is back to his average now. His stepson says that he sleeps a lot. He responds that he worked hard for so many years that he now feels justified in resting when he wants.     REVIEW OF SYSTEMS:   He has a neurology appointment tomorrow. He has two artificial knees. He reports that he can walk about a block before he has to stop due to knee pain. He does have exercise equipment at his home and does weight lifting. All other systems are negative.    PFSH:  He reports that he has cut his marijuana use down to once per month. He quit drinking alcohol in 1990. 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 His girlfriend manages his medications.     TOBACCO USE:  History   Smoking Status     Former Smoker   Smokeless Tobacco     Never Used       VITALS:  Vitals:    06/26/17 1155   BP: 124/80   Pulse: 70   Resp: 18   Temp: 97.6  F (36.4  C)   SpO2: 98%   Weight: 184 lb (83.5 kg)     Wt Readings from Last 3 Encounters:   06/26/17 184 lb (83.5 kg)   06/20/17 185 lb (83.9 kg)   06/14/17 177 lb 8 oz (80.5 kg)     Body mass index is 24.95 kg/(m^2).    PHYSICAL EXAM:  Constitutional:  Reveals an alert, talkative elderly man. Appears unsteady with a wide based gait. He is hard of hearing and appears forgetful. Vitals:  Per nursing notes.  Cardiac:  Irregular with normal rate, no murmur appreciated.   Lungs: Clear.  Respiratory effort normal.  Abdomen: Soft, non-tender. Bowel sounds active. No spleen or liver enlargement.   Rheumatologic: Normal joints and nails of the hands.  Neurologic:  Cranial nerves II-XII intact.     Psychiatric:  Mood appropriate, appears forgetful.       ADDITIONAL HISTORY SUMMARIZED (2): Reviewed ER notes of 6/20/17.  DECISION TO OBTAIN EXTRA INFORMATION (1): RAN for Care Everywhere obtained.   RADIOLOGY TESTS (1): Reviewed CTs of chest, spine, and head performed in ER.  LABS (1): Reviewed labs performed in the ER. Labs ordered.  MEDICINE TESTS (1): None.  INDEPENDENT REVIEW (2 each): None.     The visit lasted a total of 22 minutes face to face with the patient. Over 50% of the time was spent counseling and educating the patient about his recent ER visit.     IMichael, am scribing for and in the presence of, Dr. Muro.    I, Dr. Muro, personally performed the services described in this documentation, as scribed by Michael Iyer in my presence, and it is both accurate and complete.    MEDICATIONS:  Current Outpatient Prescriptions   Medication Sig Dispense Refill     acetaminophen (TYLENOL) 500 MG tablet Take 500-1,000 mg by mouth  every 6 (six) hours as needed.       ARGININE, L-ARGININE, ORAL Take 1 tablet by mouth 2 (two) times a day.       ascorbic acid (VITAMIN C) 500 MG tablet Take 1,000 mg by mouth daily.        biotin 2,500 mcg cap Take 5,000 mcg by mouth daily.       CALCIUM CARB-MAG OXIDE-ZINC OX ORAL Take 1 tablet by mouth daily.       chlorthalidone (HYGROTEN) 25 MG tablet Take 25 mg by mouth daily.       cholecalciferol, vitamin D3, 5,000 unit Tab Take 5,000 Units by mouth daily.       citalopram (CELEXA) 10 MG tablet Take 10 mg by mouth daily.       CRANBERRY FRUIT EXTRACT (CRANBERRY ORAL) Take 1 capsule by mouth daily.       cyanocobalamin, vitamin B-12, 2,500 mcg Subl Place 2,500 mcg under the tongue daily.       digoxin (LANOXIN) 125 mcg tablet Take 125 mcg by mouth every morning.       donepezil (ARICEPT) 10 MG tablet Take 10 mg by mouth bedtime.       ferrous sulfate 325 (65 FE) MG tablet Take 1 tablet by mouth 3 (three) times a day with meals. 270 tablet 3     LACTOBACILLUS ACIDOPHILUS (PROBIOTIC ACIDOPHILUS ORAL) Take 1 capsule by mouth daily.        levoFLOXacin (LEVAQUIN) 750 MG tablet Take 1 tablet (750 mg total) by mouth daily for 7 days. 7 tablet 0     metoprolol tartrate (LOPRESSOR) 100 MG tablet Take 1 tablet (100 mg total) by mouth every morning and 0.5 tablet (50 mg total) every evening.  0     OMEGA-3S/DHA/EPA/FISH OIL (OMEGA 3 ORAL) Take 1,000 mg by mouth 2 (two) times a day.        potassium chloride SA (KLOR-CON M20) 20 MEQ tablet Take 1 tablet (20 mEq total) by mouth 3 (three) times a day. 270 tablet 2     SAW PALMETTO ORAL Take 450 mg by mouth daily.       trimethoprim (TRIMPEX) 100 mg tablet Take 100 mg by mouth once daily.       VIT B1 MN/B2/B3/B5/B6/B12/C/FA (B COMPLEX W-VIT C ORAL) Take 1 tablet by mouth daily.        vitamin E 400 UNIT capsule Take by mouth see administration instructions. 1200 mg in the morning and 800 mg in the evening.       aspirin 81 MG EC tablet Take 1 tablet (81 mg total) by  mouth daily. 150 tablet 2     trimethoprim (TRIMPEX) 100 mg tablet Take 100 mg by mouth daily as needed. Take for 7 days when needed.       No current facility-administered medications for this visit.        Total data points: 5

## 2021-06-11 NOTE — TELEPHONE ENCOUNTER
RN cannot approve Refill Request    RN can NOT refill this medication medication not on med list and Medication was discontinued. Last office visit: 12/19/2019 Patricia Muro MD Last Physical: 7/31/2018 Last MTM visit: Visit date not found Last visit same specialty: 6/26/2020 Griffin Dunbar MD.  Next visit within 3 mo: Visit date not found  Next physical within 3 mo: Visit date not found      Essence Gonzalez, Care Connection Triage/Med Refill 8/28/2020    Requested Prescriptions   Pending Prescriptions Disp Refills     amLODIPine (NORVASC) 5 MG tablet [Pharmacy Med Name: amLODIPine Besylate Oral Tablet 5 MG] 90 tablet 0     Sig: TAKE ONE TABLET BY MOUTH ONE TIME DAILY       Calcium-Channel Blockers Protocol Passed - 8/26/2020  2:04 AM        Passed - PCP or prescribing provider visit in past 12 months or next 3 months     Last office visit with prescriber/PCP: 12/19/2019 Patricia Muro MD OR same dept: 6/26/2020 Griffin Dunbar MD OR same specialty: 6/26/2020 Griffin Dunbar MD  Last physical: 7/31/2018 Last MTM visit: Visit date not found   Next visit within 3 mo: Visit date not found  Next physical within 3 mo: Visit date not found  Prescriber OR PCP: Patircia Muro MD  Last diagnosis associated with med order: 1. Hypertension  - amLODIPine (NORVASC) 5 MG tablet [Pharmacy Med Name: amLODIPine Besylate Oral Tablet 5 MG]; TAKE ONE TABLET BY MOUTH ONE TIME DAILY   Dispense: 90 tablet; Refill: 0    If protocol passes may refill for 12 months if within 3 months of last provider visit (or a total of 15 months).             Passed - Blood pressure filed in past 12 months     BP Readings from Last 1 Encounters:   07/10/20 117/67

## 2021-06-12 NOTE — PROGRESS NOTES
Clinic Care Coordination Contact    Situation:  BPCI-A patient -> Care Coordination Transition Communication    Background/Clinical Data:  Patient was hospitalized at [facility] from 7/1/20 to 7/10/20 with altered mental status, sepsis, PNA of RLL, and Alzheimer's dementia.  Patient was discharged to home with home care.  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  While inpatient, patient was seen by neurology and psychiatry, and medications were adjusted for agitation (appeared resolved on discharge), metoprolol was increased for HTN (controlled at time of discharge), and oral supplementations for both hypokalemia and hypomagnesemia.  Patient was discharged to [facility] for rehab, management of multiple medical problems.    *Of note, numerous attempts have been made to reach patient/SO to complete BPCI-A assessment between hospitalizations (see patient outreach encounters from 7/22/20 and 8/24/20 for reference).      Transition to Facility:              Facility Name:  [facility]               Contact name and phone number: [phone]      Assessment:  RN Care Coordinator called [facility] and spoke with EDIS Chacon, introduced self, role, and requesting update on patient and discharge plan.  Oswaldo updates patient is doing very well.  Oswaldo is working on scheduling a care conference sometime this week, waiting to hear back from pt's SO to schedule (he has had some difficulty getting return calls from SO).  Plan will be to discharge patient home with his girlfriend and with in home health nursing and therapy for continued support during transition home.  EDIS reports cognition improving with both short term and long term, scored 15/15 on his BIMS test with EDIS.  He sleeps a lot.  Very  excited to get back home.  SW requests follow up call next Monday, 10/12/20, for update on discharge plan.      Plan:  RN Care Coordinator will follow up with TCU SW again in one week, for update.      Nelly Alonzo RN Clinic Care Coordinator

## 2021-06-12 NOTE — PROGRESS NOTES
[facility] Patient Outreach:     Discharge Date::  10/9/20      Reason for TCU stay (discharge diagnosis)::  Altered mental status, dementia, anxiety/depression      Are you feeling better, the same or worse since your discharge?:  Patient is feeling better          As part of your discharge plan, did they discuss home care with you?: Yes        Have your seen them yet, or are they scheduled to visit?: Yes                Do you have any follow up visits scheduled with your PCP or Specialist?:  Yes, with PCP      (RN) Is it scheduled soon enough (3-5 days)?: No        (RN) Is the patient okay with moving appointment up (if RN feels appropriate)?: No (Patient is seeing PCP on 10/26/20.  )

## 2021-06-12 NOTE — TELEPHONE ENCOUNTER
I reached out to Mame to clarify which zyprexa 2.5 or 5 mg needed. Mame confirmed the 5 mg disintegrating tablet.

## 2021-06-12 NOTE — TELEPHONE ENCOUNTER
I reached out to pt and no answer. I left a vm. If pt or Melva hernandez call, please relay Dr. Muro's message below.

## 2021-06-12 NOTE — TELEPHONE ENCOUNTER
Patient Returning Call  Reason for call:  Returning clinic call.  Information relayed to patient:  The caller, Melva was read the note entry of Dr Muro, expressed understanding of the information given, and read back to this writer.  Patient has additional questions:  No  If YES, what are your questions/concerns:  NA  Okay to leave a detailed message?: No call back needed

## 2021-06-12 NOTE — TELEPHONE ENCOUNTER
Orders being requested: Physical Therapy twice a week for two weeks, and once a week for three weeks. Also requesting a Speech Therapy Evaluation.  Reason service is needed/diagnosis: Gait, Balance, and Home Exercise Program. Possible food and fluid aspiration  When are orders needed by: As Able  Where to send Orders: Phone:  [phone]  Okay to leave detailed message?  Yes

## 2021-06-12 NOTE — PROGRESS NOTES
[facility]    Facility:   [facility] [[id number]]   Code Status: POLST AVAILABLE    Reassessment of 75-year-old male with history of hypertension, chronic atrial fibrillation not anticoagulated, dementia, hospitalized  9/ 16 - 9/25 with encephalopathy and behavioral abnormalities, initiated on Zyprexa 5 mg at HS,2 .5 mg q six hours PRN, trazodone at HS, sertraline dose increased, metoprolol dose increased to for blood pressure control, stabilized and transferred to TCU for rehabilitation and observation of clinical status.    Review of systems: fatigue present. Denies current pain. No focal neurologic deficits. No seizure activity. Denies cardiac or pulmonary symptomatology. Denies hallucinations. No agitation. Mood adequate. Remainder of 12 point review of systems obtained negative.    Exam: blood pressure 128/71, heart rate 82, 02 saturation 96%. Lying supine in bed in no apparent distress. Cooperative and pleasant, articulate, oriented times two. S1 and S2 irregular with 1/6 systolic murmur. Pulmonary exam without rales rhonchi or wheezes. Abdomen without masses tenderness organomegaly. Periphery without edema. Strength and muscle tone diminished and symmetrical. No hand drift.    Impression and plan:   dementia on donepezil, recent behavioral abnormalities resolved, no evidence of adverse effect from Zyprexa.   Deconditioning, continues in bed majority of time, rehabilitation ongoing.   History of seizure disorder on Keppra, no seizure activity.   Hypertension on metoprolol 75 mg b.i.d. with satisfactory control of blood pressure.   Chronic atrial fibrillation with heart rate controlled, continues without anticoagulation due to fall risk.   Chronic depression and anxiety, mood  stable.      Electronically signed by: Nabila Landry MD

## 2021-06-12 NOTE — TELEPHONE ENCOUNTER
I will leave the milk of mag and dulcolax as needed but send in prescriptions.  The zyprexa is new and should be continued however I would like him to stop the seroquel at night.  The trazodone 0.5 tab is also refilled.

## 2021-06-12 NOTE — PROGRESS NOTES
Clinic Care Coordination Contact    Follow Up Progress Note   BPCI-A  Hospital discharge: 7/10/20    Background/Clinical Data:  Patient was hospitalized at [facility] from 7/1/20 to 7/10/20 with altered mental status, sepsis, PNA of RLL, and Alzheimer's dementia.  Patient was discharged to home with home care.  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  While inpatient, patient was seen by neurology and psychiatry, and medications were adjusted for agitation.  Patient was discharged to [facility] on 9/25/20 for further rehab and management of multiple medical problems, before discharging home on 10/9/20, with home care services through [facility].    Assessment:  Follow up after TCU discharge.  Pt states he's doing well.  Home Care PT was out on Monday, 10/12/20 and they'll be out again tomorrow, 10/15/20.  He states he feels he's doing well with PT; he does not have any extra exercises to do on his own yet.  He has no pain, and denies fevers or s/sx of infection.  Denies fevers, sx of infection, pain, CP, shortness of breath, dyspnea, or seizures since home from TCU.  He ambulates with a cane and denies any falls since home.  His girlfriend helps him with his medications, and his stepson provides transportation and any other needs he has.      Pt is scheduled to follow up with Dr. Muro on 10/26/20.        Goals addressed this encounter:   Goals Addressed                 This Visit's Progress       Patient Stated      COMPLETED: Medical (pt-stated)        RN Goal Statement:  RN Care Coordinator will follow up with TCU care team in collaboration of patient's goals of care and discharge plan/needs for the next 30 days, or until discharge, per BPCI-A guidelines.         Date Goal set:   10/5/2020  Barriers:  Recent hospitalization(s), therapy progression  Strengths:  Ongoing recovery and rehab support at TCU  Date to Achieve By:  10/26/20  Patient expressed understanding of goal:  Yes  Action steps to achieve this goal:  1. RN Care Coordinator will follow up with TCU biweekly for update, sooner or as needed, on patient progression towards goals of care, and discharge plan/needs.                Intervention/Education provided during outreach:    90 day BPCI-A monitoring ended 10/10/20.  Today was the final RN Care Coordinator outreach, following TCU discharge.        Plan: BPCI-A monitoring completed. No further follow up needed from CCC as pt currently receiving home care services through [facility].  CCRC team removed from care team, and BPCI-A episode resolved.  Graduation letter sent via [employer].       Nelly Alonzo RN Clinic Care Coordinator

## 2021-06-12 NOTE — TELEPHONE ENCOUNTER
Reason contacted:  Orders request  Information relayed:  Notified ok for requested order per Dr. Muro via . Please advise if this is not ok.   Additional questions:  No  Further follow-up needed:  No  Okay to leave a detailed message:  No

## 2021-06-12 NOTE — PROGRESS NOTES
"[facility]    Facility:   [facility] [[id number]]   Code Status: FULL CODE and POLST AVAILABLE      CHIEF COMPLAINT/REASON FOR VISIT:  Chief Complaint   Patient presents with     Review Of Multiple Medical Conditions     AMS, Depression, Anxiety       HISTORY:      HPI: Navdeep is a 75 y.o. male who  has a past medical history of Anemia, Anxiety, Aortic valve disorder, Arthritis, Atrial fibrillation (H), BPH (benign prostatic hypertrophy) with urinary retention, Carotid stenosis, Depression, Dyslipidemia, [place] (hard of hearing), Hypertension, Lymphedema, and Syncope and collapse (2009, 2010). Per previous TCU provider: \"On hospitalization patient evaluated by neurology and psychiatry. Significant behavioral abnormalities present requiring initiation of Zyprexa 5 mg at HS, 2.5 mg q six hours PRN. Trazodone initiated for insomnia, metoprolol dose increased with increased blood pressure recordings. Sertraline increased from 25 to 50 mg. No acute CNS  otherwise identified. Hypomagnesemia and hypokalemia replaced. Stabilized and transferred to TCU for rehabilitation, management of multiple medical problems.\"    Today Harpreet is being evaluated for a routine review of multiple medical problems while in the TCU. He has been doing extraordinarily well. He has no new concerns or issues. He states that therapies are going well. He has not had any behavioral issues or problems in his opinion. Nursing staff state he has been stable and without issue. They have not had any behavioral issues or problems to report.  They state that he has been eating, drinking and eliminating well.  He denies any symptoms of anxiety or depression at this time.  There have been no use of PRN medications for anxiety, depression, psychosis.  Harpreet does have a history of dementia, however at this time he is alert and oriented.  Harpreet denies any other concerns including fevers/chills, cough or cold symptoms, " headaches, vision changes, chest pain/pressure, difficulty breathing, SOB, abdominal pain, nausea, vomiting, diarrhea, dysuria, increasing weakness, increasing pain.     Past Medical History:   Diagnosis Date     Anemia      Anxiety      Aortic valve disorder      Arthritis      Atrial fibrillation (H)      BPH (benign prostatic hypertrophy) with urinary retention      Carotid stenosis      Depression      Dyslipidemia      [place] (hard of hearing)      Hypertension      Lymphedema      Syncope and collapse 2009, 2010    r/t urinary obstruction             Family History   Problem Relation Age of Onset     Cancer Mother      Social History     Socioeconomic History     Marital status:      Spouse name: Not on file     Number of children: Not on file     Years of education: Not on file     Highest education level: Not on file   Occupational History     Not on file   Social Needs     Financial resource strain: Not on file     Food insecurity     Worry: Not on file     Inability: Not on file     Transportation needs     Medical: Not on file     Non-medical: Not on file   Tobacco Use     Smoking status: Former Smoker     Smokeless tobacco: Never Used   Substance and Sexual Activity     Alcohol use: No     Comment: Past history- quit June 1st, 1990     Drug use: Yes     Types: Marijuana     Comment: none since 3/2014     Sexual activity: Not on file   Lifestyle     Physical activity     Days per week: Not on file     Minutes per session: Not on file     Stress: Not on file   Relationships     Social connections     Talks on phone: Not on file     Gets together: Not on file     Attends Taoism service: Not on file     Active member of club or organization: Not on file     Attends meetings of clubs or organizations: Not on file     Relationship status: Not on file     Intimate partner violence     Fear of current or ex partner: Not on file     Emotionally abused: Not on file     Physically abused: Not on file      Forced sexual activity: Not on file   Other Topics Concern     Not on file   Social History Narrative     Not on file       REVIEW OF SYSTEM:  Per HPI    PHYSICAL EXAM:   /73   Pulse 83   Temp 98  F (36.7  C)   Resp 16   Wt 189 lb 6.4 oz (85.9 kg)   SpO2 95%   BMI 25.69 kg/m      A limited exam was performed due to recommendations for care during COVID-19 pandemic. Due to the 2020 COVID-19 pandemic, except as noted above, the patient was visually observed at a 6 foot plus distance.  An observational exam was performed in an effort to keep patient safe from COVID-19 and other communicable diseases.     General appearance: alert, appears stated age and cooperative  HEENT: Head is normocephalic with normal hair distribution. No evidence of trauma. Ears: Without lesions or deformity. No acute purulent discharge. Eyes: Conjunctivae pink with no scleral icterus or erythema. Nose: Normal. Oropharnyx: mmm.  Lungs: respirations without effort.  Extremities: extremities normal, atraumatic, no cyanosis.  Skin: Skin color, texture normal. No rashes or lesions on exposed skin.   Neurologic: Grossly normal   Psych: interacts well with caregivers, exhibits logical thought processes and connections with evidence of dementia and memory impairment, however is alert and oriented x3 today, pleasant.      LABS:   None today.    ASSESSMENT:      ICD-10-CM    1. Dementia with behavioral disturbance, unspecified dementia type (H)  F03.91    2. Physical deconditioning  R53.81    3. Late onset Alzheimer's disease without behavioral disturbance (H)  G30.1     F02.80    4. Transient alteration of awareness  R40.4    5. Anxiety  F41.1    6. Depressive disorder, atypical  F32.89        PLAN:    Physical Deconditioning  -Continue PT/OT and other therapies as per care plan.  -Encouraged good nutrition and movement habits.   -Discussed care plan and expected course of stay.   -Continue to follow-up per routine schedule or sooner if  needed.     Dementia  -Patient has significant dementia and is unable to make decisions that affect finances, legal issues, or healthcare.   -Care to be arranged with family or POA.  -Continue to care for patient in LTC setting, as she is comfortable and safe in this setting.   -Follow up per routine and as needed.    AMS, anxiety, depression  -Saw palmetto capsules 450 mg by mouth once daily.  -Cranberry tablet 450 mg by mouth once daily.  -Fish oil 1000 mg by mouth once daily.  -ASA 81 mg by mouth daily.  -Lactobacillus rhamnosus 1 capsule by mouth daily.  -Arginine 1000 mg by mouth once daily.  -Donepezil 10 mg by mouth at bedtime.  -Seroquel 25 mg by mouth 1 tablet as needed.  -Zyprexa 5 mg by mouth at bedtime.  -Zyprexa 2.5 mg by mouth every 6 hours as needed.  -Zoloft 50 mg by mouth once daily.  Next line-trazodone 50 mg by mouth at bedtime.  -Psych eval and treat.    Otherwise continue current care plan for all other chronic medical conditions, as they are stable. Encouraged patient to engage in healthy lifestyle behaviors such as engaging in social activities, exercising (PT/OT), eating well, and following care plan. Follow up for routine check-up, or sooner if needed. Will continue to monitor patient and work with nursing staff collaboratively to work toward positive patient outcomes.    Electronically signed by: Jaylyn Overton CNP

## 2021-06-12 NOTE — TELEPHONE ENCOUNTER
Medication Questions or Clarifications  Who is calling: BRO Vilchis with [facility]  What medications are you calling about (include dose and sig)?:   1) Milk of magnesia - listed as needed    2) disacodyl suppository for constipation - per caller, no issues with constipation at this time    3) trazodone     4) zyprexa    Who prescribed the medication?: n/a  What is your question/concern?: Caller stated the patient was discharged from a TCU with a list of medications. Caller stated the patient told her he is not taking the medications listed above. Caller stated the patient wants to know if he is supposed to continue taking the medications listed above. If so, then they need refills. Caller stated she does not need a call back but to please contact the patient about this further.    The caller was did not have enough medication information as she was driving.   Requested Pharmacy: n/a  Okay to leave a detailed message?: No

## 2021-06-12 NOTE — PROGRESS NOTES
"[facility]    Facility:   [facility] [[id number]]   Code Status: FULL CODE and POLST AVAILABLE  PCP: Patricia Muro MD   Phone: [phone]   Fax: [phone]      CHIEF COMPLAINT/REASON FOR VISIT:  Chief Complaint   Patient presents with     Discharge Summary       HISTORY COURSE:  Navdeep is a 75 y.o. male who  has a past medical history of Anemia, Anxiety, Aortic valve disorder, Arthritis, Atrial fibrillation (H), BPH (benign prostatic hypertrophy) with urinary retention, Carotid stenosis, Depression, Dyslipidemia, [place] (hard of hearing), Hypertension, Lymphedema, and Syncope and collapse (2009, 2010). Per previous TCU provider: \"On hospitalization patient evaluated by neurology and psychiatry. Significant behavioral abnormalities present requiring initiation of Zyprexa 5 mg at HS, 2.5 mg q six hours PRN. Trazodone initiated for insomnia, metoprolol dose increased with increased blood pressure recordings. Sertraline increased from 25 to 50 mg. No acute CNS  otherwise identified. Hypomagnesemia and hypokalemia replaced. Stabilized and transferred to TCU for rehabilitation, management of multiple medical problems.\"    Today Harpreet is being evaluated for a discharge from the TCU. Harpreet shares he is doing well and has no other concerns or issues. He is very happy to be going home, he will have support of his girlfriend and his girlfriend's daughter. Harpreet does not anticipate any issues or problems. He has been eating, drinking and eliminating well. Nursing staff have no concerns or worries. Harpreet denies any other concerns including fevers/chills, cough or cold symptoms, headaches, vision changes, chest pain/pressure, difficulty breathing, SOB, abdominal pain, nausea, vomiting, diarrhea, dysuria, increasing weakness, increasing pain.     PHYSICAL EXAM:   /70   Pulse 74   Temp 98  F (36.7  C)   Resp 16   Wt 188 lb (85.3 kg)   SpO2 96%   BMI 25.50 kg/m      A " limited exam was performed due to recommendations for care during COVID-19 pandemic. Due to the 2020 COVID-19 pandemic, except as noted above, the patient was visually observed at a 6 foot plus distance.  An observational exam was performed in an effort to keep patient safe from COVID-19 and other communicable diseases.     General appearance: alert, appears stated age and cooperative  HEENT: Head is normocephalic with normal hair distribution. No evidence of trauma. Ears: Without lesions or deformity. No acute purulent discharge. Eyes: Conjunctivae pink with no scleral icterus or erythema. Nose: Normal. Oropharnyx: mmm.  Lungs: respirations without effort.  Extremities: extremities normal, atraumatic, no cyanosis.  Skin: Skin color, texture normal. No rashes or lesions on exposed skin.   Neurologic: Grossly normal   Psych: interacts well with caregivers, exhibits logical thought processes and connections with some forgetfulness, pleasant.    MEDICATION LIST:  Current Outpatient Medications   Medication Sig     acetaminophen (TYLENOL) 650 MG CR tablet Take 1 tablet (650 mg total) by mouth every 6 (six) hours as needed for pain.     arginine HCl, L-arginine, 1,000 mg Tab Take 1,000 mg by mouth daily.     aspirin 81 MG EC tablet Take 1 tablet (81 mg total) by mouth daily.     bisacodyL (DULCOLAX) 10 mg suppository Insert 1 suppository (10 mg total) into the rectum daily as needed (Constipation).     cholecalciferol, vitamin D3, 5,000 unit Tab Take 5,000 Units by mouth daily.     CRANBERRY FRUIT EXTRACT (CRANBERRY ORAL) Take 450 mg by mouth daily.      cyanocobalamin (VITAMIN B-12) 250 MCG tablet Take 250 mcg by mouth daily.     docosahexaenoic acid-epa 120-180 mg cap Take 1,000 mg by mouth daily.      donepezil (ARICEPT) 10 MG tablet Take 10 mg by mouth bedtime.     ferrous sulfate 325 (65 FE) MG tablet Take 1 tablet by mouth 3 (three) times a day with meals. (Patient taking differently: Take 1-2 tablets by mouth 2  (two) times a day. Takes 2 tablets in the morning and 1 tablet at bedtime      )     hydroCHLOROthiazide (HYDRODIURIL) 25 MG tablet Take 25 mg by mouth daily.     KLOR-CON M20 20 mEq tablet Take 1 tablet by mouth 3 times a day.     Lactobacillus rhamnosus GG (CULTURELLE) 10-15 Billion cell capsule Take 1 capsule by mouth daily.     levETIRAcetam (KEPPRA) 750 MG tablet Take 1 tablet (750 mg total) by mouth 2 (two) times a day.     magnesium hydroxide (MILK OF MAG) 400 mg/5 mL Susp suspension Take 30 mL by mouth daily as needed (Constipation).     magnesium oxide (MAG-OX) 400 mg (241.3 mg magnesium) tablet Take 1 tablet (400 mg total) by mouth 3 (three) times a day.     melatonin 3 mg Tab tablet Take 1 tablet (3 mg total) by mouth at bedtime as needed.     metoprolol tartrate 75 mg Tab Take 75 mg by mouth 2 (two) times a day.     OLANZapine (ZYPREXA) 2.5 MG tablet Take 1 tablet (2.5 mg total) by mouth every 6 (six) hours as needed.     OLANZapine zydis (ZYPREXA ZYDIS) 5 MG disintegrating tablet Take 1 tablet (5 mg total) by mouth at bedtime.     OMEGA-3S/DHA/EPA/FISH OIL (OMEGA 3 ORAL) Take 1 tablet by mouth daily.      omeprazole (PRILOSEC) 20 MG capsule Take 1 capsule (20 mg total) by mouth daily before breakfast.     QUEtiapine (SEROQUEL) 25 MG tablet Take 1 tablet (25 mg total) by mouth at bedtime as needed (agiation and/or sleep).     SAW PALMETTO ORAL Take 450 mg by mouth daily.     sertraline (ZOLOFT) 25 MG tablet Take 1 tablet (25 mg total) by mouth daily.     traZODone (DESYREL) 50 MG tablet Take 0.5 tablets (25 mg total) by mouth at bedtime.     trimethoprim (TRIMPEX) 100 mg tablet Take 1 tablet (100 mg total) by mouth once daily.     VIT B1 MN/B2/B3/B5/B6/B12/C/FA (B COMPLEX W-VIT C ORAL) Take 1 tablet by mouth daily.        DISCHARGE DIAGNOSIS:    ICD-10-CM    1. Mood disorder due to a general medical condition  F06.30    2. Late onset Alzheimer's disease without behavioral disturbance (H)  G30.1      F02.80    3. Physical deconditioning  R53.81    4. Transient alteration of awareness  R40.4      Physical Deconditioning  -Continue PT/OT and other therapies as per care plan.  -Encouraged good nutrition and movement habits.   -Discussed care plan and expected course of stay.   -Continue to follow-up per routine schedule or sooner if needed.     Dementia  -Patient has significant dementia and is unable to make decisions that affect finances, legal issues, or healthcare.   -Care to be arranged with family or POA.  -Continue to care for patient in LTC setting, as she is comfortable and safe in this setting.   -Follow up per routine and as needed.    AMS, anxiety, depression  -Saw palmetto capsules 450 mg by mouth once daily.  -Cranberry tablet 450 mg by mouth once daily.  -Fish oil 1000 mg by mouth once daily.  -ASA 81 mg by mouth daily.  -Lactobacillus rhamnosus 1 capsule by mouth daily.  -Arginine 1000 mg by mouth once daily.  -Donepezil 10 mg by mouth at bedtime.  -Seroquel 25 mg by mouth 1 tablet as needed.  -Zyprexa 5 mg by mouth at bedtime.  -Zyprexa 2.5 mg by mouth every 6 hours as needed.  -Zoloft 50 mg by mouth once daily.  Next line-trazodone 50 mg by mouth at bedtime.  -Psych eval and treat.    Otherwise continue current care plan for all other chronic medical conditions, as they are stable. Encouraged patient to engage in healthy lifestyle behaviors such as engaging in social activities, exercising (PT/OT), eating well, and following care plan. Follow up for routine check-up, or sooner if needed. Will continue to monitor patient and work with nursing staff collaboratively to work toward positive patient outcomes.    MEDICAL EQUIPMENT NEEDS:  None today.     DISCHARGE PLAN/FACE TO FACE:  I certify that services are/were furnished while this patient was under the care of a physician and that a physician or an allowed non-physician practitioner (NPP), had a face-to-face encounter that meets the physician  face-to-face encounter requirements. The encounter was in whole, or in part, related to the primary reason for home health. The patient is confined to his/her home and needs intermittent skilled nursing, physical therapy, speech-language pathology, or the continued need for occupational therapy. A plan of care has been established by a physician and is periodically reviewed by a physician.  Date of Face-to-Face Encounter: 10/7/2020    I certify that, based on my findings, the following services are medically necessary home health services: home health aid for bathing and ADLs, skilled nursing (RN) for medication set-up and teaching, symptoms and disease process monitoring and education, PT for strengthening, balance, endurance and safety within the home, OT for strengthening, ADL needs, adaptive equipment and safety.    My clinical findings support the need for the above skilled services because: home health aid for bathing and ADLs, skilled nursing (RN) for medication set-up and teaching, symptoms and disease process monitoring and education, PT for strengthening, balance, endurance and safety within the home, OT for strengthening, ADL needs, adaptive equipment and safety.    This patient is homebound because: he is a frail elderly gentleman with multiple comorbidities and recent hospitalizations making it unsafe for him to go out into the community for services.    The patient is, or has been, under my care and I have initiated the establishment of the plan of care. This patient will be followed by a physician who will periodically review the plan of care. Schedule follow up visit with primary care provider within 7 days to reestablish care.    Total unit/floor time of 40 minutes time spent on discharge planning, discharge follow-up discussion and discharge medication review.    Electronically signed by: Jaylyn Overton CNP

## 2021-06-13 NOTE — TELEPHONE ENCOUNTER
Office visit with PCP on 11/10/20 has trimethoprim listed on med list and it was listed as continue on hospital discharge so from what I can see he should be taking one tablet daily.    Dr King

## 2021-06-13 NOTE — TELEPHONE ENCOUNTER
Patient currently at facility, [facility]. Caller requests call back from Care Team - to clarify dosing of one of patient's medications - TRIMETHOPRIM.    [facility]    [phone]

## 2021-06-13 NOTE — TELEPHONE ENCOUNTER
hydroCHLOROthiazide (HYDRODIURIL) 25 MG tablet [[id number]]  Patient-reported historical medication  Ordering date: 06/25/20 1300 Authorized by: PROVIDER, HISTORICAL   Frequency: DAILY 06/22/20 - 07/10/20  Discontinued by: Russell North MD 07/10/20 1088 [Stop Taking at Discharge]

## 2021-06-13 NOTE — TELEPHONE ENCOUNTER
Called Nelly and notified her patient is taking Trimethoprim 100 mg once daily for UTI prophylaxis.

## 2021-06-13 NOTE — PROGRESS NOTES
ASSESSMENT/PLAN:  75-year-old gentleman admitted to the hospital from September 16 through September 29 initially thought to be due to sepsis and pneumonia however further evaluation failed to confirm significant infection.  The altered mental status after further evaluation with psychiatry and neurology was felt to be due to advanced dementia.  Keppra, Zyprexa and sertraline were added.  He is currently doing well.  He seems to be back to his baseline health.    He does have some mildly low blood pressures.  He is asymptomatic with this.  We discussed that if he becomes lightheaded or dizzy, we will need to reduce his metoprolol to 50 mg twice daily.    1. Pneumonia of right lower lobe due to infectious organism    2. Late onset Alzheimer's disease without behavioral disturbance (H)    Medication reconciliation was performed.      Dragon dictation was used for this note.  Speech recognition errors are a possibility.    No follow-ups on file.  There are no Patient Instructions on file for this visit.    No orders of the defined types were placed in this encounter.    There are no discontinued medications.      CHIEF COMPLAINT;  No chief complaint on file.      HISTORY OF PRESENT ILLNESS:  Navdeep is a 75 y.o. male presenting to the clinic today for for follow-up after being hospitalized for pneumonia.  He was hospitalized September 16 through the 25th.  He is unresponsive and there was concern for pneumonia.  He was initially treated however subsequent studies including blood cultures and chest CT did not confirm pneumonia..  He was discharged on September 25  admitted to TCU on September 28 for rehabilitation.  During hospitalization he had evaluation with neurology and psychiatry.  Zyprexa and sertraline were added.  He has been having some elevated blood pressure readings so his metoprolol was increased to 75 mg twice daily.  Today states he has been doing well.  He is accompanied by his stepson who lives with  them.  Harpreet is able to provide limited history but states he has been feeling great.  He has been doing some exercise and has a full diet eating regularly.  No return of fevers cough or chills.  His stepson confirms this report and states they have no concerns.  They feel he is back to his baseline health.    Echo during hospitalization showed a ejection fraction of 55% mild aortic stenosis and no significant change from 2018.    CT showed suggestion of possible bronchitis otherwise no change from prior year.    MRI shows no significant abnormalities with the exception of some advanced white matter changes and parenchymal volume loss      Remainder of 12-point ROS is negative.    TOBACCO USE:  Social History     Tobacco Use   Smoking Status Former Smoker   Smokeless Tobacco Never Used       VITALS:  Vitals:    11/10/20 1408   BP: 104/64   Temp: 97  F (36.1  C)     Wt Readings from Last 3 Encounters:   10/07/20 188 lb (85.3 kg)   09/30/20 189 lb 6.4 oz (85.9 kg)   09/16/20 190 lb (86.2 kg)     There is no height or weight on file to calculate BMI.    PHYSICAL EXAM  GENERAL APPEARANCE: Alert, cooperative, no distress, appears stated age  He is a poor historian, and has some hearing loss.  He answers simple questions appropriately but looks to his son-in-law for confirmation.    Lungs are clear bilaterally  Heart is irregular without murmur  Abdomen is soft and nontender  Extremities warm showing no edema.    RECENT RESULTS  No results found for this or any previous visit (from the past 48 hour(s)).    MEDICATIONS:  Current Outpatient Medications   Medication Sig Dispense Refill     acetaminophen (TYLENOL) 650 MG CR tablet Take 1 tablet (650 mg total) by mouth every 6 (six) hours as needed for pain.  0     arginine HCl, L-arginine, 1,000 mg Tab Take 1,000 mg by mouth daily.       aspirin 81 MG EC tablet Take 1 tablet (81 mg total) by mouth daily. 90 tablet 1     bisacodyL (DULCOLAX) 10 mg suppository Insert 1  suppository (10 mg total) into the rectum daily as needed (Constipation). 28 suppository 1     cholecalciferol, vitamin D3, 5,000 unit Tab Take 5,000 Units by mouth daily.       CRANBERRY FRUIT EXTRACT (CRANBERRY ORAL) Take 450 mg by mouth daily.        cyanocobalamin (VITAMIN B-12) 250 MCG tablet Take 250 mcg by mouth daily.       docosahexaenoic acid-epa 120-180 mg cap Take 1,000 mg by mouth daily.        donepezil (ARICEPT) 10 MG tablet Take 10 mg by mouth bedtime.       ferrous sulfate 325 (65 FE) MG tablet Take 1 tablet by mouth 3 (three) times a day with meals. (Patient taking differently: Take 1-2 tablets by mouth 2 (two) times a day. Takes 2 tablets in the morning and 1 tablet at bedtime      ) 270 tablet 3     hydroCHLOROthiazide (HYDRODIURIL) 25 MG tablet Take 25 mg by mouth daily.       KLOR-CON M20 20 mEq tablet Take 1 tablet by mouth 3 times a day. 270 tablet 2     Lactobacillus rhamnosus GG (CULTURELLE) 10-15 Billion cell capsule Take 1 capsule by mouth daily.       levETIRAcetam (KEPPRA) 750 MG tablet Take 1 tablet (750 mg total) by mouth 2 (two) times a day. 180 tablet 3     magnesium hydroxide (MILK OF MAG) 400 mg/5 mL Susp suspension Take 30 mL by mouth daily as needed (Constipation). 1 Bottle 4     magnesium oxide (MAG-OX) 400 mg (241.3 mg magnesium) tablet TAKE 1 TABLET (400 MG TOTAL) BY MOUTH DAILY. 100 tablet 0     melatonin 3 mg Tab tablet Take 1 tablet (3 mg total) by mouth at bedtime as needed.  0     metoprolol tartrate 75 mg Tab Take 75 mg by mouth 2 (two) times a day.  0     OLANZapine (ZYPREXA) 2.5 MG tablet Take 1 tablet (2.5 mg total) by mouth every 6 (six) hours as needed.  0     OLANZapine zydis (ZYPREXA ZYDIS) 5 MG disintegrating tablet Take 1 tablet (5 mg total) by mouth at bedtime. 30 tablet 2     OMEGA-3S/DHA/EPA/FISH OIL (OMEGA 3 ORAL) Take 1 tablet by mouth daily.        omeprazole (PRILOSEC) 20 MG capsule Take 1 capsule (20 mg total) by mouth daily before breakfast. 90 capsule  2     QUEtiapine (SEROQUEL) 25 MG tablet Take 1 tablet (25 mg total) by mouth at bedtime as needed (agiation and/or sleep).  0     SAW PALMETTO ORAL Take 450 mg by mouth daily.       sertraline (ZOLOFT) 25 MG tablet Take 1 tablet (25 mg total) by mouth daily.  0     traZODone (DESYREL) 50 MG tablet Take 0.5 tablets (25 mg total) by mouth at bedtime. 15 tablet 2     trimethoprim (TRIMPEX) 100 mg tablet Take 1 tablet (100 mg total) by mouth once daily.  0     VIT B1 MN/B2/B3/B5/B6/B12/C/FA (B COMPLEX W-VIT C ORAL) Take 1 tablet by mouth daily.        No current facility-administered medications for this visit.

## 2021-06-14 NOTE — PROGRESS NOTES
ASSESSMENT:  1. Chronic atrial fibrillation  72-year-old gentleman with chronic atrial fibrillation.  We decided with cardiology at that warfarin was high risk for him and instead of been maintaining him on aspirin.  He is currently rate controlled.  He has been asymptomatic and denies any symptoms of palpitations, shortness of breath or leg swelling.  Will continue on his current medications and follow-up in 6 months.    2. Hypertension  Currently well controlled.  Will check labs for medication management.  - Basic Metabolic Panel  - HM2(CBC w/o Differential)  - Lipid Cascade RANDOM    3. Dementia  He does continue to struggle with his memory.  It certainly seems to be stable currently.  He lives with his girlfriend who does help him significantly with medication management.  Will continue to follow this.    4. BPH (benign prostatic hyperplasia)  He sees urology and takes trimethoprim regularly for prophylaxis.        PLAN:  There are no Patient Instructions on file for this visit.    Orders Placed This Encounter   Procedures     Basic Metabolic Panel     HM2(CBC w/o Differential)     Lipid Cascade RANDOM     Order Specific Question:   Fasting is required?     Answer:   No     Medications Discontinued During This Encounter   Medication Reason     chlorthalidone (HYGROTEN) 25 MG tablet      citalopram (CELEXA) 10 MG tablet      trimethoprim (TRIMPEX) 100 mg tablet      metoprolol tartrate (LOPRESSOR) 100 MG tablet      metoprolol tartrate (LOPRESSOR) 100 MG tablet        No Follow-up on file.    CHIEF COMPLAINT;  Chief Complaint   Patient presents with     Follow-up     also needs INR, off Warfarin       HISTORY OF PRESENT ILLNESS:  Navdeep is a 72 y.o. male presenting to the clinic today for a general check up regarding current medical conditions. He has a history of atrial fibrillation, hypertension, and dementia. He reports that he generally feels good. He exercises regularly. He reports he had the flu a couple  of months ago. He reports he has been doing well since his multiple hospital visits in June. He has previously taken the wrong medications that have resulted in a hospital visit. He feels that his memory is quite a bit better from previously. He does not feel like his memory has gotten worse.    Atrial Fibrillation: His last INR was 1.10 as of 6/26/17. Since discontinuing warfarin, he reports feeling better. He feels the warfarin caused him dizziness and confusion that resulted in multiple falls.     Hypertension: His blood pressure today is 124/70. He discontinued metoprolol 100 mg. His blood pressure is well controlled at this time.    REVIEW OF SYSTEMS:  He reports his appetite has been good. All other systems are negative.    PFSH:  He likes to read. He does not eat salt. He has not eaten meat in 16 years. He quit drinking June 1, 1990. He went to  for a couple of months; he felt he did not need to go because he did not feel tempted by alcohol. He used to snorkel frequently at [facility] and in the [facility]. Reviewed as below.    TOBACCO USE:  History   Smoking Status     Former Smoker   Smokeless Tobacco     Never Used       VITALS:  Vitals:    11/30/17 1305   BP: 124/70   Pulse: 72   Resp: 20   Weight: 188 lb (85.3 kg)     Wt Readings from Last 3 Encounters:   11/30/17 188 lb (85.3 kg)   09/12/17 179 lb (81.2 kg)   06/26/17 184 lb (83.5 kg)     Body mass index is 25.5 kg/(m^2).    PHYSICAL EXAM:  GENERAL APPEARANCE: Alert, cooperative, no distress, appears stated age  HEAD: Normocephalic, without obvious abnormality, atraumatic  LUNGS: Clear to auscultation bilaterally, respirations unlabored  CHEST WALL: No tenderness or deformity  HEART: Regular rate and rhythm, S1 and S2 normal, no murmur, rub   or gallop  EXTREMITIES: Extremities normal, atraumatic, no cyanosis or edema  PULSES: 2+ and symmetric all extremities  NEUROLOGIC: CNII-XII intact.    RECENT RESULTS  No results found for this or any  previous visit (from the past 48 hour(s)).      ADDITIONAL HISTORY SUMMARIZED (2): None.  DECISION TO OBTAIN EXTRA INFORMATION (1): None.  RADIOLOGY TESTS (1): None.  LABS (1): Labs ordered.  MEDICINE TESTS (1): None.  INDEPENDENT REVIEW (2 each): None.      The visit lasted a total of 14 minutes face to face with the patient. Over 50% of the time was spent counseling and educating the patient about discontinued medication and general health follow up.    IPam, am scribing for and in the presence of, Dr. Muro.    I, Dr. Muro, personally performed the services described in this documentation, as scribed by Pam Pinto in my presence, and it is both accurate and complete.    MEDICATIONS:  Current Outpatient Prescriptions   Medication Sig Dispense Refill     acetaminophen (TYLENOL) 500 MG tablet Take 500-1,000 mg by mouth every 6 (six) hours as needed.       ARGININE, L-ARGININE, ORAL Take 1 tablet by mouth 2 (two) times a day.       ascorbic acid (VITAMIN C) 500 MG tablet Take 1,000 mg by mouth daily.        aspirin 81 MG EC tablet Take 1 tablet (81 mg total) by mouth daily. 150 tablet 2     biotin 2,500 mcg cap Take 5,000 mcg by mouth daily.       CALCIUM CARB-MAG OXIDE-ZINC OX ORAL Take 1 tablet by mouth daily.       chlorthalidone (HYGROTEN) 25 MG tablet TAKE ONE TABLET BY MOUTH IN THE MORNING  30 tablet 11     cholecalciferol, vitamin D3, 5,000 unit Tab Take 5,000 Units by mouth daily.       citalopram (CELEXA) 10 MG tablet Take 10 mg by mouth daily.       CRANBERRY FRUIT EXTRACT (CRANBERRY ORAL) Take 1 capsule by mouth daily.       cyanocobalamin, vitamin B-12, 2,500 mcg Subl Place 2,500 mcg under the tongue daily.       digoxin (LANOXIN) 125 mcg tablet Take 125 mcg by mouth every morning.       digoxin (LANOXIN) 125 mcg tablet TAKE ONE TABLET BY MOUTH EVERY DAY IN THE MORNING.  90 tablet 0     donepezil (ARICEPT) 10 MG tablet Take 10 mg by mouth bedtime.       ferrous sulfate 325 (65 FE) MG  tablet Take 1 tablet by mouth 3 (three) times a day with meals. 270 tablet 3     LACTOBACILLUS ACIDOPHILUS (PROBIOTIC ACIDOPHILUS ORAL) Take 1 capsule by mouth daily.        OMEGA-3S/DHA/EPA/FISH OIL (OMEGA 3 ORAL) Take 1,000 mg by mouth 2 (two) times a day.        potassium chloride SA (KLOR-CON M20) 20 MEQ tablet Take 1 tablet (20 mEq total) by mouth 3 (three) times a day. 270 tablet 2     SAW PALMETTO ORAL Take 450 mg by mouth daily.       trimethoprim (TRIMPEX) 100 mg tablet Take 100 mg by mouth once daily.       VIT B1 MN/B2/B3/B5/B6/B12/C/FA (B COMPLEX W-VIT C ORAL) Take 1 tablet by mouth daily.        vitamin E 400 UNIT capsule Take by mouth see administration instructions. 1200 mg in the morning and 800 mg in the evening.       No current facility-administered medications for this visit.        Total data points: 1

## 2021-06-14 NOTE — TELEPHONE ENCOUNTER
Hello,    Recently an order was placed with [email or web address] to perform physical therapy and occupational therapy for this patient.     Unfortunately, we have tried multiple times to reach this patient and his spouse to set up homecare and have been unsuccessful. There was not a voicemail set up, so each time we called we were unable to leave any contact information.    Due to not being able to reach the patient, we will now be discarding our current order for homecare.     If you have any questions or concerns, please call our office at [phone].    Thank you,    Debora Childress  Patient   [email or web address]

## 2021-06-14 NOTE — TELEPHONE ENCOUNTER
RN cannot approve Refill Request    RN can NOT refill this medication historical medication requested. Last office visit: Visit date not found Last Physical: Visit date not found Last MTM visit: Visit date not found Last visit same specialty: 1/26/2021 Patricia Muro MD.  Next visit within 3 mo: Visit date not found  Next physical within 3 mo: Visit date not found      Emery Lynn, [facility] Triage/Med Refill 2/2/2021    Requested Prescriptions   Pending Prescriptions Disp Refills     digoxin (LANOXIN) 125 mcg (0.125 mg) tablet [Pharmacy Med Name: Digoxin Oral Tablet 125 MCG] 30 tablet 0     Sig: TAKE ONE TABLET BY MOUTH IN THE MORNING       Refill Protocol for Digoxin Passed - 2/1/2021  5:34 PM        Passed - LFT or AST or ALT on file in last 12 months     Albumin   Date Value Ref Range Status   12/06/2020 3.7 3.5 - 5.0 g/dL Final     Bilirubin, Total   Date Value Ref Range Status   12/06/2020 0.9 0.0 - 1.0 mg/dL Final     Bilirubin, Direct   Date Value Ref Range Status   12/06/2020 0.4 <=0.5 mg/dL Final     Alkaline Phosphatase   Date Value Ref Range Status   12/06/2020 99 45 - 120 U/L Final     AST   Date Value Ref Range Status   12/06/2020 39 0 - 40 U/L Final     ALT   Date Value Ref Range Status   12/06/2020 15 0 - 45 U/L Final     Protein, Total   Date Value Ref Range Status   12/06/2020 7.2 6.0 - 8.0 g/dL Final                Passed - PCP or prescribing provider visit in past 6 months or next 3 months     Last office visit with prescriber/PCP: Visit date not found OR same dept: 1/26/2021 Patricia Muro MD OR same specialty: 1/26/2021 Patricia Muro MD Last physical: Visit date not found  Last MTM visit: Visit date not found     Next appt within 3 mo: Visit date not found  Next physical within 3 mo: Visit date not found  Prescriber OR PCP: Cheryle Alfred MD  Last diagnosis associated with med order: There are no diagnoses linked to this encounter.   If protocol passes may refill  for 6 months if within 3 months of last provider visit (or a total of 9 months).          Passed - BMP on file in last 12 months     Sodium   Date Value Ref Range Status   01/26/2021 141 136 - 145 mmol/L Final     Potassium   Date Value Ref Range Status   01/26/2021 3.8 3.5 - 5.0 mmol/L Final     Chloride   Date Value Ref Range Status   01/26/2021 108 (H) 98 - 107 mmol/L Final     CO2   Date Value Ref Range Status   01/26/2021 22 22 - 31 mmol/L Final     BUN   Date Value Ref Range Status   01/26/2021 13 8 - 28 mg/dL Final     Creatinine   Date Value Ref Range Status   01/26/2021 1.02 0.70 - 1.30 mg/dL Final             Passed - CBC w/plts (hm2) on file in last 12 months     WBC   Date Value Ref Range Status   01/26/2021 7.3 4.0 - 11.0 thou/uL Final     Hemoglobin   Date Value Ref Range Status   01/26/2021 12.7 (L) 14.0 - 18.0 g/dL Final     Hematocrit   Date Value Ref Range Status   01/26/2021 37.0 (L) 40.0 - 54.0 % Final     Platelets   Date Value Ref Range Status   01/26/2021 208 140 - 440 thou/uL Final             Passed - ECG in last 12 months     ECG rhythm strip: No results found for this or any previous visit. ECG 12 lead MUSE:   Results for orders placed or performed during the hospital encounter of 09/16/20   ECG 12 lead MUSE   Result Value Ref Range    SYSTOLIC BLOOD PRESSURE      DIASTOLIC BLOOD PRESSURE      VENTRICULAR RATE 99 BPM    ATRIAL RATE 119 BPM    P-R INTERVAL      QRS DURATION 120 ms    Q-T INTERVAL 366 ms    QTC CALCULATION (BEZET) 469 ms    P Axis      R AXIS -56 degrees    T AXIS 80 degrees    MUSE DIAGNOSIS       Atrial fibrillation  Left anterior fascicular block  Nonspecific T wave abnormality  Abnormal ECG  When compared with ECG of 16-SEP-2020 03:23,  Nonspecific T wave abnormality is now Present  Confirmed by AILYN HUTSON MD LOC:[facility]N ([id number]) on 9/18/2020 3:09:26 PM      ECG 12 lead nursing unit:   Results for orders placed or performed during the hospital encounter of 12/06/20   ECG 12  lead nursing unit performed   Result Value Ref Range    SYSTOLIC BLOOD PRESSURE 134 mmHg    DIASTOLIC BLOOD PRESSURE 77 mmHg    VENTRICULAR RATE 84 BPM    ATRIAL RATE 46 BPM    P-R INTERVAL      QRS DURATION 132 ms    Q-T INTERVAL 410 ms    QTC CALCULATION (BEZET) 484 ms    P Axis      R AXIS -52 degrees    T AXIS 149 degrees    MUSE DIAGNOSIS       Atrial fibrillation with premature ventricular or aberrantly conducted complexes  Left axis deviation  Non-specific intra-ventricular conduction block  Nonspecific T wave abnormality  Abnormal ECG  When compared with ECG of 17-SEP-2020 15:34,  No significant change was found  Confirmed by SEE ED PROVIDER NOTE FOR, ECG INTERPRETATION ([id number]),  PABLO OSBORNE ([id number]) on 12/6/2020 10:27:00 PM               Passed - Magnesium in last 12 months     Magnesium   Date Value Ref Range Status   01/26/2021 1.8 1.8 - 2.6 mg/dL Final              Passed - Normal digoxin level in last 12 months     No results found for: PHENOBARB          Passed - Serum creatinine in last 12 months     Creatinine   Date Value Ref Range Status   01/26/2021 1.02 0.70 - 1.30 mg/dL Final

## 2021-06-14 NOTE — TELEPHONE ENCOUNTER
OLANZapine zydis (ZYPREXA ZYDIS) 5 MG disintegrating tablet [[id number]]    Electronically signed by: Patricia Muro MD on 10/13/20 1335 Status: Discontinued   Ordering user: Patricia Muro MD 10/13/20 1335 Authorized by: Patricia Muro MD   Frequency: QHS 10/13/20 - 12/06/20  Released by: Patricia Muro MD 10/13/20 1335   Discontinued by: Christine Kowalski, Pharmacy Intern 12/06/20 1641 [Therapy completed]

## 2021-06-14 NOTE — TELEPHONE ENCOUNTER
ANTICOAGULATION  MANAGEMENT    Assessment     Today's INR result of 2.0 is Therapeutic (goal INR of 2.0-3.0)        Warfarin taken as previously instructed - started warfarin on 1/28; today is day #6 of warfarin    No new diet changes affecting INR    No new medication/supplements affecting INR    Continues to tolerate warfarin with no reported s/s of bleeding or thromboembolism     Plan:     Spoke on phone with Melva regarding INR result and instructed:      Warfarin Dosing Instructions:  Continue current warfarin dose 2.5 mg daily  (0 % change)    Instructed patient to follow up no later than: Fri 2/5    Education provided: target INR goal and significance of current INR result, importance of following up for INR monitoring at instructed interval, importance of taking warfarin as instructed, importance of notifying clinic for changes in medications and importance of notifying clinic for diarrhea, nausea/vomiting, reduced intake and/or illness    Melva verbalizes understanding and agrees to warfarin dosing plan.    Instructed to call the [facility] for any changes, questions or concerns. (#[phone])   ?   Sabrina Evans RN    Subjective/Objective:      Navdeep Spann, a 75 y.o. male is on warfarin. Harpreet Mullins reports:     Home warfarin dose: verbally confirmed home dose with Melva and updated on anticoagulation calendar     Missed doses: No     Medication changes:  No     S/S of bleeding or thromboembolism:  No     New Injury or illness:  No     Changes in diet or alcohol consumption:  No     Upcoming surgery, procedure or cardioversion:  No    Anticoagulation Episode Summary     Current INR goal:  2.0-3.0   TTR:  --   Next INR check:  2/5/2021   INR from last check:  2.00 (2/2/2021)   Weekly max warfarin dose:     Target end date:     INR check location:     Preferred lab:     Send INR reminders to:  LYLE HODLER    Indications    Chronic atrial fibrillation [I48.20]           Comments:            Anticoagulation Care Providers     Provider Role Specialty Phone number    Patricia Muro MD Referring Family Medicine [phone]

## 2021-06-14 NOTE — TELEPHONE ENCOUNTER
RN cannot approve Refill Request    RN can NOT refill this medication medication not on med list. Last office visit: 11/10/2020 Patricia Muro MD Last Physical: 7/31/2018 Last MTM visit: Visit date not found Last visit same specialty: 11/10/2020 Patricia Muro MD.  Next visit within 3 mo: Visit date not found  Next physical within 3 mo: Visit date not found      Haley Roque, [facility] Triage/Med Refill 12/26/2020    Requested Prescriptions   Pending Prescriptions Disp Refills     amLODIPine (NORVASC) 5 MG tablet [Pharmacy Med Name: amLODIPine Besylate Oral Tablet 5 MG] 90 tablet 0     Sig: TAKE ONE TABLET BY MOUTH ONE TIME DAILY       Calcium-Channel Blockers Protocol Passed - 12/23/2020  5:54 PM        Passed - PCP or prescribing provider visit in past 12 months or next 3 months     Last office visit with prescriber/PCP: 11/10/2020 Patricia Muro MD OR same dept: 11/10/2020 Patricia Muro MD OR same specialty: 11/10/2020 Patricia Muro MD  Last physical: 7/31/2018 Last MTM visit: Visit date not found   Next visit within 3 mo: Visit date not found  Next physical within 3 mo: Visit date not found  Prescriber OR PCP: Patricia Muro MD  Last diagnosis associated with med order: 1. Hypertension  - amLODIPine (NORVASC) 5 MG tablet [Pharmacy Med Name: amLODIPine Besylate Oral Tablet 5 MG]; TAKE ONE TABLET BY MOUTH ONE TIME DAILY   Dispense: 90 tablet; Refill: 0    If protocol passes may refill for 12 months if within 3 months of last provider visit (or a total of 15 months).             Passed - Blood pressure filed in past 12 months     BP Readings from Last 1 Encounters:   12/09/20 112/71

## 2021-06-14 NOTE — TELEPHONE ENCOUNTER
[facility] received [facility]agulation referral for pt.      PCP needs to order starting prescription/dose of warfarin for pt.

## 2021-06-14 NOTE — TELEPHONE ENCOUNTER
ANTICOAGULATION  MANAGEMENT: INITIAL CONSULT    Assessment     ghanshyam Garcia 75 y.o. male  is newly referred to [facility] anticoagulation.    New to warfarin: starting today. if able to  RX today         INR goal range reviewed; standard for indication(s)    Previous INR not applicable     Warfarin initiation dose provided by provider today    Factors that may increase sensitivity to warfarin: Age > 75     Factors expected to reduce sensitivity to warfarin: Male gender and High greens/vitamin K intake       Home medication list reviewed for potential drug interactions. No new medication/supplements affecting INR    Educational needs identified: knows some, basic education given today     Plan:     Warfarin Dosing Instructions: Start 2.5 mg daily   (0 % change)    Instructed patient to follow up no later than: Mon, 2/1, conflict, will come in on 2/2    Education provided: importance of consistent vitamin K intake, impact of vitamin K foods on INR, vitamin K content of foods, potential interaction between warfarin and alcohol, target INR goal and significance of current INR result, importance of notifying clinic for changes in medications, importance of notifying clinic for diarrhea, nausea/vomiting, reduced intake and/or illness and Importance of contacting anticoagulation clinic if you have not received warfarin dosing instructions on day of INR testing    Melva verbalizes understanding and agrees to warfarin dosing plan.     Instructed to call the [facility] for any questions or concerns regarding warfarin dosing. (# [phone])   ?   Sabrina Evans RN    Subjective/Objective:      ghanshyam Garcia 75 y.o. male  is newly referred to [facility] anticoagulation for warfarin management    Navdeep reports:    Anticoagulation:      Warfarin initiation date (approximate): 1/28 or 1/29   Indication: Atrial Fibrillation    Goal INR: 2.0-3.0   On Bridge Therapy: No   Previously on  warfarin:Yes, he does not recall dose.      Medication setup/administration: spouse/significant other: Melva     How often do you forget to take your medications?: never      Missed warfarin doses since last INR: No     S/sx of bleeding, thromboembolism: No     Typical  vitamin K intake: high;  consistent     Typical  alcohol intake: none     Other dietary considerations: none     New Injury or illness:  No     Upcoming surgery procedure or cardioversion: No    Patient weight:   Wt Readings from Last 1 Encounters:   01/26/21 185 lb 8 oz (84.1 kg)       Recent lab results:       Lab Results   Component Value Date    INR 1.14 (H) 12/06/2020    INR 1.15 (H) 09/16/2020    INR 1.12 (H) 07/01/2020        Lab Results   Component Value Date    HGB 12.7 (L) 01/26/2021    HCT 37.0 (L) 01/26/2021     01/26/2021        Lab Results   Component Value Date    ALT 15 12/06/2020    AST 39 12/06/2020    ALBUMIN 3.7 12/06/2020        Lab Results   Component Value Date    CREATININE 1.02 01/26/2021       Medications:    Current Outpatient Medications on File Prior to Visit   Medication Sig     acetaminophen (TYLENOL) 650 MG CR tablet Take 1 tablet (650 mg total) by mouth every 6 (six) hours as needed for pain.     amLODIPine (NORVASC) 5 MG tablet TAKE ONE TABLET BY MOUTH ONE TIME DAILY      arginine HCl, L-arginine, 1,000 mg Tab Take 1,000 mg by mouth daily.     ascorbic acid, vitamin C, (VITAMIN C) 1000 MG tablet Take 1,000 mg by mouth 2 (two) times a day.     aspirin 81 MG EC tablet Take 1 tablet (81 mg total) by mouth daily.     cholecalciferol, vitamin D3, 5,000 unit Tab Take 5,000 Units by mouth daily.     citalopram (CELEXA) 20 MG tablet citalopram 20 mg tablet   TAKE ONE TABLET BY MOUTH ONE TIME DAILY     CRANBERRY FRUIT EXTRACT (CRANBERRY ORAL) Take 450 mg by mouth daily.      cyanocobalamin (VITAMIN B-12) 250 MCG tablet Take 250 mcg by mouth daily.     digoxin (LANOXIN) 125 mcg (0.125 mg) tablet digoxin 125 mcg (0.125  mg) tablet   TAKE ONE TABLET BY MOUTH IN THE MORNING     donepezil (ARICEPT) 10 MG tablet Take 10 mg by mouth bedtime.     ferrous sulfate 325 (65 FE) MG tablet Take 1 tablet by mouth 3 (three) times a day with meals. (Patient taking differently: Take 1-2 tablets by mouth 2 (two) times a day. Takes 2 tablets in the morning and 1 tablet at bedtime      )     fluoride, sodium, (DENTAGEL) 1.1 % Gel dental gel SF 5000 Plus 1.1 % dental cream   USE NIGHTLY AT BEDTIME. APPLY PEA SIZED AMOUNT TO TOOTHBRUSH AND BRUSH TEETH AROUND GUMLINE FOR 1 MINUTE     Lactobacillus acidophilus 100 mg (1 billion cell) cap Take 1 capsule by mouth daily.     levETIRAcetam (KEPPRA) 750 MG tablet Take 1 tablet (750 mg total) by mouth 2 (two) times a day.     magnesium oxide (MAG-OX) 400 mg (241.3 mg magnesium) tablet magnesium oxide 400 mg (241.3 mg magnesium) tablet   TAKE 1 TABLET (400 MG TOTAL) BY MOUTH DAILY.     metoprolol tartrate (LOPRESSOR) 50 MG tablet Take 1 tablet (50 mg total) by mouth 2 (two) times a day.     OMEGA-3S/DHA/EPA/FISH OIL (OMEGA 3 ORAL) Take 1 tablet by mouth daily.      omeprazole (PRILOSEC) 20 MG capsule Take 1 capsule (20 mg total) by mouth daily before breakfast.     potassium chloride (KLOR-CON M20) 20 MEQ tablet Klor-Con M20 mEq tablet,extended release   Take 1 tablet by mouth 3 times a day.     SAW PALMETTO ORAL Take 450 mg by mouth daily.     trimethoprim (TRIMPEX) 100 mg tablet Take 1 tablet (100 mg total) by mouth once daily.     vitamin E 400 unit capsule Take 400 Units by mouth. Take 3 capsules in the morning and 2 capsules in the evening.     No current facility-administered medications on file prior to visit.        Past Medical History:  Patient Active Problem List   Diagnosis     Hypertension     Anemia     Chronic atrial fibrillation     Leukocytosis, unspecified type     Hearing loss with hearing aids     Depressive disorder, atypical     Mood disorder due to a general medical condition      Encephalopathy     Dementia with behavioral disturbance, unspecified dementia type (H)     Contraindication to anticoagulation therapy     BPH (benign prostatic hyperplasia)     SHERRELL (generalized anxiety disorder)     Late onset Alzheimer's disease without behavioral disturbance (H)     Recurrent major depressive disorder, in partial remission (H)     Nonrheumatic aortic valve stenosis     Partial symptomatic epilepsy with complex partial seizures, not intractable, without status epilepticus (H)     Complex partial seizures (H)     Unresponsive episode     Syncope, unspecified syncope type     Elevated liver function tests     Toxic encephalopathy     Altered mental status     Cough     Metabolic encephalopathy     Pneumonia of right lower lobe due to infectious organism     Altered mental state     Sleep difficulties     Physical deconditioning     Hypokalemia     Generalized muscle weakness    Past Medical History:   Diagnosis Date     Anemia      Anxiety      Aortic valve disorder      Arthritis      Atrial fibrillation (H)      BPH (benign prostatic hypertrophy) with urinary retention      Carotid stenosis      Depression      Dyslipidemia      [place] (hard of hearing)      Hypertension      Lymphedema      Syncope and collapse 2009, 2010    r/t urinary obstruction

## 2021-06-15 NOTE — TELEPHONE ENCOUNTER
"ANTICOAGULATION  MANAGEMENT    Navdeep Spann is on warfarin and had a point of care INR result > 5.5 or an \"error message\" on point of care meter today.    Morning INR; STAT venous INR processing requested from lab    Spoke with Navdeep via phone while at the lab and reviewed triage questions for potential signs and symptoms of bleeding.      Patient Response     Have you had any bleeding in the last week?   No   Have you passed any red, black, or tarry stools in last week?   No   Have you vomited/spit up any red or coffee ground material in last week?   No   Have you had any new, severe abdominal pain or bloating develop in last week?   No   Have you fallen or had any injuries in last week?   No   Do you currently have a severe, sudden onset headache?   No   Do you currently have any severe sudden changes in your vision?   No   Do you currently have any new onset numbness, weakness/paralysis?   No     Assessment/Plan:     Navdeep's responses were negative for signs and symptoms of bleeding; may discharge from clinic    Navdeep instructed to:       Hold warfarin today until venous INR result returns and receives follow up call from [facility]    Seek medical attention for new signs and symptoms of bleeding or a fall with injury.         "

## 2021-06-15 NOTE — TELEPHONE ENCOUNTER
PT HE is asking for verbal ok for continued PT 2w2 to work on strengthening, gait/transfer training, and balance training.  You can respond to this inbasket with the ok or call and leave a message at [phone].  Thank you

## 2021-06-15 NOTE — TELEPHONE ENCOUNTER
INITIAL INR > 5.5 NOTIFICATION- Anticoagulation Management Program    Navdeep Spann had an initial point of care INR of 6.8    Venous confirmation of INR required per protocol. STAT venous sample drawn and being sent out for confirmation.    Anticoagulation template scanned into EHR. Patient phone call with [facility] ([facility]) being arranged for patient triage prior to discharge.     Kassandra Zavala, CMA

## 2021-06-15 NOTE — TELEPHONE ENCOUNTER
RN cannot approve Refill Request    RN can NOT refill this medication med is not covered by policy/route to provider. Last office visit: 2/15/2021 Patricia Muro MD Last Physical: 7/31/2018 Last MTM visit: Visit date not found Last visit same specialty: 2/15/2021 Patricia Muro MD.  Next visit within 3 mo: Visit date not found  Next physical within 3 mo: Visit date not found      Tami Barber, [facility] Triage/Med Refill 2/19/2021    Requested Prescriptions   Pending Prescriptions Disp Refills     OLANZapine zydis (ZYPREXA ZYDIS) 5 MG disintegrating tablet [Pharmacy Med Name: OLANZapine Oral Tablet Disintegrating 5 MG] 30 tablet 0     Sig: Take 1 tablet (5 mg total) by mouth at bedtime.       There is no refill protocol information for this order

## 2021-06-15 NOTE — PROGRESS NOTES
ASSESSMENT/PLAN:  1. Generalized muscle weakness  Recommend a lift chair for home and also safety eval with occupational therapy and home physical therapy.  - Lift Chair with Seat Lift Mechanism  - Ambulatory referral to Home Health    2. Chronic atrial fibrillation  Rate controlled chronic A. fib.  He just finished a Holter monitor and results are not available as of yet.  He did meet with cardiology which concurred that Coumadin is contraindicated due to his high risk of falls.  His INR is been elevated we will recheck it today to assure it is going down and he will stop his warfarin today.  - INR  - Ambulatory referral to Home Health    3. Dementia with behavioral disturbance, unspecified dementia type (H)  - Ambulatory referral to Home Health    4. Benign essential hypertension  Blood pressures are in the normal range.  Will stay off of hydrochlorothiazide and spironolactone at this point in time.      Dragon dictation was used for this note.  Speech recognition errors are a possibility.    No follow-ups on file.  There are no Patient Instructions on file for this visit.    Orders Placed This Encounter   Procedures     Lift Chair with Seat Lift Mechanism     Order Specific Question:   Which DME provider?     Answer:   Other (specify)     Order Specific Question:   Length of need:     Answer:   99 months (lifetime)     Order Specific Question:   The face to face evaluation was performed on:     Answer:   2/15/2021     INR     Ambulatory referral to Home Health     Referral Priority:   Routine     Referral Type:   Home Health Care     Referral Reason:   Evaluation and Treatment     Requested Specialty:   Home Health Services     Number of Visits Requested:   1     Medications Discontinued During This Encounter   Medication Reason     warfarin ANTICOAGULANT (COUMADIN) 2.5 MG tablet          CHIEF COMPLAINT;  Chief Complaint   Patient presents with     Blood Pressure Check     INR Check       HISTORY OF PRESENT  "ILLNESS:  Navdeep is a 76 y.o. male presenting to the clinic today for follow-up regarding his blood pressure and his chronic A. fib.  Harpreet and Melva now met with the atrial fibrillation clinic and they concur that Coumadin does have a higher risk for him given his falls and did not recommend that he start it.  He is actually had some higher INR levels and is currently holding his Coumadin.  In our discussions today, Yoselyn states that she would like to just stop that at this point in time.    He has been off of his hydrochlorothiazide and spironolactone and blood pressures are still in the normal if not low range.  He is not reporting any dizziness or sluggishness.    They have not received any contact from home health.  He is very weak and has difficulty getting from the sitting to standing position.  He has to use both arms to lift himself up and will use either his cane or the arm of a chair.  It is becoming harder and harder for him as he is gradually losing strength.  Remainder of 12-point ROS is negative.    TOBACCO USE:  Social History     Tobacco Use   Smoking Status Former Smoker   Smokeless Tobacco Never Used       VITALS:  Vitals:    02/15/21 1430   BP: 100/60   Pulse: 66   Resp: 20   Temp: 97.6  F (36.4  C)   TempSrc: Oral   SpO2: 97%   Weight: 196 lb 6.4 oz (89.1 kg)   Height: 5' 11\" (1.803 m)     Wt Readings from Last 3 Encounters:   02/15/21 196 lb 6.4 oz (89.1 kg)   02/04/21 196 lb (88.9 kg)   01/26/21 185 lb 8 oz (84.1 kg)     Body mass index is 27.39 kg/m .    PHYSICAL EXAM:  GENERAL APPEARANCE: Alert, cooperative, no distress, appears stated age  BACK: Symmetric, no curvature, ROM normal, no CVA tenderness  LUNGS: Clear to auscultation bilaterally, respirations unlabored  HEART: Regular rate and rhythm, S1 and S2 normal, no murmur, rub or gallop  ABDOMEN: Soft, non-tender, bowel sounds active all four quadrants,     no masses, no organomegaly  EXTREMITIES: Extremities normal, atraumatic, 1-2+ " edema in the right with trace on the left  Slow to get from the sitting to standing and requiring the assistance of the table and his cane to do so.  NEUROLOGIC: CNII-XII intact. Normal strength, sensation and reflexes       throughout    RECENT RESULTS  No results found for this or any previous visit (from the past 48 hour(s)).    MEDICATIONS:  Current Outpatient Medications   Medication Sig Dispense Refill     acetaminophen (TYLENOL) 650 MG CR tablet Take 1 tablet (650 mg total) by mouth every 6 (six) hours as needed for pain.  0     amLODIPine (NORVASC) 5 MG tablet TAKE ONE TABLET BY MOUTH ONE TIME DAILY  90 tablet 0     arginine HCl, L-arginine, 1,000 mg Tab Take 1,000 mg by mouth daily.       ascorbic acid, vitamin C, (VITAMIN C) 1000 MG tablet Take 1,000 mg by mouth 2 (two) times a day.       aspirin 81 MG EC tablet Take 1 tablet (81 mg total) by mouth daily. 90 tablet 1     cholecalciferol, vitamin D3, 5,000 unit Tab Take 5,000 Units by mouth daily.       citalopram (CELEXA) 20 MG tablet citalopram 20 mg tablet   TAKE ONE TABLET BY MOUTH ONE TIME DAILY       CRANBERRY FRUIT EXTRACT (CRANBERRY ORAL) Take 450 mg by mouth daily.        cyanocobalamin (VITAMIN B-12) 250 MCG tablet Take 250 mcg by mouth daily.       digoxin (LANOXIN) 125 mcg (0.125 mg) tablet TAKE ONE TABLET BY MOUTH IN THE MORNING 90 tablet 3     donepezil (ARICEPT) 10 MG tablet Take 10 mg by mouth bedtime.       ferrous sulfate 325 (65 FE) MG tablet Take 1 tablet by mouth 3 (three) times a day with meals. (Patient taking differently: Take 1-2 tablets by mouth 2 (two) times a day. Takes 2 tablets in the morning and 1 tablet at bedtime      ) 270 tablet 3     fluoride, sodium, (DENTAGEL) 1.1 % Gel dental gel SF 5000 Plus 1.1 % dental cream   USE NIGHTLY AT BEDTIME. APPLY PEA SIZED AMOUNT TO TOOTHBRUSH AND BRUSH TEETH AROUND GUMLINE FOR 1 MINUTE       Lactobacillus acidophilus 100 mg (1 billion cell) cap Take 1 capsule by mouth daily.        levETIRAcetam (KEPPRA) 750 MG tablet Take 1 tablet (750 mg total) by mouth 2 (two) times a day. 180 tablet 3     magnesium oxide (MAG-OX) 400 mg (241.3 mg magnesium) tablet TAKE 1 TABLET (400 MG TOTAL) BY MOUTH DAILY. 100 tablet 0     metoprolol tartrate (LOPRESSOR) 50 MG tablet Take 1 tablet (50 mg total) by mouth 2 (two) times a day.  0     OMEGA-3S/DHA/EPA/FISH OIL (OMEGA 3 ORAL) Take 1 tablet by mouth daily.        omeprazole (PRILOSEC) 20 MG capsule Take 1 capsule (20 mg total) by mouth daily before breakfast. 90 capsule 2     potassium chloride (KLOR-CON M20) 20 MEQ tablet Klor-Con M20 mEq tablet,extended release   Take 1 tablet by mouth 3 times a day.       SAW PALMETTO ORAL Take 450 mg by mouth daily.       trimethoprim (TRIMPEX) 100 mg tablet Take 1 tablet (100 mg total) by mouth once daily.  0     vitamin E 400 unit capsule Take 400 Units by mouth. Take 3 capsules in the morning and 2 capsules in the evening.       No current facility-administered medications for this visit.

## 2021-06-15 NOTE — TELEPHONE ENCOUNTER
RN cannot approve Refill Request    RN can NOT refill this medication med is not covered by policy/route to provider. Last office visit: 1/26/2021 Patricia Muro MD Last Physical: 7/31/2018 Last MTM visit: Visit date not found Last visit same specialty: 1/26/2021 Patricia Muro MD.  Next visit within 3 mo: Visit date not found  Next physical within 3 mo: Visit date not found      Vy Roy, [facility] Triage/Med Refill 2/10/2021    Requested Prescriptions   Pending Prescriptions Disp Refills     magnesium oxide (MAG-OX) 400 mg (241.3 mg magnesium) tablet [Pharmacy Med Name: Magnesium Oxide Oral Tablet 400 MG] 100 tablet 0     Sig: TAKE 1 TABLET (400 MG TOTAL) BY MOUTH DAILY.       There is no refill protocol information for this order

## 2021-06-15 NOTE — TELEPHONE ENCOUNTER
Physical Therapy with [facility] (formerly [facility]) is requesting verbal ok for 2W3 to work on strengthening, gait/transfer training, and balance training.   OT eval and treat for home safety and management.   You can respond to this inbasket with the ok or call and leave a message at [phone]. Thank you    AZUL, there were several med interactions noted and please advise if any changes needed:         Drug-Drug: citalopram and donepeziL   The use of citalopram in patients maintained on agents that prolong the QTc interval may result in potentially life-threatening cardiac arrhythmias, including torsades de pointes.(1-3)   Details Severe Interaction   citalopram (CELEXA) 20 MG tablet     donepezil (ARICEPT) 10 MG tablet Drug-Drug     Drug-Drug: Digox, digoxin, trimethoprim and digoxin   Concurrent use of trimethoprim may result in elevated levels of and toxicity from digoxin.(1,2) Symptoms of digoxin toxicity can include anorexia, nausea, vomiting, headache, fatigue, malaise, drowsiness, generalized muscle weakness, disorientation, hallucinations, visual disturbances, and arrhythmias.   Details Severe Interaction   trimethoprim (TRIMPEX) 100 mg tablet     digoxin (LANOXIN) 125 mcg (0.125 mg) tablet     Drug-Drug: trimethoprim and potassium chloride   Concurrent use of trimethoprim and potassium supplements may result in hyperkalemia, which may be severe.   Details Severe Interaction

## 2021-06-15 NOTE — PATIENT INSTRUCTIONS - HE
Navdeep Spann,    It was a pleasure to see you today at the [facility].     My recommendations after this visit include:    24 hour holter to check heart rates daytime and nighttime in atrial fibrillation.  Please call and check if one is available now at [facility].    I discussed risk of bleeding in head if you fall or pass out and are on warfarin versus risk of stroke if you are not on warfarin.    To followup with me as needed only. Call and make appointment to see Dr. Muro to discuss CODE STATUS and other advanced health planning-health care directive and whether to stay on warfarin or not.      My contact information:  Bony Noland, JOSH  After Hours or Scheduling  [phone]  My Nurse---Samantha Costa [phone]

## 2021-06-15 NOTE — TELEPHONE ENCOUNTER
"Per chart review from PCP visit today:  \"2. Chronic atrial fibrillation  Rate controlled chronic A. fib.  He just finished a Holter monitor and results are not available as of yet.  He did meet with cardiology which concurred that Coumadin is contraindicated due to his high risk of falls.  His INR is been elevated we will recheck it today to assure it is going down and he will stop his warfarin today.\"    Episode and referral closed.   Standing INR order cancelled.    Called patient to confirm that warfarin was no longer in medication box to be taken.      Marielena Pelaez RN  Anticoagulation Clinic    "

## 2021-06-15 NOTE — TELEPHONE ENCOUNTER
ANTICOAGULATION  MANAGEMENT    Assessment     Friday's INR result of 2.6 is Therapeutic (goal INR of 2.0-3.0)        No warfarin dosing was filled out on template and was unable to reach pt's spouse/caregiver to verify dosing    No new diet changes affecting INR    No new medication/supplements affecting INR    Continues to tolerate warfarin with no reported s/s of bleeding or thromboembolism     Previous INR was Therapeutic    Plan:     Left detailed message for Melva regarding INR result and instructed:      Warfarin Dosing Instructions:  Continue current warfarin dose 2.5 mg daily    Instructed patient to follow up no later than: Tue 2/9    Education provided: target INR goal and significance of current INR result    Instructed to call the [facility] for any changes, questions or concerns. (#[phone])   ?   Sabrina Evans RN    Subjective/Objective:      Navdeep Spann, a 75 y.o. male is on warfarin. Harpreet Mullins reports:     Home warfarin dose: Left detailed msg with current warfarin dosing, asked them to call back if pt was taking his warfarin differently     Missed doses: No     Medication changes:  No     S/S of bleeding or thromboembolism:  No     New Injury or illness:  No     Changes in diet or alcohol consumption:  No     Upcoming surgery, procedure or cardioversion:  No    Anticoagulation Episode Summary     Current INR goal:  2.0-3.0   TTR:  --   Next INR check:  2/9/2021   INR from last check:  2.60 (2/5/2021)   Weekly max warfarin dose:     Target end date:     INR check location:     Preferred lab:     Send INR reminders to:  LYLE HOLDER    Indications    Chronic atrial fibrillation [I48.20]           Comments:           Anticoagulation Care Providers     Provider Role Specialty Phone number    Patricia Muro MD Referring Family Medicine [phone]

## 2021-06-15 NOTE — TELEPHONE ENCOUNTER
RN cannot approve Refill Request    RN can NOT refill this medication med is not covered by policy/route to provider. Last office visit: 1/26/2021 Patricia Muro MD Last Physical: 7/31/2018 Last MTM visit: Visit date not found Last visit same specialty: 1/26/2021 Patricia Muro MD.  Next visit within 3 mo: Visit date not found  Next physical within 3 mo: Visit date not found      Tami Barber, [facility] Triage/Med Refill 2/9/2021    Requested Prescriptions   Pending Prescriptions Disp Refills     OLANZapine zydis (ZYPREXA ZYDIS) 5 MG disintegrating tablet [Pharmacy Med Name: OLANZapine Oral Tablet Disintegrating 5 MG] 30 tablet 0     Sig: Take 1 tablet (5 mg total) by mouth at bedtime.       There is no refill protocol information for this order

## 2021-06-15 NOTE — TELEPHONE ENCOUNTER
RN cannot approve Refill Request    RN can NOT refill this medication med is not covered by policy/route to provider. Last office visit: 2/15/2021 Patricia Muro MD Last Physical: 7/31/2018 Last MTM visit: Visit date not found Last visit same specialty: 2/15/2021 Patricia Muro MD.  Next visit within 3 mo: Visit date not found  Next physical within 3 mo: Visit date not found      Emery Lynn, [facility] Triage/Med Refill 3/12/2021    Requested Prescriptions   Pending Prescriptions Disp Refills     OLANZapine zydis (ZYPREXA ZYDIS) 5 MG disintegrating tablet [Pharmacy Med Name: OLANZapine Oral Tablet Disintegrating 5 MG] 30 tablet 0     Sig: PLACE 1 tablet (5 mg total) ON THE TONGUE at bedtime.       There is no refill protocol information for this order

## 2021-06-15 NOTE — TELEPHONE ENCOUNTER
Requesting new verbal order for start of care assessment to be completed on 2/22/2021 per patient's request     This is outside of the original 48 hour referral, requiring a new verbal order.     Disciplines ordered: PT and OT    Thank you

## 2021-06-15 NOTE — TELEPHONE ENCOUNTER
Anticoagulation Management    Unable to reach Melva today.    Today's INR result of 2.6 is Therapeutic (goal INR of 2.0-3.0).     Follow up required to confirm warfarin doses taken.    Left message to continue 2.5 mg dailly this weekend.       ACN to follow up    Sabrina Evans, RN

## 2021-06-16 NOTE — TELEPHONE ENCOUNTER
RN cannot approve Refill Request    RN can NOT refill this medication med is not covered by policy/route to provider. Last office visit: 2/15/2021 Patricia Muro MD Last Physical: 7/31/2018 Last MTM visit: Visit date not found Last visit same specialty: 2/15/2021 Patricia Muro MD.  Next visit within 3 mo: Visit date not found  Next physical within 3 mo: Visit date not found      Haley Roque, [facility] Triage/Med Refill 4/15/2021    Requested Prescriptions   Pending Prescriptions Disp Refills     OLANZapine zydis (ZYPREXA ZYDIS) 5 MG disintegrating tablet [Pharmacy Med Name: OLANZapine Oral Tablet Disintegrating 5 MG] 30 tablet 0     Sig: PLACE 1 tablet (5 mg total) ON THE TONGUE at bedtime.       There is no refill protocol information for this order

## 2021-06-16 NOTE — TELEPHONE ENCOUNTER
RN cannot approve Refill Request    RN can NOT refill this medication med is not covered by policy/route to provider. Last office visit: 2/15/2021 Patricia Muro MD Last Physical: 7/31/2018 Last MTM visit: Visit date not found Last visit same specialty: 2/15/2021 Patricia Muro MD.  Next visit within 3 mo: Visit date not found  Next physical within 3 mo: Visit date not found      Haley Roque, [facility] Triage/Med Refill 4/17/2021    Requested Prescriptions   Pending Prescriptions Disp Refills     OLANZapine zydis (ZYPREXA ZYDIS) 5 MG disintegrating tablet [Pharmacy Med Name: OLANZapine Oral Tablet Disintegrating 5 MG] 30 tablet 0     Sig: PLACE 1 tablet (5 mg total) ON THE TONGUE at bedtime.       There is no refill protocol information for this order

## 2021-06-16 NOTE — TELEPHONE ENCOUNTER
Telephone Encounter by Екатерина Heller at 11/15/2019  4:08 PM     Author: Екатерина Heller Service: -- Author Type: --    Filed: 11/15/2019  4:08 PM Encounter Date: 11/14/2019 Status: Signed    : Екатерина Heller APPROVED:    Approval start date:8/17/19  Approval end date:11/14/20    Pharmacy has been notified of approval and will contact patient when medication is ready for pickup.

## 2021-06-16 NOTE — TELEPHONE ENCOUNTER
Refill Approved    Rx renewed per Medication Renewal Policy. Medication was last renewed on 12/26/20.    Emery Lynn, [facility] Triage/Med Refill 3/25/2021     Requested Prescriptions   Pending Prescriptions Disp Refills     amLODIPine (NORVASC) 5 MG tablet [Pharmacy Med Name: amLODIPine Besylate Oral Tablet 5 MG] 90 tablet 0     Sig: TAKE ONE TABLET BY MOUTH ONE TIME DAILY       Calcium-Channel Blockers Protocol Passed - 3/24/2021  2:01 AM        Passed - PCP or prescribing provider visit in past 12 months or next 3 months     Last office visit with prescriber/PCP: 2/15/2021 Patricia Muro MD OR same dept: 2/15/2021 Patricia Muro MD OR same specialty: 2/15/2021 Patricia Muro MD  Last physical: 7/31/2018 Last MTM visit: Visit date not found   Next visit within 3 mo: Visit date not found  Next physical within 3 mo: Visit date not found  Prescriber OR PCP: Patricia Muro MD  Last diagnosis associated with med order: 1. Hypertension  - amLODIPine (NORVASC) 5 MG tablet [Pharmacy Med Name: amLODIPine Besylate Oral Tablet 5 MG]; TAKE ONE TABLET BY MOUTH ONE TIME DAILY   Dispense: 90 tablet; Refill: 0    If protocol passes may refill for 12 months if within 3 months of last provider visit (or a total of 15 months).             Passed - Blood pressure filed in past 12 months     BP Readings from Last 1 Encounters:   02/15/21 100/60

## 2021-06-17 NOTE — PROGRESS NOTES
ASSESSMENT/PLAN:  1. Seborrheic dermatitis, unspecified  - ketoconazole (NIZORAL) 2 % shampoo; Apply to damp skin, lather, leave on 5 minutes, and rinse 2-3 times weekly  Dispense: 120 mL; Refill: 0  - ketoconazole (NIZORAL) 2 % cream; Apply to forehead and upper eye lids daily for up to 2 weeks  Dispense: 30 g; Refill: 1    2. Unstable gait  Referral to home health for ongoing physical therapy.  Encouraged that once they do teach him the skills that he does need to continue to do them in order to build strength.  - Ambulatory referral to Home Health    3. Loose stools  Discussed that the aggressive juices and beets that he takes in to help with fiber may be causing some of his symptoms.  They may want to decrease the use and if needed use a more fiber supplement such as psyllium.    4. Colon cancer screening  Given the above loose stool symptoms and being due for colon cancer screening, referral was placed.  - Ambulatory referral for Colonoscopy      Dragon dictation was used for this note.  Speech recognition errors are a possibility.    No follow-ups on file.  There are no Patient Instructions on file for this visit.    Orders Placed This Encounter   Procedures     Ambulatory referral for Colonoscopy     Referral Priority:   Routine     Referral Type:   Colonoscopy     Referral Reason:   Evaluation and Treatment     Requested Specialty:   Gastroenterology     Number of Visits Requested:   1     Ambulatory referral to Home Health     Referral Priority:   Routine     Referral Type:   Home Health Care     Referral Reason:   Evaluation and Treatment     Requested Specialty:   Home Health Services     Number of Visits Requested:   1     There are no discontinued medications.      CHIEF COMPLAINT;  Chief Complaint   Patient presents with     Follow-up       HISTORY OF PRESENT ILLNESS:  Navdeep is a 76 y.o. male presenting to the clinic today for several concerns.  He is having some scaling and dry skin around his  forehead eyes ears and scalp.  Is become quite bad and does include his beard.  It is irritating for him.  They have not used anything over-the-counter and wondering best next steps.  He has had some really loose stools.  He goes multiple times during the day and has had to use significant amounts of toilet paper.  He does not connect with this symptom but his significant other does.  She has been giving him a lot of different vegetables and beets to help with his fiber as he frequently will plug the toilet when he has bowel movements.  They do however seem to be multiple times during the day.  No abdominal pain or blood in his stools that they are aware of.  He is still weak and is not doing any regular exercise.  He did have home physical therapy after his last hospital visit but has not continued with his regular exercises.    Remainder of 12-point ROS is negative.    TOBACCO USE:  Social History     Tobacco Use   Smoking Status Former Smoker   Smokeless Tobacco Never Used       VITALS:  Vitals:    04/29/21 1430   BP: 110/60   Patient Site: Right Arm   Patient Position: Sitting   Cuff Size: Adult Large   Pulse: 60   SpO2: 98%   Weight: 194 lb (88 kg)     Wt Readings from Last 3 Encounters:   04/29/21 194 lb (88 kg)   02/15/21 196 lb 6.4 oz (89.1 kg)   02/04/21 196 lb (88.9 kg)     Body mass index is 27.06 kg/m .    PHYSICAL EXAM:  GENERAL APPEARANCE: Alert, cooperative, no distress, appears stated age  Skin exam reveals some mild erythema over the forehead scalp with lots of scaling and dry skin.  Abdomen is soft and nontender  Lungs clear to auscultation bilaterally  Heart regular rate and rhythm    RECENT RESULTS  No results found for this or any previous visit (from the past 48 hour(s)).    MEDICATIONS:  Current Outpatient Medications   Medication Sig Dispense Refill     acetaminophen (TYLENOL) 650 MG CR tablet Take 1 tablet (650 mg total) by mouth every 6 (six) hours as needed for pain.  0     amLODIPine  (NORVASC) 5 MG tablet TAKE ONE TABLET BY MOUTH ONE TIME DAILY  90 tablet 3     arginine HCl, L-arginine, 1,000 mg Tab Take 1,000 mg by mouth daily.       ascorbic acid, vitamin C, (VITAMIN C) 1000 MG tablet Take 1,000 mg by mouth 2 (two) times a day.       aspirin 81 MG EC tablet Take 1 tablet (81 mg total) by mouth daily. 90 tablet 1     cholecalciferol, vitamin D3, 5,000 unit Tab Take 5,000 Units by mouth daily.       citalopram (CELEXA) 20 MG tablet citalopram 20 mg tablet   TAKE ONE TABLET BY MOUTH ONE TIME DAILY       CRANBERRY FRUIT EXTRACT (CRANBERRY ORAL) Take 450 mg by mouth daily.        cyanocobalamin (VITAMIN B-12) 250 MCG tablet Take 250 mcg by mouth daily.       digoxin (LANOXIN) 125 mcg (0.125 mg) tablet TAKE ONE TABLET BY MOUTH IN THE MORNING 90 tablet 3     donepezil (ARICEPT) 10 MG tablet Take 10 mg by mouth bedtime.       ferrous sulfate 325 (65 FE) MG tablet Take 1 tablet by mouth 3 (three) times a day with meals. (Patient taking differently: Take 1-2 tablets by mouth 2 (two) times a day. Takes 2 tablets in the morning and 1 tablet at bedtime      ) 270 tablet 3     fluoride, sodium, (DENTAGEL) 1.1 % Gel dental gel SF 5000 Plus 1.1 % dental cream   USE NIGHTLY AT BEDTIME. APPLY PEA SIZED AMOUNT TO TOOTHBRUSH AND BRUSH TEETH AROUND GUMLINE FOR 1 MINUTE       Lactobacillus acidophilus 100 mg (1 billion cell) cap Take 1 capsule by mouth daily.       levETIRAcetam (KEPPRA) 750 MG tablet Take 1 tablet (750 mg total) by mouth 2 (two) times a day. 180 tablet 3     magnesium oxide (MAG-OX) 400 mg (241.3 mg magnesium) tablet TAKE 1 TABLET (400 MG TOTAL) BY MOUTH DAILY. 100 tablet 0     metoprolol tartrate (LOPRESSOR) 50 MG tablet Take 1 tablet (50 mg total) by mouth 2 (two) times a day.  0     OLANZapine zydis (ZYPREXA ZYDIS) 5 MG disintegrating tablet PLACE 1 tablet (5 mg total) ON THE TONGUE at bedtime. 30 tablet 0     OMEGA-3S/DHA/EPA/FISH OIL (OMEGA 3 ORAL) Take 1 tablet by mouth daily.         omeprazole (PRILOSEC) 20 MG capsule Take 1 capsule (20 mg total) by mouth daily before breakfast. 90 capsule 2     potassium chloride (KLOR-CON M20) 20 MEQ tablet Klor-Con M20 mEq tablet,extended release   Take 1 tablet by mouth 3 times a day.       SAW PALMETTO ORAL Take 450 mg by mouth daily.       trimethoprim (TRIMPEX) 100 mg tablet Take 1 tablet (100 mg total) by mouth once daily.  0     vitamin E 400 unit capsule Take 400 Units by mouth. Take 3 capsules in the morning and 2 capsules in the evening.       ketoconazole (NIZORAL) 2 % cream Apply to forehead and upper eye lids daily for up to 2 weeks 30 g 1     ketoconazole (NIZORAL) 2 % shampoo Apply to damp skin, lather, leave on 5 minutes, and rinse 2-3 times weekly 120 mL 0     No current facility-administered medications for this visit.

## 2021-06-17 NOTE — TELEPHONE ENCOUNTER
Reason contacted:  Orders request  Information relayed:    Yoselyn is calling to request a new order for home care is placed for this patient.    Dr. Muro placed an order on 4/29/2021 and Yoselyn states this order was only good for 48 hours therefore home care will need a new order to be placed.    Please place new order for home care if appropriate and notify Yoselyn once an order has been placed.  Phone number for Yoselyn: [phone]  Additional questions:  No  Further follow-up needed:  Yes  Okay to leave a detailed message:  Yes

## 2021-06-17 NOTE — TELEPHONE ENCOUNTER
Reason for Call:  Other call back      Detailed comments: NEEDS verbal Ok to Delay in home care until 5/26/21    Phone Number Nurse can be reached at: Other phone number:  [phone]    Best Time: anytime    Can we leave a detailed message on this number?: Yes    Call taken on 5/24/2021 at 4:34 PM by Shikha Johnson

## 2021-06-17 NOTE — TELEPHONE ENCOUNTER
Telephone Encounter by Sabrina Evans RN at 2/9/2021  4:25 PM     Author: Sabrina Evans RN Service: -- Author Type: Registered Nurse    Filed: 2/9/2021  4:32 PM Encounter Date: 2/9/2021 Status: Signed    : Sabrina Evans RN (Registered Nurse)       ANTICOAGULATION  MANAGEMENT    Assessment     Today's INR result of 5.42 is Supratherapeutic (goal INR of 2.0-3.0)        Warfarin taken as previously instructed    No new diet changes affecting INR    No new medication/supplements affecting INR    Continues to tolerate warfarin with no reported s/s of bleeding or thromboembolism     Reports pt saw cardiology last week who doesn't want pt on warfarin, they have an OV on 2/15 to discuss with PCP    Previous INR was Therapeutic    Plan:     Spoke on phone with Melva, spouse, regarding INR result and instructed:      Warfarin Dosing Instructions:  Hold warfarin today and tomorrow then change warfarin dose to 1.25 mg daily on Mon, Thu; and 2.5 mg daily rest of week  (14.3 % change)  Dosage only given up until Friday    Instructed patient to follow up no later than: Fri 2/12    Education provided: target INR goal and significance of current INR result, importance of following up for INR monitoring at instructed interval, importance of taking warfarin as instructed, monitoring for bleeding signs and symptoms and when to seek medical attention/emergency care    Melva verbalizes understanding and agrees to warfarin dosing plan.    Instructed to call the [facility] for any changes, questions or concerns. (#[phone])   ?   Sabrina Evans RN    Subjective/Objective:      Navdeep Spann, a 75 y.o. male is on warfarin. Harpreet Mullins reports:     Home warfarin dose: verbally confirmed home dose with Melva and updated on anticoagulation calendar     Missed doses: No     Medication changes:  No     S/S of bleeding or thromboembolism:  No     New Injury or illness:  No     Changes in diet or alcohol  consumption:  No     Upcoming surgery, procedure or cardioversion:  No    Anticoagulation Episode Summary     Current INR goal:  2.0-3.0   TTR:  17.5 % (4 d)   Next INR check:  2/12/2021   INR from last check:  5.42 (2/9/2021)   Weekly max warfarin dose:     Target end date:     INR check location:     Preferred lab:     Send INR reminders to:  LYLE HOLDER    Indications    Chronic atrial fibrillation [I48.20]           Comments:           Anticoagulation Care Providers     Provider Role Specialty Phone number    Patricia Muro MD Referring Family Medicine [phone]

## 2021-06-17 NOTE — PROGRESS NOTES
[facility] Daily Progress     [facility] Discharge Summary  Patient Name: Navdeep Spann  Date: 5/25/2018  Referral Diagnosis: Frequent Falls  Referring provider: Neva Whitlock MD  Visit Diagnosis:   1. Generalized muscle weakness     2. Unsteadiness on feet         Goals:  Pt. will be independent with home exercise program in : 12 weeks  Pt will: Able to sit <> stand with more ease as leg strength improves within 12 weeks  Pt will: Able to carry laundry and groceries with more ease as strength improves within 12 weeks  Pt will: Able to stand 20-30 minutes with more stability as strength improves within 12 weeks  Pt will: Able to walk with more ease as strength improves within 12 weeks    Patient was seen for 2 visits from 4/10/18 to 4/24/18 with 3 missed appointments.  Patient attended one follow up appt after the initial evaluation and did not return.  Unsure if goals were met.      Therapy will be discontinued at this time.  The patient will need a new referral to resume.    Thank you for your referral.  Alexandra JUAREZ Louann  5/25/2018  8:46 AM    Patient Name: Navdeep Spann  Date: 4/24/2018  Visit #: 2/12  4/10/18-7/4/18  Referral Diagnosis: Frequent falls  Referring provider: Neva Whitlock MD  Visit Diagnosis:     ICD-10-CM    1. Generalized muscle weakness M62.81    2. Unsteadiness on feet R26.81          Assessment:     Patient is benefitting from skilled physical therapy and is making steady progress toward functional goals.  Patient is appropriate to continue with skilled physical therapy intervention, as indicated by initial plan of care.    Goal Status:  Pt. will be independent with home exercise program in : 12 weeks  Pt will: Able to sit <> stand with more ease as leg strength improves within 12 weeks  Pt will: Able to carry laundry and groceries with more ease as strength improves within 12 weeks  Pt will: Able to stand 20-30 minutes with more stability as  strength improves within 12 weeks  Pt will: Able to walk with more ease as strength improves within 12 weeks    Plan / Patient Education:     Review HEP  In clinic: balance, proprioception and LE strengthening    Subjective:     Pain Ratin    I think I am better than I was here last.  (can't give specifics-just said he feels better)  I have not fallen lately so that makes me happy      Objective:   Barriers to Learning or Achieving Goals:Co-morbidities or other medical factors.  HTN, dementia, A-fib, bilateral TKA    Functional limitations are described as occurring with: walking, sit to stand, carrying objects, standing    Exercises:  Exercise #1: sit to  many chairs, 2-4 times in many  Comment #1: Current HEP: weights: supine bench press, curls, prone rows with weights  Exercise #2: bridges hold 10 seconds X 6-12 reps  Comment #2: clamshells   X20  Exercise #3: SLR with quad set, sidelying hip abd   X 20    Treatment Today     TREATMENT MINUTES COMMENTS   Evaluation     Self-care/ Home management     Manual therapy     Neuromuscular Re-education     Therapeutic Activity     Therapeutic Exercises 25 See above or flow sheet   Gait training     Modality__________________                Total 25    Blank areas are intentional and mean the treatment did not include these items.       Alexandra Lew, PT  2018

## 2021-06-17 NOTE — TELEPHONE ENCOUNTER
Please notify Yoselyn I have placed the order.  And please tell her thank you for the delicious candies she dropped off!

## 2021-06-17 NOTE — TELEPHONE ENCOUNTER
RN cannot approve Refill Request    RN can NOT refill this medication med is not covered by policy/route to provider. Last office visit: 4/29/2021 Patricia Muro MD Last Physical: 7/31/2018 Last MTM visit: Visit date not found Last visit same specialty: 4/29/2021 Patricia Muro MD.  Next visit within 3 mo: Visit date not found  Next physical within 3 mo: Visit date not found      Idania Pena, [facility] Triage/Med Refill 5/24/2021    Requested Prescriptions   Pending Prescriptions Disp Refills     magnesium oxide (MAG-OX) 400 mg (241.3 mg magnesium) tablet [Pharmacy Med Name: Magnesium Oxide Oral Tablet 400 MG] 100 tablet 0     Sig: TAKE 1 TABLET (400 MG TOTAL) BY MOUTH DAILY.       There is no refill protocol information for this order

## 2021-06-17 NOTE — TELEPHONE ENCOUNTER
Telephone Encounter by Sabrina Evans RN at 2/12/2021  5:46 PM     Author: Sabrina Evans RN Service: -- Author Type: Registered Nurse    Filed: 2/12/2021  5:52 PM Encounter Date: 2/12/2021 Status: Signed    : Sabrina Evans RN (Registered Nurse)       ANTICOAGULATION  MANAGEMENT    Assessment     Today's INR result of 3.9 is Supratherapeutic (goal INR of 2.0-3.0)        Template not filled out for warfarin dosing    No new diet changes affecting INR    No new medication/supplements affecting INR    Continues to tolerate warfarin with no reported s/s of bleeding or thromboembolism     Previous INR was Supratherapeutic    Plan:     Left detailed message for Melva, girlfriend regarding INR result and instructed:      Warfarin Dosing Instructions:  Hold warfarin today only then change warfarin dose to 1.25 mg daily on Tue, Thu, Sat; and 2.5 mg daily rest of week  (8.3 % change)    Instructed patient to follow up no later than: Mon 2/15 at     Education provided: target INR goal and significance of current INR result    Instructed to call the [facility] for any changes, questions or concerns. (#[phone])   ?   Sabrina Evans RN    Subjective/Objective:      Navdeep KUSH Spann, a 75 y.o. male is on warfarin. Harpreet Mullins reports:     Home warfarin dose: unable to verify dosing as phone went straight to      Missed doses: No     Medication changes:  No     S/S of bleeding or thromboembolism:  No     New Injury or illness:  No     Changes in diet or alcohol consumption:  No     Upcoming surgery, procedure or cardioversion:  No    Anticoagulation Episode Summary     Current INR goal:  2.0-3.0   TTR:  10.6 % (1 wk)   Next INR check:  2/15/2021   INR from last check:  3.90 (2/12/2021)   Weekly max warfarin dose:     Target end date:     INR check location:     Preferred lab:     Send INR reminders to:  LYLE HOLDER    Indications    Chronic atrial fibrillation [I48.20]           Comments:            Anticoagulation Care Providers     Provider Role Specialty Phone number    Patricia Muro MD Referring Family Medicine [phone]

## 2021-06-17 NOTE — PROGRESS NOTES
[facility]   Initial Evaluation    Patient Name: Navdeep Spann  Date of evaluation: 4/10/2018  Referral Diagnosis: Frequent falls  Referring provider: Neva Whitlock MD  Visit Diagnosis:     ICD-10-CM    1. Generalized muscle weakness M62.81    2. Unsteadiness on feet R26.81        Assessment:      Pt. is appropriate for skilled PT intervention as outlined in the Plan of Care (POC).  Pt. is a good candidate for skilled PT services to improve pain levels and function.  Patient presents to physical therapy with a history of falls.  He was on Warfarin last summer and feels that caused a lot of dizziness so he would fall.  Once they took him off the medication he is no longer dizzy and he doesn't feel he is at a fall risk any more.  Patient did demonstrate some LE weakness and some balance issues and feel he would benefit from some strengthening to work on these limitations.  Functional limitations are described as occurring with: walking, sit to stand, carrying objects, standing.  Patient does report he has dementia and may not remember as good as he used to but try to do all that is asked of him.  Feel he will be a good therapy candidate.    Goals:  Pt. will be independent with home exercise program in : 12 weeks  Pt will: Able to sit <> stand with more ease as leg strength improves within 12 weeks  Pt will: Able to carry laundry and groceries with more ease as strength improves within 12 weeks  Pt will: Able to stand 20-30 minutes with more stability as strength improves within 12 weeks  Pt will: Able to walk with more ease as strength improves within 12 weeks    Patient's expectations/goals are realistic.    Barriers to Learning or Achieving Goals:  Co-morbidities or other medical factors.  HTN, dementia, A-fib, bilateral TKA       Plan / Patient Instructions:        Plan of Care:   Authorization / Certification Start Date: 04/10/18  Authorization / Certification End Date:  18  Authorization / Certification Number of Visits: 12  Communication with: Referral Source  Patient Related Instruction: Nature of Condition;Treatment plan and rationale;Self Care instruction;Basis of treatment  Times per Week: 2  Number of Weeks: 12  Number of Visits: 12  Therapeutic Exercise: ROM;Stretching;Strengthening  Neuromuscular Reeducation: posture;balance/proprioception;core  Gait Training: as needed    Plan for next visit: Review sit to stand                                Review exercises pt currently does at home                               Few exercises for strengthening of LE and core for home                                In clinic-balance, proprioception and LE strength.     Subjective:         Social information:   Living Situation:single family home, lives with others  and stairs  with railing   Occupation:retired   Equipment Available: None.  He does have a SEC but does not use it    History of Present Illness:    Navdeep is a 73 y.o. male who presents to therapy today with complaints of frequent falls. Date of onset/duration of symptoms is 2017. Onset was gradual. He used to lose his balance and get dizzy when he was on Warfarin.  Due to the falls they took him off of that medication last summer and he thinks his balance has improved since he stopped taking it.  He is no longer dizzy..  Patient went to his primary doctor for a routine check recently, it was mentioned he had fallen in the past, and it was decided that he may want to work his overall strength in physical therapy.  Patient agrees this is a good idea   Symptoms are getting better    Pain Ratin}  Pain rating at best: 0  Pain rating at worst: 0  Pain description: weakness    Functional limitations are described as occurring with:   walking, sit to stand, carrying objects, standing         Objective:      Patient Outcome Measures :    Lower Extremity Functional Scale (_/80): 70   Scores range from 0-80, where a score  of 80 represents maximum function. The minimal clinically important difference is a positive change of 9 points.  APTA score: 11, with use of bilateral hands    Examination  1. Generalized muscle weakness     2. Unsteadiness on feet       Involved side: Bilateral  Posture Observation:      General standing posture is fair.    Hip/Knee Strength  Date: 4/10/18     Hip/Knee Strength (/5) MMT MMT MMT    Right Left Right Left Right Left   Hip Flexion 4+ 4+       Hip Abduction 5 5       Hip Adduction 4+ 4+       Hip Extension         Hip External Rotation         Hip Internal Rotation         Knee Extension 5 5       Knee Flexion 5 5         Gait:      Step length: decreased bilaterally     Swing: normal and equal      Step continuity: continuous       Standing:    Eyes open: normal, 60 seconds    Eyes closed: 60 seconds, no to minimal sway    Rhomberg with eyes open and closed: normal    1 leg stance: R and Left: 8 seconds   Standing on airex: eyes open: 30 seconds                                  Eyes closed: 30 seconds, no sway    FGA: Heel to toe: difficulty placing right foot in front of left           Slight deviation of body  to right while turning head to right          Left trail leg hit object as leading over with right       Able to turn 360 degrees both direction        Treatment Today     TREATMENT MINUTES COMMENTS   Evaluation 30 balance   Self-care/ Home management     Manual therapy     Neuromuscular Re-education     Therapeutic Activity     Therapeutic Exercises 23 Edu on DX, POC, start of HEP-sit to stands in many surfaces, discussed why this is important   Gait training     Modality__________________                Total 23    Blank areas are intentional and mean the treatment did not include these items.     PT Evaluation Code: (Please list factors)  Patient History/Comorbidities: HTN, A-fib,  dementia, Bilateral TKA  Examination: weakness in bilateral LE, decrease in functional activities-see  above  Clinical Presentation: stable  Clinical Decision Making: low    Patient History/  Comorbidities Examination  (body structures and functions, activity limitations, and/or participation restrictions) Clinical Presentation Clinical Decision Making (Complexity)   No documented Comorbidities or personal factors 1-2 Elements Stable and/or uncomplicated Low   1-2 documented comorbidities or personal factor 3 Elements Evolving clinical presentation with changing characteristics Moderate   3-4 documented comorbidities or personal factors 4 or more Unstable and unpredictable High            Alexandra JUAREZ Louann  4/10/2018  3:01 PM

## 2021-06-17 NOTE — TELEPHONE ENCOUNTER
Telephone Encounter by Sabrina Evans RN at 2/10/2021  9:29 AM     Author: Sabrina Evans RN Service: -- Author Type: Registered Nurse    Filed: 2/10/2021  9:29 AM Encounter Date: 2/9/2021 Status: Signed    : Sabrina Evans RN (Registered Nurse)       Patricia Muro MD  You 14 hours ago (7:04 PM)     I agree with the warfarin plan.    Message text        You  Patricia Muro MD; [facility]/Ob Support Pool; [place] 16 hours ago (4:32 PM)     Critical venous INR of 5.42 today.  Pt is Day #13 of warfarin initiation.  Plan is to hold warfarin 2 days and decrease dose 14.3% INR recheck on 2/12.  OV with PCP on 2/15.  PCP do you agree with warfarin plan?    Routing comment

## 2021-06-17 NOTE — TELEPHONE ENCOUNTER
Patient's referral to home care has been cancelled. We have been calling and leaving messages daily since 4/30 with no return call.  Thank you

## 2021-06-18 ENCOUNTER — RECORDS - HEALTHEAST (OUTPATIENT)
Dept: ADMINISTRATIVE | Facility: OTHER | Age: 76
End: 2021-06-18

## 2021-06-18 NOTE — PATIENT INSTRUCTIONS - HE
Patient Instructions by Patricia Muro MD at 1/26/2021  3:30 PM     Author: Patricia Muro MD Service: -- Author Type: Physician    Filed: 1/26/2021  7:00 PM Encounter Date: 1/26/2021 Status: Addendum    : Patricia Muro MD (Physician)    Related Notes: Original Note by Patricia Muro MD (Physician) filed at 1/26/2021  4:23 PM       Stop hydrochlorothiazide and spironolactone  We will consider starting warfarin for A Fib   3    Anticoagulants    What is an anticoagulant and why would I need this medication?      An anticoagulant (commonly referred to as a blood thinner) is a medicine used to prevent abnormal blood clots. Blood thinners work by making it harder for the body to form blood clots. They are used to prevent strokes in people who have an irregular heart rhythm called atrial fibrillation or artificial heart valve. Anticoagulants are also used to treat or prevent blood clots in people that have had a blood clot in their legs or lungs, or who have recently had surgery.    What options are there for blood thinners?      Medication name Variable dose anticoagulant Fixed dose anticoagulant    Warfarin  (Coumadin, Jantoven) Apixaban  (Eliquis) Rivaroxaban  (Xarelto) Dabigatran   (Pradaxa)   Dose Dose varies person to person and can change over time. Taken once daily Fixed dose twice daily Fixed dose   once daily   Fixed dose   twice daily   How To Take Usually taken in the evening. Take with or without food. Take with or without food Must take with a meal Take with or without food. Must be kept in original bottle or package until taken   Lab Testing INR (test of clotting time) Finger stick test 1-2 times weekly at first then monthly.  No routine monitoring of clotting time is needed, but kidney function must be tested once or twice a year.   Food Interactions Requires consistent intake of green leafy vegetables None   Drug Interactions Many potential interactions, but warfarin dose  can be adjusted Interacts with some medications   Cautions Long acting medication which takes a week to get blood thinned  Short acting medication which starts and stops working quickly making it important not to miss doses.    Cost Generic available; less expensive No generics available yet; cost varies based on insurance coverage             Which blood thinner is best for me?    Your provider may recommend one blood thinner over the other based on what he/she knows about your medical history.  Your provider may recommend warfarin if you have poor kidney function or some types of heart valve disease. You also have some input as to which blood thinner may work best with your lifestyle and budget.      What are side effects of anticoagulants?     Since a blood thinner slows how fast your body forms a blood clot, it also increases your risk of bleeding. The risk of serious bleeding is about 1% per year.  The majority of people do well on a blood thinner.      Call your provider if you have:    Unusual bleeding    Unexplained bruising    Red, pink or dark colored urine    Red or black tar like stool    Dark or blood stained vomit      Call 911 if you have a large amount of bleeding and you feel lightheaded or near faint.  Otherwise, call your clinic and notify provider of symptoms you are having.    Do I ever stop a blood thinner?    Do not stop your blood thinner without talking to your provider.  Always tell your dentist, surgeon or provider you are on a blood thinner if they discuss pulling a tooth, back injections or any type of surgery.  You may be asked to stop a blood thinner before a procedure or surgery.  You should ask if you are not told if you are to continue your blood thinner or stop it.  You will generally restart the blood thinner after your procedure/surgery. Ask at the time of procedure/surgery when to restart it. You do not need to stop a blood thinner for teeth cleaning or minor procedures.       What do I do if I miss a dose of my blood thinner?      If you take your blood thinner once a day, take the missed dose if you notice it within 12 hours of the missed dose.  If it is more than 12 hours later, do not take it AND do not double up at the time of the next dose.  If you are on warfarin, tell your nurse or provider at the next blood check.    If you take your blood thinner twice a day, take the missed dose if you notice it within 6 hours of the missed dose.  If it is more than 6 hours since missed dose, do not take it AND do not double up at the time of the next dose.      What if I start a new medication or supplement?    Each of the blood thinners have the potential to interact with some medications. Always talk to your provider or pharmacist before starting any new medication or herbal supplement.     Questions or concerns about anticoagulants?     Ask your provider about how you can work with a [facility] pharmacist to discuss which blood thinner may be most cost effective and safe with your current medications and medical history.    ATRIAL FIBRILLATION: Patient Information     What is atrial fibrillation (a-fib)?  A-fib is a common heart rhythm problem. It will cause irregular and often faster heart beating.  A-fib is abnormal electrical activity of the top chambers of the heart.  This causes the top chambers of the heart not to squeeze and pump blood to the lower heart chambers.  The bottom chambers of the heart continue to pump blood to the body, but they do this with an irregular and often faster heart rate due to the chaotic electrical activity in the top chambers.      How would I know if I have a-fib?   Most people feel their heart beat faster, harder or irregular such as a fluttery feeling in the chest.  You could have chest pain, be lightheaded or dizzy, feel weak or tired or become more short of breath especially with activities.  Some patients have no symptoms at all.  A-fib might be  found due to an irregular pulse or on an electrocardiogram. A-fib can stop on its own, come and go or continue for days to months to years on its own. Normally, the heart is in sinus rhythm with a regular pulse and resting heart rates between 50 and 90 beats per minute (bpm).             How common is a-fib?   Over two million people in the [place] have atrial fibrillation.  A-fib is a common heart rhythm problem for older persons. It affects about 4% of people over the age of 60 and 9% of people over 80 years of age.       Regular pulse, usually 50-90 bpm    What causes a-fib?   A-fib is more common in people who have high blood pressure, heart failure or blockage in the arteries feeding the heart.  It is also common in people who were born with or have a disease affecting the valves of the heart.  Surgery, lung disease, or thyroid problems can lead to a-fib.  Drinking alcohol can also trigger a-fib.  Breathing problems during sleep such as loud snoring and pauses in breathing, and daytime sleepiness is called sleep apnea. Sleep apnea is associated with obesity and can lead to a-fib.  Many times no cause for a-fib can be found.     How is a-fib diagnosed?    To diagnosis a-fib, you will likely have an electrocardiogram or heart monitor.  For these tests, patches are put on your chest to allow a machine to record the electrical activity of your heart.  After a-fib is found, you will likely have blood tests.  You will usually be scheduled for an echocardiogram. This test uses ultrasound to look at your heart to see how well it pumps blood forward and evaluate other heart disease.      Irregular pulse, often faster    Is a-fib dangerous?   A-fib itself is not a life threatening rhythm, but it can aggravate other heart problems or could cause a stroke.  A-fib may cause blood to pool and clot in the upper chambers of the heart.  If the blood clot breaks off and goes to the head, a stroke occurs. A stroke can be  the first sign of atrial fibrillation for some people. With a stroke, you may notice abnormal sensations, weakness on one side of the body or face, changes in your vision or changes in your speech.  If you have any of these signs, you should go to an emergency room close to you as soon as possible.    How is a- fib treated?     There are two important goals in treating a-fib.  One is to prevent blood clots which can cause a stroke.  The other main goal is to decrease the symptoms associated with a-fib.  Symptoms can be decreased by controlling the heart rate while in a-fib or by converting a-fib back to normal rhythm. This can be done by use of drugs, or an electric shock, or other heart procedures.                     Blood thinners are used to prevent blood clots. Warfarin, Coumadin and Jantoven are all names for the same medicine used to prevent blood clots if you have a-fib for more than 48 hours.  It usually takes several days for it to thin the blood enough to prevent blood clots.  You will need blood tests to check how much medicine you need.   These blood tests are more frequent as you start this medicine.  There are several new medicines that are approved to thin your blood that are an alternative to warfarin.  These medicines dont require monitoring and dose adjustments but they can be expensive.  With all blood thinners, the principal risk is the risk of bleeding.  These medications are dabigatran or Pradaxa, rivaroxaban or Xarelto and apixban or Eliquis.  New occlusion devices are currently in clinical investigation as an alternative to blood thinners.                   There are several kinds of medicines that can be used to slow your heart rate. One kind is called a beta blocker which slows the heart rate in a-fib and also lowers your blood pressure some.  Another kind of medicine is called a calcium channel blocker and does much the same thing.  Digoxin is a third type of medicine that can be used to  slow your heart rate.                                 Changing a-fib back to a normal heart rhythm is another way to control heart rate and symptoms.  Sometimes medicine can be used to make your heart rhythm normal. If your heart rhythm does not go back to normal on its own, or with medications, you might benefit from a cardioversion.  A cardioversion gives a jolt of electricity through patches or paddles on your chest while you are asleep under anesthesia. This shock causes your heart to go back to a normal rhythm.   You will need to be effectively anti-coagulated on blood thinners for at least 3 weeks before this procedure.          Will I live a normal life?    Most people with atrial fibrillation live a fairly normal life.  A wide variety of treatments are available to prevent blood clots which can cause a stroke and to decrease the symptoms associated with a-fib by controlling the heart rate while in a-fib or by converting a-fib back to normal rhythm.  Recurrences of atrial fibrillation are common.  Antiarrhythmic drugs may be used to suppress a-fib episodes.  Pulmonary vein isolation to prevent triggering beats from starting atrial fibrillation is a newer approach that may offer alternatives to antiarrhythmic drug therapy.    What comes next?    It is important for you to take the medicines you have been given exactly like you were told. You should also get any tests that may be ordered for you and see the doctor you are told to see.  You may be referred to the [facility].  You will be seen by a nurse practitioner and/or physician who specialize in heart rhythms.  When to Call Your Doctor:  Call your doctor right away if you have any significant changes with the following:    Weakness    Dizziness    Fainting    Fatigue    Shortness of breath    Chest pain with increased activity    If you are concerned that your heart rate is too fast or too slow    Bleeding that does not stop  in 10 minutes    A heavier-than-normal menstrual period or bleeding between periods    Coughing or throwing up blood    Bloody diarrhea or bleeding hemorrhoids    Dark-colored urine or black stool  Allergic reactions:    Rash    Itching    Swelling    Trouble breathing or swallowing     Please call the [facility] at [phone] if you have concerns about your symptoms, your medicines, or your follow-up appointments

## 2021-06-19 NOTE — PROGRESS NOTES
Assessment and Plan:   73-year-old here for annual wellness exam and checkup of his chronic medical conditions.    1. Chronic atrial fibrillation (H)  Chronic atrial fibrillation.  Unfortunately had several falls and is having worsening dementia.  He is at high risk for injury related to anticoagulation so in conjunction with cardiology, we have elected not to anticoagulate.  He is taking an aspirin daily.  He is currently rate controlled.    2. Hypertension  Blood pressures well controlled.  Will check labs including magnesium.  - HM2(CBC w/o Differential)  - Comprehensive Metabolic Panel  - Lipid Cascade FASTING  - Magnesium    3. BPH (benign prostatic hyperplasia)  He follows with urology and is on trimethoprim for prophylactic treatment for UTIs.    4. Hearing loss  He wears bilateral hearing aids but did not have them with him today.  He intends to go get batteries put back in them.    5. Dementia  Dementia, likely multifactorial.  Is gradually worsening but still is functioning at the independent status.  He does live with his wife who is helpful and manages his medications.  He still able to cook meals and do a considerable amount of the house chores.    6. Yeast dermatitis  Yeast dermatitis in left axillary region.  Is prescribed nystatin cream.  - nystatin (MYCOSTATIN) cream; Apply 3 times daily to affected area  Dispense: 30 g; Refill: 2    7. Dyspnea  I did observe some mild dyspnea on exertion.  With his history of chronic atrial fibrillation, will check a BNP.  There was some mild swelling in his right lower leg also.  On exam he seemed to be euvolemic.  - BNP(B-type Natriuretic Peptide)    The patient's current medical problems were reviewed.    The following are part of a depression follow up plan for the patient:  mental health screening assessment and patient follow-up to return when and if necessary  The following health maintenance schedule was reviewed with the patient and provided in printed  form in the after visit summary:   Health Maintenance   Topic Date Due     INFLUENZA VACCINE RULE BASED (1) 08/01/2018     FALL RISK ASSESSMENT  07/31/2019     COLONOSCOPY  03/26/2020     ADVANCE DIRECTIVES DISCUSSED WITH PATIENT  09/14/2021     TD 18+ HE  06/20/2027     PNEUMOCOCCAL POLYSACCHARIDE VACCINE AGE 65 AND OVER  Completed     PNEUMOCOCCAL CONJUGATE VACCINE FOR ADULTS (PCV13 OR PREVNAR)  Completed     ZOSTER VACCINE  Completed        Subjective:   Chief Complaint: Annual Wellness Visit    HPI:   Navdeep Spann is an 73 y.o. male here for an Annual Wellness visit. He has been feeling well and is fasting today. He has not had any falls or hospitalizations. His wife continues to set up his medications for him and makes sure he takes his medications on time.     Chronic Atrial Fibrillation: He continues on digoxin 125 mcg daily. He does not report problems with the medications. He denies chest pain or difficulty sleeping. His legs are intermittently mildly swollen. He feels he knows how long he can stay on his feet for. He reports that his right leg is always more swollen than his left.    Hypertension: His blood pressure today is 110/78. He continues on chlorthalidone 25 mg daily and metoprolol tartrate 150 mg twice daily. He does not mention adverse side effects with the medications.    Hearing Loss: He has hearing aids and reports his hearing is worsening. He feels his hearing aids do improve his hearing.    Dementia: He continues on Aricept 10 mg daily. He does not mention problems with the medication. He states his wife has to remind him of things often as his memory is worsening. He reports he can remember directions, how to cook, and clean the house.     Depression: His PHQ-2 is 0 today in clinic. He continues on citalopram 40 mg daily.    Rash: He endorses a rash in his left axilla that started recently. He has been applying Vaseline to the area. He is unsure how this started.     Review of  Systems:  He denies difficulty sleeping. He feels well rested when he wakes up in the morning. He denies recent urinary tract infections. He sees a dentist regularly.  Please see above.  The rest of the review of systems are negative for all systems.    PFSH: He likes to garden.     Patient Care Team:  Patricia Muro MD as PCP - General (Family Medicine)  Michelle Atkinson PharmD as Pharmacist (Pharmacist)     Patient Active Problem List   Diagnosis     Anxiety     Hypertension     Anemia     Chronic atrial fibrillation (H)     Leukocytosis, unspecified type     Hearing loss with hearing aids     Depressive disorder, atypical     Dementia     Marijuana use     Contraindication to anticoagulation therapy     BPH (benign prostatic hyperplasia)     Past Medical History:   Diagnosis Date     Anemia      Anxiety      Aortic valve disorder      Arthritis      Atrial fibrillation (H)      BPH (benign prostatic hypertrophy) with urinary retention      Carotid stenosis      Depression      Dyslipidemia      [place] (hard of hearing)      Hypertension      Lymphedema      Syncope and collapse 2009, 2010    r/t urinary obstruction      Past Surgical History:   Procedure Laterality Date     APPENDECTOMY       COLON SURGERY       JOINT REPLACEMENT       PIC  10/23/2014          LA APPENDECTOMY      Description: Appendectomy;  Recorded: 05/21/2008;  Comments: at 16 years old     LA ARTHROPLASTY TIBIAL PLATEAU      Description: Knee Replacement;  Recorded: 05/21/2008;  Comments: right knee 8/1999; Left knee 5/2002     LA COLOSTOMY      Description: Colostomy;  Recorded: 07/22/2014;     LA LAP, SURG CLOSE ENTEROSTOMY RESECT ANAST N/A 2/11/2015    Procedure: LAPAROSCOPIC ASSISTED COLOSTOMY TAKEDOWN;  Surgeon: Jed Ritchie MD;  Location: [facility];  Service: General     LA LAP,SURG,COLECTOMY, PARTIAL, W/ANAST N/A 7/15/2014    Procedure: Exploratory Laparotomy, Sigmoid Colectomy,  Colostomy (Fernandez's);  Surgeon: Jed  JOSEPHINE Ritchie MD;  Location: [facility] OR;  Service: General     VA TRANSURETHRAL ELEC-SURG PROSTATECTOM  5/23/2008    Description: Transurethral Resection Of Prostate (TURP);  Proc Date: 05/23/2008;      Family History   Problem Relation Age of Onset     Cancer Mother       Social History     Social History     Marital status:      Spouse name: N/A     Number of children: N/A     Years of education: N/A     Occupational History     Not on file.     Social History Main Topics     Smoking status: Former Smoker     Smokeless tobacco: Never Used     Alcohol use No      Comment: Past history- quit June 1st, 1990     Drug use: Yes     Special: Marijuana      Comment: none since 3/2014     Sexual activity: Not on file     Other Topics Concern     Not on file     Social History Narrative      Current Outpatient Prescriptions   Medication Sig Dispense Refill     acetaminophen (TYLENOL) 500 MG tablet Take 500-1,000 mg by mouth every 6 (six) hours as needed.       ARGININE, L-ARGININE, ORAL Take 1 tablet by mouth 2 (two) times a day.       aspirin 81 MG EC tablet Take 1 tablet (81 mg total) by mouth daily. 90 tablet 1     biotin 2,500 mcg cap Take 5,000 mcg by mouth daily.       CALCIUM CARB-MAG OXIDE-ZINC OX ORAL Take 1 tablet by mouth daily.       chlorthalidone (HYGROTEN) 25 MG tablet TAKE ONE TABLET BY MOUTH IN THE MORNING  30 tablet 11     cholecalciferol, vitamin D3, 5,000 unit Tab Take 5,000 Units by mouth daily.       citalopram (CELEXA) 40 MG tablet Take 1 tablet (40 mg total) by mouth daily. 90 tablet 1     CRANBERRY FRUIT EXTRACT (CRANBERRY ORAL) Take 1 capsule by mouth daily.       cyanocobalamin, vitamin B-12, 2,500 mcg Subl Place 2,500 mcg under the tongue daily.       digoxin (LANOXIN) 125 mcg tablet TAKE ONE TABLET BY MOUTH IN THE MORNING 90 tablet 0     donepezil (ARICEPT) 10 MG tablet Take 10 mg by mouth bedtime.       ferrous sulfate 325 (65 FE) MG tablet Take 1 tablet by mouth 3 (three) times a  "day with meals. 270 tablet 3     LACTOBACILLUS ACIDOPHILUS (PROBIOTIC ACIDOPHILUS ORAL) Take 1 capsule by mouth daily.        magnesium oxide (MAGOX) 400 mg tablet Take 1 tablet (400 mg total) by mouth daily. 200 tablet 1     metoprolol tartrate (LOPRESSOR) 100 MG tablet TAKE 1 1/2 TABLETS BY MOUTH 2 TIMES A DAY  270 tablet 0     OMEGA-3S/DHA/EPA/FISH OIL (OMEGA 3 ORAL) Take 1,000 mg by mouth 2 (two) times a day.        potassium chloride (KLOR-CON M20) 20 MEQ tablet Take 1 tablet (20 mEq total) by mouth 3 (three) times a day. 270 tablet 3     SAW PALMETTO ORAL Take 450 mg by mouth daily.       trimethoprim (TRIMPEX) 100 mg tablet Take 100 mg by mouth once daily.       VIT B1 MN/B2/B3/B5/B6/B12/C/FA (B COMPLEX W-VIT C ORAL) Take 1 tablet by mouth daily.        vitamin E 400 UNIT capsule Take by mouth see administration instructions. 1200 mg in the morning and 800 mg in the evening.       nystatin (MYCOSTATIN) cream Apply 3 times daily to affected area 30 g 2     No current facility-administered medications for this visit.       Objective:   Vital Signs:   Visit Vitals     /78     Pulse 72     Resp 20     Ht 5' 9\" (1.753 m)     Wt 190 lb (86.2 kg)     BMI 28.06 kg/m2        VisionScreening:  No exam data present     PHYSICAL EXAM:  GENERAL APPEARANCE: Alert, cooperative, no distress, appears stated age  HEAD: Normocephalic, without obvious abnormality, atraumatic  EYES:  PERRL, conjunctiva/corneas clear, EOM's intact, fundi     benign, both eyes       EARS: Normal TM's and external ear canals, both ears  NOSE:  Nares normal, septum midline, mucosa normal, no drainage or sinus tenderness  THROAT: Lips, mucosa, and tongue normal; teeth and gums normal  NECK: Supple, symmetrical, trachea midline, no adenopathy  BACK: Symmetric, no curvature, ROM normal, no CVA tenderness  LUNGS: Clear to auscultation bilaterally, respirations unlabored  CHEST WALL: No tenderness or deformity  HEART: Irregularly irregular rate and " rhythm, S1 and S2 normal, no murmur, rub or gallop  ABDOMEN: Soft, non-tender, bowel sounds active all four quadrants,     no masses, no organomegaly  EXTREMITIES: Extremities normal, atraumatic, no cyanosis; trace to 1+ nonpitting edema in right ankle  PULSES: 2+ and symmetric all extremities  SKIN: Skin color, texture, turgor normal, no lesions; erythematous rash noted under left axillary region  LYMPH NODES: Cervical, supraclavicular, and axillary nodes normal  NEUROLOGIC: CNII-XII intact. Wide-based, slower gait, seems more unsteady.    Assessment Results 7/31/2018   Activities of Daily Living No help needed   Instrumental Activities of Daily Living No help needed   Get Up and Go Score Less than 12 seconds   Mini Cog Total Score 4   Some recent data might be hidden     A Mini-Cog score of 0-2 suggests the possibility of dementia, score of 3-5 suggests no dementia    Identified Health Risks:     The patient was provided with written information regarding signs of hearing loss.  The patient s PHQ-9 score is consistent with moderate depression.  He was provided with information regarding depression and was advised to schedule a follow up appointment in 3 months to further address this issue.  He is at risk for falling and has been provided with information to reduce the risk of falling at home.    ADDITIONAL HISTORY SUMMARIZED (2): None.  DECISION TO OBTAIN EXTRA INFORMATION (1): None.   RADIOLOGY TESTS (1): None.  LABS (1): Labs ordered today.  MEDICINE TESTS (1): None.  INDEPENDENT REVIEW (2 each): None.     The visit lasted a total of 15 minutes face to face with the patient. Over 50% of the time was spent counseling and educating the patient about hypertension, chronic atrial fibrillation, dementia, hearing loss, and routine health maintenance.    Pam CLARK, am scribing for and in the presence of, Dr. Muro.    IDr. Muro, personally performed the services described in this documentation, as  scribed by Pam Pinto in my presence, and it is both accurate and complete.    Dragon dictation was used for this note.  Speech recognition errors are a possibility.    Total Data Points: 1

## 2021-06-20 NOTE — LETTER
Letter by Patricia Muro MD at      Author: Patricia Muro MD Service: -- Author Type: --    Filed:  Encounter Date: 6/30/2020 Status: (Other)         Navdeep Spann  [address]  [place] [address]             June 30, 2020         Dear Mr. Spann,    Below are the results from your recent visit:    Resulted Orders   HM2(CBC w/o Differential)   Result Value Ref Range    WBC 7.0 4.0 - 11.0 thou/uL    RBC 4.56 4.40 - 6.20 mill/uL    Hemoglobin 14.0 14.0 - 18.0 g/dL    Hematocrit 41.8 40.0 - 54.0 %    MCV 92 80 - 100 fL    MCH 30.7 27.0 - 34.0 pg    MCHC 33.5 32.0 - 36.0 g/dL    RDW 12.5 11.0 - 14.5 %    Platelets 230 140 - 440 thou/uL    MPV 10.4 8.5 - 12.5 fL   Comprehensive Metabolic Panel   Result Value Ref Range    Sodium 141 136 - 145 mmol/L    Potassium 3.6 3.5 - 5.0 mmol/L    Chloride 106 98 - 107 mmol/L    CO2 22 22 - 31 mmol/L    Anion Gap, Calculation 13 5 - 18 mmol/L    Glucose 161 (H) 70 - 125 mg/dL    BUN 16 8 - 28 mg/dL    Creatinine 1.19 0.70 - 1.30 mg/dL    GFR MDRD Af Amer >60 >60 mL/min/1.73m2    GFR MDRD Non Af Amer 60 (L) >60 mL/min/1.73m2    Bilirubin, Total 0.6 0.0 - 1.0 mg/dL    Calcium 9.0 8.5 - 10.5 mg/dL    Protein, Total 7.0 6.0 - 8.0 g/dL    Albumin 3.5 3.5 - 5.0 g/dL    Alkaline Phosphatase 124 (H) 45 - 120 U/L    AST 20 0 - 40 U/L    ALT 14 0 - 45 U/L    Narrative    Fasting Glucose reference range is 70-99 mg/dL per  American Diabetes Association (ADA) guidelines.       Laboratory tests done at his visit to assess his shortness of breath did not reveal any cause as to the shortness of breath.  I recommend we proceed with arranging for a Holter monitor which will allow us to monitor his heart rate for 24 hours and scheduling an echocardiogram which is an ultrasound picture of his heart which will give us an idea of the structure and function of his heart at this time.  Based on those test results we will decide if he needs to see a  cardiologist or have any other testing done.    Please call with questions or contact us using [employer].    Sincerely,        Electronically signed by Dr Griffin Dunbar

## 2021-06-20 NOTE — LETTER
Letter by Nabila Landry MD at      Author: Nabila Landyr MD Service: -- Author Type: --    Filed:  Encounter Date: 10/5/2020 Status: (Other)         Patient: Navdeep Spann   MR Number: [id number]   YOB: 1945   Date of Visit: 10/5/2020     [facility]    Facility:   [facility] [[id number]]   Code Status: POLST AVAILABLE    Reassessment 75-year-old male admitted to hospital with decreased mentation and behavioral abnormalities, history of dementia, atrial fibrillation, hypertension, depression and anxiety. Evaluated by neurology and psychiatry in hospital, initiated on Zyprexa at  HS with PRN dosing available, trazodone at HS for insomnia, Zoloft dose increased, metoprolol dose increased with increased blood pressure recordings, transferred to TCU for rehabilitation and management of medical problems.    Review of systems: denies focal neurologic deficits. Denies agitation hallucinations, paranoia or depression. States he is tired, appetite adequate, participating in physical therapy. Sleeping frequently during the day, prefers to be in bed. No cardiac or pulmonary symptoms. Remainder of 12 point review of systems obtained negative.    Exam: pleasant male lying supine in bed in no apparent distress. Blood pressure 142/68, pulse 77 and irregular, respiratory 16, 02 95%. Conversant, no facial asymmetry, no pharyngeal erythema. No HJR. S1 and S2 irregular with 1/6 systolic murmur. Pulmonary exam without rales rhonchi or wheezes. Abdomen without masses tenderness organomegaly. Strength symmetrical and diminished upper and lower extremities. No hand drift. No calf tenderness or swelling. Joints without acute inflammatory change.    Impression and plan:   hypertension on metoprolol 75 mg b.i.d., satisfactory control of blood pressure.   Chronic deconditioning, continue to encourage activities in physical therapy, patient reports no change in  strength since admission to TCU.   History of seizure disorder without seizure activity.   Dementia without current behavioral abnormalities as  reported in hospital, tolerating Zyprexa with resolution of abnormal behavior.   Anxiety and depression with mood stable.   Issues reviewed with patient and nursing staff.   Medications reviewed.      Electronically signed by: Nabila Landry MD

## 2021-06-20 NOTE — LETTER
Letter by Nabila Landry MD at      Author: Nabila Landry MD Service: -- Author Type: --    Filed:  Encounter Date: 10/2/2020 Status: (Other)         Patient: Navdeep Spann   MR Number: [id number]   YOB: 1945   Date of Visit: 10/2/2020     [facility]    Facility:   [facility] [[id number]]   Code Status: POLST AVAILABLE    Reassessment of 75-year-old male with history of hypertension, chronic atrial fibrillation not anticoagulated, dementia, hospitalized  9/ 16 - 9/25 with encephalopathy and behavioral abnormalities, initiated on Zyprexa 5 mg at HS,2 .5 mg q six hours PRN, trazodone at HS, sertraline dose increased, metoprolol dose increased to for blood pressure control, stabilized and transferred to TCU for rehabilitation and observation of clinical status.    Review of systems: fatigue present. Denies current pain. No focal neurologic deficits. No seizure activity. Denies cardiac or pulmonary symptomatology. Denies hallucinations. No agitation. Mood adequate. Remainder of 12 point review of systems obtained negative.    Exam: blood pressure 128/71, heart rate 82, 02 saturation 96%. Lying supine in bed in no apparent distress. Cooperative and pleasant, articulate, oriented times two. S1 and S2 irregular with 1/6 systolic murmur. Pulmonary exam without rales rhonchi or wheezes. Abdomen without masses tenderness organomegaly. Periphery without edema. Strength and muscle tone diminished and symmetrical. No hand drift.    Impression and plan:   dementia on donepezil, recent behavioral abnormalities resolved, no evidence of adverse effect from Zyprexa.   Deconditioning, continues in bed majority of time, rehabilitation ongoing.   History of seizure disorder on Keppra, no seizure activity.   Hypertension on metoprolol 75 mg b.i.d. with satisfactory control of blood pressure.   Chronic atrial fibrillation with heart rate controlled, continues  without anticoagulation due to fall risk.   Chronic depression and anxiety, mood  stable.      Electronically signed by: Nabila Landry MD

## 2021-06-20 NOTE — PROGRESS NOTES
Assessment/Plan:  1. Dyspnea, unspecified type  Dyspnea of unknown known etiology.  Certainly could be primarily pulmonary.  Echo did not show congestive heart failure.  Will order PFTs for further evaluation.  - PFT Complete; Future    2. Depressive disorder, atypical  Depressive disorder suboptimally treated with citalopram.  He is at a maximum dose for this.  We will decrease to 20 mg for 3 days and then stop and plan on starting fluoxetine at 20 mg daily this will hopefully provide better relief for him for his depression.  - FLUoxetine (PROZAC) 20 MG capsule; Take 1 capsule (20 mg total) by mouth daily.  Dispense: 30 capsule; Refill: 2    We did discuss his ability to drive.  2 years ago he was told not to drive but this was in a timeframe when he was having quite a bit more confusion and falls.  This timeframe has overall resolved and I feel he can drive in limited circumstances including daytime hours and no freeway driving.    Subjective:  73-year-old gentleman presents today with his significant other.  They are here to follow-up on a couple issues.  We discussed the results of his echocardiogram.  He has been feeling somewhat short of breath and had a mildly elevated BNP.  Echo did not show any significant change from 2 years ago.  It shows an ejection fraction of about 55% and some mild left ventricular hypertrophy.  He does have an exposure to lots of chemicals in his working environment.  He worked in the steel industry for 40 years.  His significant other notices that he will breathe sometimes heavy even at rest.  She also notices that he seems more tired.  She feels that he seems more depressed and that is motivated to do things that he enjoyed previously.  He has been on citalopram for some time and it was originally helpful.  They would like me to check some moles on his back also.    Objective:  /74  Pulse 64  Resp 20  Wt 198 lb (89.8 kg)  BMI 29.24 kg/m2  Alert in no acute  distress  Lungs clear to auscultation bilaterally  Heart regular rate and rhythm without murmurs  Skin on the back shows a few benign seborrheic keratoses and benign nevi

## 2021-06-20 NOTE — LETTER
"Letter by Jaylyn Overton CNP at      Author: Jaylyn Overton CNP Service: -- Author Type: --    Filed:  Encounter Date: 10/7/2020 Status: (Other)         Patient: Navdeep Spann   MR Number: [id number]   YOB: 1945   Date of Visit: 10/7/2020     [facility]    Facility:   [facility] [[id number]]   Code Status: FULL CODE and POLST AVAILABLE  PCP: Patricia Muro MD   Phone: [phone]   Fax: [phone]      CHIEF COMPLAINT/REASON FOR VISIT:  Chief Complaint   Patient presents with   ? Discharge Summary       HISTORY COURSE:  Navdeep is a 75 y.o. male who  has a past medical history of Anemia, Anxiety, Aortic valve disorder, Arthritis, Atrial fibrillation (H), BPH (benign prostatic hypertrophy) with urinary retention, Carotid stenosis, Depression, Dyslipidemia, [place] (hard of hearing), Hypertension, Lymphedema, and Syncope and collapse (2009, 2010). Per previous TCU provider: \"On hospitalization patient evaluated by neurology and psychiatry. Significant behavioral abnormalities present requiring initiation of Zyprexa 5 mg at HS, 2.5 mg q six hours PRN. Trazodone initiated for insomnia, metoprolol dose increased with increased blood pressure recordings. Sertraline increased from 25 to 50 mg. No acute CNS  otherwise identified. Hypomagnesemia and hypokalemia replaced. Stabilized and transferred to TCU for rehabilitation, management of multiple medical problems.\"    Today Harpreet is being evaluated for a discharge from the TCU. Harpreet shares he is doing well and has no other concerns or issues. He is very happy to be going home, he will have support of his girlfriend and his girlfriend's daughter. Harpreet does not anticipate any issues or problems. He has been eating, drinking and eliminating well. Nursing staff have no concerns or worries. Hrapreet denies any other concerns including fevers/chills, cough or cold symptoms, headaches, vision changes, chest " pain/pressure, difficulty breathing, SOB, abdominal pain, nausea, vomiting, diarrhea, dysuria, increasing weakness, increasing pain.     PHYSICAL EXAM:   /70   Pulse 74   Temp 98  F (36.7  C)   Resp 16   Wt 188 lb (85.3 kg)   SpO2 96%   BMI 25.50 kg/m      A limited exam was performed due to recommendations for care during COVID-19 pandemic. Due to the 2020 COVID-19 pandemic, except as noted above, the patient was visually observed at a 6 foot plus distance.  An observational exam was performed in an effort to keep patient safe from COVID-19 and other communicable diseases.     General appearance: alert, appears stated age and cooperative  HEENT: Head is normocephalic with normal hair distribution. No evidence of trauma. Ears: Without lesions or deformity. No acute purulent discharge. Eyes: Conjunctivae pink with no scleral icterus or erythema. Nose: Normal. Oropharnyx: mmm.  Lungs: respirations without effort.  Extremities: extremities normal, atraumatic, no cyanosis.  Skin: Skin color, texture normal. No rashes or lesions on exposed skin.   Neurologic: Grossly normal   Psych: interacts well with caregivers, exhibits logical thought processes and connections with some forgetfulness, pleasant.    MEDICATION LIST:  Current Outpatient Medications   Medication Sig   ? acetaminophen (TYLENOL) 650 MG CR tablet Take 1 tablet (650 mg total) by mouth every 6 (six) hours as needed for pain.   ? arginine HCl, L-arginine, 1,000 mg Tab Take 1,000 mg by mouth daily.   ? aspirin 81 MG EC tablet Take 1 tablet (81 mg total) by mouth daily.   ? bisacodyL (DULCOLAX) 10 mg suppository Insert 1 suppository (10 mg total) into the rectum daily as needed (Constipation).   ? cholecalciferol, vitamin D3, 5,000 unit Tab Take 5,000 Units by mouth daily.   ? CRANBERRY FRUIT EXTRACT (CRANBERRY ORAL) Take 450 mg by mouth daily.    ? cyanocobalamin (VITAMIN B-12) 250 MCG tablet Take 250 mcg by mouth daily.   ? docosahexaenoic acid-epa  120-180 mg cap Take 1,000 mg by mouth daily.    ? donepezil (ARICEPT) 10 MG tablet Take 10 mg by mouth bedtime.   ? ferrous sulfate 325 (65 FE) MG tablet Take 1 tablet by mouth 3 (three) times a day with meals. (Patient taking differently: Take 1-2 tablets by mouth 2 (two) times a day. Takes 2 tablets in the morning and 1 tablet at bedtime      )   ? hydroCHLOROthiazide (HYDRODIURIL) 25 MG tablet Take 25 mg by mouth daily.   ? KLOR-CON M20 20 mEq tablet Take 1 tablet by mouth 3 times a day.   ? Lactobacillus rhamnosus GG (CULTURELLE) 10-15 Billion cell capsule Take 1 capsule by mouth daily.   ? levETIRAcetam (KEPPRA) 750 MG tablet Take 1 tablet (750 mg total) by mouth 2 (two) times a day.   ? magnesium hydroxide (MILK OF MAG) 400 mg/5 mL Susp suspension Take 30 mL by mouth daily as needed (Constipation).   ? magnesium oxide (MAG-OX) 400 mg (241.3 mg magnesium) tablet Take 1 tablet (400 mg total) by mouth 3 (three) times a day.   ? melatonin 3 mg Tab tablet Take 1 tablet (3 mg total) by mouth at bedtime as needed.   ? metoprolol tartrate 75 mg Tab Take 75 mg by mouth 2 (two) times a day.   ? OLANZapine (ZYPREXA) 2.5 MG tablet Take 1 tablet (2.5 mg total) by mouth every 6 (six) hours as needed.   ? OLANZapine zydis (ZYPREXA ZYDIS) 5 MG disintegrating tablet Take 1 tablet (5 mg total) by mouth at bedtime.   ? OMEGA-3S/DHA/EPA/FISH OIL (OMEGA 3 ORAL) Take 1 tablet by mouth daily.    ? omeprazole (PRILOSEC) 20 MG capsule Take 1 capsule (20 mg total) by mouth daily before breakfast.   ? QUEtiapine (SEROQUEL) 25 MG tablet Take 1 tablet (25 mg total) by mouth at bedtime as needed (agiation and/or sleep).   ? SAW PALMETTO ORAL Take 450 mg by mouth daily.   ? sertraline (ZOLOFT) 25 MG tablet Take 1 tablet (25 mg total) by mouth daily.   ? traZODone (DESYREL) 50 MG tablet Take 0.5 tablets (25 mg total) by mouth at bedtime.   ? trimethoprim (TRIMPEX) 100 mg tablet Take 1 tablet (100 mg total) by mouth once daily.   ? VIT B1  MN/B2/B3/B5/B6/B12/C/FA (B COMPLEX W-VIT C ORAL) Take 1 tablet by mouth daily.        DISCHARGE DIAGNOSIS:    ICD-10-CM    1. Mood disorder due to a general medical condition  F06.30    2. Late onset Alzheimer's disease without behavioral disturbance (H)  G30.1     F02.80    3. Physical deconditioning  R53.81    4. Transient alteration of awareness  R40.4      Physical Deconditioning  -Continue PT/OT and other therapies as per care plan.  -Encouraged good nutrition and movement habits.   -Discussed care plan and expected course of stay.   -Continue to follow-up per routine schedule or sooner if needed.     Dementia  -Patient has significant dementia and is unable to make decisions that affect finances, legal issues, or healthcare.   -Care to be arranged with family or POA.  -Continue to care for patient in LTC setting, as she is comfortable and safe in this setting.   -Follow up per routine and as needed.    AMS, anxiety, depression  -Saw palmetto capsules 450 mg by mouth once daily.  -Cranberry tablet 450 mg by mouth once daily.  -Fish oil 1000 mg by mouth once daily.  -ASA 81 mg by mouth daily.  -Lactobacillus rhamnosus 1 capsule by mouth daily.  -Arginine 1000 mg by mouth once daily.  -Donepezil 10 mg by mouth at bedtime.  -Seroquel 25 mg by mouth 1 tablet as needed.  -Zyprexa 5 mg by mouth at bedtime.  -Zyprexa 2.5 mg by mouth every 6 hours as needed.  -Zoloft 50 mg by mouth once daily.  Next line-trazodone 50 mg by mouth at bedtime.  -Psych eval and treat.    Otherwise continue current care plan for all other chronic medical conditions, as they are stable. Encouraged patient to engage in healthy lifestyle behaviors such as engaging in social activities, exercising (PT/OT), eating well, and following care plan. Follow up for routine check-up, or sooner if needed. Will continue to monitor patient and work with nursing staff collaboratively to work toward positive patient outcomes.    MEDICAL EQUIPMENT  NEEDS:  None today.     DISCHARGE PLAN/FACE TO FACE:  I certify that services are/were furnished while this patient was under the care of a physician and that a physician or an allowed non-physician practitioner (NPP), had a face-to-face encounter that meets the physician face-to-face encounter requirements. The encounter was in whole, or in part, related to the primary reason for home health. The patient is confined to his/her home and needs intermittent skilled nursing, physical therapy, speech-language pathology, or the continued need for occupational therapy. A plan of care has been established by a physician and is periodically reviewed by a physician.  Date of Face-to-Face Encounter: 10/7/2020    I certify that, based on my findings, the following services are medically necessary home health services: home health aid for bathing and ADLs, skilled nursing (RN) for medication set-up and teaching, symptoms and disease process monitoring and education, PT for strengthening, balance, endurance and safety within the home, OT for strengthening, ADL needs, adaptive equipment and safety.    My clinical findings support the need for the above skilled services because: home health aid for bathing and ADLs, skilled nursing (RN) for medication set-up and teaching, symptoms and disease process monitoring and education, PT for strengthening, balance, endurance and safety within the home, OT for strengthening, ADL needs, adaptive equipment and safety.    This patient is homebound because: he is a frail elderly gentleman with multiple comorbidities and recent hospitalizations making it unsafe for him to go out into the community for services.    The patient is, or has been, under my care and I have initiated the establishment of the plan of care. This patient will be followed by a physician who will periodically review the plan of care. Schedule follow up visit with primary care provider within 7 days to reestablish  care.    Total unit/floor time of 40 minutes time spent on discharge planning, discharge follow-up discussion and discharge medication review.    Electronically signed by: Jaylyn Overton CNP

## 2021-06-20 NOTE — LETTER
Letter by Esperanza Arora CHW at      Author: Esperanza Arora CHW Service: -- Author Type: --    Filed:  Encounter Date: 7/22/2020 Status: (Other)                        This letter is to give you information only. No action is required on your part.                               Beneficiary Notification Letter - BPCI Advanced                     Your Doctor or Hospital Has Joined [employer]ica[employer] New Payment and                                                          Service Delivery Model     Hello,     We wanted to let you know that your health care provider, [facility], has volunteered to take part in our Centers for Medicare & Medicaid Services (CMS) Bundled Payments for Care Improvement Advanced Model (BPCI Advanced). This doesnt change your Medicare rights or benefits and you dont need to do anything.      What are bundled payments?   A bundled payment combines, or bundles together, payments that Medicare makes to your health care providers for the many different kinds of medical services you might get in a specific time period. In BPCI Advanced, this time period could include a hospital inpatient stay or outpatient procedure, plus 90 days.     Why would Medicare bundle payments?   Bundled payments are thought of as a value-based way to pay because health care providers are responsible for both the quality and cost of medical care they give. This is a relatively new way of paying health care providers compared to thefee-for-service way Medicare has traditionally paid, where providers are paid separately for each service they provide. Bundled payments encourage these providers to work together to provide better, more coordinated care during your hospital stay, or outpatient procedure, and through your recovery.     What does BPCI Advanced mean for me?   Youre more likely to get even better care when hospitals, doctors, and other health care providers work together. In BPCI Advanced, hospitals,  doctors, and other health care providers may be rewarded for providing better, more coordinated health care. Medicare will watch BPCI Advanced participants closely to make sure that you and other patients keep getting efficient, high quality care.     What do I need to know about BPCI Advanced?   Whats most important for you to know is that your Medicare rights and benefits dont change because your health care provider is participating in BPCI Advanced. Medicare will keep covering all of your medically necessary services.       January 2019    Beneficiary Notification Letter - BPCI Advanced (page 2)     Even though Medicare will pay your doctor in a different way under BPCI Advanced, how much you have to pay wont change. Health care providers and suppliers who are enrolled in Medicare will submit their Medicare claims like they always have.     Youll have all the same Medicare rights and protections, including the right to choose which hospital, doctor, or other health care provider you see. If you dont want to get care from a health care provider whos participating in BPCI Advanced, then youll have to choose a different health care provider whos not participating in the Model.     How can I give feedback about my health care?   Medicare might ask you to take a voluntary survey about the services and care you received from [facility] during your hospital stay or outpatient procedure and for a specific period of time afterwards. You can decide whether you want to take the voluntary survey, but if you do, itll help Medicare make BPCI Advanced and the care of other Medicare patients better.     If you have concerns or complaints about your care, you can:     Talk to your doctor or health care provider.     Contact your Beneficiary and Family Centered Care Quality Improvement Organization (CC-QIO). You can get your CC-QIOs phone number at Medicare.gov/contacts or by calling 1-800-MEDICARE. TTY users  can call 1-345.513.6336.     Where can I learn more about BPCI Advanced?   Learn more about BPCI Advanced at https://innovation.cms.gov/initiatives/bpci-advanced/:     A list of all the hospitals and physician group practices in the country participating in BPCI Advanced.     All of the inpatient and outpatient Clinical Episodes that are currently included under BPCI Advanced. A Clinical Episode is a grouping of medical conditions or diagnoses that are included in the BPCI Advanced Model.                                   January 2019

## 2021-06-20 NOTE — LETTER
Letter by Nabila Landry MD at      Author: Nabila Landry MD Service: -- Author Type: --    Filed:  Encounter Date: 9/28/2020 Status: (Other)         Patient: Navdeep Spann   MR Number: [id number]   YOB: 1945   Date of Visit: 9/28/2020     [facility]    Facility:   [facility] [[id number]]   Code Status: POLST AVAILABLE      Admission evaluation to TCU. of 75-year-old male. History is taken accompanying hospital notes, patient provides limited history. Admitted to hospital 9/16 - 9/25 with altered mental status. Previous hospitalization July for altered minutes mental status,pneumonia and sepsis.   Medical history significant for chronic atrial fibrillation not anticoagulated in view of fall risk, dementia on donepezil, hypertension, dyslipidemia, BPH without obstruction, carotid stenosis, depression, anxiety, aortic valve disease, seizure disorder.   On hospitalization patient evaluated by neurology and psychiatry. Significant behavioral abnormalities present requiring initiation of Zyprexa 5 mg at HS, 2.5 mg q six hours PRN. Trazodone initiated for insomnia, metoprolol dose increased with increased blood pressure recordings. Sertraline increased from 25 to 50 mg. No acute CNS  otherwise identified. Hypomagnesemia and hypokalemia replaced. Stabilized and transferred to TCU for rehabilitation, management of multiple medical problems.    Past medical history, current medical problem list, drug allergies, current medication list, social history, family history, code status reviewed in epic.    Review of systems: somewhat aware of memory deficit. Denies current agitation. No current anxiety or depression. Denies chest pain or palpitations. No seizure activity. No focal neurologic deficits of new onset. No orthopnea or PND. Chronic right shoulder pain. Generalized weakness present. Remainder of 12 point review of systems obtained  negative.    Exam: pleasant male lying supine in bed in no apparent distress, oriented times two, articulate. Blood pressure 124/70, heart rate 84, temperature 97.8, 02 97%, respiratory 16. No facial asymmetry. Extraocular movements intact. Mucous membranes moist. No HJR. S1 and S2 irregular with 1/6 systolic murmur. Pulmonary exam without rales rhonchi or wheezes. Limited range of motion right greater than left shoulder. Joints without acute inflammatory change. Abdomen without hepatosplenomegaly masses or tenderness. Periphery without edema. Strength and muscle tone mildly diminished.    Impression and plan:   dementia, high baseline intellectual level, highly articulate, has insight into issues, recent significant agitation and behavioral issues in hospital, Zyprexa initiated, mood adequate at present, behavior acceptable, mmonitor.   Hypertension with recent increase in metoprolol to 75 mg b.i.d., blood pressure control satisfactory.   Seizure disorder without recent seizure activity, Keppra 750 BID, dose increased in July, no current seizure activity.   Anxiety and depression with anticipated increase in sertraline from 25 to 50 mg over next week.   Chronic anemia, ferrous sulfate 325 TID, follow Hgb.   Recent hypokalemia and hypomagnesemia, continues replacement, follow up levels over next two weeks.   Significant insomnia with use of melatonin PRN Zyprexa and trazodone at HS, adequate control at present.   Chronic atrial fibrillation with heart rate controlled, not anticoagulated in view of fall risk.   Significant deconditioning with need for rehabilitation.   History of carotid stenosis, no history of CVA, no focal neurologic deficiency.   BPH without obstruction.   Multiple medical issues are reviewed, assessment of psychiatric and neurologic status, assessment of mood, review of medications, discussion with nursing staff.    Electronically signed by: Nabila Landry MD

## 2021-06-20 NOTE — LETTER
Letter by Patricia Muro MD at      Author: Patricia Muro MD Service: -- Author Type: --    Filed:  Encounter Date: 12/20/2019 Status: Signed         Navdeep [address]  [place] [address]             December 20, 2019         Dear Mr. Spann,    Below are the results from your recent visit:    Resulted Orders   Urinalysis-UC if Indicated   Result Value Ref Range    Color, UA Yellow Colorless, Yellow, Straw, Light Yellow    Clarity, UA Clear Clear    Glucose, UA Negative Negative    Bilirubin, UA Negative Negative    Ketones, UA Negative Negative    Specific Gravity, UA 1.020 1.005 - 1.030    Blood, UA Small (!) Negative    pH, UA 7.0 5.0 - 8.0    Protein, UA 30 mg/dL (!) Negative mg/dL    Urobilinogen, UA 0.2 E.U./dL 0.2 E.U./dL, 1.0 E.U./dL    Nitrite, UA Negative Negative    Leukocytes, UA Negative Negative    Bacteria, UA None Seen None Seen hpf    RBC, UA 3-5 (!) None Seen, 0-2 hpf    WBC, UA None Seen None Seen, 0-5 hpf    Squam Epithel, UA 0-5 None Seen, 0-5 lpf    Narrative    UC not indicated   PSA, Diagnostic (Prostatic-Specific Antigen)   Result Value Ref Range    PSA 0.6 0.0 - 6.5 ng/mL    Narrative    Method is Abbott Prostate-Specific Antigen (PSA)  Standard-WHO 1st International (90:10)   Basic Metabolic Panel   Result Value Ref Range    Sodium 140 136 - 145 mmol/L    Potassium 3.8 3.5 - 5.0 mmol/L    Chloride 104 98 - 107 mmol/L    CO2 27 22 - 31 mmol/L    Anion Gap, Calculation 9 5 - 18 mmol/L    Glucose 128 (H) 70 - 125 mg/dL    Calcium 9.0 8.5 - 10.5 mg/dL    BUN 16 8 - 28 mg/dL    Creatinine 1.10 0.70 - 1.30 mg/dL    GFR MDRD Af Amer >60 >60 mL/min/1.73m2    GFR MDRD Non Af Amer >60 >60 mL/min/1.73m2    Narrative    Fasting Glucose reference range is 70-99 mg/dL per  American Diabetes Association (ADA) guidelines.       Your labs are within normal limits.  Your urine testing does not show signs of infection.    Please call with questions or  contact us using [employer].    Sincerely,        Electronically signed by Patricia Muro MD

## 2021-06-20 NOTE — LETTER
Letter by Patricia Muro MD at      Author: Patricia Muro MD Service: -- Author Type: --    Filed:  Encounter Date: 6/30/2020 Status: (Other)         Navdeep Spann  [address]  [place] [address]     Resulted Orders   HM2(CBC w/o Differential)   Result Value Ref Range    WBC 7.0 4.0 - 11.0 thou/uL    RBC 4.56 4.40 - 6.20 mill/uL    Hemoglobin 14.0 14.0 - 18.0 g/dL    Hematocrit 41.8 40.0 - 54.0 %    MCV 92 80 - 100 fL    MCH 30.7 27.0 - 34.0 pg    MCHC 33.5 32.0 - 36.0 g/dL    RDW 12.5 11.0 - 14.5 %    Platelets 230 140 - 440 thou/uL    MPV 10.4 8.5 - 12.5 fL   Comprehensive Metabolic Panel   Result Value Ref Range    Sodium 141 136 - 145 mmol/L    Potassium 3.6 3.5 - 5.0 mmol/L    Chloride 106 98 - 107 mmol/L    CO2 22 22 - 31 mmol/L    Anion Gap, Calculation 13 5 - 18 mmol/L    Glucose 161 (H) 70 - 125 mg/dL    BUN 16 8 - 28 mg/dL    Creatinine 1.19 0.70 - 1.30 mg/dL    GFR MDRD Af Amer >60 >60 mL/min/1.73m2    GFR MDRD Non Af Amer 60 (L) >60 mL/min/1.73m2    Bilirubin, Total 0.6 0.0 - 1.0 mg/dL    Calcium 9.0 8.5 - 10.5 mg/dL    Protein, Total 7.0 6.0 - 8.0 g/dL    Albumin 3.5 3.5 - 5.0 g/dL    Alkaline Phosphatase 124 (H) 45 - 120 U/L    AST 20 0 - 40 U/L    ALT 14 0 - 45 U/L    Narrative    Fasting Glucose reference range is 70-99 mg/dL per  American Diabetes Association (ADA) guidelines.   Laboratory tests done at his visit to assess his shortness of breath did not reveal any cause as to the shortness of breath.  I recommend we proceed with arranging for a Holter monitor which will allow us to monitor his heart rate for 24 hours and scheduling an echocardiogram which is an ultrasound picture of his heart which will give us an idea of the structure and function of his heart at this time. Based on those test results we will decide if he needs to see a cardiologist or have any other testing done.  - Dr. Griffin Dunbar.

## 2021-06-20 NOTE — LETTER
"Letter by Jaylyn Overton CNP at      Author: Jaylyn Overton CNP Service: -- Author Type: --    Filed:  Encounter Date: 9/30/2020 Status: (Other)         Patient: Navdeep Spann   MR Number: [id number]   YOB: 1945   Date of Visit: 9/30/2020     [facility]    Facility:   [facility] [[id number]]   Code Status: FULL CODE and POLST AVAILABLE      CHIEF COMPLAINT/REASON FOR VISIT:  Chief Complaint   Patient presents with   ? Review Of Multiple Medical Conditions     AMS, Depression, Anxiety       HISTORY:      HPI: Navdeep is a 75 y.o. male who  has a past medical history of Anemia, Anxiety, Aortic valve disorder, Arthritis, Atrial fibrillation (H), BPH (benign prostatic hypertrophy) with urinary retention, Carotid stenosis, Depression, Dyslipidemia, [place] (hard of hearing), Hypertension, Lymphedema, and Syncope and collapse (2009, 2010). Per previous TCU provider: \"On hospitalization patient evaluated by neurology and psychiatry. Significant behavioral abnormalities present requiring initiation of Zyprexa 5 mg at HS, 2.5 mg q six hours PRN. Trazodone initiated for insomnia, metoprolol dose increased with increased blood pressure recordings. Sertraline increased from 25 to 50 mg. No acute CNS  otherwise identified. Hypomagnesemia and hypokalemia replaced. Stabilized and transferred to TCU for rehabilitation, management of multiple medical problems.\"    Today Harpreet is being evaluated for a routine review of multiple medical problems while in the TCU. He has been doing extraordinarily well. He has no new concerns or issues. He states that therapies are going well. He has not had any behavioral issues or problems in his opinion. Nursing staff state he has been stable and without issue. They have not had any behavioral issues or problems to report.  They state that he has been eating, drinking and eliminating well.  He denies any symptoms of anxiety or " depression at this time.  There have been no use of PRN medications for anxiety, depression, psychosis.  Harpreet does have a history of dementia, however at this time he is alert and oriented.  Harpreet denies any other concerns including fevers/chills, cough or cold symptoms, headaches, vision changes, chest pain/pressure, difficulty breathing, SOB, abdominal pain, nausea, vomiting, diarrhea, dysuria, increasing weakness, increasing pain.     Past Medical History:   Diagnosis Date   ? Anemia    ? Anxiety    ? Aortic valve disorder    ? Arthritis    ? Atrial fibrillation (H)    ? BPH (benign prostatic hypertrophy) with urinary retention    ? Carotid stenosis    ? Depression    ? Dyslipidemia    ? [place] (hard of hearing)    ? Hypertension    ? Lymphedema    ? Syncope and collapse 2009, 2010    r/t urinary obstruction             Family History   Problem Relation Age of Onset   ? Cancer Mother      Social History     Socioeconomic History   ? Marital status:      Spouse name: Not on file   ? Number of children: Not on file   ? Years of education: Not on file   ? Highest education level: Not on file   Occupational History   ? Not on file   Social Needs   ? Financial resource strain: Not on file   ? Food insecurity     Worry: Not on file     Inability: Not on file   ? Transportation needs     Medical: Not on file     Non-medical: Not on file   Tobacco Use   ? Smoking status: Former Smoker   ? Smokeless tobacco: Never Used   Substance and Sexual Activity   ? Alcohol use: No     Comment: Past history- quit June 1st, 1990   ? Drug use: Yes     Types: Marijuana     Comment: none since 3/2014   ? Sexual activity: Not on file   Lifestyle   ? Physical activity     Days per week: Not on file     Minutes per session: Not on file   ? Stress: Not on file   Relationships   ? Social connections     Talks on phone: Not on file     Gets together: Not on file     Attends [employer] service: Not on file     Active member of club or  organization: Not on file     Attends meetings of clubs or organizations: Not on file     Relationship status: Not on file   ? Intimate partner violence     Fear of current or ex partner: Not on file     Emotionally abused: Not on file     Physically abused: Not on file     Forced sexual activity: Not on file   Other Topics Concern   ? Not on file   Social History Narrative   ? Not on file       REVIEW OF SYSTEM:  Per HPI    PHYSICAL EXAM:   /73   Pulse 83   Temp 98  F (36.7  C)   Resp 16   Wt 189 lb 6.4 oz (85.9 kg)   SpO2 95%   BMI 25.69 kg/m      A limited exam was performed due to recommendations for care during COVID-19 pandemic. Due to the 2020 COVID-19 pandemic, except as noted above, the patient was visually observed at a 6 foot plus distance.  An observational exam was performed in an effort to keep patient safe from COVID-19 and other communicable diseases.     General appearance: alert, appears stated age and cooperative  HEENT: Head is normocephalic with normal hair distribution. No evidence of trauma. Ears: Without lesions or deformity. No acute purulent discharge. Eyes: Conjunctivae pink with no scleral icterus or erythema. Nose: Normal. Oropharnyx: mmm.  Lungs: respirations without effort.  Extremities: extremities normal, atraumatic, no cyanosis.  Skin: Skin color, texture normal. No rashes or lesions on exposed skin.   Neurologic: Grossly normal   Psych: interacts well with caregivers, exhibits logical thought processes and connections with evidence of dementia and memory impairment, however is alert and oriented x3 today, pleasant.      LABS:   None today.    ASSESSMENT:      ICD-10-CM    1. Dementia with behavioral disturbance, unspecified dementia type (H)  F03.91    2. Physical deconditioning  R53.81    3. Late onset Alzheimer's disease without behavioral disturbance (H)  G30.1     F02.80    4. Transient alteration of awareness  R40.4    5. Anxiety  F41.1    6. Depressive disorder,  atypical  F32.89        PLAN:    Physical Deconditioning  -Continue PT/OT and other therapies as per care plan.  -Encouraged good nutrition and movement habits.   -Discussed care plan and expected course of stay.   -Continue to follow-up per routine schedule or sooner if needed.     Dementia  -Patient has significant dementia and is unable to make decisions that affect finances, legal issues, or healthcare.   -Care to be arranged with family or POA.  -Continue to care for patient in LTC setting, as she is comfortable and safe in this setting.   -Follow up per routine and as needed.    AMS, anxiety, depression  -Saw palmetto capsules 450 mg by mouth once daily.  -Cranberry tablet 450 mg by mouth once daily.  -Fish oil 1000 mg by mouth once daily.  -ASA 81 mg by mouth daily.  -Lactobacillus rhamnosus 1 capsule by mouth daily.  -Arginine 1000 mg by mouth once daily.  -Donepezil 10 mg by mouth at bedtime.  -Seroquel 25 mg by mouth 1 tablet as needed.  -Zyprexa 5 mg by mouth at bedtime.  -Zyprexa 2.5 mg by mouth every 6 hours as needed.  -Zoloft 50 mg by mouth once daily.  Next line-trazodone 50 mg by mouth at bedtime.  -Psych eval and treat.    Otherwise continue current care plan for all other chronic medical conditions, as they are stable. Encouraged patient to engage in healthy lifestyle behaviors such as engaging in social activities, exercising (PT/OT), eating well, and following care plan. Follow up for routine check-up, or sooner if needed. Will continue to monitor patient and work with nursing staff collaboratively to work toward positive patient outcomes.    Electronically signed by: Jaylyn Overtno CNP

## 2021-06-21 ENCOUNTER — COMMUNICATION - HEALTHEAST (OUTPATIENT)
Dept: FAMILY MEDICINE | Facility: CLINIC | Age: 76
End: 2021-06-21

## 2021-06-21 NOTE — PROGRESS NOTES
ASSESSMENT/PLAN:  1. Venous (peripheral) insufficiency  73-year-old gentleman presenting with his significant other with concerns for possible congestive heart failure.  We discussed that while he had a mildly elevated BNP back in July, and echo shows an intact ejection fraction.  He may have some underlying diastolic dysfunction due to his long-standing hypertension but that does not appear to be the cause for his current swelling in his legs.  I suspect he has both venous insufficiency and lymphedema in particular in his right leg.  I recommend compression stockings and a prescription is written.  - Compression stockings 20/30 mmHg; Knee      There are no Patient Instructions on file for this visit.    Orders Placed This Encounter   Procedures     Compression stockings 20/30 mmHg; Knee     Order Specific Question:   Which DME provider?     Answer:   Other (specify)     Order Specific Question:   Which strength?     Answer:   20/30 mmHg     Order Specific Question:   Which length?     Answer:   Knee     Order Specific Question:   Quantity:     Answer:   4 pair     Medications Discontinued During This Encounter   Medication Reason     citalopram (CELEXA) 40 MG tablet Therapy completed       Return if symptoms worsen or fail to improve.    CHIEF COMPLAINT;  Chief Complaint   Patient presents with     Follow-up     wants to discuss CHF       HISTORY OF PRESENT ILLNESS:  Navdeep is a 73 y.o. male presenting to the clinic with his wife today for concerns of CHF.     Dyspnea: His wife is concerned that he has congestive heart failure because he has lower extremity edema and dyspnea. His wife states that their  noticed swelling in his legs and suggested he be evaluated in clinic for CHF. Of note, his BNP level was mildly elevated at 297 7/31/18. An Echocardiogram was done in August that revealed very mild aortic stenosis, mild left atrial enlargement, and normal ejection fraction. He states that he has  long-standing edema. He does not wear his support stockings.     REVIEW OF SYSTEMS:  He wears glasses. He has bilateral hearing loss. All other systems are negative.    PFSH:  He and his brother helped his dad dig a basement in their house when he was a child. Reviewed, as below.    TOBACCO USE:  History   Smoking Status     Former Smoker   Smokeless Tobacco     Never Used       VITALS:  Vitals:    10/18/18 1100   BP: 130/80   Pulse: 72   Resp: 20   Weight: 199 lb (90.3 kg)     Wt Readings from Last 3 Encounters:   10/18/18 199 lb (90.3 kg)   10/02/18 198 lb (89.8 kg)   08/16/18 190 lb (86.2 kg)     Body mass index is 29.39 kg/(m^2).    PHYSICAL EXAM:  GENERAL APPEARANCE: Alert, cooperative, no distress, appears stated age  HEAD: Normocephalic, without obvious abnormality, atraumatic  LUNGS: Clear to auscultation bilaterally, respirations unlabored  HEART: Regular rate and rhythm, S1 and S2 normal, no murmur, rub or gallop  EXTREMITIES: Extremities normal, atraumatic, no cyanosis; 1-2+ pitting edema in right, trace to 1+ pitting edema in left.  PULSES: 2+ and symmetric all extremities  NEUROLOGIC: CNII-XII intact.    RECENT RESULTS  Recent Results (from the past 48 hour(s))   Hemoglobin    Collection Time: 10/18/18  1:49 PM   Result Value Ref Range    Hemoglobin 12.5 (L) 14.0 - 18.0 g/dL       ADDITIONAL HISTORY SUMMARIZED (2): None.  DECISION TO OBTAIN EXTRA INFORMATION (1): None.  RADIOLOGY TESTS (1): None.  LABS (1): Labs 7/31/18, , cholesterol 127, hemoglobin 12.3 glucose 126. Labs ordered today.   MEDICINE TESTS (1): Echo 8/16/18 reviewed, very mild aortic stenosis, mild left atrial enlargement, no major appreciable interval change when compared to 5/27/16.  INDEPENDENT REVIEW (2 each): None.    The visit lasted a total of 9 minutes face to face with the patient. Over 50% of the time was spent counseling and educating the patient about dyspnea.    Pam CLARK, am scribing for and in the presence of,  Dr. Muro.    I, Dr. Muro, personally performed the services described in this documentation, as scribed by Pam Pinto in my presence, and it is both accurate and complete.    Dragon dictation was used for this note.  Speech recognition errors are a possibility.    MEDICATIONS:  Current Outpatient Prescriptions   Medication Sig Dispense Refill     acetaminophen (TYLENOL) 500 MG tablet Take 500-1,000 mg by mouth every 6 (six) hours as needed.       ARGININE, L-ARGININE, ORAL Take 1 tablet by mouth 2 (two) times a day.       aspirin 81 MG EC tablet Take 1 tablet (81 mg total) by mouth daily. 90 tablet 1     biotin 2,500 mcg cap Take 5,000 mcg by mouth daily.       CALCIUM CARB-MAG OXIDE-ZINC OX ORAL Take 1 tablet by mouth daily.       chlorthalidone (HYGROTEN) 25 MG tablet Take 1 tablet (25 mg total) by mouth every morning. 90 tablet 3     cholecalciferol, vitamin D3, 5,000 unit Tab Take 5,000 Units by mouth daily.       CRANBERRY FRUIT EXTRACT (CRANBERRY ORAL) Take 1 capsule by mouth daily.       cyanocobalamin, vitamin B-12, 2,500 mcg Subl Place 2,500 mcg under the tongue daily.       DIGOX 125 mcg tablet TAKE ONE TABLET BY MOUTH IN THE MORNING 90 tablet 0     donepezil (ARICEPT) 10 MG tablet Take 10 mg by mouth bedtime.       ferrous sulfate 325 (65 FE) MG tablet Take 1 tablet by mouth 3 (three) times a day with meals. 270 tablet 3     FLUoxetine (PROZAC) 20 MG capsule Take 1 capsule (20 mg total) by mouth daily. 30 capsule 2     LACTOBACILLUS ACIDOPHILUS (PROBIOTIC ACIDOPHILUS ORAL) Take 1 capsule by mouth daily.        magnesium oxide (MAGOX) 400 mg tablet Take 1 tablet (400 mg total) by mouth daily. 200 tablet 1     metoprolol tartrate (LOPRESSOR) 100 MG tablet TAKE 1 1/2 TABLETS BY MOUTH 2 TIMES A DAY  270 tablet 0     nystatin (MYCOSTATIN) cream Apply 3 times daily to affected area 30 g 2     OMEGA-3S/DHA/EPA/FISH OIL (OMEGA 3 ORAL) Take 1,000 mg by mouth 2 (two) times a day.        potassium  chloride (KLOR-CON M20) 20 MEQ tablet Take 1 tablet (20 mEq total) by mouth 3 (three) times a day. 270 tablet 3     SAW PALMETTO ORAL Take 450 mg by mouth daily.       trimethoprim (TRIMPEX) 100 mg tablet Take 100 mg by mouth once daily.       VIT B1 MN/B2/B3/B5/B6/B12/C/FA (B COMPLEX W-VIT C ORAL) Take 1 tablet by mouth daily.        vitamin E 400 UNIT capsule Take by mouth see administration instructions. 1200 mg in the morning and 800 mg in the evening.       No current facility-administered medications for this visit.        Total data points: 2

## 2021-06-21 NOTE — LETTER
Letter by Patricia Muro MD at      Author: Patricia Muro MD Service: -- Author Type: --    Filed:  Encounter Date: 1/29/2021 Status: (Other)         Navdeep Spann  [address]  [place] [address]             January 29, 2021         Dear Mr. Spann,    Below are the results from your recent visit:    Resulted Orders   HM2(CBC w/o Differential)   Result Value Ref Range    WBC 7.3 4.0 - 11.0 thou/uL    RBC 4.03 (L) 4.40 - 6.20 mill/uL    Hemoglobin 12.7 (L) 14.0 - 18.0 g/dL    Hematocrit 37.0 (L) 40.0 - 54.0 %    MCV 92 80 - 100 fL    MCH 31.5 27.0 - 34.0 pg    MCHC 34.3 32.0 - 36.0 g/dL    RDW 12.7 11.0 - 14.5 %    Platelets 208 140 - 440 thou/uL    MPV 10.1 8.5 - 12.5 fL   Basic Metabolic Panel   Result Value Ref Range    Sodium 141 136 - 145 mmol/L    Potassium 3.8 3.5 - 5.0 mmol/L    Chloride 108 (H) 98 - 107 mmol/L    CO2 22 22 - 31 mmol/L    Anion Gap, Calculation 11 5 - 18 mmol/L    Glucose 136 (H) 70 - 125 mg/dL    Calcium 8.7 8.5 - 10.5 mg/dL    BUN 13 8 - 28 mg/dL    Creatinine 1.02 0.70 - 1.30 mg/dL    GFR MDRD Af Amer >60 >60 mL/min/1.73m2    GFR MDRD Non Af Amer >60 >60 mL/min/1.73m2    Narrative    Fasting Glucose reference range is 70-99 mg/dL per  American Diabetes Association (ADA) guidelines.   Magnesium   Result Value Ref Range    Magnesium 1.8 1.8 - 2.6 mg/dL       Labs are stable. I have referred you to the anticoagulation clinic for your chronic a fib.    Please call with questions or contact us using [employer].    Sincerely,        Electronically signed by Patricia Muro MD

## 2021-06-21 NOTE — LETTER
Letter by Nelly Alonzo RN at      Author: Nelly Alonzo RN Service: -- Author Type: --    Filed:  Encounter Date: 10/12/2020 Status: (Other)       CARE COORDINATION  [facility]  [address] [place] [address]       October 14, 2020    Navdeep Spann  [address]  [place] [address]      Dear Navdeep,    Your Care Team congratulates you on your journey to maintain wellness. This document will help guide you on your journey to maintain a healthy lifestyle.  You can use this to help you overcome any barriers you may encounter.  If you should have any questions or concerns, you can contact the members of your Care Team or contact your Primary Care Clinic for assistance.      My Access Plan  Medical Emergency 911   Primary Clinic Line Patricia Muro MD - [phone]   24 Hour Appointment Line [phone] or  [phone] ([phone]) (toll-free)   24 Hour Nurse Line [phone]   Preferred Urgent Care     Preferred Hospital [facility]  [phone]   Preferred Pharmacy [id number] [place], [place] - [address] [facility] Crisis Line The National Suicide Prevention Lifeline at 1-635.488.6227 or 911     My Care Team Members  Patient Care Team       Relationship Specialty Notifications Start End    Patricia Muro MD PCP - General Family Medicine  3/20/15     Phone: [phone] Fax: [phone]         [address] [place] [address]    Michelle Atkinson, PharmD Pharmacist Pharmacist Admissions 7/13/16     Phone: [phone] Fax: [phone]         [facility] [address] [address]    Patricia Muro MD Assigned PCP   7/28/19     Phone: [phone] Fax: [phone]         [address] [place] [address]              Goals        Patient Stated    ? COMPLETED: Medical (pt-stated)      RN Goal Statement:  RN Care Coordinator  will follow up with TCU care team in collaboration of patient's goals of care and discharge plan/needs for the next 30 days, or until discharge, per BPCI-A guidelines.         Date Goal set:  10/5/2020  Barriers:  Recent hospitalization(s), therapy progression  Strengths:  Ongoing recovery and rehab support at TCU  Date to Achieve By:  10/26/20  Patient expressed understanding of goal:  Yes  Action steps to achieve this goal:  1. RN Care Coordinator will follow up with TCU biweekly for update, sooner or as needed, on patient progression towards goals of care, and discharge plan/needs.                Advance Care Plans/Directives Type:      We notice that you do not have an Advance Directive on file. Upon completion of your Health Care Directive, please bring a copy with you to your next office visit.    It has been your Clinic Care Team's pleasure to work with you on your goals.    Regards,  Your Clinic Care Team

## 2021-06-21 NOTE — PROGRESS NOTES
PFT NOTE    Pt gave good effort & was cooperative, was able to meet ATS standards for acceptability and repeatability, except pleth. albuterol was given for the bronchodilator. Patient left in no distress.    Lidia Fajardo, BAILEYT      RESPIRATORY CARE NOTE     Patient Name: Navdeep Spann  Today's Date: 10/18/2018     Problem List  Patient Active Problem List   Diagnosis     Anxiety     Hypertension     Anemia     Chronic atrial fibrillation (H)     Leukocytosis, unspecified type     Hearing loss with hearing aids     Depressive disorder, atypical     Dementia     Marijuana use     Contraindication to anticoagulation therapy     BPH (benign prostatic hyperplasia)                           Lidia Fajardo

## 2021-06-22 NOTE — PROGRESS NOTES
ASSESSMENT/PLAN:  1. Hypertension  73-year-old gentleman with well-controlled hypertension.  He does have chronic atrial fibrillation and is currently rate controlled.  He is not a candidate for anticoagulation due to high risk for falls and confusion.  He also has some underlying dementia for which she states is stable.  Currently he is living independently with his significant other and does quite a bit of the house working cooking.    He was experiencing some shortness of breath and we did PFTs and cardiac workup and we did not find an etiology for his symptoms.  He is now feeling improved.    2. Chronic atrial fibrillation (H)    3. Dementia without behavioral disturbance, unspecified dementia type        There are no Patient Instructions on file for this visit.    No orders of the defined types were placed in this encounter.    There are no discontinued medications.    Return in about 3 months (around 3/10/2019) for next scheduled follow up.    CHIEF COMPLAINT;  Chief Complaint   Patient presents with     Medication Management       HISTORY OF PRESENT ILLNESS:  Navdeep is a 73 y.o. male presenting to the clinic today for medication management.     Dyspnea: He is still having some difficulty breathing, however he mentions that he does not notice it as much. He feels that he is still able to do his normal level of activity without difficulty. He maintains his home by doing the housework and cooking. He denies cough or wheezing.     Lymphedema: He states that he is supposed to wear support stockings, but these are uncomfortable for him.     Dementia: He feels his memory has been okay. He does notice some issues with his memory, but feels this is related to Alzheimer's.     REVIEW OF SYSTEMS:  He wears glasses. He ambulates with a cane. All other systems are negative.    PFSH:  He smokes marijuana about once per month. He has a history of alcohol abuse. He quit drinking alcohol in 1990. He used to ride his  motorcycle. He and his significant other have been together since 1977. He swam across the [facility] [place] when he was 20 years old. Reviewed, as below.    TOBACCO USE:  Social History     Tobacco Use   Smoking Status Former Smoker   Smokeless Tobacco Never Used       VITALS:  Vitals:    12/10/18 1414   BP: 124/80   Pulse: 68   Resp: 20   Weight: 174 lb (78.9 kg)     Wt Readings from Last 3 Encounters:   12/10/18 174 lb (78.9 kg)   10/18/18 199 lb (90.3 kg)   10/02/18 198 lb (89.8 kg)     Body mass index is 25.7 kg/m .    PHYSICAL EXAM:  GENERAL APPEARANCE: Alert, cooperative, no distress, appears stated age  HEAD: Normocephalic, without obvious abnormality, atraumatic  LUNGS: Clear to auscultation bilaterally, respirations unlabored  HEART: Regular rate and rhythm, S1 and S2 normal, no murmur, rub or gallop  EXTREMITIES: Extremities normal, atraumatic, no cyanosis or edema  PULSES: 2+ and symmetric all extremities  NEUROLOGIC: CNII-XII intact.     RECENT RESULTS  No results found for this or any previous visit (from the past 48 hour(s)).    ADDITIONAL HISTORY SUMMARIZED (2): None.  DECISION TO OBTAIN EXTRA INFORMATION (1): None.  RADIOLOGY TESTS (1): None.  LABS (1): Hemoglobin 12.5 10/18/18.  MEDICINE TESTS (1): Echo 8/16/18 reviewed, normal left ventricular size and systolic performance with visually estimated ejection fraction of 50-55%, mild concentric increase in left ventricular wall thickness, very mild aortic stenosis, normal right ventricular size and systolic performance, mild left atrial enlargement.  INDEPENDENT REVIEW (2 each): None.    The visit lasted a total of 12 minutes face to face with the patient. Over 50% of the time was spent counseling and educating the patient about lymphedema, dyspnea, and dementia.    Pam CLARK, am scribing for and in the presence of, Dr. Muro.    IDr. Muro, personally performed the services described in this documentation, as scribed by Pam Pinto in  my presence, and it is both accurate and complete.    Dragon dictation was used for this note.  Speech recognition errors are a possibility.    MEDICATIONS:  Current Outpatient Medications   Medication Sig Dispense Refill     acetaminophen (TYLENOL) 500 MG tablet Take 500-1,000 mg by mouth every 6 (six) hours as needed.       ARGININE, L-ARGININE, ORAL Take 1 tablet by mouth 2 (two) times a day.       aspirin 81 MG EC tablet Take 1 tablet (81 mg total) by mouth daily. 90 tablet 1     biotin 2,500 mcg cap Take 5,000 mcg by mouth daily.       CALCIUM CARB-MAG OXIDE-ZINC OX ORAL Take 1 tablet by mouth daily.       chlorthalidone (HYGROTEN) 25 MG tablet Take 1 tablet (25 mg total) by mouth every morning. 90 tablet 3     cholecalciferol, vitamin D3, 5,000 unit Tab Take 5,000 Units by mouth daily.       CRANBERRY FRUIT EXTRACT (CRANBERRY ORAL) Take 1 capsule by mouth daily.       cyanocobalamin, vitamin B-12, 2,500 mcg Subl Place 2,500 mcg under the tongue daily.       DIGOX 125 mcg tablet TAKE ONE TABLET BY MOUTH IN THE MORNING 90 tablet 0     donepezil (ARICEPT) 10 MG tablet Take 10 mg by mouth bedtime.       ferrous sulfate 325 (65 FE) MG tablet Take 1 tablet by mouth 3 (three) times a day with meals. 270 tablet 3     FLUoxetine (PROZAC) 20 MG capsule Take 1 capsule (20 mg total) by mouth daily. 30 capsule 2     LACTOBACILLUS ACIDOPHILUS (PROBIOTIC ACIDOPHILUS ORAL) Take 1 capsule by mouth daily.        magnesium oxide (MAG-OX) 400 mg (241.3 mg magnesium) tablet Take 1 tablet (400 mg total) by mouth daily. 200 tablet 3     metoprolol tartrate (LOPRESSOR) 100 MG tablet TAKE 1 and 1/2 TABLETS BY MOUTH 2 TIMES A  tablet 3     nystatin (MYCOSTATIN) cream Apply 3 times daily to affected area 30 g 2     OMEGA-3S/DHA/EPA/FISH OIL (OMEGA 3 ORAL) Take 1,000 mg by mouth 2 (two) times a day.        potassium chloride (KLOR-CON M20) 20 MEQ tablet Take 1 tablet (20 mEq total) by mouth 3 (three) times a day. 270 tablet 3      SAW PALMETTO ORAL Take 450 mg by mouth daily.       trimethoprim (TRIMPEX) 100 mg tablet Take 100 mg by mouth once daily.       VIT B1 MN/B2/B3/B5/B6/B12/C/FA (B COMPLEX W-VIT C ORAL) Take 1 tablet by mouth daily.        vitamin E 400 UNIT capsule Take by mouth see administration instructions. 1200 mg in the morning and 800 mg in the evening.       No current facility-administered medications for this visit.        Total data points: 2

## 2021-06-23 NOTE — TELEPHONE ENCOUNTER
RN cannot approve Refill Request    RN can NOT refill this medication Protocol failed and NO refill given.       Doreen Fuentes, [facility] Triage/Med Refill 1/28/2019    Requested Prescriptions   Pending Prescriptions Disp Refills     DIGOX 125 mcg tablet [Pharmacy Med Name: Digox Oral Tablet 125 MCG] 30 tablet 0     Sig: TAKE ONE TABLET BY MOUTH IN THE MORNING    Refill Protocol for Digoxin Failed - 1/26/2019 10:29 PM       Failed - ECG in last 12 months    ECG rhythm strip: No results found for this or any previous visit. ECG 12 lead MUSE:   Results for orders placed or performed during the hospital encounter of 07/24/14   ECG 12 lead   Result Value Ref Range    SYSTOLIC BLOOD PRESSURE  mmHg    DIASTOLIC BLOOD PRESSURE  mmHg    VENTRICULAR RATE 91 BPM    ATRIAL RATE 104 BPM    P-R INTERVAL  ms    QRS DURATION 112 ms    Q-T INTERVAL 422 ms    QTC CALCULATION (BEZET) 519 ms    P Axis  degrees    R AXIS -15 degrees    T AXIS 131 degrees    MUSE DIAGNOSIS       Atrial fibrillation  Non-specific ST & T wave changes  Leftward axis  Prolonged QT  Abnormal ECG  When compared with ECG of 24-JUL-2014 11:28,  slightly less anterior ST depression    Confirmed by ELBA ZELAYA MD LOC:[facility] ([id number]) on 7/25/2014 8:17:56 AM    ECG 12 lead nursing unit:   Results for orders placed or performed during the hospital encounter of 06/20/17   ECG 12 lead nursing unit performed   Result Value Ref Range    SYSTOLIC BLOOD PRESSURE  mmHg    DIASTOLIC BLOOD PRESSURE  mmHg    VENTRICULAR RATE 91 BPM    ATRIAL RATE 91 BPM    P-R INTERVAL  ms    QRS DURATION 114 ms    Q-T INTERVAL 396 ms    QTC CALCULATION (BEZET) 487 ms    P Axis  degrees    R AXIS -24 degrees    T AXIS 99 degrees    MUSE DIAGNOSIS       Atrial fibrillation  T wave abnormality, consider lateral ischemia or digitalis effect  Prolonged QT  Abnormal ECG  When compared with ECG of 14-JUN-2017 06:10,  No significant change was found  Confirmed by SURJIT MCDONALD MD LOC:[facility] ([id number])  on 6/20/2017 5:32:14 PM              Failed - Normal digoxin level in last 12 months    No results found for: PHENOBARB         Passed - LFT or AST or ALT on file in last 12 months    Albumin   Date Value Ref Range Status   10/03/2018 3.6 3.5 - 5.0 g/dL Final     Bilirubin, Total   Date Value Ref Range Status   10/03/2018 1.0 0.0 - 1.0 mg/dL Final     Bilirubin, Direct   Date Value Ref Range Status   10/03/2018 0.3 <=0.5 mg/dL Final     Alkaline Phosphatase   Date Value Ref Range Status   10/03/2018 76 45 - 120 U/L Final     AST   Date Value Ref Range Status   10/03/2018 25 0 - 40 U/L Final     ALT   Date Value Ref Range Status   10/03/2018 22 0 - 45 U/L Final     Protein, Total   Date Value Ref Range Status   10/03/2018 7.0 6.0 - 8.0 g/dL Final               Passed - PCP or prescribing provider visit in past 6 months or next 3 months    Last office visit with prescriber/PCP: Visit date not found OR same dept: 12/10/2018 Patricia Muro MD OR same specialty: 12/10/2018 Patricia Muro MD Last physical: Visit date not found  Last MTM visit: Visit date not found     Next appt within 3 mo: Visit date not found  Next physical within 3 mo: Visit date not found  Prescriber OR PCP: Cheryle Alfred MD  Last diagnosis associated with med order: 1. Atrial fibrillation (H)  - DIGOX 125 mcg tablet [Pharmacy Med Name: Digox Oral Tablet 125 MCG]; TAKE ONE TABLET BY MOUTH IN THE MORNING  Dispense: 30 tablet; Refill: 0     If protocol passes may refill for 6 months if within 3 months of last provider visit (or a total of 9 months).         Passed - BMP on file in last 12 months    Sodium   Date Value Ref Range Status   07/31/2018 143 136 - 145 mmol/L Final     Potassium   Date Value Ref Range Status   07/31/2018 3.6 3.5 - 5.0 mmol/L Final     Chloride   Date Value Ref Range Status   07/31/2018 107 98 - 107 mmol/L Final     CO2   Date Value Ref Range Status   07/31/2018 27 22 - 31 mmol/L Final     BUN   Date Value Ref  Range Status   07/31/2018 25 8 - 28 mg/dL Final     Creatinine   Date Value Ref Range Status   07/31/2018 1.08 0.70 - 1.30 mg/dL Final            Passed - CBC w/plts (hm2) on file in last 12 months    WBC   Date Value Ref Range Status   07/31/2018 9.0 4.0 - 11.0 thou/uL Final     Hemoglobin   Date Value Ref Range Status   10/18/2018 12.5 (L) 14.0 - 18.0 g/dL Final     Hematocrit   Date Value Ref Range Status   07/31/2018 35.0 (L) 40.0 - 54.0 % Final     Platelets   Date Value Ref Range Status   07/31/2018 242 140 - 440 thou/uL Final            Passed - Magnesium in last 12 months    Magnesium   Date Value Ref Range Status   07/31/2018 1.9 1.8 - 2.6 mg/dL Final             Passed - Serum creatinine in last 12 months    Creatinine   Date Value Ref Range Status   07/31/2018 1.08 0.70 - 1.30 mg/dL Final

## 2021-06-24 ENCOUNTER — COMMUNICATION - HEALTHEAST (OUTPATIENT)
Dept: FAMILY MEDICINE | Facility: CLINIC | Age: 76
End: 2021-06-24

## 2021-06-24 DIAGNOSIS — E87.6 HYPOKALEMIA: ICD-10-CM

## 2021-06-24 NOTE — PROGRESS NOTES
"ASSESSMENT/PLAN:  75 yo male seen in follow up after fall with laceration above eye.  It is healing well.  No headaches, confusion.  1. Chronic atrial fibrillation (H)  75 yo male with chronic afib. Anticoagulation contraindicated due to high risk of falls. Rate controlled and asymptomatic    2. Depressive disorder, atypical  Experiencing side effects from fluoxetine.  Will change to citalopram. New rx sent to the pharmacy    3. Hypertension  Well controlled currently    Patient Instructions   Stop the Prozac and start citalopram.       No orders of the defined types were placed in this encounter.    Medications Discontinued During This Encounter   Medication Reason     FLUoxetine (PROZAC) 20 MG capsule        Return in about 3 months (around 5/14/2019) for next scheduled follow up.    CHIEF COMPLAINT;  Chief Complaint   Patient presents with     Follow-up     follow up from a fall on 1/13/19       HISTORY OF PRESENT ILLNESS:  Navdeep is a 74 y.o. male presenting to the clinic with his wife today for EDF.    Facial Laceration: He was evaluated at [facility] ED 1/13/19 for facial laceration. He slipped on the ice and hit his head on a step. He blacked out. His partner thought that he would need stitches over his left eye, but he did not have stitches placed. He had a head CT done that showed no evidence of acute intracranial hemorrhage, mass, or herniation. He is not having any headaches. He has not had any other instances of syncope or balance issues since he hit his head. His wife thinks that his memory has been unchanged since the fall. He denies double vision, word-finding difficulty, or changes in  strength.     Depression: His wife is not sure if Prozac is working for him. His wife feels that he is often angry. He feels that his irritability \"sneaks up\" on him. His wife thinks that citalopram worked better for him.     REVIEW OF SYSTEMS:  Skin is positive for mole on back. All other systems are " "negative.    PFSH:  Their electric panel went out in their house recently.    TOBACCO USE:  Social History     Tobacco Use   Smoking Status Former Smoker   Smokeless Tobacco Never Used       VITALS:  Vitals:    02/14/19 1427   BP: 100/60   Patient Site: Right Arm   Patient Position: Sitting   Cuff Size: Adult Regular   Pulse: 65   SpO2: 98%   Weight: 180 lb (81.6 kg)   Height: 5' 11\" (1.803 m)     Wt Readings from Last 3 Encounters:   02/14/19 180 lb (81.6 kg)   12/10/18 174 lb (78.9 kg)   10/18/18 199 lb (90.3 kg)     Body mass index is 25.1 kg/m .    PHYSICAL EXAM:  GENERAL APPEARANCE: Alert, cooperative, no distress, appears stated age  HEAD: Normocephalic, without obvious abnormality, atraumatic  LUNGS: Clear to auscultation bilaterally, respirations unlabored  HEART: Regular rate and rhythm, S1 and S2 normal, no murmur, rub or gallop  EXTREMITIES: Extremities normal, atraumatic, no cyanosis or edema  PULSES: 2+ and symmetric all extremities  SKIN: Two elevated, deeply-pigmented seborrheic keratoses on lower back  NEUROLOGIC: CNII-XII intact.     RECENT RESULTS  Recent Results (from the past 48 hour(s))   Hepatic Profile    Collection Time: 02/18/19  2:30 PM   Result Value Ref Range    Bilirubin, Total 0.7 0.0 - 1.0 mg/dL    Bilirubin, Direct 0.2 <=0.5 mg/dL    Protein, Total 7.4 6.0 - 8.0 g/dL    Albumin 3.8 3.5 - 5.0 g/dL    Alkaline Phosphatase 106 45 - 120 U/L    AST 16 0 - 40 U/L    ALT 11 0 - 45 U/L       QUALITY MEASURES:  The following are part of a depression follow up plan for the patient:  management of mental health treatment    ADDITIONAL HISTORY SUMMARIZED (2): None.  DECISION TO OBTAIN EXTRA INFORMATION (1): Care Everywhere.  RADIOLOGY TESTS (1): CT Head 1/13/19 reviewed, no evidence of acute intracranial hemorrhage, mass, or herniation, marked periventricular white matter changes likely due to chronic microvascular ischemia disease.  LABS (1): None.  MEDICINE TESTS (1): None.  INDEPENDENT REVIEW " (2 each): None.    The visit lasted a total of 10 minutes face to face with the patient. Over 50% of the time was spent counseling and educating the patient about recent fall and depression.    I, Pam Pinto, am scribing for and in the presence of, Dr. Muro.    I, Dr. Muro, personally performed the services described in this documentation, as scribed by Pam Pinto in my presence, and it is both accurate and complete.    Dragon dictation was used for this note.  Speech recognition errors are a possibility.    MEDICATIONS:  Current Outpatient Medications   Medication Sig Dispense Refill     acetaminophen (TYLENOL) 500 MG tablet Take 500-1,000 mg by mouth every 6 (six) hours as needed.       ARGININE, L-ARGININE, ORAL Take 1 tablet by mouth 2 (two) times a day.       aspirin 81 MG EC tablet Take 1 tablet (81 mg total) by mouth daily. 90 tablet 1     biotin 2,500 mcg cap Take 5,000 mcg by mouth daily.       CALCIUM CARB-MAG OXIDE-ZINC OX ORAL Take 1 tablet by mouth daily.       chlorthalidone (HYGROTEN) 25 MG tablet Take 1 tablet (25 mg total) by mouth every morning. 90 tablet 3     cholecalciferol, vitamin D3, 5,000 unit Tab Take 5,000 Units by mouth daily.       CRANBERRY FRUIT EXTRACT (CRANBERRY ORAL) Take 1 capsule by mouth daily.       cyanocobalamin, vitamin B-12, 2,500 mcg Subl Place 2,500 mcg under the tongue daily.       DIGOX 125 mcg tablet TAKE ONE TABLET BY MOUTH IN THE MORNING 30 tablet 0     donepezil (ARICEPT) 10 MG tablet Take 10 mg by mouth bedtime.       ferrous sulfate 325 (65 FE) MG tablet Take 1 tablet by mouth 3 (three) times a day with meals. 270 tablet 3     LACTOBACILLUS ACIDOPHILUS (PROBIOTIC ACIDOPHILUS ORAL) Take 1 capsule by mouth daily.        magnesium oxide (MAG-OX) 400 mg (241.3 mg magnesium) tablet Take 1 tablet (400 mg total) by mouth daily. 200 tablet 3     metoprolol tartrate (LOPRESSOR) 100 MG tablet TAKE 1 and 1/2 TABLETS BY MOUTH 2 TIMES A  tablet 3      nystatin (MYCOSTATIN) cream Apply 3 times daily to affected area 30 g 2     OMEGA-3S/DHA/EPA/FISH OIL (OMEGA 3 ORAL) Take 1,000 mg by mouth 2 (two) times a day.        potassium chloride (KLOR-CON M20) 20 MEQ tablet Take 1 tablet (20 mEq total) by mouth 3 (three) times a day. 270 tablet 3     SAW PALMETTO ORAL Take 450 mg by mouth daily.       trimethoprim (TRIMPEX) 100 mg tablet Take 100 mg by mouth once daily.       VIT B1 MN/B2/B3/B5/B6/B12/C/FA (B COMPLEX W-VIT C ORAL) Take 1 tablet by mouth daily.        vitamin E 400 UNIT capsule Take by mouth see administration instructions. 1200 mg in the morning and 800 mg in the evening.       citalopram (CELEXA) 20 MG tablet Take 1 tablet (20 mg total) by mouth daily. 30 tablet 2     No current facility-administered medications for this visit.        Total data points: 1

## 2021-06-24 NOTE — TELEPHONE ENCOUNTER
RN cannot approve Refill Request    RN can NOT refill this medication Protocol failed and NO refill given. Last office visit: 1/30/2018 Neva Whitlock MD Last Physical: Visit date not found Last MTM visit: Visit date not found Last visit same specialty: 2/14/2019 Patricia Muro MD.  Next visit within 3 mo: Visit date not found  Next physical within 3 mo: Visit date not found      Karley Perez, [facility] Triage/Med Refill 2/27/2019    Requested Prescriptions   Pending Prescriptions Disp Refills     DIGOX 125 mcg tablet [Pharmacy Med Name: Digox Oral Tablet 125 MCG] 30 tablet 0     Sig: TAKE ONE TABLET BY MOUTH IN THE MORNING    Refill Protocol for Digoxin Failed - 2/27/2019  7:01 AM       Failed - ECG in last 12 months    ECG rhythm strip: No results found for this or any previous visit. ECG 12 lead MUSE:   Results for orders placed or performed during the hospital encounter of 07/24/14   ECG 12 lead   Result Value Ref Range    SYSTOLIC BLOOD PRESSURE  mmHg    DIASTOLIC BLOOD PRESSURE  mmHg    VENTRICULAR RATE 91 BPM    ATRIAL RATE 104 BPM    P-R INTERVAL  ms    QRS DURATION 112 ms    Q-T INTERVAL 422 ms    QTC CALCULATION (BEZET) 519 ms    P Axis  degrees    R AXIS -15 degrees    T AXIS 131 degrees    MUSE DIAGNOSIS       Atrial fibrillation  Non-specific ST & T wave changes  Leftward axis  Prolonged QT  Abnormal ECG  When compared with ECG of 24-JUL-2014 11:28,  slightly less anterior ST depression    Confirmed by ELBA ZELAYA MD LOC:[facility] ([id number]) on 7/25/2014 8:17:56 AM    ECG 12 lead nursing unit:   Results for orders placed or performed during the hospital encounter of 06/20/17   ECG 12 lead nursing unit performed   Result Value Ref Range    SYSTOLIC BLOOD PRESSURE  mmHg    DIASTOLIC BLOOD PRESSURE  mmHg    VENTRICULAR RATE 91 BPM    ATRIAL RATE 91 BPM    P-R INTERVAL  ms    QRS DURATION 114 ms    Q-T INTERVAL 396 ms    QTC CALCULATION (BEZET) 487 ms    P Axis  degrees    R AXIS -24  degrees    T AXIS 99 degrees    MUSE DIAGNOSIS       Atrial fibrillation  T wave abnormality, consider lateral ischemia or digitalis effect  Prolonged QT  Abnormal ECG  When compared with ECG of 14-JUN-2017 06:10,  No significant change was found  Confirmed by SURJIT MCDONALD MD LOC: ([id number]) on 6/20/2017 5:32:14 PM              Failed - Normal digoxin level in last 12 months    No results found for: PHENOBARB         Passed - LFT or AST or ALT on file in last 12 months    Albumin   Date Value Ref Range Status   02/18/2019 3.8 3.5 - 5.0 g/dL Final     Bilirubin, Total   Date Value Ref Range Status   02/18/2019 0.7 0.0 - 1.0 mg/dL Final     Bilirubin, Direct   Date Value Ref Range Status   02/18/2019 0.2 <=0.5 mg/dL Final     Alkaline Phosphatase   Date Value Ref Range Status   02/18/2019 106 45 - 120 U/L Final     AST   Date Value Ref Range Status   02/18/2019 16 0 - 40 U/L Final     ALT   Date Value Ref Range Status   02/18/2019 11 0 - 45 U/L Final     Protein, Total   Date Value Ref Range Status   02/18/2019 7.4 6.0 - 8.0 g/dL Final               Passed - PCP or prescribing provider visit in past 6 months or next 3 months    Last office visit with prescriber/PCP: Visit date not found OR same dept: 2/14/2019 Patricia Muro MD OR same specialty: 2/14/2019 Patricia Muro MD Last physical: Visit date not found  Last MTM visit: Visit date not found     Next appt within 3 mo: Visit date not found  Next physical within 3 mo: Visit date not found  Prescriber OR PCP: Neva Whitlock MD  Last diagnosis associated with med order: 1. Atrial fibrillation (H)  - DIGOX 125 mcg tablet [Pharmacy Med Name: Digox Oral Tablet 125 MCG]; TAKE ONE TABLET BY MOUTH IN THE MORNING  Dispense: 30 tablet; Refill: 0     If protocol passes may refill for 6 months if within 3 months of last provider visit (or a total of 9 months).         Passed - BMP on file in last 12 months    Sodium   Date Value Ref Range Status   07/31/2018 143  136 - 145 mmol/L Final     Potassium   Date Value Ref Range Status   07/31/2018 3.6 3.5 - 5.0 mmol/L Final     Chloride   Date Value Ref Range Status   07/31/2018 107 98 - 107 mmol/L Final     CO2   Date Value Ref Range Status   07/31/2018 27 22 - 31 mmol/L Final     BUN   Date Value Ref Range Status   07/31/2018 25 8 - 28 mg/dL Final     Creatinine   Date Value Ref Range Status   07/31/2018 1.08 0.70 - 1.30 mg/dL Final            Passed - CBC w/plts (hm2) on file in last 12 months    WBC   Date Value Ref Range Status   07/31/2018 9.0 4.0 - 11.0 thou/uL Final     Hemoglobin   Date Value Ref Range Status   10/18/2018 12.5 (L) 14.0 - 18.0 g/dL Final     Hematocrit   Date Value Ref Range Status   07/31/2018 35.0 (L) 40.0 - 54.0 % Final     Platelets   Date Value Ref Range Status   07/31/2018 242 140 - 440 thou/uL Final            Passed - Magnesium in last 12 months    Magnesium   Date Value Ref Range Status   07/31/2018 1.9 1.8 - 2.6 mg/dL Final             Passed - Serum creatinine in last 12 months    Creatinine   Date Value Ref Range Status   07/31/2018 1.08 0.70 - 1.30 mg/dL Final

## 2021-06-25 ENCOUNTER — COMMUNICATION - HEALTHEAST (OUTPATIENT)
Dept: FAMILY MEDICINE | Facility: CLINIC | Age: 76
End: 2021-06-25

## 2021-06-25 NOTE — TELEPHONE ENCOUNTER
Reason contacted:  Orders request  Information relayed:    Preeti calling from [facility]   Phone number: [phone]    Order being requested:   Continued physical therapy 1 times this week, 2 times a week for 3 weeks to work on gait training, balance, strength, transfers.     Social work eval   Skilled nursing eval      Notified ok for requested order per Dr. King (covering for Dr. Muro)  Please advise if this is not ok.   Additional questions:  No  Further follow-up needed:  No  Okay to leave a detailed message:  No

## 2021-06-25 NOTE — TELEPHONE ENCOUNTER
Left message to call back for: referral  Information to relay to patient:  Below message via VM. I provided the telephone number for scheduling.

## 2021-06-25 NOTE — TELEPHONE ENCOUNTER
RN cannot approve Refill Request    RN can NOT refill this medication med is not covered by policy/route to provider. Last office visit: 4/29/2021 Patricia Muro MD Last Physical: 7/31/2018 Last MTM visit: Visit date not found Last visit same specialty: 4/29/2021 Patricia Muro MD.  Next visit within 3 mo: Visit date not found  Next physical within 3 mo: Visit date not found      Tami Barber, [facility] Triage/Med Refill 6/10/2021    Requested Prescriptions   Pending Prescriptions Disp Refills     OLANZapine zydis (ZYPREXA ZYDIS) 5 MG disintegrating tablet [Pharmacy Med Name: OLANZapine Oral Tablet Disintegrating 5 MG] 30 tablet 0     Sig: PLACE 1 tablet (5 mg total) ON THE TONGUE at bedtime.       There is no refill protocol information for this order

## 2021-06-25 NOTE — ED TRIAGE NOTES
"Presents via EMS from home where he lives in an apartment with his wife. Has had frequent falls this week without injury and had two falls this morning. Patient was able to get himself up and was sitting on the side of the bed. Denies pain, loc, head injuries, c-spine tenderness. A&O x 4. Hx of dementia per EMS. Left arm appears weaker than the left and patient states \"its always like that.\" Uses a walker to ambulate.     Patient's only complaint is general weakness.     Incontinent of urine on arrival.  "

## 2021-06-25 NOTE — TELEPHONE ENCOUNTER
Reason contacted:  Status Update  Information relayed:  Yoselyn is calling to notify you she called 911 this morning. She reports the patient has fallen 5-6 times this morning and fell quite a few times yesterday. She wants you to be aware the patient is going to [facility] today.   Additional questions:  No  Further follow-up needed:  No  Okay to leave a detailed message:  No

## 2021-06-25 NOTE — ED NOTES
Patient is sleeping. Is oriented. No new changes at this time. States has general weakness. Is not dizzy and has not had any loc.

## 2021-06-25 NOTE — ED NOTES
Patient is resting. Has no new complaints at this time. Just waiting for disposition . He has no pain present

## 2021-06-25 NOTE — TELEPHONE ENCOUNTER
Refill Approved    Rx renewed per Medication Renewal Policy. Medication was last renewed on 6/24/18.    Di Bustamante, [facility] Triage/Med Refill 3/17/2019     Requested Prescriptions   Pending Prescriptions Disp Refills     aspirin 81 MG EC tablet [Pharmacy Med Name: EQL Aspirin Low Dose Oral Tablet Delayed Release 81 MG] 90 tablet 0     Sig: Take 1 tablet (81 mg total) by mouth daily.    Aspirin/Dipyridamole Refill Protocol Passed - 3/13/2019 10:02 PM       Passed - PCP or prescribing provider visit in past 12 months      Last office visit with prescriber/PCP: 2/14/2019 Patricia Muro MD OR same dept: 2/14/2019 Patricia Muro MD OR same specialty: 2/14/2019 Patricia Muro MD  Last physical: 7/31/2018 Last MTM visit: Visit date not found    Next appt within 3 mo: Visit date not found Next physical within 3 mo: Visit date not found  Prescriber OR PCP: Patricia Muro MD  Last diagnosis associated with med order: 1. A-fib (H)  - aspirin 81 MG EC tablet [Pharmacy Med Name: EQL Aspirin Low Dose Oral Tablet Delayed Release 81 MG]; Take 1 tablet (81 mg total) by mouth daily.  Dispense: 90 tablet; Refill: 0    If protocol passes may refill for 6 months if within 3 months of last provider visit (or a total of 9 months).

## 2021-06-25 NOTE — TELEPHONE ENCOUNTER
Reason for Call: Request for an order or referral:    Order or referral being requested: Dermatology    Date needed: at your convenience    Has the patient been seen by the PCP for this problem? YES    Additional comments: Recvd call from karol Frye/Harpreet has been using the cream and shampoo for his dry skin/dermatitis. The shampoo does not seem to be working and the cream is helping a little. Melva and Harpreet would like to know if Dr Patricia Muro would place an order for Harpreet to see Dermatology.    Harpreet was seen on 04.29.2021 by Dr Patricia Muro for this symptom    Melva and Harpreet are aware that Dr Muro is out of the office until Thursday and are ok with waiting for her to return to place the order    Phone number Patient can be reached at:  Other phone number:  [phone]    Best Time:  anytime    Can we leave a detailed message on this number?  Yes    Call taken on 6/1/2021 at 2:38 PM by Juliann Caldwell

## 2021-06-25 NOTE — PROGRESS NOTES
Progress Notes by Martínez King MD at 9/12/2017  2:10 PM     Author: Martínez King MD Service: -- Author Type: Physician    Filed: 9/19/2017  7:32 AM Encounter Date: 9/12/2017 Status: Addendum    : Martínez King MD (Physician)    Related Notes: Original Note by Martínez King MD (Physician) filed at 9/12/2017  3:09 PM           Click to link to [facility] HEART CARE ELECTROPHYSIOLOGY CONSULTATION    Thank you, Dr. Muro, for asking the [facility] Care team to see Mr. Navdeep Spann to evaluate chronic atrial fibrillation and a history of falls.      Assessment/Recommendations   Clinic Problem List:  1. Chronic atrial fibrillation     2. Essential hypertension with goal blood pressure less than 140/90         Assessment:    Chronic atrial fibrillation, asymptomatic with controlled ventricular response.  The patient's [id number] Score for stroke risk is 2.  Relative contraindication to anticoagulation with frequent falls but no serious injury and no falls since June 2017.  Hypertension controlled    Plan:  Risks and benefits of anticoagulation, no anticoagulation with aspirin, and left atrial occlusion device were discussed.  Warfarin 3 mg on Tuesday and Friday, 2 mg 5 days per week was recommended  Stop taking aspirin after 3 days  INR in 1 week at [facility] clinic  Follow up appointment:   Dr. Rae to discuss potential atrial occlusion device  Patient to call if he has recurrent falls or injury.  Addendum: Further discussion with Dr. Muro identified serious concerns regarding dementia, judgment, and falls risk so it was decided that he should continue on aspirin and that warfarin was contraindicated.       History of Present Illness     aNvdeep Spann is a 72 y.o. male with chronic atrial fibrillation since at least July 2014.  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  Cardiac echo showed normal left ventricular systolic function  "ejection fraction 75% with left ventricular hypertrophy but no valvular abnormalities.  A rate control strategy was advised.    Patient was seen in [facility] emergency room in June 2017 with 3 falls over 2 days.  One fall resulted in him falling down the steps.  There was no significant injury.  He was taken off warfarin anticoagulation at that time.  INRs had been running 2-2.5 on warfarin 3 mg 2 days per week and 2 mg 5 days per week.  He had had no significant bleeding abnormalities.  Falls were felt to be related to disequilibrium and \"missing a step\".  There is no history of syncope or presyncope.  He has no prior history of coronary artery disease, diabetes or history of stroke.  There is no exertional chest pain or pressure.  He was diagnosed with early dementia approximately a year ago but has been able to function without significant impairment relating to his longtime girlfriend who is accompanying him.    Personnaly reviewed: ECGs dating back to July 2014 have all shown atrial fibrillation.  Ventricular response is generally been well controlled.  Cardiac echo 5/27/2016 showed ejection fraction of 55% and no significant valvular abnormalities       Physical Examination Review of Systems   Vitals:    09/12/17 1406   BP: 126/78   Pulse: 80   Resp: 18   SpO2: 99%     Body mass index is 24.28 kg/(m^2).  Wt Readings from Last 3 Encounters:   09/12/17 179 lb (81.2 kg)   06/26/17 184 lb (83.5 kg)   06/20/17 185 lb (83.9 kg)        Appearance:   no distress, elderly bearded gentleman   HEENT: no scleral icterus, normal conjunctivae    Neck: no carotid bruits or thyromegaly   Chest/Lungs:   lungs are clear to auscultation, no rales or wheezing,      Cardiovascular:   Jugular venous pressure 4 cm, Apical pulse is irregularly irregular at 80 bpm. Normal S1,S2 with no murmurs or gallops,   Abdomen:  no  Hepatosplenomegaly., nontender,  bowel sounds are present   Extremities: no cyanosis or clubbing, No edema "   Skin: no xanthelasma, warm.    Neurologic: No gross focal neurologic deficits   Mood/Affect: Alert, cooperative    General: WNL  Eyes: WNL  Ears/Nose/Throat: Hearing Loss  Lungs: WNL  Heart: Irregular Heartbeat, Leg Swelling  Stomach: WNL  Bladder: WNL  Muscle/Joints: Joint Pain  Skin: WNL  Nervous System: Loss of Balance, Falls  Mental Health: WNL     Blood: WNL     Medical History  Surgical History Family History Social History   Past Medical History:   Diagnosis Date   ? Anemia    ? Anxiety    ? Aortic valve disorder    ? Arthritis    ? Atrial fibrillation    ? BPH (benign prostatic hypertrophy) with urinary retention    ? Carotid stenosis    ? Depression    ? Dyslipidemia    ? [place] (hard of hearing)    ? Hypertension    ? Lymphedema    ? Syncope and collapse 2009, 2010    r/t urinary obstruction    Past Surgical History:   Procedure Laterality Date   ? APPENDECTOMY     ? COLON SURGERY     ? JOINT REPLACEMENT     ? PICC  10/23/2014        ? RI APPENDECTOMY      Description: Appendectomy;  Recorded: 05/21/2008;  Comments: at 16 years old   ? RI ARTHROPLASTY TIBIAL PLATEAU      Description: Knee Replacement;  Recorded: 05/21/2008;  Comments: right knee 8/1999; Left knee 5/2002   ? RI COLOSTOMY      Description: Colostomy;  Recorded: 07/22/2014;   ? RI LAP, SURG CLOSE ENTEROSTOMY RESECT ANAST N/A 2/11/2015    Procedure: LAPAROSCOPIC ASSISTED COLOSTOMY TAKEDOWN;  Surgeon: Jed Ritchie MD;  Location: [facility];  Service: General   ? RI LAP,SURG,COLECTOMY, PARTIAL, W/ANAST N/A 7/15/2014    Procedure: Exploratory Laparotomy, Sigmoid Colectomy,  Colostomy (Fernandez's);  Surgeon: Jed Ritchie MD;  Location: [facility];  Service: General   ? RI TRANSURETHRAL ELEC-SURG PROSTATECTOM  5/23/2008    Description: Transurethral Resection Of Prostate (TURP);  Proc Date: 05/23/2008;    Family History   Problem Relation Age of Onset   ? Cancer Mother       Social History     Social History   ? Marital status:       Spouse name: N/A   ? Number of children: N/A   ? Years of education: N/A     Occupational History   ? Not on file.     Social History Main Topics   ? Smoking status: Former Smoker   ? Smokeless tobacco: Never Used   ? Alcohol use No      Comment: Past history- quit June 1st, 1990   ? Drug use: Yes     Special: Marijuana      Comment: none since 3/2014   ? Sexual activity: Not on file     Other Topics Concern   ? Not on file     Social History Narrative          Medications  Allergies   Current Outpatient Prescriptions   Medication Sig Dispense Refill   ? ARGININE, L-ARGININE, ORAL Take 1 tablet by mouth 2 (two) times a day.     ? ascorbic acid (VITAMIN C) 500 MG tablet Take 1,000 mg by mouth daily.      ? aspirin 81 MG EC tablet Take 1 tablet (81 mg total) by mouth daily. 150 tablet 2   ? biotin 2,500 mcg cap Take 5,000 mcg by mouth daily.     ? CALCIUM CARB-MAG OXIDE-ZINC OX ORAL Take 1 tablet by mouth daily.     ? chlorthalidone (HYGROTEN) 25 MG tablet Take 25 mg by mouth daily.     ? cholecalciferol, vitamin D3, 5,000 unit Tab Take 5,000 Units by mouth daily.     ? citalopram (CELEXA) 10 MG tablet Take 10 mg by mouth daily.     ? CRANBERRY FRUIT EXTRACT (CRANBERRY ORAL) Take 1 capsule by mouth daily.     ? cyanocobalamin, vitamin B-12, 2,500 mcg Subl Place 2,500 mcg under the tongue daily.     ? digoxin (LANOXIN) 125 mcg tablet Take 125 mcg by mouth every morning.     ? donepezil (ARICEPT) 10 MG tablet Take 10 mg by mouth bedtime.     ? ferrous sulfate 325 (65 FE) MG tablet Take 1 tablet by mouth 3 (three) times a day with meals. 270 tablet 3   ? LACTOBACILLUS ACIDOPHILUS (PROBIOTIC ACIDOPHILUS ORAL) Take 1 capsule by mouth daily.      ? metoprolol tartrate (LOPRESSOR) 100 MG tablet Take 1 tablet (100 mg total) by mouth every morning and 0.5 tablet (50 mg total) every evening.  0   ? OMEGA-3S/DHA/EPA/FISH OIL (OMEGA 3 ORAL) Take 1,000 mg by mouth 2 (two) times a day.      ? potassium chloride SA  (KLOR-CON M20) 20 MEQ tablet Take 1 tablet (20 mEq total) by mouth 3 (three) times a day. 270 tablet 2   ? SAW PALMETTO ORAL Take 450 mg by mouth daily.     ? trimethoprim (TRIMPEX) 100 mg tablet Take 100 mg by mouth once daily.     ? VIT B1 MN/B2/B3/B5/B6/B12/C/FA (B COMPLEX W-VIT C ORAL) Take 1 tablet by mouth daily.      ? vitamin E 400 UNIT capsule Take by mouth see administration instructions. 1200 mg in the morning and 800 mg in the evening.     ? acetaminophen (TYLENOL) 500 MG tablet Take 500-1,000 mg by mouth every 6 (six) hours as needed.     ? citalopram (CELEXA) 10 MG tablet TAKE ONE TABLET BY MOUTH ONE TIME DAILY  30 tablet 4   ? trimethoprim (TRIMPEX) 100 mg tablet Take 100 mg by mouth daily as needed. Take for 7 days when needed.       No current facility-administered medications for this visit.       No Known Allergies

## 2021-06-25 NOTE — TELEPHONE ENCOUNTER
Reason for Call:  FYI     Detailed comments: Recvd call from Melva, she was calling to let Dr Patricia Muro know that pt/Harpreet is at [facility]. Melva took him in yesterday (06.02.2021) as they think he had a stroke.    No call back needed, Melva just wanted Dr Muro to know    Phone Number Patient can be reached at:   Cell number on file:    Telephone Information:   Mobile [phone]       Best Time: anytime    Can we leave a detailed message on this number?: No call back needed    Call taken on 6/3/2021 at 2:41 PM by Juliann Caldwell

## 2021-06-25 NOTE — ED NOTES
Writer called wife to inform her that pt has been transferred to [facility], she will follow up w/[facility] and inform them that he has been accepted at the [facility] TCU, new referrals will have to be sent from there when he is ready for discharge.

## 2021-06-26 NOTE — TELEPHONE ENCOUNTER
RN cannot approve Refill Request    RN can NOT refill this medication historical medication requested.  Last office visit:  6/22/21.    Bailey Dumont, [facility] Connection Triage/Med Refill 6/24/2021    Requested Prescriptions   Pending Prescriptions Disp Refills     KLOR-CON M20 20 mEq tablet [Pharmacy Med Name: Klor-Con M20 Oral Tablet Extended Release 20 MEQ] 270 tablet 0     Sig: Take 1 tablet by mouth 3 times a day.       Potassium Supplements Refill Protocol Passed - 6/23/2021  2:00 AM        Passed - PCP or prescribing provider visit in past 12 months       Last office visit with prescriber/PCP: Visit date not found OR same dept: 6/22/2021 Patricia Muro MD OR same specialty: 6/22/2021 Patricia Muro MD  Last physical: Visit date not found Last MTM visit: Visit date not found   Next visit within 3 mo: Visit date not found  Next physical within 3 mo: Visit date not found  Prescriber OR PCP: Cheryle Alfred MD  Last diagnosis associated with med order: There are no diagnoses linked to this encounter.  If protocol passes may refill for 12 months if within 3 months of last provider visit (or a total of 15 months).             Passed - Potassium level in last 12 months     Lab Results   Component Value Date    Potassium 4.5 06/22/2021

## 2021-06-26 NOTE — CONSULTS
Care Management Initial Consult    General Information:  Patient's communication limitations: none  Level of Orientation: A & O x 3     Advance Care Planning: Patient does not have advance directive   No    Living Environment:   People in home/Living arrangements: Spouse/significant other  Current residence:  Private residence      Family/Social Support:  Care provided by/ Primary Caregiver: Spouse immediate family  Provides care for someone: No  Description of Support System: Spouse/significant other     Lifestyle & Psychosocial Needs:        Socioeconomic History     Marital status:      Spouse name: Not on file     Number of children: Not on file     Years of education: Not on file     Highest education level: Not on file     Tobacco Use     Smoking status: Former Smoker     Smokeless tobacco: Never Used   Substance and Sexual Activity     Alcohol use: No     Comment: Past history- quit June 1st, 1990     Drug use: Yes     Types: Marijuana     Comment: none since 3/2014       Functional Status:  Prior to admission ADL limits: Yes gait disturbance, transportation, supervision of safety, shopping, stairs    Current Resources:   Skilled Home Care Services:    Community Resources: DME  Equipment currently used at home: cane, straight, walker, standard  Supplies currently used at home:      Employment:  Employment Status:  Unknown No    Financial/Environmental Concerns/Barriers to Discharge:        Values/Beliefs:  Spiritual, Cultural Beliefs, Religion Practices, Values that affect care:                Additional Information:  Assessed, lives w/SO Melva, she can transport, increased weakness, no change in his Dementia and was indpendent w/cane and pt is a vegetarian but can have fish, TCU pending progress, [facility], [facility].      Chester Kimball RN

## 2021-06-26 NOTE — ED PROVIDER NOTES
ED CONSULTATION  Date/Time:6/2/2021 11:52 AM    I am seeing this patient along with Cherie Whitfield PAC.  I, Naman Davis M.D. have reviewed the documentation, personally taken the patient's history, performed an exam and agree with the physical finds, diagnosis and management plan.       HPI:  Navdeep Spann is a 76 y.o. male who presents to the ED for evaluation of frequent falls. Patient reports falling twice today and could not get up on his own, but he did not sustain any injuries for this fall. He denies feeling more weak compared to his baseline and he did not hit his head or lose consciousness. Patient is no longer anticoagulated on coumadin as his physician discontinued it and he does not take aspirin. Additionally, he does endorse some weakness in his left arm which he reports has been ongoing but he is unsure for how long.     Physical Exam:/71   Pulse 64   Temp 98  F (36.7  C)   Resp 21   Wt 190 lb (86.2 kg)   SpO2 97%   BMI 26.50 kg/m    Constitutional:  Well developed, well nourished  Head: Normocephalic, atraumatic   ENT:  Bilateral external ears normal, oropharynx moist, no oral exudates, external nose normal.   Neck: Normal range of motion, no midline cervical tenderness  Eyes: Conjunctiva normal, no discharge.   Respiratory:  Normal breath sounds, no respiratory distress, no wheezes or crackles  Cardiovascular:  Normal heart rate, normal rhythm.   GI:  Soft, no tenderness, no masses, no flank tenderness.   Musculoskeletal: Full ROM  Integument:  Warm, dry, no erythema, no rash.    Neurologic:  Alert & oriented.  Left arm weakness with frift  Psychiatric:  Affect normal, Judgment normal, Mood normal.         11:43 AM discussed care with Cherie Whitfield, prior records were reviewed.    2:05 PM personally performed history and physical.  I personally reviewed lab, EKG, and radiology results as indicated.  Care was discussed with mid-level provider.  Diagnosis and disposition were  discussed.  3:09 PM I rechecked and updated the patient.   3:40 PM I spoke with Dr. Braun, the neurologist who recommends transferring the patient to the [facility] or [facility].   4:23 PM  No beds at [facility], will explore out of system options.  4:34 PM I spoke with the Dr. De Leon the Neurologist at [facility].  Indicates patient likely will not be an interventional candidate and will receive medical management.  4:57 PM care discussed with Dr. Moss, hospitalist at [facility].  Accepts patient for transfer.    76-year-old male with history of hypertension seizure disorder, atrial fibrillation, carotid stenosis who comes in today with frequent falls over the past week or so. On exam he has left arm weakness which appears to be acute or subacute. CT of the head demonstrated indeterminate lacunar infarct, MRI demonstrated a couple areas of subacute infarct as well as possible MCA thrombus versus stenosis. CTA demonstrates high-grade stenosis. Care was discussed with Dr. Braun of neurology who recommends transfer to Center with interventional capability.    1. Cerebrovascular accident (CVA), unspecified mechanism (H)    2. Fall, initial encounter    3. Cerebellar artery occlusion or stenosis        Results for orders placed or performed during the hospital encounter of 06/02/21   Comprehensive Metabolic Panel   Result Value Ref Range    Sodium 143 136 - 145 mmol/L    Potassium 4.1 3.5 - 5.0 mmol/L    Chloride 111 (H) 98 - 107 mmol/L    CO2 22 22 - 31 mmol/L    Anion Gap, Calculation 10 5 - 18 mmol/L    Glucose 156 (H) 70 - 125 mg/dL    BUN 15 8 - 28 mg/dL    Creatinine 1.18 0.70 - 1.30 mg/dL    GFR MDRD Af Amer >60 >60 mL/min/1.73m2    GFR MDRD Non Af Amer 60 (L) >60 mL/min/1.73m2    Bilirubin, Total 0.8 0.0 - 1.0 mg/dL    Calcium 8.8 8.5 - 10.5 mg/dL    Protein, Total 6.7 6.0 - 8.0 g/dL    Albumin 3.3 (L) 3.5 - 5.0 g/dL    Alkaline Phosphatase 84 45 - 120 U/L    AST 25 0 - 40 U/L    ALT 13 0 - 45 U/L    Urinalysis-UC if Indicated   Result Value Ref Range    Color, UA Yellow Light Yellow, Yellow    Clarity, UA Clear Clear    Glucose, UA Negative Negative    Protein, UA 10 mg/dL Negative    Bilirubin, UA Negative Negative    Urobilinogen, UA <2.0 mg/dL <2.0 mg/dL    pH, UA 5.5 5.0 - 8.0    Blood, UA Negative Negative    Ketones, UA Trace Negative    Nitrite, UA Negative Negative    Leukocytes, UA Negative Negative    Specific Gravity, UA 1.024 1.001 - 1.030    RBC, UA 1 <=2 hpf    WBC UA 2 <=5 hpf    Bacteria, UA None Seen None Seen    Squamous Epithel, UA <1 <=5 /HPF    Mucus, UA Present (!) None Seen lpf    Hyaline Casts, UA 1 <=5 lpf   Magnesium   Result Value Ref Range    Magnesium 2.0 1.8 - 2.6 mg/dL   HM1 (CBC with Diff)   Result Value Ref Range    WBC 8.6 4.0 - 11.0 thou/uL    RBC 4.33 (L) 4.40 - 6.20 mill/uL    Hemoglobin 13.6 (L) 14.0 - 18.0 g/dL    Hematocrit 40.6 40.0 - 54.0 %    MCV 94 80 - 100 fL    MCH 31.4 27.0 - 34.0 pg    MCHC 33.5 32.0 - 36.0 g/dL    RDW 12.7 11.0 - 14.5 %    Platelets 193 140 - 440 thou/uL    MPV 9.9 8.5 - 12.5 fL    Neutrophils % 73 (H) 50 - 70 %    Lymphocytes % 13 (L) 20 - 40 %    Monocytes % 11 (H) 2 - 10 %    Eosinophils % 3 0 - 6 %    Basophils % 1 0 - 2 %    Immature Granulocyte % 0 <=0 %    Neutrophils Absolute 6.3 2.0 - 7.7 thou/uL    Lymphocytes Absolute 1.1 0.8 - 4.4 thou/uL    Monocytes Absolute 1.0 (H) 0.0 - 0.9 thou/uL    Eosinophils Absolute 0.2 0.0 - 0.4 thou/uL    Basophils Absolute 0.1 0.0 - 0.2 thou/uL    Immature Granulocyte Absolute 0.0 <=0.0 thou/uL   Asymptomatic SARS-CoV-2 (COVID-19)-PCR    Specimen: Respiratory   Result Value Ref Range    SARS-CoV-2 PCR Result Negative Negative, Invalid   ECG 12 lead nursing unit performed   Result Value Ref Range    SYSTOLIC BLOOD PRESSURE      DIASTOLIC BLOOD PRESSURE      VENTRICULAR RATE 66 BPM    ATRIAL RATE 500 BPM    P-R INTERVAL      QRS DURATION 122 ms    Q-T INTERVAL 406 ms    QTC CALCULATION (BEZET) 425 ms    P  Axis      R AXIS -55 degrees    T AXIS 164 degrees    MUSE DIAGNOSIS       Atrial fibrillation  Left anterior fascicular block  ST & T wave abnormality, consider lateral ischemia or digitalis effect  Abnormal ECG  When compared with ECG of 06-DEC-2020 11:33,  QT has shortened  Confirmed by SEE ED PROVIDER NOTE FOR, ECG INTERPRETATION ([id number]),  LIAM SKAGGS ([id number]) on 6/2/2021 12:38:53 PM         Mr Brain Cow With Without Contrast    Result Date: 6/2/2021  EXAM: MR BRAIN COW W WO CONTRAST LOCATION: [facility] DATE/TIME: 6/2/2021 1:14 PM INDICATION: Left arm weakness. Abnormal head CT concerning for stroke. COMPARISON: CT head 06/02/2021. Head MRI 09/18/2020 CONTRAST: Gadavist 7ml TECHNIQUE: 1) Routine multiplanar multisequence head MRI without and with intravenous contrast. 2) 3D time-of-flight head MRA without intravenous contrast. FINDINGS: HEAD MRI: INTRACRANIAL CONTENTS: Band of diffusion restriction with partially normalized ADC values and well-defined corresponding T2/FLAIR hyperintensity within the posterior right putamen and extending cranially into the centrum semiovale white matter. Involved area measures approximately 6 x 9 x 25 mm in oblique transverse, AP, and craniocaudal dimensions respectively. There is some corresponding susceptibility signal loss within this area of infarct inferiorly suggesting minor petechial hemorrhage and subtle associated patchy enhancement. No space-occupying hemorrhagic transformation or associated mass effect. There may be an additional punctate focus of diffusion restriction within left corona radiata white matter best appreciated on axial diffusion image 45. Abnormal FLAIR hyperintensity is seen within the right sylvian fissure consistent with slow flow or thrombus within multiple right middle cerebral artery branches. Confluent nonspecific T2/FLAIR hyperintensities within the cerebral white matter and zulay most consistent with advanced  chronic microvascular ischemic change. Mild generalized cerebral atrophy. No hydrocephalus. Normal position of the cerebellar tonsils. SELLA: No abnormality accounting for technique. OSSEOUS STRUCTURES/SOFT TISSUES: Normal marrow signal. The major intracranial vascular flow voids are maintained. ORBITS: No abnormality accounting for technique. SINUSES/MASTOIDS: Minor mucosal thickening involving ethmoid septa. Complete/near complete opacification of the mastoid air cells bilaterally. No apparent mass in the posterior nasopharynx or skull base. HEAD MRA: ANTERIOR CIRCULATION: Complete loss of flow related signal within the right middle cerebral artery beginning in the mid right M1 segment consistent with slow flow or occlusion of this vessel. No evidence for significant collateral vascularity by MRA. No additional proximal branch occlusion or high-grade stenosis. Intact anterior communicating artery. Poorly visualized posterior communicating arteries. POSTERIOR CIRCULATION: No stenosis/occlusion, aneurysm, or high flow vascular malformation. Balanced vertebral arteries supply a normal basilar artery.     HEAD MRI: 1.  Band of early subacute infarct within the posterior right putamen and extending into centrum semiovale white matter. This corresponds to findings on the previous CT exam. Suggestion of minor petechial blood products in this area inferiorly without space-occupying hemorrhagic transformation. 2.  Equivocal punctate early subacute infarct within the left corona radiata white matter only visualized on axial diffusion imaging. 3.  Abnormal FLAIR signal within multiple right middle cerebral artery branches within the right MCA territory suggesting slow flow or intravascular thrombus. 4.  Background of mild brain atrophy and advanced presumed microvascular ischemic change. HEAD MRA: 1.  Findings of abrupt branch occlusion or high-grade flow limiting stenosis within the right M1 middle cerebral artery segment  with no appreciable flow related signal within distal right MCA branches. Given the mismatch between this vascular finding and  the extent of infarct on diffusion-weighted imaging consider CTA for further evaluation. 2.  No additional high-grade stenosis, aneurysm, or high flow vascular malformation identified. Findings were called to ALICIA Whitfield at 6/2/2021 1:44 PM by Dr. DIONY Yuen.    Cta Head And Neck    Result Date: 6/2/2021  EXAM: CTA HEAD AND NECK LOCATION: [facility] DATE/TIME: 6/2/2021 2:16 PM INDICATION: further eval mca occlusion seen on MRI COMPARISON: Brain MRI/MRA dated 06/02/2021 CONTRAST: Iopamidol ([id number]) 75ml TECHNIQUE: Head and neck CT angiogram with IV contrast. Noncontrast head CT followed by axial helical CT images of the head and neck vessels obtained during the arterial phase of intravenous contrast administration. Axial 2D reconstructed images and multiplanar 3D MIP reconstructed images of the head and neck vessels were performed by the technologist. Dose reduction techniques were used. All stenosis measurements made according to NASCET criteria unless otherwise specified. FINDINGS: HEAD CTA: ANTERIOR CIRCULATION: There is redemonstration of a probable critical atherosclerotic stenosis involving the distal M1 segment of the right middle cerebral artery and extending into the right middle cerebral artery bifurcation. There is diminished flow into the distal right middle cerebral artery territory. No other high-grade stenosis/occlusion, aneurysm or high flow vascular malformation. Scattered areas of noncritical intracranial atherosclerotic irregularity and narrowing. Standard [place]  anatomy. POSTERIOR CIRCULATION: No stenosis/occlusion, aneurysm, or high flow vascular malformation. Balanced vertebral arteries supply a normal basilar artery. DURAL VENOUS SINUSES: Expected enhancement of the major dural venous sinuses. NECK CTA: RIGHT CAROTID: 30% calcified  atherosclerotic stenosis at the origin of the right internal carotid artery by NASCET criteria. LEFT CAROTID: 30% calcified atherosclerotic stenosis at the origin of the left internal carotid artery by NASCET criteria. VERTEBRAL ARTERIES: No focal stenosis or dissection. Balanced vertebral arteries. AORTIC ARCH: Classic aortic arch anatomy with no significant stenosis at the origin of the great vessels. NONVASCULAR STRUCTURES: Unremarkable.     HEAD CTA: 1.  Critical flow-limiting atherosclerotic stenosis of the distal M1 segment of the right middle cerebral artery extending into the right middle cerebral artery bifurcation with secondary diminished flow into the right middle cerebral artery territory distally. NECK CTA: 1.  Noncritical calcified atherosclerotic stenoses at the origins of the right and left internal carotid arteries as described above.    Ct Head Without Contrast    Result Date: 6/2/2021  EXAM: CT HEAD WO CONTRAST LOCATION: [facility] DATE/TIME: 6/2/2021 10:34 AM INDICATION: fall - left arm weakness COMPARISON: 12/06/2020. TECHNIQUE: Routine CT Head without IV contrast. Multiplanar reformats. Dose reduction techniques were used. FINDINGS: INTRACRANIAL CONTENTS: No intracranial hemorrhage, extraaxial collection, or mass effect.  Indeterminate lacunar type infarction in the dorsal right lentiform nucleus. Unchanged old right thalamic lacunar type infarction. Unchanged severe presumed chronic small vessel ischemic changes. Normal ventricles and sulci. VISUALIZED ORBITS/SINUSES/MASTOIDS: No intraorbital abnormality. No paranasal sinus mucosal disease. No middle ear or mastoid effusion. BONES/SOFT TISSUES: No scalp hematoma. No skull fracture.     1.  Age-indeterminate lacunar in the dorsal right lentiform nucleus. Consider further characterization with MRI. 2.  No additional acute intracranial abnormality. Old right thalamic lacunar infarction. Unchanged severe presumed proximal  vessel ischemic changes 3.  No calvarial or skull base fracture.    Ct Cervical Spine Without Contrast    Result Date: 6/2/2021  EXAM: CT CERVICAL SPINE WO CONTRAST LOCATION: [facility] DATE/TIME: 6/2/2021 10:36 AM INDICATION: Neck trauma (Age > 65y) fall left arm weakness COMPARISON: None. TECHNIQUE: Routine CT Cervical Spine without IV contrast. Multiplanar reformats. Dose reduction techniques were used. FINDINGS: VERTEBRA: No fracture or posttraumatic subluxation. Normal alignment. Vertebral body heights are maintained. No aggressive osseous abnormality. Multilevel cervical spondylosis. CANAL/FORAMINA: No canal or neural foraminal stenosis. PARASPINAL: No extraspinal abnormality.     1.  No fracture or posttraumatic subluxation. 2.  No high-grade spinal canal or neural foraminal stenosis.      New Prescriptions    No medications on file       Final disposition will be per the depending diagnostic studies and patient's clinical trajectory.       Naman Davis MD  06/02/21 [id number]

## 2021-06-26 NOTE — TELEPHONE ENCOUNTER
Reason contacted:  Orders request  Information relayed:    [place] calling from [facility]  Phone number: [phone]      Order being requested:   PT 2 times a week for 4 weeks for strengthening, gait, and balance  OT eval and treat    Notified ok for requested orders per Dr. Muro.  Please advise if this is not ok.   Additional questions:  No  Further follow-up needed:  No  Okay to leave a detailed message:  No

## 2021-06-26 NOTE — ED PROVIDER NOTES
EMERGENCY DEPARTMENT ENCOUNTER      NAME: Navdeep Spann  AGE: 76 y.o. male  YOB: 1945  MRN: [id number]  EVALUATION DATE & TIME: 6/2/2021  9:51 AM    PCP: Patricia Muro MD    ED PROVIDER: Cherie Whitfield PA-C      Chief Complaint   Patient presents with     Fall         FINAL IMPRESSION:  Frequent falls  Subacute CVA    MEDICAL DECISION MAKING:    Pertinent Labs & Imaging studies reviewed. (See chart for details)  76 y.o. male with a h/o hypertension, seizure disorder, atrial fibrillation (no longer on anti-coagulation), alzheimer's disease, carotid stenosis, frequent falls,  presents to the Emergency Department for evaluation of frequent falls.  Wife called EMS as he has had frequent falls over the last week, to just today.  The patient denies any pain.  He does acknowledge that he just loses his balance and goes down.  No other complaints.      Here vitals are stable, he is afebrile.  He is alert and oriented appropriately.  On examination he has some left arm weakness which he reports is chronic - but brief review of past records in 2020 does not show any mention of this.  While this could be CVA especially with his afib - stroke code was not initiated given last known normal unclear.  With falls also consider head injury/bleed.  No signs of trauma on exam.  With weakness consider anemia, electrolyte imbalance, HIPOLITO, dehydration, UTI, arrhythmia.      Initiated workup with CT head and cspine.  Labs and UA ordered as well.    CT of the head revealed age indeterminant lacunar infarct.  MRI obtained for further evaluation and did reveal subacute infarct corresponding with findings on CT scan.  There is also some abnormal FLAIR and multiple right MCA branches suggesting slow flow or intravascular thrombus.  After discussion with radiology and neurology, CTA of the head and neck was obtained to further evaluate this area.  Labs were unremarkable. No signs of UTI.     Patient will likely require  admission but location to be decided based upon his CTA results.  Patient care was signed out to Dr. Davis pending his CTA.    At the conclusion of the encounter I discussed the results of all of the tests and the disposition. The questions were answered. The patient or family acknowledged understanding and was agreeable with the care plan.         ED COURSE  9:51 AM Met and evaluated patient. Discussed ED plan.   11:43 AM Staffed patient with Dr. Davis.  11:56 AM Spoke to Care Manager who is going to come and speak to the patient.   1:45 PM Spoke to [facility] regarding patient's imaging. They recommend CTA  1:51 PM Updated patient with results and plan.  2:16 PM  Spoke to neurology regarding patient and plan.  In agreement with CTA. If true thrombus patient likely needs to be transfer to [facility].  If just low flow/stenosis, should be admitted for further stroke workup  2:30 PM  Patient care signed out to Dr. Davis.    MEDICATIONS GIVEN IN THE EMERGENCY:  Medications   iopamidol solution 75 mL (ISOVUE-370) (has no administration in time range)   gadobutroL 7.5 mmol/7.5 mL (1 mmol/mL) injection 7 mL (GADAVIST) (7 mL Intravenous Given 6/2/21 1300)       NEW PRESCRIPTIONS STARTED AT TODAY'S ER VISIT  New Prescriptions    No medications on file          =================================================================    HPI    Patient information was obtained from: Patient and nurse triage    Use of Intrepreter: N/A         Navdeep Spann is a 76 y.o. male with pertinent medical history of hypertension, seizure disorder, atrial fibrillation - no longer on anti-coagulation, alzheimer's disease, carotid stenosis, frequent falls, who presents to the ED via EMS, alone, for evaluation of frequent falls.     Per triage nurse, the patient arrives with paramedics for frequent falls with 2 today. He has not sustained any injuries from this fall and was able to get up after the fall. The  "patient was seated on the side of his bed when paramedics arrives. The patient denies any pain, as well as dizziness or loss of consciousness. He reported no use of blood thinners.     The patient confirms this story. He states that he falls because he loses balance and \"goes down\". The falls have increased in the last week. Today, after a fall, the patient's wife called EMS after being concerned over his frequent falls. The patient does not feel more weak than normal. He has not hit his head or sustained any injuries from these falls. He states his physician took him off coumadin. He is not on aspirin. Of note, the patient states that his left arm is always weak, but he is unsure how long this has been going on.       REVIEW OF SYSTEMS   Constitutional: Negative for generalized weakness.  Musculoskeletal: Negative for extremity pain  Neuro: Negative for dizziness, loss of consciousness. Positive for left arm weakness (patient is unsure of duration of symptom), loss of balance (chronic, worsened in last week).     All other systems reviewed and are negative    PAST MEDICAL HISTORY:  Past Medical History:   Diagnosis Date     Anemia      Anxiety      Aortic valve disorder      Arthritis      Atrial fibrillation (H)      BPH (benign prostatic hypertrophy) with urinary retention      Carotid stenosis      Depression      Dyslipidemia      [place] (hard of hearing)      Hypertension      Lymphedema      Syncope and collapse 2009, 2010    r/t urinary obstruction       PAST SURGICAL HISTORY:  Past Surgical History:   Procedure Laterality Date     APPENDECTOMY       COLON SURGERY       JOINT REPLACEMENT       [facility]  10/23/2014          [place] APPENDECTOMY      Description: Appendectomy;  Recorded: 05/21/2008;  Comments: at 16 years old     AZ ARTHROPLASTY TIBIAL PLATEAU      Description: Knee Replacement;  Recorded: 05/21/2008;  Comments: right knee 8/1999; Left knee 5/2002     AZ COLOSTOMY      Description: Colostomy;  Recorded: " 07/22/2014;     AL LAP, SURG CLOSE ENTEROSTOMY RESECT ANAST N/A 2/11/2015    Procedure: LAPAROSCOPIC ASSISTED COLOSTOMY TAKEDOWN;  Surgeon: Jed Ritchie MD;  Location: [facility];  Service: General     AL LAP,SURG,COLECTOMY, PARTIAL, W/ANAST N/A 7/15/2014    Procedure: Exploratory Laparotomy, Sigmoid Colectomy,  Colostomy (Fernandez's);  Surgeon: Jed Ritchie MD;  Location: [facility];  Service: General     AL TRANSURETHRAL ELEC-SURG PROSTATECTOM  5/23/2008    Description: Transurethral Resection Of Prostate (TURP);  Proc Date: 05/23/2008;           CURRENT MEDICATIONS:    No current facility-administered medications on file prior to encounter.      Current Outpatient Medications on File Prior to Encounter   Medication Sig     acetaminophen (TYLENOL) 650 MG CR tablet Take 1 tablet (650 mg total) by mouth every 6 (six) hours as needed for pain.     amLODIPine (NORVASC) 5 MG tablet TAKE ONE TABLET BY MOUTH ONE TIME DAILY      arginine HCl, L-arginine, 1,000 mg Tab Take 1,000 mg by mouth daily.     ascorbic acid, vitamin C, (VITAMIN C) 1000 MG tablet Take 1,000 mg by mouth 2 (two) times a day.     aspirin 81 MG EC tablet Take 1 tablet (81 mg total) by mouth daily.     cholecalciferol, vitamin D3, 5,000 unit Tab Take 5,000 Units by mouth daily.     citalopram (CELEXA) 20 MG tablet citalopram 20 mg tablet   TAKE ONE TABLET BY MOUTH ONE TIME DAILY     CRANBERRY FRUIT EXTRACT (CRANBERRY ORAL) Take 450 mg by mouth daily.      cyanocobalamin (VITAMIN B-12) 250 MCG tablet Take 250 mcg by mouth daily.     digoxin (LANOXIN) 125 mcg (0.125 mg) tablet TAKE ONE TABLET BY MOUTH IN THE MORNING     donepezil (ARICEPT) 10 MG tablet Take 10 mg by mouth bedtime.     ferrous sulfate 325 (65 FE) MG tablet Take 1 tablet by mouth 3 (three) times a day with meals. (Patient taking differently: Take 1-2 tablets by mouth 2 (two) times a day. Takes 2 tablets in the morning and 1 tablet at bedtime      )     fluoride, sodium,  (DENTAGEL) 1.1 % Gel dental gel SF 5000 Plus 1.1 % dental cream   USE NIGHTLY AT BEDTIME. APPLY PEA SIZED AMOUNT TO TOOTHBRUSH AND BRUSH TEETH AROUND GUMLINE FOR 1 MINUTE     ketoconazole (NIZORAL) 2 % cream Apply to forehead and upper eye lids daily for up to 2 weeks     Lactobacillus acidophilus 100 mg (1 billion cell) cap Take 1 capsule by mouth daily.     levETIRAcetam (KEPPRA) 750 MG tablet Take 1 tablet (750 mg total) by mouth 2 (two) times a day.     magnesium oxide (MAG-OX) 400 mg (241.3 mg magnesium) tablet TAKE 1 TABLET (400 MG TOTAL) BY MOUTH DAILY.     metoprolol tartrate (LOPRESSOR) 50 MG tablet Take 1 tablet (50 mg total) by mouth 2 (two) times a day.     OLANZapine zydis (ZYPREXA ZYDIS) 5 MG disintegrating tablet PLACE 1 tablet (5 mg total) ON THE TONGUE at bedtime.     OMEGA-3S/DHA/EPA/FISH OIL (OMEGA 3 ORAL) Take 1 tablet by mouth daily.      omeprazole (PRILOSEC) 20 MG capsule Take 1 capsule (20 mg total) by mouth daily before breakfast.     potassium chloride (KLOR-CON M20) 20 MEQ tablet Klor-Con M20 mEq tablet,extended release   Take 1 tablet by mouth 3 times a day.     SAW PALMETTO ORAL Take 450 mg by mouth daily.     trimethoprim (TRIMPEX) 100 mg tablet Take 1 tablet (100 mg total) by mouth once daily.     vitamin E 400 unit capsule Take 400 Units by mouth. Take 3 capsules in the morning and 2 capsules in the evening.       ALLERGIES:  No Known Allergies    FAMILY HISTORY:  Family History   Problem Relation Age of Onset     Cancer Mother        SOCIAL HISTORY:   Smoking: Former smoker  Alcohol: No    VITALS:  Patient Vitals for the past 24 hrs:   BP Temp Pulse Resp SpO2 Weight   06/02/21 1345 149/73 -- 65 -- 91 % --   06/02/21 1330 146/69 -- 70 -- 95 % --   06/02/21 1318 154/85 -- 77 -- 97 % --   06/02/21 1200 139/72 -- 82 21 96 % --   06/02/21 1145 126/59 -- 71 20 97 % --   06/02/21 1130 140/71 -- 75 19 96 % --   06/02/21 1115 130/68 -- 73 17 98 % --   06/02/21 1100 136/71 -- 81 23 97 % --    06/02/21 1050 129/66 -- 68 25 96 % --   06/02/21 0959 128/70 98  F (36.7  C) 70 16 97 % 190 lb (86.2 kg)   06/02/21 0954 128/70 -- 72 -- 96 % --       PHYSICAL EXAM    General Appearance:  Alert, cooperative, no distress, appears stated age  Head: Normocephalic without obvious deformity, atraumatic.  Eyes: Conjunctiva clear, Lids normal. PERRL. No periorbital swelling or eccymosis.  Ear, Nose, Throat: Face nontraumatic, moist mucus membranes.   Neck: Supple. No cervical tenderness.  Lungs: No distress. Lungs clear to ausculation bilaterally. No wheezes, rhonchi or stridor. Chest wall nontender.    Heart:: Irregular rhythm. Normal rate, no murmur, rub or gallop. Strong, equal distal pulses.    Abdomen: Soft, nontender.  No rebound or guarding.    Musculoskeletal: Normal ROM. No edema. Moving all extremities.  No extremity tenderness.  No T or L spine tenderness.  Skin: Warm, dry, no rashes, lesions, or lacerations.  Neurologic: GCS 15 Alert and orientated appropriately. + left arm drift and weakness with pushing my hands.  5/5 strength b/l lower extremities. Face symmetric.   Psych: Normal mood and affect      NIH Stroke Scale    Interval: Baseline  Time: 9:50 PM  Person Administering Scale: Cherie Whitfield  Administer stroke scale items in the order listed. Record performance in each category after each subscale exam. Do not go back and change scores. Follow directions provided for each exam technique. Scores should reflect what the patient does, not what the clinician thinks the patient can do. The clinician should record answers while administering the exam and work quickly. Except where indicated, the patient should not be coached (i.e., repeated requests to patient to make a special effort).    1a  Level of consciousness: 0=alert; keenly responsive   1b. LOC questions:  0=Performs both tasks correctly   1c. LOC commands: 0=Performs both tasks correctly   2.  Best Gaze: 0=normal   3.  Visual: 0=No visual loss    4. Facial Palsy: 0=Normal symmetric movement   5a.  Motor left arm: 1=Drift, limb holds 90 (or 45) degrees but drifts down before full 10 seconds: does not hit bed   5b.  Motor right arm: 0=No drift, limb holds 90 (or 45) degrees for full 10 seconds   6a. motor left le=No drift, limb holds 90 (or 45) degrees for full 10 seconds   6b  Motor right le=No drift, limb holds 90 (or 45) degrees for full 10 seconds   7. Limb Ataxia: 0=Absent   8.  Sensory: 0=Normal; no sensory loss   9. Best Language:  0=No aphasia, normal   10. Dysarthria: 0=Normal   11. Extinction and Inattention: 0=No abnormality   12. Distal motor function: 0=Normal    Total:   1           LAB:  All pertinent labs reviewed and interpreted.  Results for orders placed or performed during the hospital encounter of 21   Comprehensive Metabolic Panel   Result Value Ref Range    Sodium 143 136 - 145 mmol/L    Potassium 4.1 3.5 - 5.0 mmol/L    Chloride 111 (H) 98 - 107 mmol/L    CO2 22 22 - 31 mmol/L    Anion Gap, Calculation 10 5 - 18 mmol/L    Glucose 156 (H) 70 - 125 mg/dL    BUN 15 8 - 28 mg/dL    Creatinine 1.18 0.70 - 1.30 mg/dL    GFR MDRD Af Amer >60 >60 mL/min/1.73m2    GFR MDRD Non Af Amer 60 (L) >60 mL/min/1.73m2    Bilirubin, Total 0.8 0.0 - 1.0 mg/dL    Calcium 8.8 8.5 - 10.5 mg/dL    Protein, Total 6.7 6.0 - 8.0 g/dL    Albumin 3.3 (L) 3.5 - 5.0 g/dL    Alkaline Phosphatase 84 45 - 120 U/L    AST 25 0 - 40 U/L    ALT 13 0 - 45 U/L   Urinalysis-UC if Indicated   Result Value Ref Range    Color, UA Yellow Light Yellow, Yellow    Clarity, UA Clear Clear    Glucose, UA Negative Negative    Protein, UA 10 mg/dL Negative    Bilirubin, UA Negative Negative    Urobilinogen, UA <2.0 mg/dL <2.0 mg/dL    pH, UA 5.5 5.0 - 8.0    Blood, UA Negative Negative    Ketones, UA Trace Negative    Nitrite, UA Negative Negative    Leukocytes, UA Negative Negative    Specific Gravity, UA 1.024 1.001 - 1.030    RBC, UA 1 <=2 hpf    WBC UA 2 <=5 hpf     Bacteria, UA None Seen None Seen    Squamous Epithel, UA <1 <=5 /HPF    Mucus, UA Present (!) None Seen lpf    Hyaline Casts, UA 1 <=5 lpf   Magnesium   Result Value Ref Range    Magnesium 2.0 1.8 - 2.6 mg/dL   HM1 (CBC with Diff)   Result Value Ref Range    WBC 8.6 4.0 - 11.0 thou/uL    RBC 4.33 (L) 4.40 - 6.20 mill/uL    Hemoglobin 13.6 (L) 14.0 - 18.0 g/dL    Hematocrit 40.6 40.0 - 54.0 %    MCV 94 80 - 100 fL    MCH 31.4 27.0 - 34.0 pg    MCHC 33.5 32.0 - 36.0 g/dL    RDW 12.7 11.0 - 14.5 %    Platelets 193 140 - 440 thou/uL    MPV 9.9 8.5 - 12.5 fL    Neutrophils % 73 (H) 50 - 70 %    Lymphocytes % 13 (L) 20 - 40 %    Monocytes % 11 (H) 2 - 10 %    Eosinophils % 3 0 - 6 %    Basophils % 1 0 - 2 %    Immature Granulocyte % 0 <=0 %    Neutrophils Absolute 6.3 2.0 - 7.7 thou/uL    Lymphocytes Absolute 1.1 0.8 - 4.4 thou/uL    Monocytes Absolute 1.0 (H) 0.0 - 0.9 thou/uL    Eosinophils Absolute 0.2 0.0 - 0.4 thou/uL    Basophils Absolute 0.1 0.0 - 0.2 thou/uL    Immature Granulocyte Absolute 0.0 <=0.0 thou/uL   Asymptomatic SARS-CoV-2 (COVID-19)-PCR    Specimen: Respiratory   Result Value Ref Range    SARS-CoV-2 PCR Result Negative Negative, Invalid   ECG 12 lead nursing unit performed   Result Value Ref Range    SYSTOLIC BLOOD PRESSURE      DIASTOLIC BLOOD PRESSURE      VENTRICULAR RATE 66 BPM    ATRIAL RATE 500 BPM    P-R INTERVAL      QRS DURATION 122 ms    Q-T INTERVAL 406 ms    QTC CALCULATION (BEZET) 425 ms    P Axis      R AXIS -55 degrees    T AXIS 164 degrees    MUSE DIAGNOSIS       Atrial fibrillation  Left anterior fascicular block  ST & T wave abnormality, consider lateral ischemia or digitalis effect  Abnormal ECG  When compared with ECG of 06-DEC-2020 11:33,  QT has shortened  Confirmed by SEE ED PROVIDER NOTE FOR, ECG INTERPRETATION ([id number]),  LIAM SKAGGS ([id number]) on 6/2/2021 12:38:53 PM         RADIOLOGY:  Reviewed all pertinent imaging. Please see official radiology report    Mr Brain Cow  With Without Contrast    Result Date: 6/2/2021  EXAM: MR BRAIN COW W WO CONTRAST LOCATION: [facility] DATE/TIME: 6/2/2021 1:14 PM INDICATION: Left arm weakness. Abnormal head CT concerning for stroke. COMPARISON: CT head 06/02/2021. Head MRI 09/18/2020 CONTRAST: Gadavist 7ml TECHNIQUE: 1) Routine multiplanar multisequence head MRI without and with intravenous contrast. 2) 3D time-of-flight head MRA without intravenous contrast. FINDINGS: HEAD MRI: INTRACRANIAL CONTENTS: Band of diffusion restriction with partially normalized ADC values and well-defined corresponding T2/FLAIR hyperintensity within the posterior right putamen and extending cranially into the centrum semiovale white matter. Involved area measures approximately 6 x 9 x 25 mm in oblique transverse, AP, and craniocaudal dimensions respectively. There is some corresponding susceptibility signal loss within this area of infarct inferiorly suggesting minor petechial hemorrhage and subtle associated patchy enhancement. No space-occupying hemorrhagic transformation or associated mass effect. There may be an additional punctate focus of diffusion restriction within left corona radiata white matter best appreciated on axial diffusion image 45. Abnormal FLAIR hyperintensity is seen within the right sylvian fissure consistent with slow flow or thrombus within multiple right middle cerebral artery branches. Confluent nonspecific T2/FLAIR hyperintensities within the cerebral white matter and zulay most consistent with advanced chronic microvascular ischemic change. Mild generalized cerebral atrophy. No hydrocephalus. Normal position of the cerebellar tonsils. SELLA: No abnormality accounting for technique. OSSEOUS STRUCTURES/SOFT TISSUES: Normal marrow signal. The major intracranial vascular flow voids are maintained. ORBITS: No abnormality accounting for technique. SINUSES/MASTOIDS: Minor mucosal thickening involving ethmoid septa.  Complete/near complete opacification of the mastoid air cells bilaterally. No apparent mass in the posterior nasopharynx or skull base. HEAD MRA: ANTERIOR CIRCULATION: Complete loss of flow related signal within the right middle cerebral artery beginning in the mid right M1 segment consistent with slow flow or occlusion of this vessel. No evidence for significant collateral vascularity by MRA. No additional proximal branch occlusion or high-grade stenosis. Intact anterior communicating artery. Poorly visualized posterior communicating arteries. POSTERIOR CIRCULATION: No stenosis/occlusion, aneurysm, or high flow vascular malformation. Balanced vertebral arteries supply a normal basilar artery.     HEAD MRI: 1.  Band of early subacute infarct within the posterior right putamen and extending into centrum semiovale white matter. This corresponds to findings on the previous CT exam. Suggestion of minor petechial blood products in this area inferiorly without space-occupying hemorrhagic transformation. 2.  Equivocal punctate early subacute infarct within the left corona radiata white matter only visualized on axial diffusion imaging. 3.  Abnormal FLAIR signal within multiple right middle cerebral artery branches within the right MCA territory suggesting slow flow or intravascular thrombus. 4.  Background of mild brain atrophy and advanced presumed microvascular ischemic change. HEAD MRA: 1.  Findings of abrupt branch occlusion or high-grade flow limiting stenosis within the right M1 middle cerebral artery segment with no appreciable flow related signal within distal right MCA branches. Given the mismatch between this vascular finding and  the extent of infarct on diffusion-weighted imaging consider CTA for further evaluation. 2.  No additional high-grade stenosis, aneurysm, or high flow vascular malformation identified. Findings were called to ALICIA Whitfield at 6/2/2021 1:44 PM by Dr. DIONY Yuen.    Ct Head Without  Contrast    Result Date: 6/2/2021  EXAM: CT HEAD WO CONTRAST LOCATION: [facility] DATE/TIME: 6/2/2021 10:34 AM INDICATION: fall - left arm weakness COMPARISON: 12/06/2020. TECHNIQUE: Routine CT Head without IV contrast. Multiplanar reformats. Dose reduction techniques were used. FINDINGS: INTRACRANIAL CONTENTS: No intracranial hemorrhage, extraaxial collection, or mass effect.  Indeterminate lacunar type infarction in the dorsal right lentiform nucleus. Unchanged old right thalamic lacunar type infarction. Unchanged severe presumed chronic small vessel ischemic changes. Normal ventricles and sulci. VISUALIZED ORBITS/SINUSES/MASTOIDS: No intraorbital abnormality. No paranasal sinus mucosal disease. No middle ear or mastoid effusion. BONES/SOFT TISSUES: No scalp hematoma. No skull fracture.     1.  Age-indeterminate lacunar in the dorsal right lentiform nucleus. Consider further characterization with MRI. 2.  No additional acute intracranial abnormality. Old right thalamic lacunar infarction. Unchanged severe presumed proximal vessel ischemic changes 3.  No calvarial or skull base fracture.    Ct Cervical Spine Without Contrast    Result Date: 6/2/2021  EXAM: CT CERVICAL SPINE WO CONTRAST LOCATION: [facility] DATE/TIME: 6/2/2021 10:36 AM INDICATION: Neck trauma (Age > 65y) fall left arm weakness COMPARISON: None. TECHNIQUE: Routine CT Cervical Spine without IV contrast. Multiplanar reformats. Dose reduction techniques were used. FINDINGS: VERTEBRA: No fracture or posttraumatic subluxation. Normal alignment. Vertebral body heights are maintained. No aggressive osseous abnormality. Multilevel cervical spondylosis. CANAL/FORAMINA: No canal or neural foraminal stenosis. PARASPINAL: No extraspinal abnormality.     1.  No fracture or posttraumatic subluxation. 2.  No high-grade spinal canal or neural foraminal stenosis.      EKG:    Performed at: 1116  Impression: Atrial  fibrillation, Left anterior fascicular block, ST & T wave abnormality, consider lateral ischemia or digitalis effect, Compared to previous on 12/6/2020.  Dr. Davis and I have independently reviewed and interpreted the EKG(s) documented above.    PROCEDURES:   None.       I, Iraida Hebert, am serving as a scribe to document services personally performed by Cherie Whitfield PA-C based on my observation and the provider's statements to me. I, Cherie Whitfield PA-C attest that Iraida Hebert is acting in a scribe capacity, has observed my performance of the services and has documented them in accordance with my direction.      Cherie Whitfield PA-C  Emergency Medicine  [facility] EMERGENCY DEPARTMENT  [address]  [facility] [id number]  Dept: [phone]  Loc: [phone]     Cherie Whitfield PA-C  06/02/21 1422       Cherie Whitfield PA-C  06/02/21 1443

## 2021-06-28 ENCOUNTER — RECORDS - HEALTHEAST (OUTPATIENT)
Dept: ADMINISTRATIVE | Facility: OTHER | Age: 76
End: 2021-06-28

## 2021-06-30 NOTE — PROGRESS NOTES
Progress Notes by Patricia Muro MD at 1/26/2021  3:30 PM     Author: Patricia Muro MD Service: -- Author Type: Physician    Filed: 1/26/2021  7:19 PM Encounter Date: 1/26/2021 Status: Signed    : Patricia Muro MD (Physician)       ASSESSMENT/PLAN:  1. Chronic atrial fibrillation  75-year-old gentleman with chronic atrial fibrillation.  At this point in time his significant other Yoselyn and him would both like to start anticoagulation for his chronic atrial fibrillation.  We had deferred in the past based on his risk for falls.  They feel that the benefit of being on Coumadin outweighs the risk of intracranial bleeding due to fall.  I am referring him to the anticoagulation clinic for monitoring and also to the atrial fib clinic.  He is currently taking digoxin and metoprolol and is rate controlled.  - Ambulatory referral to Anticoagulation Clinic Monitoring  - Ambulatory referral to Afib Clinic    2. Dementia with behavioral disturbance, unspecified dementia type (H)  Dementia certainly advancing.  It has declined significantly since his last stay in the TCU.  He continues on Aricept.  His significant other Yoselyn and her son care for him at their home.  Yoselyn states there is no concerning behaviors and is actually sleeping quite well now.    3. Recurrent major depressive disorder, in partial remission (H)  Well-controlled currently on sertraline    4. Nonintractable epilepsy without status epilepticus, unspecified epilepsy type (H)  Concern for possible seizures after his last hospitalization.  He has been started on Keppra by neurology and continues on twice daily.    5. Hypotension, unspecified hypotension type  Today he is hypotensive.  He did have an episode of significant hypokalemia on hydrochlorothiazide and was started on spironolactone to help balance this.  At this point in time again to discontinue both of those and have his significant other monitor his blood pressure every day or  every other day.  He does continue on metoprolol and amlodipine.  - HM2(CBC w/o Differential)  - Basic Metabolic Panel  - Magnesium    6. Hypokalemia  We will check a potassium level today to assure that it is in a normal range.      Dragon dictation was used for this note.  Speech recognition errors are a possibility.    Return in about 3 weeks (around 2/16/2021) for Recheck blood pressure.  Patient Instructions     Stop hydrochlorothiazide and spironolactone  We will consider starting warfarin for A Fib   3    Anticoagulants    What is an anticoagulant and why would I need this medication?      An anticoagulant (commonly referred to as a blood thinner) is a medicine used to prevent abnormal blood clots. Blood thinners work by making it harder for the body to form blood clots. They are used to prevent strokes in people who have an irregular heart rhythm called atrial fibrillation or artificial heart valve. Anticoagulants are also used to treat or prevent blood clots in people that have had a blood clot in their legs or lungs, or who have recently had surgery.    What options are there for blood thinners?      Medication name Variable dose anticoagulant Fixed dose anticoagulant    Warfarin  (Coumadin, Jantoven) Apixaban  (Eliquis) Rivaroxaban  (Xarelto) Dabigatran   (Pradaxa)   Dose Dose varies person to person and can change over time. Taken once daily Fixed dose twice daily Fixed dose   once daily   Fixed dose   twice daily   How To Take Usually taken in the evening. Take with or without food. Take with or without food Must take with a meal Take with or without food. Must be kept in original bottle or package until taken   Lab Testing INR (test of clotting time) Finger stick test 1-2 times weekly at first then monthly.  No routine monitoring of clotting time is needed, but kidney function must be tested once or twice a year.   Food Interactions Requires consistent intake of green leafy vegetables None   Drug  Interactions Many potential interactions, but warfarin dose can be adjusted Interacts with some medications   Cautions Long acting medication which takes a week to get blood thinned  Short acting medication which starts and stops working quickly making it important not to miss doses.    Cost Generic available; less expensive No generics available yet; cost varies based on insurance coverage             Which blood thinner is best for me?    Your provider may recommend one blood thinner over the other based on what he/she knows about your medical history.  Your provider may recommend warfarin if you have poor kidney function or some types of heart valve disease. You also have some input as to which blood thinner may work best with your lifestyle and budget.      What are side effects of anticoagulants?     Since a blood thinner slows how fast your body forms a blood clot, it also increases your risk of bleeding. The risk of serious bleeding is about 1% per year.  The majority of people do well on a blood thinner.      Call your provider if you have:    Unusual bleeding    Unexplained bruising    Red, pink or dark colored urine    Red or black tar like stool    Dark or blood stained vomit      Call 911 if you have a large amount of bleeding and you feel lightheaded or near faint.  Otherwise, call your clinic and notify provider of symptoms you are having.    Do I ever stop a blood thinner?    Do not stop your blood thinner without talking to your provider.  Always tell your dentist, surgeon or provider you are on a blood thinner if they discuss pulling a tooth, back injections or any type of surgery.  You may be asked to stop a blood thinner before a procedure or surgery.  You should ask if you are not told if you are to continue your blood thinner or stop it.  You will generally restart the blood thinner after your procedure/surgery. Ask at the time of procedure/surgery when to restart it. You do not need to stop a  blood thinner for teeth cleaning or minor procedures.      What do I do if I miss a dose of my blood thinner?      If you take your blood thinner once a day, take the missed dose if you notice it within 12 hours of the missed dose.  If it is more than 12 hours later, do not take it AND do not double up at the time of the next dose.  If you are on warfarin, tell your nurse or provider at the next blood check.    If you take your blood thinner twice a day, take the missed dose if you notice it within 6 hours of the missed dose.  If it is more than 6 hours since missed dose, do not take it AND do not double up at the time of the next dose.      What if I start a new medication or supplement?    Each of the blood thinners have the potential to interact with some medications. Always talk to your provider or pharmacist before starting any new medication or herbal supplement.     Questions or concerns about anticoagulants?     Ask your provider about how you can work with a [facility] pharmacist to discuss which blood thinner may be most cost effective and safe with your current medications and medical history.    ATRIAL FIBRILLATION: Patient Information     What is atrial fibrillation (a-fib)?  A-fib is a common heart rhythm problem. It will cause irregular and often faster heart beating.  A-fib is abnormal electrical activity of the top chambers of the heart.  This causes the top chambers of the heart not to squeeze and pump blood to the lower heart chambers.  The bottom chambers of the heart continue to pump blood to the body, but they do this with an irregular and often faster heart rate due to the chaotic electrical activity in the top chambers.      How would I know if I have a-fib?   Most people feel their heart beat faster, harder or irregular such as a fluttery feeling in the chest.  You could have chest pain, be lightheaded or dizzy, feel weak or tired or become more short of breath especially with activities.   Some patients have no symptoms at all.  A-fib might be found due to an irregular pulse or on an electrocardiogram. A-fib can stop on its own, come and go or continue for days to months to years on its own. Normally, the heart is in sinus rhythm with a regular pulse and resting heart rates between 50 and 90 beats per minute (bpm).             How common is a-fib?   Over two million people in the [place] have atrial fibrillation.  A-fib is a common heart rhythm problem for older persons. It affects about 4% of people over the age of 60 and 9% of people over 80 years of age.       Regular pulse, usually 50-90 bpm    What causes a-fib?   A-fib is more common in people who have high blood pressure, heart failure or blockage in the arteries feeding the heart.  It is also common in people who were born with or have a disease affecting the valves of the heart.  Surgery, lung disease, or thyroid problems can lead to a-fib.  Drinking alcohol can also trigger a-fib.  Breathing problems during sleep such as loud snoring and pauses in breathing, and daytime sleepiness is called sleep apnea. Sleep apnea is associated with obesity and can lead to a-fib.  Many times no cause for a-fib can be found.     How is a-fib diagnosed?    To diagnosis a-fib, you will likely have an electrocardiogram or heart monitor.  For these tests, patches are put on your chest to allow a machine to record the electrical activity of your heart.  After a-fib is found, you will likely have blood tests.  You will usually be scheduled for an echocardiogram. This test uses ultrasound to look at your heart to see how well it pumps blood forward and evaluate other heart disease.      Irregular pulse, often faster    Is a-fib dangerous?   A-fib itself is not a life threatening rhythm, but it can aggravate other heart problems or could cause a stroke.  A-fib may cause blood to pool and clot in the upper chambers of the heart.  If the blood clot breaks off  and goes to the head, a stroke occurs. A stroke can be the first sign of atrial fibrillation for some people. With a stroke, you may notice abnormal sensations, weakness on one side of the body or face, changes in your vision or changes in your speech.  If you have any of these signs, you should go to an emergency room close to you as soon as possible.    How is a- fib treated?     There are two important goals in treating a-fib.  One is to prevent blood clots which can cause a stroke.  The other main goal is to decrease the symptoms associated with a-fib.  Symptoms can be decreased by controlling the heart rate while in a-fib or by converting a-fib back to normal rhythm. This can be done by use of drugs, or an electric shock, or other heart procedures.                     Blood thinners are used to prevent blood clots. Warfarin, Coumadin and Jantoven are all names for the same medicine used to prevent blood clots if you have a-fib for more than 48 hours.  It usually takes several days for it to thin the blood enough to prevent blood clots.  You will need blood tests to check how much medicine you need.   These blood tests are more frequent as you start this medicine.  There are several new medicines that are approved to thin your blood that are an alternative to warfarin.  These medicines dont require monitoring and dose adjustments but they can be expensive.  With all blood thinners, the principal risk is the risk of bleeding.  These medications are dabigatran or Pradaxa, rivaroxaban or Xarelto and apixban or Eliquis.  New occlusion devices are currently in clinical investigation as an alternative to blood thinners.                   There are several kinds of medicines that can be used to slow your heart rate. One kind is called a beta blocker which slows the heart rate in a-fib and also lowers your blood pressure some.  Another kind of medicine is called a calcium channel blocker and does much the same thing.   Digoxin is a third type of medicine that can be used to slow your heart rate.                                 Changing a-fib back to a normal heart rhythm is another way to control heart rate and symptoms.  Sometimes medicine can be used to make your heart rhythm normal. If your heart rhythm does not go back to normal on its own, or with medications, you might benefit from a cardioversion.  A cardioversion gives a jolt of electricity through patches or paddles on your chest while you are asleep under anesthesia. This shock causes your heart to go back to a normal rhythm.   You will need to be effectively anti-coagulated on blood thinners for at least 3 weeks before this procedure.          Will I live a normal life?    Most people with atrial fibrillation live a fairly normal life.  A wide variety of treatments are available to prevent blood clots which can cause a stroke and to decrease the symptoms associated with a-fib by controlling the heart rate while in a-fib or by converting a-fib back to normal rhythm.  Recurrences of atrial fibrillation are common.  Antiarrhythmic drugs may be used to suppress a-fib episodes.  Pulmonary vein isolation to prevent triggering beats from starting atrial fibrillation is a newer approach that may offer alternatives to antiarrhythmic drug therapy.    What comes next?    It is important for you to take the medicines you have been given exactly like you were told. You should also get any tests that may be ordered for you and see the doctor you are told to see.  You may be referred to the [facility].  You will be seen by a nurse practitioner and/or physician who specialize in heart rhythms.  When to Call Your Doctor:  Call your doctor right away if you have any significant changes with the following:    Weakness    Dizziness    Fainting    Fatigue    Shortness of breath    Chest pain with increased activity    If you are concerned that your heart rate  is too fast or too slow    Bleeding that does not stop in 10 minutes    A heavier-than-normal menstrual period or bleeding between periods    Coughing or throwing up blood    Bloody diarrhea or bleeding hemorrhoids    Dark-colored urine or black stool  Allergic reactions:    Rash    Itching    Swelling    Trouble breathing or swallowing     Please call the [facility] at [phone] if you have concerns about your symptoms, your medicines, or your follow-up appointments      Orders Placed This Encounter   Procedures   ? HM2(CBC w/o Differential)   ? Basic Metabolic Panel   ? Magnesium   ? Ambulatory referral to Anticoagulation Clinic Monitoring     Referral Priority:   Routine     Referral Type:   Health Education     Referral Reason:   Evaluation and Treatment     Requested Specialty:   Hematology     Number of Visits Requested:   1   ? Ambulatory referral to Afib Clinic     Referral Priority:   Routine     Referral Type:   Consultation     Referral Reason:   Evaluation and Treatment     Requested Specialty:   Cardiology     Number of Visits Requested:   1     Medications Discontinued During This Encounter   Medication Reason   ? spironolactone (ALDACTONE) 25 MG tablet Therapy completed   ? hydroCHLOROthiazide (HYDRODIURIL) 12.5 MG tablet Therapy completed   ? QUEtiapine (SEROQUEL) 25 MG tablet    ? OLANZapine zydis (ZYPREXA ZYDIS) 5 MG disintegrating tablet    ? amoxicillin-clavulanate (AUGMENTIN) 875-125 mg per tablet          CHIEF COMPLAINT;  Chief Complaint   Patient presents with   ? Follow-up       HISTORY OF PRESENT ILLNESS:  Navdeep is a 75 y.o. male presenting to the clinic today for follow-up after being discharged from a TCU.  He was hospitalized with another one of his unresponsive episodes.  He has had multiple of these over the year and the exact etiology has not been identified.  There was some concern that it may have been tied to his atrial fibrillation this time although they are not sure.   In the past they have questioned seizure activity and has been started on Keppra.    He spent time in the TCU to get stronger and more steady with gait.  They had been told they would get home PT but have not yet been contacted.  Yoselyn feels his dementia is worsening.  She would like to get coumadin started with his chronic A-fib. They understand the risk of intracranial bleed with falls but feel its important to start at this time.  He is eating regularly.  Sleeping well at night  No behaviors at that Yoselyn is worried about    12-point ROS is negative.        TOBACCO USE:  Social History     Tobacco Use   Smoking Status Former Smoker   Smokeless Tobacco Never Used       VITALS:  Vitals:    01/26/21 1534 01/26/21 1537   BP: (!) 86/60 (!) 88/58   Patient Site: Right Arm Right Arm   Patient Position: Sitting Sitting   Cuff Size: Adult Large Adult Regular   Pulse: 69 61   SpO2: 98%    Weight: 185 lb 8 oz (84.1 kg)      Wt Readings from Last 3 Encounters:   01/26/21 185 lb 8 oz (84.1 kg)   12/07/20 190 lb 14.4 oz (86.6 kg)   12/06/20 220 lb (99.8 kg)     Body mass index is 25.87 kg/m .    PHYSICAL EXAM:  GENERAL APPEARANCE: Alert, cooperative, no distress, appears stated age  HEAD: Normocephalic, without obvious abnormality, atraumatic  EYES:  PERRL, conjunctiva/corneas clear, EOM's intact, fundi     benign, both eyes       EARS: Normal TM's and external ear canals, both ears  NECK: Supple, symmetrical, trachea midline, no adenopathy;    thyroid:  No enlargement/tenderness/nodules; no carotid    bruit or JVD  LUNGS: Clear to auscultation bilaterally, respirations unlabored  HEART: irregularly irregular  ABDOMEN: Soft, non-tender, bowel sounds active all four quadrants,     no masses, no organomegaly  EXTREMITIES: Extremities normal, atraumatic, no cyanosis or edema        RECENT RESULTS  No results found for this or any previous visit (from the past 48 hour(s)).    MEDICATIONS:  Current Outpatient Medications    Medication Sig Dispense Refill   ? acetaminophen (TYLENOL) 650 MG CR tablet Take 1 tablet (650 mg total) by mouth every 6 (six) hours as needed for pain.  0   ? amLODIPine (NORVASC) 5 MG tablet TAKE ONE TABLET BY MOUTH ONE TIME DAILY  90 tablet 0   ? arginine HCl, L-arginine, 1,000 mg Tab Take 1,000 mg by mouth daily.     ? ascorbic acid, vitamin C, (VITAMIN C) 1000 MG tablet Take 1,000 mg by mouth 2 (two) times a day.     ? aspirin 81 MG EC tablet Take 1 tablet (81 mg total) by mouth daily. 90 tablet 1   ? cholecalciferol, vitamin D3, 5,000 unit Tab Take 5,000 Units by mouth daily.     ? CRANBERRY FRUIT EXTRACT (CRANBERRY ORAL) Take 450 mg by mouth daily.      ? cyanocobalamin (VITAMIN B-12) 250 MCG tablet Take 250 mcg by mouth daily.     ? donepezil (ARICEPT) 10 MG tablet Take 10 mg by mouth bedtime.     ? ferrous sulfate 325 (65 FE) MG tablet Take 1 tablet by mouth 3 (three) times a day with meals. (Patient taking differently: Take 1-2 tablets by mouth 2 (two) times a day. Takes 2 tablets in the morning and 1 tablet at bedtime      ) 270 tablet 3   ? Lactobacillus acidophilus 100 mg (1 billion cell) cap Take 1 capsule by mouth daily.     ? levETIRAcetam (KEPPRA) 750 MG tablet Take 1 tablet (750 mg total) by mouth 2 (two) times a day. 180 tablet 3   ? metoprolol tartrate (LOPRESSOR) 50 MG tablet Take 1 tablet (50 mg total) by mouth 2 (two) times a day.  0   ? OMEGA-3S/DHA/EPA/FISH OIL (OMEGA 3 ORAL) Take 1 tablet by mouth daily.      ? omeprazole (PRILOSEC) 20 MG capsule Take 1 capsule (20 mg total) by mouth daily before breakfast. 90 capsule 2   ? SAW PALMETTO ORAL Take 450 mg by mouth daily.     ? sertraline (ZOLOFT) 25 MG tablet Take 1 tablet (25 mg total) by mouth daily.  0   ? trimethoprim (TRIMPEX) 100 mg tablet Take 1 tablet (100 mg total) by mouth once daily.  0   ? vitamin E 400 unit capsule Take 400 Units by mouth. Take 3 capsules in the morning and 2 capsules in the evening.     ? citalopram (CELEXA)  20 MG tablet citalopram 20 mg tablet   TAKE ONE TABLET BY MOUTH ONE TIME DAILY     ? digoxin (LANOXIN) 125 mcg (0.125 mg) tablet digoxin 125 mcg (0.125 mg) tablet   TAKE ONE TABLET BY MOUTH IN THE MORNING     ? fluoride, sodium, (DENTAGEL) 1.1 % Gel dental gel SF 5000 Plus 1.1 % dental cream   USE NIGHTLY AT BEDTIME. APPLY PEA SIZED AMOUNT TO TOOTHBRUSH AND BRUSH TEETH AROUND GUMLINE FOR 1 MINUTE     ? magnesium oxide (MAG-OX) 400 mg (241.3 mg magnesium) tablet magnesium oxide 400 mg (241.3 mg magnesium) tablet   TAKE 1 TABLET (400 MG TOTAL) BY MOUTH DAILY.     ? potassium chloride (KLOR-CON M20) 20 MEQ tablet Klor-Con M20 mEq tablet,extended release   Take 1 tablet by mouth 3 times a day.       No current facility-administered medications for this visit.

## 2021-06-30 NOTE — PROGRESS NOTES
Progress Notes by Tere Noland CNP at 2/4/2021 12:50 PM     Author: Tere Noland CNP Service: -- Author Type: Nurse Practitioner    Filed: 2/4/2021  2:19 PM Encounter Date: 2/4/2021 Status: Signed    : Tere Noland CNP (Nurse Practitioner)            Thank you, Dr. Muro, for asking the [facility] team to see Mr. Navdeep Spann to evaluate chronic atrial fibrillation.    Assessment/Recommendations     Assessment/Plan:    Diagnoses and all orders for this visit:    Chronic atrial fibrillation and EF noted to be mildly decreased on echo during hospitalization.  This could be related to poor rate control in atrial fibrillation.  Recommended 24-hour Holter on a normal day of activity for him.  I will call with results.  May need increase in metoprolol dose and then would decrease amlodipine due to history of falls and syncope as need to avoid hypotension.  -     Holter monitor - 24 hour; Future; Expected date: 02/05/2021    Contraindication to anticoagulation therapy in my estimation but will defer to Dr. Muro as she knows him better.  I discussed benefits of stroke protection associated with anticoagulation but risk of intracranial bleed or more significant bruising or bleeding if he has falls or loss of consciousness especially if he hits his head.    Essential hypertension and blood pressure at goal.    Dementia with behavioral disturbance, unspecified dementia type (H) but clearly participated in discussion today including CODE STATUS.  They were a little surprised by this discussion in cardiology clinic today.  I have encouraged them to think over this information and complete healthcare directive given to them and discuss with primary physician.  To make sure that it is known that this is the purpose of the visit so enough time is allocated to Dr. Muro.    [id number] score of 3 and new warfarin start in the hospital and see above for more  details.  Follow up in clinic with me as needed.     History of Present Illness/Subjective     Navdeep Spann is a very pleasant 75 y.o. male who comes in today for EP follow-up on chronic atrial fibrillation after hospitalization with syncope episode.  Navdeep Spann has a known history of dementia, seizure disorder, atrial fibrillation, SHERRELL, BPH, MDD, hypertension, hyperlipidemia that presents from [facility] with complaint of loss of consciousness, weakness, and found to have hypokalemia.  Harpreet was sitting on the bed and putting on his jeans and suddenly found himself on the floor.  He believes he passed out.  I believed him has been in chronic atrial fibrillation is looking back on twelve-lead EKGs dating back to October 2019 all show him in atrial fib.  He was restarted on warfarin during hospitalization.  He is accompanied by his significant other.  They have been together for 43 years.  He lives with her and her son.  Harpreet tells me that he has fallen 5 or 6 times in the last year.  He is also has several episodes of syncope or near syncope.    Harpreet does not have a CODE STATUS designated and does not have a healthcare directive or living will.  His significant other reports that she has to watch him and he cannot be left alone because he wanders and they are afraid he would go outside and not know his way back home.      Cardiographics (reviewed):  Results for orders placed during the hospital encounter of 12/06/20   Echo Limited [ECH17] 12/07/2020    Narrative   1.Left ventricle ejection fraction is mildly decreased. The estimated   left ventricular ejection fraction is 50%.    2.Normal right ventricular size and systolic function.    3.Mild left atrial chamber enlargement.    4.No hemodynamically significant valvular heart abnormalities, although   this was a limited study without complete Doppler assessment of valves   performed.    5.When compared to the previous study dated 9/16/2020, no  significant   change. Prior ejection fraction appeared visually to be about 50%, mildly   diminished.           Results for orders placed during the hospital encounter of 07/24/14   NM Pharmacologic Stress Test    Narrative   SUMMARY:  1.  Subjectively and objectively negative regadenoson infusion.  2.  Regadenoson nuclear imaging does not suggest any ischemia or prior   scar.  3.  Gated left ventricular wall motion is normal without any wall motion  abnormalities.  4.  No prior scan available for comparison.              Cardiac testing personally reviewed:      Results for orders placed or performed during the hospital encounter of 09/16/20   ECG 12 lead MUSE   Result Value Ref Range    SYSTOLIC BLOOD PRESSURE      DIASTOLIC BLOOD PRESSURE      VENTRICULAR RATE 99 BPM    ATRIAL RATE 119 BPM    P-R INTERVAL      QRS DURATION 120 ms    Q-T INTERVAL 366 ms    QTC CALCULATION (BEZET) 469 ms    P Axis      R AXIS -56 degrees    T AXIS 80 degrees    MUSE DIAGNOSIS       Atrial fibrillation  Left anterior fascicular block  Nonspecific T wave abnormality  Abnormal ECG  When compared with ECG of 16-SEP-2020 03:23,  Nonspecific T wave abnormality is now Present  Confirmed by AILYN HUTSON MD LOC:JN ([id number]) on 9/18/2020 3:09:26 PM            Problem List:  Patient Active Problem List   Diagnosis   ? Essential hypertension   ? Anemia   ? Chronic atrial fibrillation   ? Leukocytosis, unspecified type   ? Hearing loss with hearing aids   ? Depressive disorder, atypical   ? Mood disorder due to a general medical condition   ? Encephalopathy   ? Dementia with behavioral disturbance, unspecified dementia type (H)   ? Contraindication to anticoagulation therapy   ? BPH (benign prostatic hyperplasia)   ? SHERRELL (generalized anxiety disorder)   ? Late onset Alzheimer's disease without behavioral disturbance (H)   ? Recurrent major depressive disorder, in partial remission (H)   ? Nonrheumatic aortic valve stenosis   ? Partial  symptomatic epilepsy with complex partial seizures, not intractable, without status epilepticus (H)   ? Complex partial seizures (H)   ? Syncope, unspecified syncope type   ? Elevated liver function tests   ? Toxic encephalopathy   ? Altered mental status   ? Cough   ? Metabolic encephalopathy   ? Pneumonia of right lower lobe due to infectious organism   ? Altered mental state   ? Sleep difficulties   ? Physical deconditioning   ? Generalized muscle weakness     Revi  e  Physical Examination Review of Systems   w mane  Vitals:    02/04/21 1246   BP: 119/64   Pulse: 68   Resp: 16   SpO2: 98%     Body mass index is 27.34 kg/m .  Wt Readings from Last 3 Encounters:   02/04/21 196 lb (88.9 kg)   01/26/21 185 lb 8 oz (84.1 kg)   12/07/20 190 lb 14.4 oz (86.6 kg)     General Appearance:   Alert, well-appearing and in no acute distress.   HEENT: Atraumatic, normocephalic.  No scleral icterus, normal conjunctivae; mucous membranes pink and moist.     Chest: Chest symmetric, spine straight.   Lungs:   Respirations unlabored: Lungs are clear to auscultation.   Cardiovascular:   Normal first and second heart sounds with no murmurs, rubs, or gallops.  Irregular, irregular.  Radial and posterior tibial pulses are intact.  Normal JVD, 1+ right lower extremity edema and none on the left.  Wearing compression stockings on both sides which could skew physical assessment.         Extremities: No cyanosis or clubbing   Musculoskeletal: Moves all extremities   Skin: Warm, dry, intact.    Neurologic: Mood and affect are appropriate, alert and oriented to person, place, time, and situation    General: WNL  Eyes: WNL  Ears/Nose/Throat: Hearing Loss  Lungs: WNL  Heart: Irregular Heartbeat, Leg Swelling  Stomach: WNL  Bladder: WNL  Muscle/Joints: Muscle Weakness  Skin: WNL  Nervous System: WNL  Mental Health: WNL     Blood: Easy Bleeding       Medical History  Surgical History Family History Social History     Past Medical History:    Diagnosis Date   ? Anemia    ? Anxiety    ? Aortic valve disorder    ? Arthritis    ? Atrial fibrillation (H)    ? BPH (benign prostatic hypertrophy) with urinary retention    ? Carotid stenosis    ? Depression    ? Dyslipidemia    ? [place] (hard of hearing)    ? Hypertension    ? Lymphedema    ? Syncope and collapse 2009, 2010    r/t urinary obstruction    Past Surgical History:   Procedure Laterality Date   ? APPENDECTOMY     ? COLON SURGERY     ? JOINT REPLACEMENT     ? PICC  10/23/2014        ? WI APPENDECTOMY      Description: Appendectomy;  Recorded: 05/21/2008;  Comments: at 16 years old   ? WI ARTHROPLASTY TIBIAL PLATEAU      Description: Knee Replacement;  Recorded: 05/21/2008;  Comments: right knee 8/1999; Left knee 5/2002   ? WI COLOSTOMY      Description: Colostomy;  Recorded: 07/22/2014;   ? WI LAP, SURG CLOSE ENTEROSTOMY RESECT ANAST N/A 2/11/2015    Procedure: LAPAROSCOPIC ASSISTED COLOSTOMY TAKEDOWN;  Surgeon: Jed Ritchie MD;  [place] [facility] [place]: [place]   ? [place] LAP,SURG,COLECTOMY, PARTIAL, W/ANAST N/A 7/15/2014    Procedure: Exploratory Laparotomy, Sigmoid Colectomy,  Colostomy (Fernandez's);  Surgeon: Jed Ritchie MD;  Location: [facility] OR;  Service: General   ? WI TRANSURETHRAL ELEC-SURG PROSTATECTOM  5/23/2008    Description: Transurethral Resection Of Prostate (TURP);  Proc Date: 05/23/2008;    Family History   Problem Relation Age of Onset   ? Cancer Mother     Social History     Tobacco Use   Smoking Status Former Smoker   Smokeless Tobacco Never Used     Social History     Substance and Sexual Activity   Alcohol Use No    Comment: Past history- quit June 1st, 1990        Medications  Allergies     Current Outpatient Medications   Medication Sig Dispense Refill   ? acetaminophen (TYLENOL) 650 MG CR tablet Take 1 tablet (650 mg total) by mouth every 6 (six) hours as needed for pain.  0   ? amLODIPine (NORVASC) 5 MG tablet TAKE ONE TABLET BY MOUTH ONE TIME DAILY   90 tablet 0   ? arginine HCl, L-arginine, 1,000 mg Tab Take 1,000 mg by mouth daily.     ? ascorbic acid, vitamin C, (VITAMIN C) 1000 MG tablet Take 1,000 mg by mouth 2 (two) times a day.     ? aspirin 81 MG EC tablet Take 1 tablet (81 mg total) by mouth daily. 90 tablet 1   ? cholecalciferol, vitamin D3, 5,000 unit Tab Take 5,000 Units by mouth daily.     ? citalopram (CELEXA) 20 MG tablet citalopram 20 mg tablet   TAKE ONE TABLET BY MOUTH ONE TIME DAILY     ? CRANBERRY FRUIT EXTRACT (CRANBERRY ORAL) Take 450 mg by mouth daily.      ? cyanocobalamin (VITAMIN B-12) 250 MCG tablet Take 250 mcg by mouth daily.     ? digoxin (LANOXIN) 125 mcg (0.125 mg) tablet TAKE ONE TABLET BY MOUTH IN THE MORNING 90 tablet 3   ? donepezil (ARICEPT) 10 MG tablet Take 10 mg by mouth bedtime.     ? ferrous sulfate 325 (65 FE) MG tablet Take 1 tablet by mouth 3 (three) times a day with meals. (Patient taking differently: Take 1-2 tablets by mouth 2 (two) times a day. Takes 2 tablets in the morning and 1 tablet at bedtime      ) 270 tablet 3   ? fluoride, sodium, (DENTAGEL) 1.1 % Gel dental gel SF 5000 Plus 1.1 % dental cream   USE NIGHTLY AT BEDTIME. APPLY PEA SIZED AMOUNT TO TOOTHBRUSH AND BRUSH TEETH AROUND GUMLINE FOR 1 MINUTE     ? Lactobacillus acidophilus 100 mg (1 billion cell) cap Take 1 capsule by mouth daily.     ? levETIRAcetam (KEPPRA) 750 MG tablet Take 1 tablet (750 mg total) by mouth 2 (two) times a day. 180 tablet 3   ? magnesium oxide (MAG-OX) 400 mg (241.3 mg magnesium) tablet magnesium oxide 400 mg (241.3 mg magnesium) tablet   TAKE 1 TABLET (400 MG TOTAL) BY MOUTH DAILY.     ? metoprolol tartrate (LOPRESSOR) 50 MG tablet Take 1 tablet (50 mg total) by mouth 2 (two) times a day.  0   ? OMEGA-3S/DHA/EPA/FISH OIL (OMEGA 3 ORAL) Take 1 tablet by mouth daily.      ? omeprazole (PRILOSEC) 20 MG capsule Take 1 capsule (20 mg total) by mouth daily before breakfast. 90 capsule 2   ? potassium chloride (KLOR-CON M20) 20 MEQ  tablet Klor-Con M20 mEq tablet,extended release   Take 1 tablet by mouth 3 times a day.     ? SAW PALMETTO ORAL Take 450 mg by mouth daily.     ? trimethoprim (TRIMPEX) 100 mg tablet Take 1 tablet (100 mg total) by mouth once daily.  0   ? vitamin E 400 unit capsule Take 400 Units by mouth. Take 3 capsules in the morning and 2 capsules in the evening.     ? warfarin ANTICOAGULANT (COUMADIN) 2.5 MG tablet Take 1 tablet (2.5 mg total) by mouth daily. Take 1 to 3 tablets 2.5 to7.5 mg) by mouth daily. Adjust dose based on INR results as directed. 30 tablet 11     No current facility-administered medications for this visit.       No Known Allergies   Medical, surgical, family, social history, and medications were all reviewed and updated as necessary.   Lab Results    Chemistry/lipid CBC Cardiac Enzymes/BNP/TSH/INR     Lab Results   Component Value Date    CREATININE 1.02 01/26/2021    BUN 13 01/26/2021     01/26/2021    K 3.8 01/26/2021     (H) 01/26/2021    CO2 22 01/26/2021     Creatinine (mg/dL)   Date Value   01/26/2021 1.02   12/09/2020 1.06   12/08/2020 1.00   12/07/2020 1.08     Lab Results   Component Value Date    BNP 93 (H) 09/16/2020    Lab Results   Component Value Date    WBC 7.3 01/26/2021    HGB 12.7 (L) 01/26/2021    HCT 37.0 (L) 01/26/2021    MCV 92 01/26/2021     01/26/2021     Lab Results   Component Value Date    INR 2.00 (H) 02/02/2021      Lab Results   Component Value Date    CHOL 127 07/31/2018    HDL 41 07/31/2018    LDLCALC 74 07/31/2018    TRIG 62 07/31/2018          Total Time- 60 minutes spent on date of encounter doing chart review, history and exam, documentation and further activities as noted above.  This note has been dictated using voice recognition software. Any grammatical, typographical, or context distortions are unintentional and inherent to the software.

## 2021-07-02 ENCOUNTER — TELEPHONE (OUTPATIENT)
Dept: NEUROLOGY | Facility: CLINIC | Age: 76
End: 2021-07-02

## 2021-07-02 NOTE — TELEPHONE ENCOUNTER
Yoselyn called regarding Harpreet and said he was at [facility] on 6/2/21 for a CVA and then was brought to [place] for impatient stay. She wants to know if he should follow up for this and when? He currently see's  for his Alzheimer's. She can be reached at [phone]

## 2021-07-02 NOTE — TELEPHONE ENCOUNTER
Spoke with Yoselyn and Harpreet will see Dr Moreno 8/12/21. She will cancel the appt with Vidal Mann that's scheduled 9/1/21  Ximena Hsu CMA on 7/2/2021 at 2:26 PM

## 2021-07-06 VITALS — BODY MASS INDEX: 25.77 KG/M2 | WEIGHT: 190 LBS

## 2021-07-07 NOTE — TELEPHONE ENCOUNTER
Herminio from [facility] called requesting verbal OK for added Home Care 2x week for 4 weeks to work on ADL's and Home Safety.     Verbal OK was given.     Also needs orders faxed to [facility] for Lift Chair.  Fax to [phone]

## 2021-07-14 PROBLEM — J96.01 ACUTE RESPIRATORY FAILURE WITH HYPOXIA (H): Status: RESOLVED | Noted: 2019-10-15 | Resolved: 2021-01-26

## 2021-07-15 ENCOUNTER — TELEPHONE (OUTPATIENT)
Dept: FAMILY MEDICINE | Facility: CLINIC | Age: 76
End: 2021-07-15

## 2021-07-15 DIAGNOSIS — H90.3 SENSORINEURAL HEARING LOSS (SNHL) OF BOTH EARS: Primary | ICD-10-CM

## 2021-07-15 NOTE — TELEPHONE ENCOUNTER
Patients significant other Melva is calling to request an order for audiology.   She reports the patient lost his hearing aids previously and is in need of a new pair.     Please place order if appropriate.    -ok to wait for Dr. Muro's return to clinic per Melva         Phone number: [phone] - [facility]

## 2021-07-29 ENCOUNTER — OFFICE VISIT (OUTPATIENT)
Dept: FAMILY MEDICINE | Facility: CLINIC | Age: 76
End: 2021-07-29
Payer: COMMERCIAL

## 2021-07-29 VITALS
HEART RATE: 60 BPM | BODY MASS INDEX: 24.62 KG/M2 | SYSTOLIC BLOOD PRESSURE: 106 MMHG | OXYGEN SATURATION: 96 % | WEIGHT: 181.5 LBS | DIASTOLIC BLOOD PRESSURE: 62 MMHG

## 2021-07-29 DIAGNOSIS — I63.9 CEREBROVASCULAR ACCIDENT (CVA), UNSPECIFIED MECHANISM (H): ICD-10-CM

## 2021-07-29 DIAGNOSIS — G40.209 PARTIAL SYMPTOMATIC EPILEPSY WITH COMPLEX PARTIAL SEIZURES, NOT INTRACTABLE, WITHOUT STATUS EPILEPTICUS (H): ICD-10-CM

## 2021-07-29 DIAGNOSIS — F33.41 RECURRENT MAJOR DEPRESSIVE DISORDER, IN PARTIAL REMISSION (H): ICD-10-CM

## 2021-07-29 DIAGNOSIS — R26.81 GAIT INSTABILITY: Primary | ICD-10-CM

## 2021-07-29 LAB — LEVETIRACETAM (KEPPRA): <2 UG/ML (ref 6–46)

## 2021-07-29 PROCEDURE — 36415 COLL VENOUS BLD VENIPUNCTURE: CPT | Performed by: FAMILY MEDICINE

## 2021-07-29 PROCEDURE — 99214 OFFICE O/P EST MOD 30 MIN: CPT | Performed by: FAMILY MEDICINE

## 2021-07-29 PROCEDURE — 80177 DRUG SCRN QUAN LEVETIRACETAM: CPT | Performed by: FAMILY MEDICINE

## 2021-07-29 RX ORDER — CLOPIDOGREL BISULFATE 75 MG/1
75 TABLET ORAL DAILY
Qty: 34 TABLET | Refills: 0 | Status: SHIPPED | OUTPATIENT
Start: 2021-07-29 | End: 2021-08-28

## 2021-07-29 NOTE — PROGRESS NOTES
Assessment & Plan     Gait instability  - Home Care PT Referral for Hospital Discharge    Recurrent major depressive disorder, in partial remission (H)  - sertraline (ZOLOFT) 50 MG tablet; Take 1 tablet (50 mg) by mouth daily    Partial symptomatic epilepsy with complex partial seizures, not intractable, without status epilepticus (H)  - Keppra (Levetiracetam) Level; Future  - Keppra (Levetiracetam) Level    Cerebrovascular accident (CVA), unspecified mechanism (H)  - clopidogrel (PLAVIX) 75 MG tablet; Take 1 tablet (75 mg) by mouth daily        CONSULTATION/REFERRAL to home PT, following with neurology    Return in about 3 months (around 10/29/2021).    Patricia Muro MD  [facility]     Navdeep Spann is a 76 year old male who presents to clinic today for the following health issues accompanied by his partner:    HPI   Has had a recent decline in his overall functional status.  He continues to have some of the following symptoms:  Can't get up from chair and still shuffles  Unstable gait  Left shoulder weak from prior work injury  Right TKA, left partial TKA  PT  Is becoming more difficult to manage him at home.  He has been having more diarrhea accidents, particularly if they are not at home.  Per her son Danny does quite a bit help with assisting making ambulation and helping him when he has an accidents.  They have recently completely moved out of their old home.  They have been living in this apartment for over a year but still has old home.  It was somewhat emotional for them to go through this process.      Review of Systems   Constitutional, HEENT, cardiovascular, pulmonary, gi and gu systems are negative, except as otherwise noted.      Objective    /62 (BP Location: Left arm, Patient Position: Sitting, Cuff Size: Adult Large)   Pulse 60   Wt 82.3 kg (181 lb 8 oz)   SpO2 96%   BMI 24.62 kg/m    Body mass index is 24.62 kg/m .  Physical Exam   GENERAL:  frail and elderly  NECK: no adenopathy, no asymmetry, masses, or scars and thyroid normal to palpation  RESP: lungs clear to auscultation - no rales, rhonchi or wheezes  CV: regular rate and rhythm, normal S1 S2, no S3 or S4, no murmur, click or rub, no peripheral edema and peripheral pulses strong  ABDOMEN: soft, nontender, no hepatosplenomegaly, no masses and bowel sounds normal  MS: no gross musculoskeletal defects noted, no edema    Results for orders placed or performed in visit on 07/29/21   Keppra (Levetiracetam) Level     Status: Abnormal   Result Value Ref Range    Levetiracetam <2.0 (L) 6.0 - 46.0 ug/mL

## 2021-08-03 PROBLEM — A41.9 SEPSIS WITHOUT ACUTE ORGAN DYSFUNCTION, DUE TO UNSPECIFIED ORGANISM (H): Status: RESOLVED | Noted: 2017-04-08 | Resolved: 2020-11-10

## 2021-08-06 DIAGNOSIS — K21.9 GASTROESOPHAGEAL REFLUX DISEASE WITHOUT ESOPHAGITIS: Primary | ICD-10-CM

## 2021-08-09 NOTE — TELEPHONE ENCOUNTER
"Routing refill request to provider for review/approval because:  Drug not active on patient's medication list    Last Written Prescription Date:  7/29/21  Last Fill Quantity: 34,  # refills: 0   Last office visit provider:  6/22/21     Requested Prescriptions   Pending Prescriptions Disp Refills     omeprazole (PRILOSEC) 20 MG DR capsule [Pharmacy Med Name: Omeprazole Oral Capsule Delayed Release 20 MG] 90 capsule 0     Sig: Take 1 capsule (20 mg total) by mouth daily before breakfast.       PPI Protocol Failed - 8/6/2021  9:27 PM        Failed - Not on Clopidogrel (unless Pantoprazole ordered)        Failed - Medication is active on med list        Passed - No diagnosis of osteoporosis on record        Passed - Recent (12 mo) or future (30 days) visit within the authorizing provider's specialty     Patient has had an office visit with the authorizing provider or a provider within the authorizing providers department within the previous 12 mos or has a future within next 30 days. See \"Patient Info\" tab in inbasket, or \"Choose Columns\" in Meds & Orders section of the refill encounter.              Passed - Patient is age 18 or older             michael bellamy RN 08/09/21 4:56 PM  "

## 2021-08-12 ENCOUNTER — OFFICE VISIT (OUTPATIENT)
Dept: NEUROLOGY | Facility: CLINIC | Age: 76
End: 2021-08-12
Payer: COMMERCIAL

## 2021-08-12 VITALS
BODY MASS INDEX: 24.52 KG/M2 | HEIGHT: 72 IN | WEIGHT: 181 LBS | DIASTOLIC BLOOD PRESSURE: 71 MMHG | SYSTOLIC BLOOD PRESSURE: 127 MMHG | HEART RATE: 79 BPM

## 2021-08-12 DIAGNOSIS — G30.1 LATE ONSET ALZHEIMER'S DISEASE WITHOUT BEHAVIORAL DISTURBANCE (H): ICD-10-CM

## 2021-08-12 DIAGNOSIS — F02.80 LATE ONSET ALZHEIMER'S DISEASE WITHOUT BEHAVIORAL DISTURBANCE (H): ICD-10-CM

## 2021-08-12 DIAGNOSIS — G40.209 PARTIAL SYMPTOMATIC EPILEPSY WITH COMPLEX PARTIAL SEIZURES, NOT INTRACTABLE, WITHOUT STATUS EPILEPTICUS (H): ICD-10-CM

## 2021-08-12 DIAGNOSIS — I63.511 ACUTE RIGHT ARTERIAL ISCHEMIC STROKE, MCA (MIDDLE CEREBRAL ARTERY) (H): Primary | ICD-10-CM

## 2021-08-12 PROBLEM — I71.21 ASCENDING AORTIC ANEURYSM (H): Status: ACTIVE | Noted: 2021-06-04

## 2021-08-12 PROCEDURE — 99215 OFFICE O/P EST HI 40 MIN: CPT | Performed by: PSYCHIATRY & NEUROLOGY

## 2021-08-12 RX ORDER — ROSUVASTATIN CALCIUM 40 MG/1
40 TABLET, COATED ORAL DAILY
Qty: 30 TABLET | Refills: 11 | Status: SHIPPED | OUTPATIENT
Start: 2021-08-12

## 2021-08-12 ASSESSMENT — MIFFLIN-ST. JEOR: SCORE: 1599.01

## 2021-08-12 NOTE — LETTER
2021         RE: Navdeep Spann  [address]  [place] [address]        Dear Colleague,    Thank you for referring your patient, Navdeep Spann, to the [facility] [place]. Please see a copy of my visit note below.    NEUROLOGY FOLLOW UP VISIT  NOTE       [facility] [place]  [address] [place] [address]  Tel: [phone]  Fax: [phone]  [email or web address]     Navdeep Spann,  1945, MRN [id number]  PCP: Patricia Muro  Date: 2021      ASSESSMENT & PLAN     Visit Diagnosis  1.  Acute right arterial ischemic stroke, MCA (middle cerebral artery) (H)  2. Late onset Alzheimer's disease without behavioral disturbance (H)  3. Partial symptomatic epilepsy with complex partial seizures, not intractable, without status epilepticus (H)     Right MCA infarction  76-year-old male with history of A. fib, carotid stenosis, HTN, HLD, Alzheimer's dementia, complex partial seizure who was admitted to [facility] on 2021 with right MCA infarction.  Although he has atrial fibrillation is not a candidate for anticoagulation due to history of frequent falls.  I have recommended:    1.  Continue dual antiplatelet therapy with aspirin and Plavix.  On 2021 he will stop using Plavix and continue on aspirin monotherapy  2.  Continue Crestor 40 mg daily with goal of LDL less than 70  3.  Continue physical therapy  4.  Follow-up as scheduled in 2021    Late onset Alzheimer's dementia  76-year-old male with history of A. fib, carotid stenosis, HTN, HLD, right MCA infarction who is been followed in our clinic for late onset Alzheimer's dementia.  He is currently on Aricept but family feels after his right MCA infarction in 2021 there has been a sudden decline in his cognition.  At present I recommended continuing on current dose of Aricept but if there is any further decline on his [place]  cognitive assessment (currently 19/30), during his next visit I will add Namenda.    Complex partial seizure  Patient had multiple episodes in the past during which he had loss of consciousness and had extensive work-up including MRI, EEG, 24-hour EEG that were normal.  1 EEG showed left temporal slowing and although nonspecific he was started on Keppra with improvement in his symptoms and has not experienced any further episodes.  Keppra level checked during last visit was low but as he has remained symptom-free I am not making any changes and I have encouraged him to take Keppra regularly.  Follow-up will be in April 2022    Thank you again for this referral, please feel free to contact me if you have any questions.    Alonzo Moreno MD  [facility]  (Formerly, [facility]     HISTORY OF PRESENT ILLNESS     Patient is a 76-year-old male with history of A. fib, carotid stenosis, HTN, HLD, Alzheimer's dementia, complex partial seizures who is here today sooner than his scheduled visit as he was admitted to [facility] with right MCA infarction.  Patient initially presented to [facility] emergency room on 6/2/2021 after a mechanical fall.  CT of the head showed age indeterminate lacunar infarct.  MRI scan showed subacute infarct corresponding with findings on CT scan.  CTA of the head and neck was done that showed significant M1 stenosis.  He was not a candidate for TPA.  He had echocardiogram that was unremarkable.  Although he has atrial fibrillation decision was made not to start him on anticoagulation due to frequent falls.  He was kept on dual antiplatelet therapy and although he was discharged on Crestor also I do not see him taking a statin.  Since discharge family feels there has been a significant decline in his cognition.  Previously he was smoking marijuana but he has stopped smoking it    Previously patient was admitted to the hospital for  spells of loss of consciousness.  He had similar episodes in the previous few years and extensive testing was negative.  In one EEG there was paroxysmal left temporal slowing and although nonspecific he was started on Keppra and his symptoms improved.    He also has significant cognitive decline and during last visit he reported difficulty with short-term and long-term memory.  He scored 19/30 on [place] cognitive assessment.  Complicating his clinical picture is sensorineural hearing loss and marijuana use that some reports has now stopped.     PROBLEM LIST   Patient Active Problem List   Diagnosis Code     Recurrent major depressive disorder, in partial remission (H) F33.41     SHERRELL (generalized anxiety disorder) F41.1     Late onset Alzheimer's disease without behavioral disturbance (H) G30.1, F02.80     Marijuana use F12.90     Hypertension I10     Hearing loss H91.90     Contraindication to anticoagulation therapy Z53.09     BPH (benign prostatic hyperplasia) N40.0     Anxiety state F41.1     Anemia D64.9     A-fib (H) I48.91     Complex partial seizures (H) G40.209     Hyperlipidemia E78.5     Ascending aortic aneurysm (H) I71.2     Right MCA infarction, (M1 occlusion) I63.511         PAST MEDICAL & SURGICAL HISTORY     Past Medical History:   Patient  has a past medical history of A-fib (H), Acute respiratory failure (H) (2/23/2014), Anemia, Anemia, Anxiety, Anxiety, Aortic valve disorder, Aortic valve disorder, Arthritis, Arthritis, Atrial fibrillation (H), BPH (benign prostatic hyperplasia), BPH (benign prostatic hypertrophy) with urinary retention, Carotid stenosis, Carotid stenosis, Depression, Depression, Drug overdose (2/23/2014), Dyslipidemia, Dyslipidemia, Hard of hearing, [place] (hard of hearing), Hypertension, Hypertension, Lymphedema, Lymphedema, Syncope and collapse (2009, 2010), and Syncope and collapse (2009, 2010).    Surgical History:  He  has a past surgical history that includes appendectomy;  Colon surgery; joint replacement; picc insertion (10/23/2014); Pr Arthroplasty Tibial Plateau; APPENDECTOMY; TRANSURETHRAL ELEC-SURG PROSTATECTOM (5/23/2008); Pr Lap,Surg,Colectomy, Partial, W/Anast (N/A, 7/15/2014); Picc (10/23/2014); COLOSTOMY; Colon surgery; appendectomy; joint replacement; and Pr Lap, Surg Close Enterostomy Resect Anast (N/A, 2/11/2015).     SOCIAL HISTORY     Reviewed, and he  reports that he has quit smoking. His smoking use included cigarettes. He has never used smokeless tobacco. He reports current drug use. Drug: Marijuana. He reports that he does not drink alcohol.     FAMILY HISTORY     Reviewed, and family history includes Cancer in his mother.     ALLERGIES     No Known Allergies      REVIEW OF SYSTEMS     A 12 point review of system was performed and was negative except as outlined in the history of present illness.     HOME MEDICATIONS     Current Outpatient Rx   Medication Sig Dispense Refill     acetaminophen (TYLENOL) 325 MG tablet Take 2 tablets (650 mg) by mouth every 4 hours as needed for mild pain       amLODIPine (NORVASC) 5 MG tablet Take 1 tablet (5 mg) by mouth daily       aspirin 81 MG EC tablet Take 81 mg by mouth daily       clopidogrel (PLAVIX) 75 MG tablet Take 1 tablet (75 mg) by mouth daily 34 tablet 0     cyanocobalamin (VITAMIN B-12) 2500 MCG SUBL sublingual tablet Place 2,500 mcg under the tongue daily       digoxin (DIGOX) 125 MCG tablet Take 125 mcg by mouth daily       donepezil (ARICEPT) 10 MG tablet Take 1 tablet (10 mg) by mouth At Bedtime 90 tablet 3     ferrous sulfate (FEROSUL) 325 (65 Fe) MG tablet Take 1 tablet by mouth 3 times daily (with meals)       levETIRAcetam (KEPPRA) 500 MG tablet Take 1 tablet (500 mg) by mouth 2 times daily 180 tablet 3     lisinopril (PRINIVIL/ZESTRIL) 40 MG tablet Take 40 mg by mouth daily       magnesium oxide (MAG-OX) 400 MG tablet Take 400 mg by mouth daily       metoprolol tartrate 75 MG TABS Take 75 mg by mouth 2 times  daily       omeprazole (PRILOSEC) 20 MG DR capsule Take 1 capsule (20 mg total) by mouth daily before breakfast. 90 capsule 0     rosuvastatin (CRESTOR) 40 MG tablet Take 1 tablet (40 mg) by mouth daily 30 tablet 11     sertraline (ZOLOFT) 50 MG tablet Take 1 tablet (50 mg) by mouth daily 30 tablet 3     trimethoprim (TRIMPEX) 100 MG tablet Take 100 mg by mouth daily            PHYSICAL EXAM     Vital signs  /71 (BP Location: Right arm, Patient Position: Sitting)   Pulse 79   Ht 1.829 m (6')   Wt 82.1 kg (181 lb)   BMI 24.55 kg/m      Weight:   181 lbs 0 oz    Patient is alert and oriented x2 in no acute distress. Vital signs were reviewed and are documented in electronic medical record. Neck was supple, no carotid bruits, thyromegaly, JVD, or lymphadenopathy was noted.   NEUROLOGY EXAM:   Patient is alert and oriented x2 speech was dysarthric.  He is hard of hearing rest of his cranial nerves are intact.  Strength on the right 5/5 on the left 5 -/5 reflexes on the right 1+ on the left 2+ toes equivocal on the left downgoing on the right he has dysmetria on finger-nose testing.  Unable to tandem walk.  He walks with a cane and has a stooped posture.     DIAGNOSTIC STUDIES     PERTINENT RADIOLOGY  Following imaging studies were reviewed:     CT, CTA BRAIN 6/2/2021  HEAD CTA:   1.  Critical flow-limiting atherosclerotic stenosis of the distal M1 segment of the right middle cerebral artery extending into the right middle cerebral artery bifurcation with secondary diminished flow into the right middle cerebral artery territory   distally.     NECK CTA:   1.  Noncritical calcified atherosclerotic stenoses at the origins of the right and left internal carotid arteries as described above.    CT HEAD 1/13/19  1. No evidence of acute intracranial hemorrhage, mass, or herniation.  2. Marked periventricular white matter changes likely due to chronic  microvascular ischemic disease.  3. Chronic appearing lacunar  infarct in the ventral right thalamus.  4. Nonspecific small air-fluid layers in the maxillary sinuses  bilaterally. This can be seen in acute sinusitis in the appropriate  clinical setting.  5. Mild soft tissue swelling overlying the left preorbital region. No  underlying orbital fracture appreciated    MRI BRAIN 6/2/2021  HEAD MRI:   1.  Band of early subacute infarct within the posterior right putamen and extending into centrum semiovale white matter. This corresponds to findings on the previous CT exam. Suggestion of minor petechial blood products in this area inferiorly without   space-occupying hemorrhagic transformation.   2.  Equivocal punctate early subacute infarct within the left corona radiata white matter only visualized on axial diffusion imaging.   3.  Abnormal FLAIR signal within multiple right middle cerebral artery branches within the right MCA territory suggesting slow flow or intravascular thrombus.   4.  Background of mild brain atrophy and advanced presumed microvascular ischemic change.     HEAD MRA:   1.  Findings of abrupt branch occlusion or high-grade flow limiting stenosis within the right M1 middle cerebral artery segment with no appreciable flow related signal within distal right MCA branches. Given the mismatch between this vascular finding and    the extent of infarct on diffusion-weighted imaging consider CTA for further evaluation.   2.  No additional high-grade stenosis, aneurysm, or high flow vascular malformation identified.     MRI BRAIN 3/10/2020  1. No discrete mass lesion, hemorrhage or focal area suggestive of acute infarct.  2. Advanced age related changes along with old lacunar infarcts thalami bilaterally.     MRI BRAIN 7/1/19  1. No acute areas of ischemia, hemorrhage or mass effect.   2. Fairly extensive chronic white matter change.   3. Pansinusitis and bilateral mastoiditis. This may be chronic.     BRAIN PET SCAN  6/10/2016   Fairly uniformly decreased cerebral  glucose metabolism is nonspecific, but not typical of a neurodegenerative process. This pattern can be seen with caffeine, alcohol, corticosteroid, or benzodiazepine use and correlation with medication and substance use is requested.     EEG 3/10/2020  This is a normal EEG during wakefulness and drowsiness except for mild background dysrhythmia. Further clinical correlation is needed.     24 HR EEG 9/10/19  This is a normal awake and sleep 24 hour EEG    ECHOCARDIOGRAM 6/3/2021   1. Normal left ventricular chamber size and systolic function. Estimated left ventricular   ejection fraction is 55%.    2. No regional wall motion abnormalities. Normal left ventricular wall thickness.    3. Severe left atrial enlargement.    4. Moderate mitral annular calcification. Trivial mitral valve regurgitation.    5. Moderate ascending aorta dilatation (4.7 cm).     NEUROPSYCHOLOGICAL TESTING 6/27/2016  suggested neurocognitive disorder     PERTINENT LABS  Following labs were reviewed:  Office Visit on 07/29/2021   Component Date Value     Levetiracetam 07/29/2021 <2.0*   Office Visit - [facility] on 06/22/2021   Component Date Value     WBC 06/22/2021 9.9      RBC Count 06/22/2021 4.08*     Hemoglobin 06/22/2021 12.7*     Hematocrit 06/22/2021 37.7*     MCV 06/22/2021 92      MCH 06/22/2021 31.1      MCHC 06/22/2021 33.7      RDW 06/22/2021 12.8      Platelet Count 06/22/2021 247      Mean Platelet Volume 06/22/2021 9.7      Sodium 06/22/2021 139      Potassium 06/22/2021 4.5      Chloride 06/22/2021 109*     Carbon Dioxide (CO2) 06/22/2021 18*     Anion Gap 06/22/2021 12      Glucose 06/22/2021 138*     Calcium 06/22/2021 8.7      Urea Nitrogen 06/22/2021 14      Creatinine 06/22/2021 0.99      GFR Estimate If Black 06/22/2021 >60      GFR Estimate 06/22/2021 >60          Total time spent for face to face visit, reviewing labs/imaging studies, counseling and coordination of care was: 45 Minutes spent on the date of the encounter  doing chart review, review of outside records, review of test results, interpretation of tests, patient visit and documentation       This note was dictated using voice recognition software.  Any grammatical or context distortions are unintentional and inherent to the software.    No orders of the defined types were placed in this encounter.     New Prescriptions    ROSUVASTATIN (CRESTOR) 40 MG TABLET    Take 1 tablet (40 mg) by mouth daily     Modified Medications    No medications on file                     Again, thank you for allowing me to participate in the care of your patient.        Sincerely,        Alonzo Moreno MD

## 2021-08-12 NOTE — PATIENT INSTRUCTIONS
Stop taking Plavix 75 mg tablet on 9/1/2021  Continue taking Cholesterol lower medicine Crestor 40 mg daily    Vascular risk factors modification: Healthy diet (fruits, vegetables, low fat dairy & reduced saturated fat), weight loss, exercise at least 30 minutes 5 days/week, MI goal <25.  Keep systolic blood pressure goal <130.  LDL goal <70.  Hemoglobin A1c goal <7. If applicable, STOP smoking

## 2021-08-12 NOTE — PROGRESS NOTES
NEUROLOGY FOLLOW UP VISIT  NOTE       [facility] NEUROLOGY [place]  [address] [place] [address]  Tel: [phone]  Fax: [phone]  [email or web address]     Navdeep Spann,  1945, MRN [id number]  PCP: Patricia Muro  Date: 2021      ASSESSMENT & PLAN     Visit Diagnosis  1.  Acute right arterial ischemic stroke, MCA (middle cerebral artery) (H)  2. Late onset Alzheimer's disease without behavioral disturbance (H)  3. Partial symptomatic epilepsy with complex partial seizures, not intractable, without status epilepticus (H)     Right MCA infarction  76-year-old male with history of A. fib, carotid stenosis, HTN, HLD, Alzheimer's dementia, complex partial seizure who was admitted to [facility] on 2021 with right MCA infarction.  Although he has atrial fibrillation is not a candidate for anticoagulation due to history of frequent falls.  I have recommended:    1.  Continue dual antiplatelet therapy with aspirin and Plavix.  On 2021 he will stop using Plavix and continue on aspirin monotherapy  2.  Continue Crestor 40 mg daily with goal of LDL less than 70  3.  Continue physical therapy  4.  Follow-up as scheduled in 2021    Late onset Alzheimer's dementia  76-year-old male with history of A. fib, carotid stenosis, HTN, HLD, right MCA infarction who is been followed in our clinic for late onset Alzheimer's dementia.  He is currently on Aricept but family feels after his right MCA infarction in 2021 there has been a sudden decline in his cognition.  At present I recommended continuing on current dose of Aricept but if there is any further decline on his Brilliant cognitive assessment (currently ), during his next visit I will add Namenda.    Complex partial seizure  Patient had multiple episodes in the past during which he had loss of consciousness and had extensive work-up including MRI, EEG, 24-hour EEG that were normal.  1 EEG showed  left temporal slowing and although nonspecific he was started on Keppra with improvement in his symptoms and has not experienced any further episodes.  Keppra level checked during last visit was low but as he has remained symptom-free I am not making any changes and I have encouraged him to take Keppra regularly.  Follow-up will be in April 2022    Thank you again for this referral, please feel free to contact me if you have any questions.    Alonzo Moreno MD  [facility]  (Formerly, [facility]     HISTORY OF PRESENT ILLNESS     Patient is a 76-year-old male with history of A. fib, carotid stenosis, HTN, HLD, Alzheimer's dementia, complex partial seizures who is here today sooner than his scheduled visit as he was admitted to [facility] with right MCA infarction.  Patient initially presented to [facility] emergency room on 6/2/2021 after a mechanical fall.  CT of the head showed age indeterminate lacunar infarct.  MRI scan showed subacute infarct corresponding with findings on CT scan.  CTA of the head and neck was done that showed significant M1 stenosis.  He was not a candidate for TPA.  He had echocardiogram that was unremarkable.  Although he has atrial fibrillation decision was made not to start him on anticoagulation due to frequent falls.  He was kept on dual antiplatelet therapy and although he was discharged on Crestor also I do not see him taking a statin.  Since discharge family feels there has been a significant decline in his cognition.  Previously he was smoking marijuana but he has stopped smoking it    Previously patient was admitted to the hospital for spells of loss of consciousness.  He had similar episodes in the previous few years and extensive testing was negative.  In one EEG there was paroxysmal left temporal slowing and although nonspecific he was started on Keppra and his symptoms improved.    He also has significant cognitive  decline and during last visit he reported difficulty with short-term and long-term memory.  He scored 19/30 on Lv cognitive assessment.  Complicating his clinical picture is sensorineural hearing loss and marijuana use that some reports has now stopped.     PROBLEM LIST   Patient Active Problem List   Diagnosis Code     Recurrent major depressive disorder, in partial remission (H) F33.41     SHERRELL (generalized anxiety disorder) F41.1     Late onset Alzheimer's disease without behavioral disturbance (H) G30.1, F02.80     Marijuana use F12.90     Hypertension I10     Hearing loss H91.90     Contraindication to anticoagulation therapy Z53.09     BPH (benign prostatic hyperplasia) N40.0     Anxiety state F41.1     Anemia D64.9     A-fib (H) I48.91     Complex partial seizures (H) G40.209     Hyperlipidemia E78.5     Ascending aortic aneurysm (H) I71.2     Right MCA infarction, (M1 occlusion) I63.511         PAST MEDICAL & SURGICAL HISTORY     Past Medical History:   Patient  has a past medical history of A-fib (H), Acute respiratory failure (H) (2/23/2014), Anemia, Anemia, Anxiety, Anxiety, Aortic valve disorder, Aortic valve disorder, Arthritis, Arthritis, Atrial fibrillation (H), BPH (benign prostatic hyperplasia), BPH (benign prostatic hypertrophy) with urinary retention, Carotid stenosis, Carotid stenosis, Depression, Depression, Drug overdose (2/23/2014), Dyslipidemia, Dyslipidemia, Hard of hearing, Ruby (hard of hearing), Hypertension, Hypertension, Lymphedema, Lymphedema, Syncope and collapse (2009, 2010), and Syncope and collapse (2009, 2010).    Surgical History:  He  has a past surgical history that includes appendectomy; Colon surgery; joint replacement; picc insertion (10/23/2014); Pr Arthroplasty Tibial Plateau; APPENDECTOMY; TRANSURETHRAL ELEC-SURG PROSTATECTOM (5/23/2008); Pr Lap,Surg,Colectomy, Partial, W/Anast (N/A, 7/15/2014); Picc (10/23/2014); COLOSTOMY; Colon surgery; appendectomy; joint  replacement; and Pr Lap, Surg Close Enterostomy Resect Anast (N/A, 2/11/2015).     SOCIAL HISTORY     Reviewed, and he  reports that he has quit smoking. His smoking use included cigarettes. He has never used smokeless tobacco. He reports current drug use. Drug: Marijuana. He reports that he does not drink alcohol.     FAMILY HISTORY     Reviewed, and family history includes Cancer in his mother.     ALLERGIES     No Known Allergies      REVIEW OF SYSTEMS     A 12 point review of system was performed and was negative except as outlined in the history of present illness.     HOME MEDICATIONS     Current Outpatient Rx   Medication Sig Dispense Refill     acetaminophen (TYLENOL) 325 MG tablet Take 2 tablets (650 mg) by mouth every 4 hours as needed for mild pain       amLODIPine (NORVASC) 5 MG tablet Take 1 tablet (5 mg) by mouth daily       aspirin 81 MG EC tablet Take 81 mg by mouth daily       clopidogrel (PLAVIX) 75 MG tablet Take 1 tablet (75 mg) by mouth daily 34 tablet 0     cyanocobalamin (VITAMIN B-12) 2500 MCG SUBL sublingual tablet Place 2,500 mcg under the tongue daily       digoxin (DIGOX) 125 MCG tablet Take 125 mcg by mouth daily       donepezil (ARICEPT) 10 MG tablet Take 1 tablet (10 mg) by mouth At Bedtime 90 tablet 3     ferrous sulfate (FEROSUL) 325 (65 Fe) MG tablet Take 1 tablet by mouth 3 times daily (with meals)       levETIRAcetam (KEPPRA) 500 MG tablet Take 1 tablet (500 mg) by mouth 2 times daily 180 tablet 3     lisinopril (PRINIVIL/ZESTRIL) 40 MG tablet Take 40 mg by mouth daily       magnesium oxide (MAG-OX) 400 MG tablet Take 400 mg by mouth daily       metoprolol tartrate 75 MG TABS Take 75 mg by mouth 2 times daily       omeprazole (PRILOSEC) 20 MG DR capsule Take 1 capsule (20 mg total) by mouth daily before breakfast. 90 capsule 0     rosuvastatin (CRESTOR) 40 MG tablet Take 1 tablet (40 mg) by mouth daily 30 tablet 11     sertraline (ZOLOFT) 50 MG tablet Take 1 tablet (50 mg) by  mouth daily 30 tablet 3     trimethoprim (TRIMPEX) 100 MG tablet Take 100 mg by mouth daily            PHYSICAL EXAM     Vital signs  /71 (BP Location: Right arm, Patient Position: Sitting)   Pulse 79   Ht 1.829 m (6')   Wt 82.1 kg (181 lb)   BMI 24.55 kg/m      Weight:   181 lbs 0 oz    Patient is alert and oriented x2 in no acute distress. Vital signs were reviewed and are documented in electronic medical record. Neck was supple, no carotid bruits, thyromegaly, JVD, or lymphadenopathy was noted.   NEUROLOGY EXAM:   Patient is alert and oriented x2 speech was dysarthric.  He is hard of hearing rest of his cranial nerves are intact.  Strength on the right 5/5 on the left 5 -/5 reflexes on the right 1+ on the left 2+ toes equivocal on the left downgoing on the right he has dysmetria on finger-nose testing.  Unable to tandem walk.  He walks with a cane and has a stooped posture.     DIAGNOSTIC STUDIES     PERTINENT RADIOLOGY  Following imaging studies were reviewed:     CT, CTA BRAIN 6/2/2021  HEAD CTA:   1.  Critical flow-limiting atherosclerotic stenosis of the distal M1 segment of the right middle cerebral artery extending into the right middle cerebral artery bifurcation with secondary diminished flow into the right middle cerebral artery territory   distally.     NECK CTA:   1.  Noncritical calcified atherosclerotic stenoses at the origins of the right and left internal carotid arteries as described above.    CT HEAD 1/13/19  1. No evidence of acute intracranial hemorrhage, mass, or herniation.  2. Marked periventricular white matter changes likely due to chronic  microvascular ischemic disease.  3. Chronic appearing lacunar infarct in the ventral right thalamus.  4. Nonspecific small air-fluid layers in the maxillary sinuses  bilaterally. This can be seen in acute sinusitis in the appropriate  clinical setting.  5. Mild soft tissue swelling overlying the left preorbital region. No  underlying orbital  fracture appreciated    MRI BRAIN 6/2/2021  HEAD MRI:   1.  Band of early subacute infarct within the posterior right putamen and extending into centrum semiovale white matter. This corresponds to findings on the previous CT exam. Suggestion of minor petechial blood products in this area inferiorly without   space-occupying hemorrhagic transformation.   2.  Equivocal punctate early subacute infarct within the left corona radiata white matter only visualized on axial diffusion imaging.   3.  Abnormal FLAIR signal within multiple right middle cerebral artery branches within the right MCA territory suggesting slow flow or intravascular thrombus.   4.  Background of mild brain atrophy and advanced presumed microvascular ischemic change.     HEAD MRA:   1.  Findings of abrupt branch occlusion or high-grade flow limiting stenosis within the right M1 middle cerebral artery segment with no appreciable flow related signal within distal right MCA branches. Given the mismatch between this vascular finding and    the extent of infarct on diffusion-weighted imaging consider CTA for further evaluation.   2.  No additional high-grade stenosis, aneurysm, or high flow vascular malformation identified.     MRI BRAIN 3/10/2020  1. No discrete mass lesion, hemorrhage or focal area suggestive of acute infarct.  2. Advanced age related changes along with old lacunar infarcts thalami bilaterally.     MRI BRAIN 7/1/19  1. No acute areas of ischemia, hemorrhage or mass effect.   2. Fairly extensive chronic white matter change.   3. Pansinusitis and bilateral mastoiditis. This may be chronic.     BRAIN PET SCAN  6/10/2016   Fairly uniformly decreased cerebral glucose metabolism is nonspecific, but not typical of a neurodegenerative process. This pattern can be seen with caffeine, alcohol, corticosteroid, or benzodiazepine use and correlation with medication and substance use is requested.     EEG 3/10/2020  This is a normal EEG during  wakefulness and drowsiness except for mild background dysrhythmia. Further clinical correlation is needed.     24 HR EEG 9/10/19  This is a normal awake and sleep 24 hour EEG    ECHOCARDIOGRAM 6/3/2021   1. Normal left ventricular chamber size and systolic function. Estimated left ventricular   ejection fraction is 55%.    2. No regional wall motion abnormalities. Normal left ventricular wall thickness.    3. Severe left atrial enlargement.    4. Moderate mitral annular calcification. Trivial mitral valve regurgitation.    5. Moderate ascending aorta dilatation (4.7 cm).     NEUROPSYCHOLOGICAL TESTING 6/27/2016  suggested neurocognitive disorder     PERTINENT LABS  Following labs were reviewed:  Office Visit on 07/29/2021   Component Date Value     Levetiracetam 07/29/2021 <2.0*   Office Visit - [facility] on 06/22/2021   Component Date Value     WBC 06/22/2021 9.9      RBC Count 06/22/2021 4.08*     Hemoglobin 06/22/2021 12.7*     Hematocrit 06/22/2021 37.7*     MCV 06/22/2021 92      MCH 06/22/2021 31.1      MCHC 06/22/2021 33.7      RDW 06/22/2021 12.8      Platelet Count 06/22/2021 247      Mean Platelet Volume 06/22/2021 9.7      Sodium 06/22/2021 139      Potassium 06/22/2021 4.5      Chloride 06/22/2021 109*     Carbon Dioxide (CO2) 06/22/2021 18*     Anion Gap 06/22/2021 12      Glucose 06/22/2021 138*     Calcium 06/22/2021 8.7      Urea Nitrogen 06/22/2021 14      Creatinine 06/22/2021 0.99      GFR Estimate If Black 06/22/2021 >60      GFR Estimate 06/22/2021 >60          Total time spent for face to face visit, reviewing labs/imaging studies, counseling and coordination of care was: 45 Minutes spent on the date of the encounter doing chart review, review of outside records, review of test results, interpretation of tests, patient visit and documentation       This note was dictated using voice recognition software.  Any grammatical or context distortions are unintentional and inherent to the  software.    No orders of the defined types were placed in this encounter.     New Prescriptions    ROSUVASTATIN (CRESTOR) 40 MG TABLET    Take 1 tablet (40 mg) by mouth daily     Modified Medications    No medications on file

## 2021-08-12 NOTE — NURSING NOTE
Chief Complaint   Patient presents with     Cerebral Vascular Accident (Cva)     Hospital follow up 6/4/21     Ximena Hsu CMA on 8/12/2021 at 1:12 PM

## 2021-08-15 ENCOUNTER — HEALTH MAINTENANCE LETTER (OUTPATIENT)
Age: 76
End: 2021-08-15

## 2021-08-30 ENCOUNTER — OFFICE VISIT (OUTPATIENT)
Dept: FAMILY MEDICINE | Facility: CLINIC | Age: 76
End: 2021-08-30
Payer: COMMERCIAL

## 2021-08-30 VITALS
BODY MASS INDEX: 23.88 KG/M2 | SYSTOLIC BLOOD PRESSURE: 138 MMHG | HEART RATE: 64 BPM | DIASTOLIC BLOOD PRESSURE: 62 MMHG | WEIGHT: 176.1 LBS | OXYGEN SATURATION: 98 %

## 2021-08-30 DIAGNOSIS — R53.83 FATIGUE, UNSPECIFIED TYPE: Primary | ICD-10-CM

## 2021-08-30 LAB
ALBUMIN SERPL-MCNC: 3.5 G/DL (ref 3.5–5)
ALP SERPL-CCNC: 87 U/L (ref 45–120)
ALT SERPL W P-5'-P-CCNC: <9 U/L (ref 0–45)
ANION GAP SERPL CALCULATED.3IONS-SCNC: 10 MMOL/L (ref 5–18)
AST SERPL W P-5'-P-CCNC: 13 U/L (ref 0–40)
BILIRUB SERPL-MCNC: 0.9 MG/DL (ref 0–1)
BUN SERPL-MCNC: 10 MG/DL (ref 8–28)
CALCIUM SERPL-MCNC: 9.3 MG/DL (ref 8.5–10.5)
CHLORIDE BLD-SCNC: 108 MMOL/L (ref 98–107)
CO2 SERPL-SCNC: 23 MMOL/L (ref 22–31)
CREAT SERPL-MCNC: 0.92 MG/DL (ref 0.7–1.3)
ERYTHROCYTE [DISTWIDTH] IN BLOOD BY AUTOMATED COUNT: 12 % (ref 10–15)
GFR SERPL CREATININE-BSD FRML MDRD: 82 ML/MIN/1.73M2
GLUCOSE BLD-MCNC: 108 MG/DL (ref 70–125)
HCT VFR BLD AUTO: 34.8 % (ref 40–53)
HGB BLD-MCNC: 11.9 G/DL (ref 13.3–17.7)
MCH RBC QN AUTO: 31.2 PG (ref 26.5–33)
MCHC RBC AUTO-ENTMCNC: 34.2 G/DL (ref 31.5–36.5)
MCV RBC AUTO: 91 FL (ref 78–100)
PLATELET # BLD AUTO: 203 10E3/UL (ref 150–450)
POTASSIUM BLD-SCNC: 3.8 MMOL/L (ref 3.5–5)
PROT SERPL-MCNC: 6.8 G/DL (ref 6–8)
RBC # BLD AUTO: 3.82 10E6/UL (ref 4.4–5.9)
SODIUM SERPL-SCNC: 141 MMOL/L (ref 136–145)
TSH SERPL DL<=0.005 MIU/L-ACNC: 2.26 UIU/ML (ref 0.3–5)
WBC # BLD AUTO: 6.9 10E3/UL (ref 4–11)

## 2021-08-30 PROCEDURE — 36415 COLL VENOUS BLD VENIPUNCTURE: CPT | Performed by: FAMILY MEDICINE

## 2021-08-30 PROCEDURE — 85027 COMPLETE CBC AUTOMATED: CPT | Mod: 59 | Performed by: FAMILY MEDICINE

## 2021-08-30 PROCEDURE — 99214 OFFICE O/P EST MOD 30 MIN: CPT | Performed by: FAMILY MEDICINE

## 2021-08-30 PROCEDURE — 85025 COMPLETE CBC W/AUTO DIFF WBC: CPT | Performed by: FAMILY MEDICINE

## 2021-08-30 PROCEDURE — 80053 COMPREHEN METABOLIC PANEL: CPT | Performed by: FAMILY MEDICINE

## 2021-08-30 PROCEDURE — 84443 ASSAY THYROID STIM HORMONE: CPT | Performed by: FAMILY MEDICINE

## 2021-08-31 LAB
BASOPHILS # BLD AUTO: 0.1 10E3/UL (ref 0–0.2)
BASOPHILS NFR BLD AUTO: 1 %
EOSINOPHIL # BLD AUTO: 0.1 10E3/UL (ref 0–0.7)
EOSINOPHIL NFR BLD AUTO: 2 %
ERYTHROCYTE [DISTWIDTH] IN BLOOD BY AUTOMATED COUNT: 12 % (ref 10–15)
HCT VFR BLD AUTO: 35.2 % (ref 40–53)
HGB BLD-MCNC: 12 G/DL (ref 13.3–17.7)
IMM GRANULOCYTES # BLD: 0 10E3/UL
IMM GRANULOCYTES NFR BLD: 0 %
LYMPHOCYTES # BLD AUTO: 1.5 10E3/UL (ref 0.8–5.3)
LYMPHOCYTES NFR BLD AUTO: 21 %
MCH RBC QN AUTO: 31.4 PG (ref 26.5–33)
MCHC RBC AUTO-ENTMCNC: 34.1 G/DL (ref 31.5–36.5)
MCV RBC AUTO: 92 FL (ref 78–100)
MONOCYTES # BLD AUTO: 0.6 10E3/UL (ref 0–1.3)
MONOCYTES NFR BLD AUTO: 9 %
NEUTROPHILS # BLD AUTO: 4.6 10E3/UL (ref 1.6–8.3)
NEUTROPHILS NFR BLD AUTO: 67 %
PLATELET # BLD AUTO: 214 10E3/UL (ref 150–450)
RBC # BLD AUTO: 3.82 10E6/UL (ref 4.4–5.9)
WBC # BLD AUTO: 6.9 10E3/UL (ref 4–11)

## 2021-09-02 DIAGNOSIS — G30.1 LATE ONSET ALZHEIMER'S DISEASE WITHOUT BEHAVIORAL DISTURBANCE (H): ICD-10-CM

## 2021-09-02 DIAGNOSIS — F02.80 LATE ONSET ALZHEIMER'S DISEASE WITHOUT BEHAVIORAL DISTURBANCE (H): ICD-10-CM

## 2021-09-02 DIAGNOSIS — F33.41 RECURRENT MAJOR DEPRESSIVE DISORDER, IN PARTIAL REMISSION (H): Primary | ICD-10-CM

## 2021-09-03 NOTE — TELEPHONE ENCOUNTER
Disp Refills Start End DIANA    OLANZapine zydis (ZYPREXA ZYDIS) 5 MG disintegrating tablet 30 tablet 0 6/10/2021  No   Sig: PLACE 1 tablet (5 mg total) ON THE TONGUE at bedtime.   Sent to pharmacy as: OLANZapine 5 mg disintegrating tablet (ZyPREXA Zydis)   E-Prescribing Status: Receipt confirmed by pharmacy (6/10/2021 10:49 AM CDT)   OLANZapine zydis (ZYPREXA ZYDIS) 5 MG disintegrating tablet [[id number]]    Electronically signed by: Patricia Muro MD on 06/10/21 1049 Status: Active   Ordering user: Patricia Muro MD 06/10/21 1049 Authorized by: Patricia Muro MD   Frequency:  06/10/21 - Until Discontinued Released by: Patricia Muro MD 06/10/21 1049   Diagnoses  Late onset Alzheimer's disease without behavioral disturbance (H) [G30.1, F02.80]       Routing refill request to provider for review/approval because:  Labs not current:  Lipid panel    Last Written Prescription Date:  6/10/21  Last Fill Quantity: 30,  # refills: 0   Last office visit provider:  8/30/21     Requested Prescriptions   Pending Prescriptions Disp Refills     OLANZapine zydis (ZYPREXA) 5 MG ODT [Pharmacy Med Name: OLANZapine Oral Tablet Disintegrating 5 MG] 30 tablet 0     Sig: PLACE 1 tablet (5 mg total) ON THE TONGUE at bedtime.       Antipsychotic Medications Failed - 9/2/2021  7:46 PM        Failed - Lipid panel on file within the past 12 months     Recent Labs   Lab Test 07/31/18  1331   CHOL 127   TRIG 62   HDL 41   LDL 74               Failed - Medication is active on med list        Passed - Blood pressure under 140/90 in past 12 months     BP Readings from Last 3 Encounters:   08/30/21 138/62   08/12/21 127/71   07/29/21 106/62                 Passed - Patient is 12 years of age or older        Passed - CBC on file in past 12 months     Recent Labs   Lab Test 08/30/21  1611   WBC 6.9  6.9   RBC 3.82*  3.82*   HGB 12.0*  11.9*   HCT 35.2*  34.8*     203                 Passed - Heart Rate on file within  "past 12 months     Pulse Readings from Last 3 Encounters:   08/30/21 64   08/12/21 79   07/29/21 60               Passed - A1c or Glucose on file in past 12 months     Recent Labs   Lab Test 08/30/21  1611 05/06/19  0738 05/05/19  1753      < >  --    A1C  --   --  5.8*    < > = values in this interval not displayed.       Please review patients last 3 weights. If a weight gain of >10 lbs exists, you may refill the prescription once after instructing the patient to schedule an appointment within the next 30 days.    Wt Readings from Last 3 Encounters:   08/30/21 79.9 kg (176 lb 1.6 oz)   08/12/21 82.1 kg (181 lb)   07/29/21 82.3 kg (181 lb 8 oz)             Passed - Recent (6 mo) or future (30 days) visit within the authorizing provider's specialty     Patient had office visit in the last 6 months or has a visit in the next 30 days with authorizing provider or within the authorizing provider's specialty.  See \"Patient Info\" tab in inbasket, or \"Choose Columns\" in Meds & Orders section of the refill encounter.                 Emery Lynn RN 09/03/21 2:02 PM  "

## 2021-09-03 NOTE — PROGRESS NOTES
Assessment & Plan   Problem List Items Addressed This Visit     None      Visit Diagnoses     Fatigue, unspecified type    -  Primary    Relevant Orders    CBC with platelets (Completed)    Comprehensive metabolic panel (BMP + Alb, Alk Phos, ALT, AST, Total. Bili, TP) (Completed)    TSH with free T4 reflex (Completed)    CBC with platelets and differential (Completed)      Unclear etiology for his extreme fatigue.  It certainly could be due to his worsening dementia and general decline.  His labs did come back positive for some mild anemia.  We are adding ferrous sulfate.  Encourage gradual increase in activities.  Plan follow-up within 2 to 3 months for recheck, sooner if needed.  He continues to have anemia, will consider referral for colonoscopy given his recent GI symptoms.  They have been discussing higher levels of care including assisted living which I believe may be appropriate at this time.  If of any other questions or concerns.      Return in about 3 months (around 11/30/2021).    Patricia Muro MD  [facility]   Harpreet is a 76 year old who presents for the following health issues  accompanied by his Danny, his wife son who lives with them:    HPI   Harpreet continues to be quite fatigued.  He is sleeping a full night and then also sleep during the daytime.  This is uncharacteristic for him.  His memory has become an increasing issue.  He was recently having some diarrhea symptoms but that actually seems to be improving at this point time.  Never noticed any blood in his stools.  He is on some new medications due to his episodes of unresponsiveness not to be due to seizures.  Wondering if these are also contributing to his fatigue.  He is lost a small amount of weight but now seems to be improving with his diet.  He still is not eating as much as he typically would however.  Harpreet is a poor historian and majority of history comes from Danny.  Typically his wife Melva is  here with us that she is unable to be here today.      Review of Systems   Constitutional, HEENT, cardiovascular, pulmonary, gi and gu systems are negative, except as otherwise noted.      Objective    /62 (BP Location: Left arm, Patient Position: Sitting, Cuff Size: Adult Regular)   Pulse 64   Wt 79.9 kg (176 lb 1.6 oz)   SpO2 98%   BMI 23.88 kg/m    Body mass index is 23.88 kg/m .  Physical Exam   GENERAL: healthy, alert and no distress  NECK: no adenopathy, no asymmetry, masses, or scars and thyroid normal to palpation  RESP: lungs clear to auscultation - no rales, rhonchi or wheezes  CV: regular rate and rhythm, normal S1 S2, no S3 or S4, no murmur, click or rub, no peripheral edema and peripheral pulses strong  ABDOMEN: soft, nontender, no hepatosplenomegaly, no masses and bowel sounds normal  MS: no gross musculoskeletal defects noted, no edema    Results for orders placed or performed in visit on 08/30/21   CBC with platelets     Status: Abnormal   Result Value Ref Range    WBC Count 6.9 4.0 - 11.0 10e3/uL    RBC Count 3.82 (L) 4.40 - 5.90 10e6/uL    Hemoglobin 11.9 (L) 13.3 - 17.7 g/dL    Hematocrit 34.8 (L) 40.0 - 53.0 %    MCV 91 78 - 100 fL    MCH 31.2 26.5 - 33.0 pg    MCHC 34.2 31.5 - 36.5 g/dL    RDW 12.0 10.0 - 15.0 %    Platelet Count 203 150 - 450 10e3/uL   Comprehensive metabolic panel (BMP + Alb, Alk Phos, ALT, AST, Total. Bili, TP)     Status: Abnormal   Result Value Ref Range    Sodium 141 136 - 145 mmol/L    Potassium 3.8 3.5 - 5.0 mmol/L    Chloride 108 (H) 98 - 107 mmol/L    Carbon Dioxide (CO2) 23 22 - 31 mmol/L    Anion Gap 10 5 - 18 mmol/L    Urea Nitrogen 10 8 - 28 mg/dL    Creatinine 0.92 0.70 - 1.30 mg/dL    Calcium 9.3 8.5 - 10.5 mg/dL    Glucose 108 70 - 125 mg/dL    Alkaline Phosphatase 87 45 - 120 U/L    AST 13 0 - 40 U/L    ALT <9 0 - 45 U/L    Protein Total 6.8 6.0 - 8.0 g/dL    Albumin 3.5 3.5 - 5.0 g/dL    Bilirubin Total 0.9 0.0 - 1.0 mg/dL    GFR Estimate 82 >60  mL/min/1.73m2   TSH with free T4 reflex     Status: Normal   Result Value Ref Range    TSH 2.26 0.30 - 5.00 uIU/mL   CBC with platelets and differential     Status: Abnormal    Narrative    The following orders were created for panel order CBC with platelets and differential.  Procedure                               Abnormality         Status                     ---------                               -----------         ------                     CBC with platelets and d...[[id number]]  Abnormal            Final result                 Please view results for these tests on the individual orders.   CBC with platelets and differential     Status: Abnormal   Result Value Ref Range    WBC Count 6.9 4.0 - 11.0 10e3/uL    RBC Count 3.82 (L) 4.40 - 5.90 10e6/uL    Hemoglobin 12.0 (L) 13.3 - 17.7 g/dL    Hematocrit 35.2 (L) 40.0 - 53.0 %    MCV 92 78 - 100 fL    MCH 31.4 26.5 - 33.0 pg    MCHC 34.1 31.5 - 36.5 g/dL    RDW 12.0 10.0 - 15.0 %    Platelet Count 214 150 - 450 10e3/uL    % Neutrophils 67 %    % Lymphocytes 21 %    % Monocytes 9 %    % Eosinophils 2 %    % Basophils 1 %    % Immature Granulocytes 0 %    Absolute Neutrophils 4.6 1.6 - 8.3 10e3/uL    Absolute Lymphocytes 1.5 0.8 - 5.3 10e3/uL    Absolute Monocytes 0.6 0.0 - 1.3 10e3/uL    Absolute Eosinophils 0.1 0.0 - 0.7 10e3/uL    Absolute Basophils 0.1 0.0 - 0.2 10e3/uL    Absolute Immature Granulocytes 0.0 <=0.0 10e3/uL

## 2021-09-07 RX ORDER — OLANZAPINE 5 MG/1
TABLET, ORALLY DISINTEGRATING ORAL
Qty: 30 TABLET | Refills: 0 | Status: SHIPPED | OUTPATIENT
Start: 2021-09-07 | End: 2021-10-05

## 2021-09-24 NOTE — PLAN OF CARE
A/O to self, garbled, rambling speech, inconsistent with commands. Restless throughout night, quiet environment promoted, impulsive at times, sitter at bedside. AVSS on RA except HTN, scheduled lopressor given. T-max 100.6, suppository tylenon give. Tele Afib w/CVR and PVC. Denies pain. LS diminshed, clear, labored breathing at times. Lower R abdominal eccymosis. T/R q2h, incontinent of bladder and bowel. NPO. BG check q4h, 106, 107. Speech following. Will continue to monitor.     4 weeks or until cleared by your doctor

## 2021-09-28 ENCOUNTER — TRANSFERRED RECORDS (OUTPATIENT)
Dept: HEALTH INFORMATION MANAGEMENT | Facility: CLINIC | Age: 76
End: 2021-09-28

## 2021-09-29 ENCOUNTER — TRANSFERRED RECORDS (OUTPATIENT)
Dept: HEALTH INFORMATION MANAGEMENT | Facility: CLINIC | Age: 76
End: 2021-09-29

## 2021-10-05 DIAGNOSIS — F33.41 RECURRENT MAJOR DEPRESSIVE DISORDER, IN PARTIAL REMISSION (H): ICD-10-CM

## 2021-10-05 DIAGNOSIS — F02.80 LATE ONSET ALZHEIMER'S DISEASE WITHOUT BEHAVIORAL DISTURBANCE (H): ICD-10-CM

## 2021-10-05 DIAGNOSIS — G30.1 LATE ONSET ALZHEIMER'S DISEASE WITHOUT BEHAVIORAL DISTURBANCE (H): ICD-10-CM

## 2021-10-05 RX ORDER — OLANZAPINE 5 MG/1
TABLET, ORALLY DISINTEGRATING ORAL
Qty: 30 TABLET | Refills: 0 | Status: SHIPPED | OUTPATIENT
Start: 2021-10-05

## 2021-10-05 NOTE — TELEPHONE ENCOUNTER
Routing refill request to provider for review/approval because:  Labs not current:  Lipid panel.     Last Written Prescription Date:  9/7/21  Last Fill Quantity: 30,  # refills: 0   Last office visit provider:  8/30/21     Requested Prescriptions   Pending Prescriptions Disp Refills     OLANZapine zydis (ZYPREXA) 5 MG ODT [Pharmacy Med Name: OLANZapine Oral Tablet Disintegrating 5 MG] 30 tablet 0     Sig: PLACE 1 tablet (5 mg total) ON THE TONGUE at bedtime.       Antipsychotic Medications Failed - 10/5/2021  2:02 AM        Failed - Lipid panel on file within the past 12 months     Recent Labs   Lab Test 07/31/18  1331   CHOL 127   TRIG 62   HDL 41   LDL 74               Passed - Blood pressure under 140/90 in past 12 months     BP Readings from Last 3 Encounters:   08/30/21 138/62   08/12/21 127/71   07/29/21 106/62                 Passed - Patient is 12 years of age or older        Passed - CBC on file in past 12 months     Recent Labs   Lab Test 08/30/21  1611   WBC 6.9  6.9   RBC 3.82*  3.82*   HGB 12.0*  11.9*   HCT 35.2*  34.8*     203                 Passed - Heart Rate on file within past 12 months     Pulse Readings from Last 3 Encounters:   08/30/21 64   08/12/21 79   07/29/21 60               Passed - A1c or Glucose on file in past 12 months     Recent Labs   Lab Test 08/30/21  1611 05/06/19  0738 05/05/19  1753      < >  --    A1C  --   --  5.8*    < > = values in this interval not displayed.       Please review patients last 3 weights. If a weight gain of >10 lbs exists, you may refill the prescription once after instructing the patient to schedule an appointment within the next 30 days.    Wt Readings from Last 3 Encounters:   08/30/21 79.9 kg (176 lb 1.6 oz)   08/12/21 82.1 kg (181 lb)   07/29/21 82.3 kg (181 lb 8 oz)             Passed - Medication is active on med list        Passed - Recent (6 mo) or future (30 days) visit within the authorizing provider's specialty     Patient  "had office visit in the last 6 months or has a visit in the next 30 days with authorizing provider or within the authorizing provider's specialty.  See \"Patient Info\" tab in inbasket, or \"Choose Columns\" in Meds & Orders section of the refill encounter.                 Marlo Flores RN 10/05/21 1:12 PM  "

## 2021-10-10 ENCOUNTER — HEALTH MAINTENANCE LETTER (OUTPATIENT)
Age: 76
End: 2021-10-10

## 2022-03-07 NOTE — TELEPHONE ENCOUNTER
RN cannot approve Refill Request    RN can NOT refill this medication med is not covered by policy/route to provider. Last office visit: Visit date not found Last Physical: Visit date not found Last MTM visit: Visit date not found Last visit same specialty: 6/26/2020 Griffin Dunbar MD.  Next visit within 3 mo: Visit date not found  Next physical within 3 mo: Visit date not found      Angelina Pacheco, [facility] Triage/Med Refill 10/18/2020    Requested Prescriptions   Pending Prescriptions Disp Refills     magnesium oxide (MAG-OX) 400 mg (241.3 mg magnesium) tablet [Pharmacy Med Name: Magnesium Oxide Oral Tablet 400 MG] 100 tablet 0     Sig: TAKE 1 TABLET (400 MG TOTAL) BY MOUTH DAILY.       There is no refill protocol information for this order           
ingestion

## 2022-09-18 ENCOUNTER — HEALTH MAINTENANCE LETTER (OUTPATIENT)
Age: 77
End: 2022-09-18

## 2023-10-08 ENCOUNTER — HEALTH MAINTENANCE LETTER (OUTPATIENT)
Age: 78
End: 2023-10-08

## 2024-05-10 NOTE — TELEPHONE ENCOUNTER
Refill Approved    Rx renewed per Medication Renewal Policy. Medication was last renewed on 3/12/20, last OV 6/26/20.    Haley Roque, [facility] Triage/Med Refill 7/25/2020     Requested Prescriptions   Pending Prescriptions Disp Refills     KLOR-CON M20 20 mEq tablet [Pharmacy Med Name: Klor-Con M20 Oral Tablet Extended Release 20 MEQ] 270 tablet 0     Sig: Take 1 tablet by mouth 3 times a day.       Potassium Supplements Refill Protocol Passed - 7/23/2020  7:02 AM        Passed - PCP or prescribing provider visit in past 12 months       Last office visit with prescriber/PCP: Visit date not found OR same dept: 6/26/2020 Griffin Dunbar MD OR same specialty: 6/26/2020 Griffin Dunbar MD  Last physical: Visit date not found Last MTM visit: Visit date not found   Next visit within 3 mo: Visit date not found  Next physical within 3 mo: Visit date not found  Prescriber OR PCP: Cheryle Alfred MD  Last diagnosis associated with med order: 1. Hypokalemia  - KLOR-CON M20 20 mEq tablet [Pharmacy Med Name: Klor-Con M20 Oral Tablet Extended Release 20 MEQ]; Take 1 tablet by mouth 3 times a day.  Dispense: 270 tablet; Refill: 0    If protocol passes may refill for 12 months if within 3 months of last provider visit (or a total of 15 months).             Passed - Potassium level in last 12 months     Lab Results   Component Value Date    Potassium 3.6 07/10/2020                             Tired calling the pt, no answer. LVM to return call

## 2024-12-10 NOTE — PROGRESS NOTES
Detail Level: Detailed [facility]    Facility:   [facility] [[id number]]   Code Status: POLST AVAILABLE    Reassessment 75-year-old male admitted to hospital with decreased mentation and behavioral abnormalities, history of dementia, atrial fibrillation, hypertension, depression and anxiety. Evaluated by neurology and psychiatry in hospital, initiated on Zyprexa at  HS with PRN dosing available, trazodone at HS for insomnia, Zoloft dose increased, metoprolol dose increased with increased blood pressure recordings, transferred to TCU for rehabilitation and management of medical problems.    Review of systems: denies focal neurologic deficits. Denies agitation hallucinations, paranoia or depression. States he is tired, appetite adequate, participating in physical therapy. Sleeping frequently during the day, prefers to be in bed. No cardiac or pulmonary symptoms. Remainder of 12 point review of systems obtained negative.    Exam: pleasant male lying supine in bed in no apparent distress. Blood pressure 142/68, pulse 77 and irregular, respiratory 16, 02 95%. Conversant, no facial asymmetry, no pharyngeal erythema. No HJR. S1 and S2 irregular with 1/6 systolic murmur. Pulmonary exam without rales rhonchi or wheezes. Abdomen without masses tenderness organomegaly. Strength symmetrical and diminished upper and lower extremities. No hand drift. No calf tenderness or swelling. Joints without acute inflammatory change.    Impression and plan:   hypertension on metoprolol 75 mg b.i.d., satisfactory control of blood pressure.   Chronic deconditioning, continue to encourage activities in physical therapy, patient reports no change in strength since admission to TCU.   History of seizure disorder without seizure activity.   Dementia without current behavioral abnormalities as  reported in hospital, tolerating Zyprexa with resolution of abnormal behavior.   Anxiety and depression with mood stable.    Add 66343 Cpt? (Important Note: In 2017 The Use Of 27308 Is Being Tracked By Cms To Determine Future Global Period Reimbursement For Global Periods): no Issues reviewed with patient and nursing staff.   Medications reviewed.      Electronically signed by: Nabila Landry MD     Wound Evaluated By (Optional): ALEX Coronado Wound Diameter In Cm(Optional): 9.5 Wound Crusting?: clean Wound Discharge?: minimal discharge Sutures?: intact Wound Edema?: moderate Wound Color?: pink Wound Bruising?: mild Lymphadenopathy?: absent

## 2025-04-11 NOTE — TELEPHONE ENCOUNTER
Copied from CRM #[id number]. Topic: MW Schedule Appointment  >> Apr 11, 2025  5:13 PM Nasima WALKER wrote:  [facility]ara called to schedule, cancel or reschedule a Primary Care or Specialty Clinician visit.   Request to cancel or reschedule original Appointment time, Surgery or Procedure for a Specialist. Followed Instructions in 'Other Scheduling Instructions' on KB clinician card.-- DO NOT REPLY / DO NOT REPLY ALL --  -- This inbox is not monitored. If this was sent to the wrong provider or department, reroute message to  EpiEPoute pool. --  -- Message is from Engagement Center Operations (ECO) --    General Patient Message: patient called to cancel 4/21 procedure for later time of month - please call to assist detailed messaaes may be left w no response    Caller Information       Contact Date/Time Type Contact Phone/Fax    04/11/2025 05:08 PM CDT Phone (Incoming) zara [phone]            Alternative phone number: none    Can a detailed message be left? Yes - Voicemail   Patient has been advised the message will be addressed within 2-3 business days.                 Ok for orders as stated